# Patient Record
Sex: FEMALE | Race: WHITE | NOT HISPANIC OR LATINO | Employment: OTHER
[De-identification: names, ages, dates, MRNs, and addresses within clinical notes are randomized per-mention and may not be internally consistent; named-entity substitution may affect disease eponyms.]

---

## 2017-10-24 ENCOUNTER — RECORDS - HEALTHEAST (OUTPATIENT)
Dept: ADMINISTRATIVE | Facility: OTHER | Age: 82
End: 2017-10-24

## 2017-11-01 ENCOUNTER — RECORDS - HEALTHEAST (OUTPATIENT)
Dept: ADMINISTRATIVE | Facility: OTHER | Age: 82
End: 2017-11-01

## 2017-11-03 ENCOUNTER — AMBULATORY - HEALTHEAST (OUTPATIENT)
Dept: SURGERY | Facility: CLINIC | Age: 82
End: 2017-11-03

## 2017-11-03 ENCOUNTER — RECORDS - HEALTHEAST (OUTPATIENT)
Dept: ADMINISTRATIVE | Facility: OTHER | Age: 82
End: 2017-11-03

## 2017-11-03 DIAGNOSIS — C50.919 MALIGNANT NEOPLASM OF BREAST (FEMALE) (H): ICD-10-CM

## 2017-11-07 ENCOUNTER — OFFICE VISIT - HEALTHEAST (OUTPATIENT)
Dept: SURGERY | Facility: CLINIC | Age: 82
End: 2017-11-07

## 2017-11-07 DIAGNOSIS — C50.912 BILATERAL MALIGNANT NEOPLASM OF BREAST IN FEMALE, ESTROGEN RECEPTOR POSITIVE, UNSPECIFIED SITE OF BREAST (H): ICD-10-CM

## 2017-11-07 DIAGNOSIS — C50.911 BILATERAL MALIGNANT NEOPLASM OF BREAST IN FEMALE, ESTROGEN RECEPTOR POSITIVE, UNSPECIFIED SITE OF BREAST (H): ICD-10-CM

## 2017-11-07 DIAGNOSIS — Z17.0 BILATERAL MALIGNANT NEOPLASM OF BREAST IN FEMALE, ESTROGEN RECEPTOR POSITIVE, UNSPECIFIED SITE OF BREAST (H): ICD-10-CM

## 2017-11-07 RX ORDER — LISINOPRIL/HYDROCHLOROTHIAZIDE 10-12.5 MG
0.5-0.75 TABLET ORAL DAILY PRN
Status: SHIPPED | COMMUNITY
Start: 2017-09-20 | End: 2021-12-20

## 2017-11-07 ASSESSMENT — MIFFLIN-ST. JEOR: SCORE: 818.7

## 2017-11-22 ENCOUNTER — AMBULATORY - HEALTHEAST (OUTPATIENT)
Dept: SURGERY | Facility: CLINIC | Age: 82
End: 2017-11-22

## 2017-11-22 DIAGNOSIS — Z17.0 BILATERAL MALIGNANT NEOPLASM OF BREAST IN FEMALE, ESTROGEN RECEPTOR POSITIVE, UNSPECIFIED SITE OF BREAST (H): ICD-10-CM

## 2017-11-22 DIAGNOSIS — C50.912 BILATERAL MALIGNANT NEOPLASM OF BREAST IN FEMALE, ESTROGEN RECEPTOR POSITIVE, UNSPECIFIED SITE OF BREAST (H): ICD-10-CM

## 2017-11-22 DIAGNOSIS — C50.911 BILATERAL MALIGNANT NEOPLASM OF BREAST IN FEMALE, ESTROGEN RECEPTOR POSITIVE, UNSPECIFIED SITE OF BREAST (H): ICD-10-CM

## 2017-11-24 ENCOUNTER — COMMUNICATION - HEALTHEAST (OUTPATIENT)
Dept: SURGERY | Facility: CLINIC | Age: 82
End: 2017-11-24

## 2018-03-19 ENCOUNTER — RECORDS - HEALTHEAST (OUTPATIENT)
Dept: LAB | Facility: CLINIC | Age: 83
End: 2018-03-19

## 2018-03-19 LAB
ALBUMIN SERPL-MCNC: 3.7 G/DL (ref 3.5–5)
ALP SERPL-CCNC: 147 U/L (ref 45–120)
ALT SERPL W P-5'-P-CCNC: 20 U/L (ref 0–45)
ANION GAP SERPL CALCULATED.3IONS-SCNC: 9 MMOL/L (ref 5–18)
AST SERPL W P-5'-P-CCNC: 22 U/L (ref 0–40)
BILIRUB SERPL-MCNC: 0.9 MG/DL (ref 0–1)
BUN SERPL-MCNC: 45 MG/DL (ref 8–28)
CALCIUM SERPL-MCNC: 9.4 MG/DL (ref 8.5–10.5)
CHLORIDE BLD-SCNC: 105 MMOL/L (ref 98–107)
CO2 SERPL-SCNC: 28 MMOL/L (ref 22–31)
CREAT SERPL-MCNC: 1.64 MG/DL (ref 0.6–1.1)
GFR SERPL CREATININE-BSD FRML MDRD: 30 ML/MIN/1.73M2
GLUCOSE BLD-MCNC: 106 MG/DL (ref 70–125)
POTASSIUM BLD-SCNC: 4.8 MMOL/L (ref 3.5–5)
PROT SERPL-MCNC: 7.1 G/DL (ref 6–8)
SODIUM SERPL-SCNC: 142 MMOL/L (ref 136–145)
TSH SERPL DL<=0.005 MIU/L-ACNC: 0.3 UIU/ML (ref 0.3–5)

## 2018-03-30 ENCOUNTER — COMMUNICATION - HEALTHEAST (OUTPATIENT)
Dept: SCHEDULING | Facility: CLINIC | Age: 83
End: 2018-03-30

## 2018-06-29 ENCOUNTER — HOSPITAL ENCOUNTER (OUTPATIENT)
Dept: SURGERY | Facility: CLINIC | Age: 83
Discharge: HOME | End: 2018-06-29
Attending: SPECIALIST

## 2018-06-29 DIAGNOSIS — C50.811 MALIGNANT NEOPLASM OF OVERLAPPING SITES OF BOTH BREASTS IN FEMALE, ESTROGEN RECEPTOR POSITIVE (H): ICD-10-CM

## 2018-06-29 DIAGNOSIS — C50.911 BILATERAL MALIGNANT NEOPLASM OF BREAST IN FEMALE, ESTROGEN RECEPTOR POSITIVE, UNSPECIFIED SITE OF BREAST (H): ICD-10-CM

## 2018-06-29 DIAGNOSIS — Z17.0 MALIGNANT NEOPLASM OF OVERLAPPING SITES OF BOTH BREASTS IN FEMALE, ESTROGEN RECEPTOR POSITIVE (H): ICD-10-CM

## 2018-06-29 DIAGNOSIS — C50.912 BILATERAL MALIGNANT NEOPLASM OF BREAST IN FEMALE, ESTROGEN RECEPTOR POSITIVE, UNSPECIFIED SITE OF BREAST (H): ICD-10-CM

## 2018-06-29 DIAGNOSIS — C50.812 MALIGNANT NEOPLASM OF OVERLAPPING SITES OF BOTH BREASTS IN FEMALE, ESTROGEN RECEPTOR POSITIVE (H): ICD-10-CM

## 2018-06-29 DIAGNOSIS — Z17.0 BILATERAL MALIGNANT NEOPLASM OF BREAST IN FEMALE, ESTROGEN RECEPTOR POSITIVE, UNSPECIFIED SITE OF BREAST (H): ICD-10-CM

## 2018-06-29 ASSESSMENT — MIFFLIN-ST. JEOR: SCORE: 823.24

## 2018-09-04 ENCOUNTER — HOSPITAL ENCOUNTER (OUTPATIENT)
Dept: SURGERY | Facility: CLINIC | Age: 83
Discharge: HOME | End: 2018-09-04
Attending: SPECIALIST

## 2018-09-04 DIAGNOSIS — Z17.0 MALIGNANT NEOPLASM OF OVERLAPPING SITES OF BOTH BREASTS IN FEMALE, ESTROGEN RECEPTOR POSITIVE (H): ICD-10-CM

## 2018-09-04 DIAGNOSIS — C50.911 BILATERAL MALIGNANT NEOPLASM OF BREAST IN FEMALE, ESTROGEN RECEPTOR POSITIVE, UNSPECIFIED SITE OF BREAST (H): ICD-10-CM

## 2018-09-04 DIAGNOSIS — C50.912 BILATERAL MALIGNANT NEOPLASM OF BREAST IN FEMALE, ESTROGEN RECEPTOR POSITIVE, UNSPECIFIED SITE OF BREAST (H): ICD-10-CM

## 2018-09-04 DIAGNOSIS — C50.812 MALIGNANT NEOPLASM OF OVERLAPPING SITES OF BOTH BREASTS IN FEMALE, ESTROGEN RECEPTOR POSITIVE (H): ICD-10-CM

## 2018-09-04 DIAGNOSIS — Z17.0 BILATERAL MALIGNANT NEOPLASM OF BREAST IN FEMALE, ESTROGEN RECEPTOR POSITIVE, UNSPECIFIED SITE OF BREAST (H): ICD-10-CM

## 2018-09-04 DIAGNOSIS — C50.811 MALIGNANT NEOPLASM OF OVERLAPPING SITES OF BOTH BREASTS IN FEMALE, ESTROGEN RECEPTOR POSITIVE (H): ICD-10-CM

## 2018-09-19 ENCOUNTER — RECORDS - HEALTHEAST (OUTPATIENT)
Dept: LAB | Facility: CLINIC | Age: 83
End: 2018-09-19

## 2018-09-19 LAB
CHOLEST SERPL-MCNC: 165 MG/DL
FASTING STATUS PATIENT QL REPORTED: NORMAL
HDLC SERPL-MCNC: 65 MG/DL
LDLC SERPL CALC-MCNC: 81 MG/DL
T4 FREE SERPL-MCNC: 1.1 NG/DL (ref 0.7–1.8)
TRIGL SERPL-MCNC: 97 MG/DL
TSH SERPL DL<=0.005 MIU/L-ACNC: 0.28 UIU/ML (ref 0.3–5)

## 2019-03-05 ENCOUNTER — HOSPITAL ENCOUNTER (OUTPATIENT)
Dept: SURGERY | Facility: CLINIC | Age: 84
Discharge: HOME | End: 2019-03-05
Attending: SPECIALIST

## 2019-03-05 DIAGNOSIS — C50.812 MALIGNANT NEOPLASM OF OVERLAPPING SITES OF BOTH BREASTS IN FEMALE, ESTROGEN RECEPTOR POSITIVE (H): ICD-10-CM

## 2019-03-05 DIAGNOSIS — C50.811 MALIGNANT NEOPLASM OF OVERLAPPING SITES OF BOTH BREASTS IN FEMALE, ESTROGEN RECEPTOR POSITIVE (H): ICD-10-CM

## 2019-03-05 DIAGNOSIS — Z17.0 MALIGNANT NEOPLASM OF OVERLAPPING SITES OF BOTH BREASTS IN FEMALE, ESTROGEN RECEPTOR POSITIVE (H): ICD-10-CM

## 2019-03-05 ASSESSMENT — MIFFLIN-ST. JEOR: SCORE: 823.24

## 2019-03-20 ENCOUNTER — RECORDS - HEALTHEAST (OUTPATIENT)
Dept: LAB | Facility: CLINIC | Age: 84
End: 2019-03-20

## 2019-03-20 LAB
ALBUMIN SERPL-MCNC: 3.9 G/DL (ref 3.5–5)
ALP SERPL-CCNC: 96 U/L (ref 45–120)
ALT SERPL W P-5'-P-CCNC: 23 U/L (ref 0–45)
ANION GAP SERPL CALCULATED.3IONS-SCNC: 10 MMOL/L (ref 5–18)
AST SERPL W P-5'-P-CCNC: 19 U/L (ref 0–40)
BILIRUB SERPL-MCNC: 0.7 MG/DL (ref 0–1)
BUN SERPL-MCNC: 45 MG/DL (ref 8–28)
CALCIUM SERPL-MCNC: 9.3 MG/DL (ref 8.5–10.5)
CHLORIDE BLD-SCNC: 107 MMOL/L (ref 98–107)
CO2 SERPL-SCNC: 27 MMOL/L (ref 22–31)
CREAT SERPL-MCNC: 2.07 MG/DL (ref 0.6–1.1)
ERYTHROCYTE [SEDIMENTATION RATE] IN BLOOD BY WESTERGREN METHOD: 9 MM/HR (ref 0–20)
GFR SERPL CREATININE-BSD FRML MDRD: 23 ML/MIN/1.73M2
GLUCOSE BLD-MCNC: 130 MG/DL (ref 70–125)
POTASSIUM BLD-SCNC: 5 MMOL/L (ref 3.5–5)
PROT SERPL-MCNC: 7.2 G/DL (ref 6–8)
SODIUM SERPL-SCNC: 144 MMOL/L (ref 136–145)
T4 FREE SERPL-MCNC: 1.1 NG/DL (ref 0.7–1.8)
TSH SERPL DL<=0.005 MIU/L-ACNC: 0.13 UIU/ML (ref 0.3–5)

## 2019-04-05 ENCOUNTER — RECORDS - HEALTHEAST (OUTPATIENT)
Dept: LAB | Facility: CLINIC | Age: 84
End: 2019-04-05

## 2019-04-05 LAB — T3FREE SERPL-MCNC: 3.3 PG/ML (ref 1.9–3.9)

## 2019-08-23 ENCOUNTER — COMMUNICATION - HEALTHEAST (OUTPATIENT)
Dept: SURGERY | Facility: CLINIC | Age: 84
End: 2019-08-23

## 2019-08-23 DIAGNOSIS — C50.911 BILATERAL MALIGNANT NEOPLASM OF BREAST IN FEMALE, ESTROGEN RECEPTOR POSITIVE, UNSPECIFIED SITE OF BREAST (H): ICD-10-CM

## 2019-08-23 DIAGNOSIS — Z17.0 BILATERAL MALIGNANT NEOPLASM OF BREAST IN FEMALE, ESTROGEN RECEPTOR POSITIVE, UNSPECIFIED SITE OF BREAST (H): ICD-10-CM

## 2019-08-23 DIAGNOSIS — C50.912 BILATERAL MALIGNANT NEOPLASM OF BREAST IN FEMALE, ESTROGEN RECEPTOR POSITIVE, UNSPECIFIED SITE OF BREAST (H): ICD-10-CM

## 2019-09-10 ENCOUNTER — HOSPITAL ENCOUNTER (OUTPATIENT)
Dept: SURGERY | Facility: CLINIC | Age: 84
Discharge: HOME | End: 2019-09-10
Attending: SPECIALIST

## 2019-09-10 DIAGNOSIS — C50.911 BILATERAL MALIGNANT NEOPLASM OF BREAST IN FEMALE, ESTROGEN RECEPTOR POSITIVE, UNSPECIFIED SITE OF BREAST (H): ICD-10-CM

## 2019-09-10 DIAGNOSIS — C50.912 BILATERAL MALIGNANT NEOPLASM OF BREAST IN FEMALE, ESTROGEN RECEPTOR POSITIVE, UNSPECIFIED SITE OF BREAST (H): ICD-10-CM

## 2019-09-10 DIAGNOSIS — Z17.0 BILATERAL MALIGNANT NEOPLASM OF BREAST IN FEMALE, ESTROGEN RECEPTOR POSITIVE, UNSPECIFIED SITE OF BREAST (H): ICD-10-CM

## 2019-09-10 ASSESSMENT — MIFFLIN-ST. JEOR: SCORE: 784.87

## 2019-09-18 ENCOUNTER — RECORDS - HEALTHEAST (OUTPATIENT)
Dept: LAB | Facility: CLINIC | Age: 84
End: 2019-09-18

## 2019-09-18 LAB
ANION GAP SERPL CALCULATED.3IONS-SCNC: 9 MMOL/L (ref 5–18)
BUN SERPL-MCNC: 44 MG/DL (ref 8–28)
CALCIUM SERPL-MCNC: 9.7 MG/DL (ref 8.5–10.5)
CHLORIDE BLD-SCNC: 106 MMOL/L (ref 98–107)
CHOLEST SERPL-MCNC: 168 MG/DL
CO2 SERPL-SCNC: 29 MMOL/L (ref 22–31)
CREAT SERPL-MCNC: 1.76 MG/DL (ref 0.6–1.1)
FASTING STATUS PATIENT QL REPORTED: NORMAL
GFR SERPL CREATININE-BSD FRML MDRD: 27 ML/MIN/1.73M2
GLUCOSE BLD-MCNC: 128 MG/DL (ref 70–125)
HDLC SERPL-MCNC: 67 MG/DL
LDLC SERPL CALC-MCNC: 73 MG/DL
POTASSIUM BLD-SCNC: 5.1 MMOL/L (ref 3.5–5)
SODIUM SERPL-SCNC: 144 MMOL/L (ref 136–145)
TRIGL SERPL-MCNC: 138 MG/DL

## 2019-09-24 ENCOUNTER — RECORDS - HEALTHEAST (OUTPATIENT)
Dept: LAB | Facility: CLINIC | Age: 84
End: 2019-09-24

## 2019-09-25 LAB — HBA1C MFR BLD: 6.4 % (ref 4.2–6.1)

## 2020-09-03 ENCOUNTER — COMMUNICATION - HEALTHEAST (OUTPATIENT)
Dept: SURGERY | Facility: CLINIC | Age: 85
End: 2020-09-03

## 2020-09-03 DIAGNOSIS — C50.912 BILATERAL MALIGNANT NEOPLASM OF BREAST IN FEMALE, ESTROGEN RECEPTOR POSITIVE, UNSPECIFIED SITE OF BREAST (H): ICD-10-CM

## 2020-09-03 DIAGNOSIS — C50.911 BILATERAL MALIGNANT NEOPLASM OF BREAST IN FEMALE, ESTROGEN RECEPTOR POSITIVE, UNSPECIFIED SITE OF BREAST (H): ICD-10-CM

## 2020-09-03 DIAGNOSIS — Z17.0 BILATERAL MALIGNANT NEOPLASM OF BREAST IN FEMALE, ESTROGEN RECEPTOR POSITIVE, UNSPECIFIED SITE OF BREAST (H): ICD-10-CM

## 2020-09-14 ENCOUNTER — COMMUNICATION - HEALTHEAST (OUTPATIENT)
Dept: SURGERY | Facility: CLINIC | Age: 85
End: 2020-09-14

## 2020-09-25 ENCOUNTER — HOSPITAL ENCOUNTER (OUTPATIENT)
Dept: SURGERY | Facility: CLINIC | Age: 85
Discharge: HOME | End: 2020-09-25
Attending: SPECIALIST

## 2020-09-25 ENCOUNTER — SURGERY - HEALTHEAST (OUTPATIENT)
Dept: SURGERY | Facility: AMBULATORY SURGERY CENTER | Age: 85
End: 2020-09-25
Payer: MEDICARE

## 2020-09-25 DIAGNOSIS — Z17.0 BILATERAL MALIGNANT NEOPLASM OF BREAST IN FEMALE, ESTROGEN RECEPTOR POSITIVE, UNSPECIFIED SITE OF BREAST (H): ICD-10-CM

## 2020-09-25 DIAGNOSIS — C50.912 BILATERAL MALIGNANT NEOPLASM OF BREAST IN FEMALE, ESTROGEN RECEPTOR POSITIVE, UNSPECIFIED SITE OF BREAST (H): ICD-10-CM

## 2020-09-25 DIAGNOSIS — C50.911 BILATERAL MALIGNANT NEOPLASM OF BREAST IN FEMALE, ESTROGEN RECEPTOR POSITIVE, UNSPECIFIED SITE OF BREAST (H): ICD-10-CM

## 2020-10-21 ENCOUNTER — COMMUNICATION - HEALTHEAST (OUTPATIENT)
Dept: SURGERY | Facility: CLINIC | Age: 85
End: 2020-10-21

## 2021-05-20 ENCOUNTER — HOSPITAL ENCOUNTER (OUTPATIENT)
Dept: SURGERY | Facility: CLINIC | Age: 86
Discharge: HOME | End: 2021-05-20
Attending: SPECIALIST

## 2021-05-20 DIAGNOSIS — Z17.0 BILATERAL MALIGNANT NEOPLASM OF BREAST IN FEMALE, ESTROGEN RECEPTOR POSITIVE, UNSPECIFIED SITE OF BREAST (H): ICD-10-CM

## 2021-05-20 DIAGNOSIS — C50.911 BILATERAL MALIGNANT NEOPLASM OF BREAST IN FEMALE, ESTROGEN RECEPTOR POSITIVE, UNSPECIFIED SITE OF BREAST (H): ICD-10-CM

## 2021-05-20 DIAGNOSIS — C50.912 BILATERAL MALIGNANT NEOPLASM OF BREAST IN FEMALE, ESTROGEN RECEPTOR POSITIVE, UNSPECIFIED SITE OF BREAST (H): ICD-10-CM

## 2021-05-26 ENCOUNTER — COMMUNICATION - HEALTHEAST (OUTPATIENT)
Dept: SURGERY | Facility: CLINIC | Age: 86
End: 2021-05-26

## 2021-05-27 ENCOUNTER — AMBULATORY - HEALTHEAST (OUTPATIENT)
Dept: SURGERY | Facility: CLINIC | Age: 86
End: 2021-05-27

## 2021-05-27 DIAGNOSIS — Z11.59 ENCOUNTER FOR SCREENING FOR OTHER VIRAL DISEASES: ICD-10-CM

## 2021-05-31 VITALS — BODY MASS INDEX: 17.66 KG/M2 | HEIGHT: 62 IN | WEIGHT: 96 LBS

## 2021-06-01 VITALS — BODY MASS INDEX: 17.85 KG/M2 | WEIGHT: 97 LBS | HEIGHT: 62 IN

## 2021-06-01 NOTE — PROGRESS NOTES
Mayela comes in for continued follow-up of her bilateral breast cancers that we are treating nonoperatively with anastrozole.  She is very opposed to having any surgery done.  She was diagnosed 2 years ago.  She is overall feeling reasonably well.  She does state that she has had some problems with stability and uses a cane.    Physical exam:  Thin elderly woman.  She is alert and oriented.  Breast: Persistent palpable mass just underneath the nipple in the right breast.  It actually feels smaller than it did last time.  In the left breast there is a small lesion just lateral to the nipple.  There is also small lesion on the medial aspect of the breast that actually feels like benign breast tissue.  Axilla: No axillary adenopathy.    Impression: Bilateral breast cancer that we are following and treating nonoperatively with anastrozole.  I actually think both breasts feel better today.  My only concern is that she is actually lost some weight.  She states she knows this and is not concerned with it.  Once again she does not want any type of surgery but is willing to continue with the anastrozole.    Plan: Renewed her anastrozole.  Follow-up with me again in 6 months.

## 2021-06-01 NOTE — PROGRESS NOTES
"[facility] for a follow up appointment with Dr. Duran.  RN assessment and EMR update.  /64 (Patient Site: Left Arm, Patient Position: Sitting)   Pulse 76   Resp 16   Ht 5' 1\" (1.549 m)   Wt (!) 92 lb 0.6 oz (41.7 kg)   SpO2 94%   BMI 17.39 kg/m  .  She said she is tolerating her hormone medication just fine.  She has not noticed any changes in her breasts.  Patient met with Dr. Duarn, see dictation for details. She will plan to come see Dr. Duran in 6 mos, sooner as needed.  RN time 18 mins  "

## 2021-06-02 VITALS — BODY MASS INDEX: 17.74 KG/M2 | WEIGHT: 97 LBS

## 2021-06-02 VITALS — WEIGHT: 97 LBS | HEIGHT: 62 IN | BODY MASS INDEX: 17.85 KG/M2

## 2021-06-03 ENCOUNTER — RECORDS - HEALTHEAST (OUTPATIENT)
Dept: LAB | Facility: CLINIC | Age: 86
End: 2021-06-03

## 2021-06-03 VITALS — HEIGHT: 61 IN | BODY MASS INDEX: 17.38 KG/M2 | WEIGHT: 92.04 LBS

## 2021-06-03 LAB
ANION GAP SERPL CALCULATED.3IONS-SCNC: 8 MMOL/L (ref 5–18)
BUN SERPL-MCNC: 39 MG/DL (ref 8–28)
CALCIUM SERPL-MCNC: 9.1 MG/DL (ref 8.5–10.5)
CHLORIDE BLD-SCNC: 104 MMOL/L (ref 98–107)
CO2 SERPL-SCNC: 32 MMOL/L (ref 22–31)
CREAT SERPL-MCNC: 1.62 MG/DL (ref 0.6–1.1)
GFR SERPL CREATININE-BSD FRML MDRD: 30 ML/MIN/1.73M2
GLUCOSE BLD-MCNC: 107 MG/DL (ref 70–125)
POTASSIUM BLD-SCNC: 5 MMOL/L (ref 3.5–5)
SODIUM SERPL-SCNC: 144 MMOL/L (ref 136–145)

## 2021-06-04 ENCOUNTER — COMMUNICATION - HEALTHEAST (OUTPATIENT)
Dept: SURGERY | Facility: CLINIC | Age: 86
End: 2021-06-04

## 2021-06-04 DIAGNOSIS — C50.911 BILATERAL MALIGNANT NEOPLASM OF BREAST IN FEMALE, ESTROGEN RECEPTOR POSITIVE, UNSPECIFIED SITE OF BREAST (H): ICD-10-CM

## 2021-06-04 DIAGNOSIS — C50.912 BILATERAL MALIGNANT NEOPLASM OF BREAST IN FEMALE, ESTROGEN RECEPTOR POSITIVE, UNSPECIFIED SITE OF BREAST (H): ICD-10-CM

## 2021-06-04 DIAGNOSIS — Z17.0 BILATERAL MALIGNANT NEOPLASM OF BREAST IN FEMALE, ESTROGEN RECEPTOR POSITIVE, UNSPECIFIED SITE OF BREAST (H): ICD-10-CM

## 2021-06-07 RX ORDER — ANASTROZOLE 1 MG/1
TABLET ORAL
Qty: 90 TABLET | Refills: 2 | Status: SHIPPED | OUTPATIENT
Start: 2021-06-07 | End: 2022-04-11

## 2021-06-09 ENCOUNTER — RECORDS - HEALTHEAST (OUTPATIENT)
Dept: ADMINISTRATIVE | Facility: OTHER | Age: 86
End: 2021-06-09

## 2021-06-10 ENCOUNTER — RECORDS - HEALTHEAST (OUTPATIENT)
Dept: ADMINISTRATIVE | Facility: OTHER | Age: 86
End: 2021-06-10

## 2021-06-10 ASSESSMENT — MIFFLIN-ST. JEOR: SCORE: 775.15

## 2021-06-11 ENCOUNTER — RECORDS - HEALTHEAST (OUTPATIENT)
Dept: LAB | Facility: CLINIC | Age: 86
End: 2021-06-11

## 2021-06-11 ENCOUNTER — RECORDS - HEALTHEAST (OUTPATIENT)
Dept: ADMINISTRATIVE | Facility: OTHER | Age: 86
End: 2021-06-11

## 2021-06-11 LAB
SARS-COV-2 PCR COMMENT: NORMAL
SARS-COV-2 RNA SPEC QL NAA+PROBE: NEGATIVE
SARS-COV-2 VIRUS SPECIMEN SOURCE: NORMAL

## 2021-06-11 NOTE — PROGRESS NOTES
Mayela presents to [facility] for a follow up appointment with Dr. Duran.  RN assessment and EMR update. Patient has been taking Arimidex for her breast cancer since 2017 hoping to avoid surgery, but she feels the lump in her left breast is getting larger.  Patient met with Dr. Duran, see dictation for details of visit. She will think over whether or not she will agree to have surgery to remove the lump and call when she makes that decision.  Support provided, invited calls. RN time 12 mins

## 2021-06-11 NOTE — PROGRESS NOTES
This is a 87 y.o. woman who comes in for  continued follow-up of her bilateral breast cancers.  I have been seeing her for nearly 3 years with known breast cancers.  The patient has refused surgery.  She had an initial somewhat response to hormone therapy but states that over the last few months the mass on the left has grown considerably.      Please see the chart review for PMH, Meds, allergies, FH and SH.    ROS:  A 12 point comprehensive review of systems was negative except as noted.      Physical Exam:  There were no vitals taken for this visit.  General appearance: alert, appears stated age and cooperative, very thin.  Breasts: She has very small breasts.  There is a very large central mass in the left breast right behind the nipple measuring at least 2 cm in size and just medial to it is a smaller mass.  In the right breast there is a small mass right underneath the nipple causing some retraction of the nipple but this measures no more than a centimeter in size.  All of this is very mobile.  Nothing is fixed to the chest wall.  Lymph nodes: Cervical, supraclavicular, and axillary nodes normal.  Neurologic: Grossly normal    Data Review: There have been no recent mammograms.      Impression:   Bilateral breast cancer.  The mass in the left has now grown to the size where I definitely feel she needs surgery.  I have told her in the past that I think she needs surgery but now I really think she needs to consider it before it becomes a problem to the point where we cannot do a resection.  Explained to her that she is so thin and her breasts are so small, that this is something I could do as an outpatient under local anesthetic.  She was actually much more interested in this when I told her that.  In particular the fact that it is outpatient.  She does not want to do anything on the right but thinks that she would do something in the left. She then tried to pin me down as to how long she can wait to do it.  I  told her there is no way I would know how soon this could become a problem and that I would tend to recommend she do it sooner than later.  She is not quite ready to make that decision yet.  Would not do a sentinel lymph node biopsy on her.  It would not change anything we would do as first treatment and she again is so thin that if she had something in her axilla, I am sure I could feel it and there is nothing palpable.    Plan: She will call and let me know when she decides she wants surgery.  I told her I would not be calling and checking on her as she has been very clear about what she wants and does not want over the last several years.

## 2021-06-11 NOTE — TELEPHONE ENCOUNTER
Attempted to reach patient to schedule a follow up appointment for her to see Dr. Duran.  No answer, no voice mail.

## 2021-06-12 NOTE — TELEPHONE ENCOUNTER
Mayela's son, Mina, called and asked for Dr. Duran to call him to discuss his mother needing surgery.  Message sent to Dr. Duran.

## 2021-06-14 ENCOUNTER — RECORDS - HEALTHEAST (OUTPATIENT)
Dept: ADMINISTRATIVE | Facility: OTHER | Age: 86
End: 2021-06-14

## 2021-06-14 ENCOUNTER — SURGERY - HEALTHEAST (OUTPATIENT)
Dept: SURGERY | Facility: HOSPITAL | Age: 86
End: 2021-06-14
Payer: MEDICARE

## 2021-06-14 ENCOUNTER — ANESTHESIA - HEALTHEAST (OUTPATIENT)
Dept: SURGERY | Facility: HOSPITAL | Age: 86
End: 2021-06-14

## 2021-06-14 ASSESSMENT — MIFFLIN-ST. JEOR: SCORE: 767.5

## 2021-06-14 NOTE — PROGRESS NOTES
"This is a 84 y.o.  female who I'm asked to see by Mendel Murray MD for evaluation of a bilateral breast cancers.  The lesion on the right was picked up by the patient.  She then had mammograms which showed a mass where she felt 1 and then 2 lesions on the left.  The patient cannot feel a mass on the left.  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  They are both estrogen receptor positive, progesterone receptor positive and HER-2 negative.    She has no family history of breast cancer.      PAST MEDICAL HISTORY:  Past Medical History:   Diagnosis Date     Abnormal mini-mental status exam     scored 16 2012, ?dementia     Breast cancer      Chronic kidney disease      Essential tremor      HTN (hypertension)      Tobacco use      Past Surgical History:   Procedure Laterality Date     BREAST LUMPECTOMY Bilateral 1982    Cyst removed     ear prosthesis Left      l rola       left hip ORIF         Medications:    Current Outpatient Prescriptions:      artificial tears,hypromellose, (GENTEAL) 0.3 % Drop, Administer 1 drop to both eyes as needed., Disp: , Rfl:      busPIRone (BUSPAR) 5 MG tablet, Take 1 tablet by mouth daily as needed., Disp: , Rfl:      cholecalciferol, vitamin D3, 1,000 unit tablet, Take 1,000 Units by mouth daily., Disp: , Rfl:      lisinopril-hydrochlorothiazide (PRINZIDE,ZESTORETIC) 10-12.5 mg per tablet, Take 1 tablet by mouth daily., Disp: , Rfl:      multivit-iron-min-folic acid 3,500-18-0.4 unit-mg-mg Chew, Chew 0.5 tablets daily., Disp: , Rfl:     Allergies:  Allergies   Allergen Reactions     Latex Rash       Social History:   reports that she has been smoking.  She has never used smokeless tobacco. She reports that she does not drink alcohol.    ROS:  A 12 point comprehensive review of systems was negative except as noted.    Physical Exam  /72  Pulse 92  Resp 18  Ht 5' 2\" (1.575 m)  Wt (!) 96 lb (43.5 kg)  SpO2 " 100%  BMI 17.56 kg/m2  General:alert, appears stated age, cachectic and cooperative  Lungs:clear to auscultation bilaterally  CV:regular rate and rhythm, S1, S2 normal, no murmur, click, rub or gallop  Breasts: Very small breasts.  Mass just underneath the nipple of the right breast.  Although the mass is not very large it takes up the preponderance of the central portion of her breast tissue.  On the left I feel a mass laterally but do not feel the mass just below the nipple.  No skin changes.  Lymph Nodes:no axillary nodes palpated.  She has scarring in both axilla that look like potentially previous radiation.  Neuro:Grossly normal      Reviewed her mammograms and ultrasound and pathology.     Impression: Bilateral Breast Cancer.  She is clinically stage I on both sides.  Discussed the typical surgical options for treatment of breast cancer which generally are a lumpectomy (partial mastectomy) combined with radiation versus a mastectomy.  Explained that the survival benefit is the same for both.  The difference is in local recurrence risk.  The patient is a Poor candidate for a lumpectomy.  She has 2 lesions on the left that are not completely different quadrants and the lesion on the right is just underneath the nipple.  In addition she has very small breasts so I will basically have removed most of her breast tissue just by doing a lumpectomies.  There is also some remote history of radiation in the area so I do think she would be best served by mastectomies.  She was not particularly happy to hear this.  She kept asking why she could not have lumpectomies and despite me trying to explain to her I am not sure that I could really get her to understand it.  I also had a long talk with her son who is not here afterwards on the phone and explained to him.  He does seem to understand and he plans to talk to her.  I also talked to both of them about another option and that would be to simply do hormone therapy.  I  typically reserve this for patients who are not surgical candidates but there is good data out of [place] that this may be adequate.  He is going to talk to her about it and then they will let me know.  Discussed SLN biopsy.  The procedure and rationale were explained.  If she does do surgery then I would recommend this bilaterally.  I spent greater than an hour with the patient discussing all of this.  I then also spent time talking it over with her son.  Total time in coordinating her care was an hour and half.        Plan: She is going to discuss this with her son and then they will decide what to do and will get back to me.  She does have surgery this would be an overnight stay in the hospital.  The risks and benefits of surgery were explained.  Also talked about expected recovery time.

## 2021-06-15 ENCOUNTER — RECORDS - HEALTHEAST (OUTPATIENT)
Dept: ADMINISTRATIVE | Facility: OTHER | Age: 86
End: 2021-06-15

## 2021-06-15 ASSESSMENT — MIFFLIN-ST. JEOR: SCORE: 778.33

## 2021-06-16 PROBLEM — F17.210 CIGARETTE NICOTINE DEPENDENCE, UNCOMPLICATED: Status: ACTIVE | Noted: 2019-03-04

## 2021-06-16 PROBLEM — Z17.0 BILATERAL MALIGNANT NEOPLASM OF BREAST IN FEMALE, ESTROGEN RECEPTOR POSITIVE, UNSPECIFIED SITE OF BREAST (H): Status: ACTIVE | Noted: 2021-05-26

## 2021-06-16 PROBLEM — I12.9 HYPERTENSIVE CHRONIC KIDNEY DISEASE W STG 1-4/UNSP CHR KDNY: Status: ACTIVE | Noted: 2019-03-04

## 2021-06-16 PROBLEM — F41.1 GENERALIZED ANXIETY DISORDER: Status: ACTIVE | Noted: 2019-03-04

## 2021-06-16 PROBLEM — C50.919 INFILTRATING DUCTAL CARCINOMA OF BREAST (H): Status: ACTIVE | Noted: 2019-03-04

## 2021-06-16 PROBLEM — E04.9 GOITER: Status: ACTIVE | Noted: 2019-03-04

## 2021-06-16 PROBLEM — C50.912 BILATERAL MALIGNANT NEOPLASM OF BREAST IN FEMALE, ESTROGEN RECEPTOR POSITIVE, UNSPECIFIED SITE OF BREAST (H): Status: ACTIVE | Noted: 2021-05-26

## 2021-06-16 PROBLEM — C50.911 BILATERAL MALIGNANT NEOPLASM OF BREAST IN FEMALE, ESTROGEN RECEPTOR POSITIVE, UNSPECIFIED SITE OF BREAST (H): Status: ACTIVE | Noted: 2021-05-26

## 2021-06-17 NOTE — PROGRESS NOTES
This is a 88 y.o. woman who comes in for  continued follow-up of her bilateral breast cancers.  I have been seeing her for nearly 4 years with known breast cancers.  The patient has refused surgery.  She had an initial somewhat response to hormone therapy but states that over the last few months the mass on the left has grown considerably.  I saw her about 6 months ago and at that point she actually was indicating that she probably wanted to have surgery.  I even discussed this with her son.  Her mass on the left had grown considerably.  However she never returned phone calls about scheduling.  She states that she comes in again now because her son has been getting on her to make her come in to be evaluated again.  The last time I saw her again was 6 months ago.        Please see the chart review for PMH, Meds, allergies, FH and SH.  Past Medical History:   Diagnosis Date     Abnormal mini-mental status exam     scored 16 2012, ?dementia     Breast cancer (H)      Chronic kidney disease      Essential tremor      HTN (hypertension)      Tobacco use      Current Outpatient Medications   Medication Instructions     anastrozole (ARIMIDEX) 1 mg tablet TAKE 1 TABLET BY MOUTH DAILY     cholecalciferol (vitamin D3) 1,000 Units, Oral, DAILY     lisinopril-hydrochlorothiazide (PRINZIDE,ZESTORETIC) 10-12.5 mg per tablet 1 tablet, Oral, DAILY     multivit-iron-min-folic acid 3,500-18-0.4 unit-mg-mg Chew 0.5 tablets, Oral, DAILY        ROS:  A 12 point comprehensive review of systems was negative except as noted.        Physical Exam:  There were no vitals taken for this visit.    General appearance: alert, appears stated age and cooperative, very thin.  Breasts: She has very small breasts.  There is a very large central mass in the left breast right behind the nipple measuring at least 3-4 cm in size.  Worried that there is starting to be skin involvement over a fairly large area at the very central aspect of the breast.  Were  used to be 2 separate clearly palpable masses and now was just 1 relatively large mass.  There is definitely skin puckering medially. In the right breast there is a small mass right underneath the nipple causing some retraction of the nipple but this measures no more than a centimeter in size.  All of this is very mobile.  Nothing is fixed to the chest wall.  Lymph nodes: Cervical, supraclavicular, and axillary nodes normal.  Neurologic: Grossly normal     Data Review: There have been no recent mammograms.        Impression:   Bilateral breast cancer.  The mass in the left has now grown to the size where I definitely feel she needs surgery.  I told her this 6 months ago but then she never scheduled.  I told her that if we do not do something soon it will to the point where I will have nothing further to offer.   She is small and thin enough that I think we can do this under local MAC.  She seems most concerned about the anesthesia.  Given her age and some immobility issues I think she should be done at the hospital and plan to spend the night but I told her she could go home the next day.  She was actually much more interested in this when I told her that.    Previously she only wanted to do the left but I think I have her convinced at this point that if her going to go to the operating room we should do both sides so that we do not end up in the same boat again a year from now.  She is still very apprehensive but seems agreeable to do this.     Plan:  Bilateral mastectomies.  Do not plan to take out any lymph nodes because I do not want to increase her morbidity.  I will have my  call her but I told her we are not going to keep pounding her and will be up to her to make the decision on when to proceed.  We have made multiple phone calls in the past and then she just does not answer them and does not return calls.  It sounds like her son is more on top of trying to get her to get surgery at this  time.    Total time with the patient including reviewing charts, exam and discussion and then coordination of care afterwards was 60 minutes.

## 2021-06-17 NOTE — PROGRESS NOTES
Patient presents to [facility] [place] today for a follow up appointment with Dr. Duran regarding her bilateral breast cancers. She has been refusing surgery for a number of years now.   RN assessment and EMR update.  Patient met with Dr. Duran, see dictation for details of visit. She has agreed to bilateral mastectomies, no reconstruction.  Gave her a breast cancer packet, contents reviewed, drain teaching done.  Given a drain apron.  Joan to call her to schedule. Support provided, invited calls. RN time 20 mins

## 2021-06-18 ENCOUNTER — COMMUNICATION - HEALTHEAST (OUTPATIENT)
Dept: ADMINISTRATIVE | Facility: CLINIC | Age: 86
End: 2021-06-18

## 2021-06-19 NOTE — PROGRESS NOTES
"Mayela comes in for continued follow-up of her bilateral breast cancer that we had opted to treat nonoperatively and which is hormone therapy.  When I saw her 7 months ago I had recommended bilateral mastectomies.  She was very resistant and based on that we opted to just go with hormone therapy.  She is tolerating it well.  She states she does not examine her breasts so she does not know if there is been much change.    Past medical history is otherwise unchanged.    Physical exam:  /60 (Patient Site: Left Arm, Patient Position: Sitting, Cuff Size: Adult Regular)  Pulse 81  Ht 5' 2\" (1.575 m)  Wt (!) 97 lb (44 kg)  SpO2 94%  Breastfeeding? No  BMI 17.74 kg/m2  General: Thin elderly woman in no acute distress.  Breast: Persistent palpable mass just underneath the nipple of the right breast is basically involving the nipple.  The nipple is retracted.  In the left breast there is a clearly palpable mass in the medial aspect of the breast that measures about 1-1-1/2 cm in size and then another mass palpable laterally.  Axilla: No axillary adenopathy.    Impression: Bilateral breast cancer.  Unfortunately there has not been as much responses I would like to see to the hormone therapy.  At this point I would recommend she undergo bilateral mastectomies.  She again is very resistant.  She asked again about being able to do a lumpectomy.  I tried to explain to her once again that the size of the tumor on the right compared to the size of her breasts would just preclude doing any kind of a lumpectomy.  On the left she has 2 cancers and completely different quadrants of the breast and again she is very small breasts and I think trying to do a lumpectomy is going to leave a significant cosmetic deficit where doing a mastectomy and wearing a bra with a prosthesis I think will do better for her.  I offered to have her get a second opinion just to see if that would help her feel more comfortable with it.  She asked " about just checking in 3 months.  I told her that I doubted very much that there would be any difference in 3 months since there was not anything in 7 months.  I told her either she should go ahead with the surgery or not.  If she does not then we could just see her again in 6 months or so just to be sure that it is not advancing.  Although the tumors are not regressing the office or not getting larger so she could live for quite some time without these causing a problem.  There is of course no way to know that.  After much discussion she again refuses any type of surgery at this time.  She wants to come in in 3 months even though I think that is too soon and is not likely going to change anything that recommend.    Plan: Continue with anastrozole and follow-up again in 3 months.

## 2021-06-20 NOTE — PROGRESS NOTES
Chief Complaint   Patient presents with     Follow-up     patient states that she thinks it is going ok wondering if anything should be checked like hgb patient stattes she has been tired

## 2021-06-20 NOTE — PROGRESS NOTES
Mayela comes in for continued follow-up of her bilateral breast cancer that she opted to treat conservatively with hormone therapy versus surgery.  She is overall feeling well.  She is still opposed to doing mastectomies.  She has had no side effects from the medications.    No change in her past medical history.    Physical exam:  /60 (Patient Site: Left Arm, Patient Position: Sitting, Cuff Size: Adult Regular)  Pulse 81  Wt (!) 97 lb (44 kg)  SpO2 96%  Breastfeeding? No  BMI 17.74 kg/m2  General: Elderly, thin woman in no acute distress.  Very alert.  Breast: Persistent palpable mass involving the nipple in the right breast.  May be a little bit smaller.  Measures at least 2 cm in size.  In the left breast there to palpable masses one very medial and the other one lateral.  The medial one is the larger one of the 2.  Both feel stable from her previous exam which was just 3 months ago.  Axilla: No axillary adenopathy.    Impression: Bilateral breast cancer.  All of the lesions are fairly stable since we started hormone therapy.  The one on the right is may be a little bit smaller but not a significant change.  I discussed with her again that if we were to do surgery I would tend to recommend mastectomies.  She is very opposed to that.  Explained again that the tumor involves her nipple on her right so we would be removing the nipple and most of her breast tissue anyway.  On the left she has 2 masses so once we removed both of those there is going to be a significant cosmetic deficit where I do think mastectomies is getting give her a better cosmetic result.  She will be able to wear a mastectomy bra with prosthesis better than a typical bra will factor.  She is so small and thin and I am just worried that I will not even build to get much of a margin doing a lumpectomy.  The other option is just to continue with the hormone therapy.  She wanted to know how long it would take to see a complete response if  there was going to be.  I told her there is no way to know that.  All tumors are different.  There has not been a significant change in nearly a year or so there may never be a complete response.  If it at least keeps them at bay then that is reasonable to continue with just that also.  Again I would tend to recommend surgery but she has been opposed to that in the past.    Plan: For now we are going to continue hormone therapy.  She would like me to talk to her son and I will try to contact him within the next couple days.  Tentatively setting her up for a six-month follow-up but if she does decide to do surgery we would get that set up at her convenience.  I would be willing to do lumpectomies but again warned her that it is going to not be that much different than just going ahead with a mastectomy.

## 2021-06-22 ENCOUNTER — HOSPITAL ENCOUNTER (OUTPATIENT)
Dept: SURGERY | Facility: CLINIC | Age: 86
Discharge: HOME | End: 2021-06-22
Attending: SPECIALIST
Payer: MEDICARE

## 2021-06-22 DIAGNOSIS — Z48.89 POSTOPERATIVE VISIT: ICD-10-CM

## 2021-06-24 NOTE — PROGRESS NOTES
"Mayela comes in for continued follow-up of her bilateral breast cancers that she has opted to treat nonoperatively.  She is currently on anastrozole and is tolerating and she has no complaints.  She states she does not check the lump so she does not know if they have changed.    Past medical history is unchanged.  No significant changes in her medical history since I saw her last.    Physical exam:  /80 (Patient Site: Left Arm, Patient Position: Sitting, Cuff Size: Adult Regular)   Pulse 80   Ht 5' 2\" (1.575 m)   Wt (!) 97 lb (44 kg)   SpO2 96%   Breastfeeding? No   BMI 17.74 kg/m    General: Very thin elderly woman in no acute distress.  Breast: Clearly palpable mass involving the skin of the nipple on the right.  Seems to be a little less retracted than it was previously but still measures about a centimeter in size so no significant change.  On the left there are 2 palpable masses one at the 9:00 and one at the 3 o'clock position.  Both of them feel somewhat more firm than they did previously but not significantly enlarged.  Still less than a centimeter in size.  Axilla: No axillary adenopathy.    Impression: Bilateral breast cancer in an elderly patient who does not want surgery.  I did explain to her that these have not grown but have also not significantly decreased in size since her original diagnosis.  The hormone therapy may be just helping keep them at bay.  As I told her before I would still tend to recommend surgery, however, she does not want surgery at this time.  She asked a lot of questions about how would we know when it has spread.  I told her there is no way to really predict that.  There is no evidence of it at this time but I also do not have the ability to predict if and when that would ever happen.  I do not think that they are ever going to shrink away completely with the medication.  If that was going to do that that already would have had an effect.  On the other hand they also " do not seem to be growing significantly.  After much discussion she would like to just follow them for another 6 months.    Plan: Continue anastrozole.  Follow-up with me in 6 months.

## 2021-06-24 NOTE — PROGRESS NOTES
Chief Complaint   Patient presents with     Follow-up     6month follow up patient states she is doing ok bilateral breast cancer

## 2021-06-25 NOTE — TELEPHONE ENCOUNTER
Spoke with Carlos today regarding Mayela's surgery. Informed him that surgery has been scheduled. He would like details sent to him via e-mail at [email or web address]    We've received instruction to get you scheduled for Bilateral Mastectomies with Dr Dulce Duran. We have that arranged as follows:     Surgery Date: Monday June 14th   Location:  [place] [address]   Arrival Time: 7:10 AM (unless instructed otherwise by the pre-op nurse)    Prep Instructions:     1. Please schedule a pre-op physical with your primary care doctor. This may be virtual or face-to-face depending on your doctors preference. Call them right away to schedule this.    2. PCR-Rated COVID19 testing is required within 4 days of surgery. Please call [phone] to schedule your Covid Test. If your test is positive, your surgery will be canceled.     3. Nothing to eat or drink for 8 hours before surgery unless instructed differently by the pre-op nurse.    4. No blood thinners including aspirin for one week prior to surgery. Verify this is safe for you with your primary care doctor before stopping.     5. Visitor restrictions are in place due to the pandemic. One visitor is allowed for adult surgical patients. Please verify this with the pre-op nurse when they call before surgery because it is subject to change.    6. If you are going home the same day of surgery, you need an adult to drive you home and stay with you 24 hours after surgery.      7. When you arrive to the hospital, you will be screened for COVID19 symptoms. If you screen positive, your surgery will need to be postponed for your safety.    8. The community is experiencing a surge in COVID19 hospital admissions which is impacting bed availability at all hospitals. If you are being admitted overnight or longer following surgery, please be aware that your procedure could be cancelled as a result.     9. We always encourage you to notify your  insurance any time you have something scheduled including surgery. The number is usually right on the back of your insurance card. Please call [facility] Cost of Care at [phone] if you need pricing information.     Call our office if you have any questions! Thank you!     Joan REYNOLDS  Surgery Scheduler  [facility]  [facility]  Direct line: [phone]   Main Line: [phone]  Direct Fax: [phone]

## 2021-06-25 NOTE — TELEPHONE ENCOUNTER
Carlos would like Dr. Duran to give him a call he has some questions regarding his mother, Mayela's care.    Carlos informed no consent to communicate on file.    Thanks!

## 2021-06-26 NOTE — PROGRESS NOTES
Mayela is here for a post op visit. She is doing well, denies pain. 7 min in nursing time. WILFRED Jackson, OCN, CBCN

## 2021-06-26 NOTE — ANESTHESIA PREPROCEDURE EVALUATION
Anesthesia Evaluation      Patient summary reviewed   No history of anesthetic complications     Airway   Mallampati: II  Neck ROM: full   Pulmonary - normal exam                          Cardiovascular - normal exam  (+) hypertension, ,      Neuro/Psych      Endo/Other       GI/Hepatic/Renal    (+)   chronic renal disease,           Dental - normal exam                        Anesthesia Plan  Planned anesthetic: general LMA    ASA 2     Anesthetic plan and risks discussed with: patient  Anesthesia plan special considerations: antiemetics,   Post-op plan: routine recovery

## 2021-06-26 NOTE — ANESTHESIA POSTPROCEDURE EVALUATION
Patient: Mayela Espino  Procedure(s):  Bilateral mastectomies (Bilateral)  Anesthesia type: MAC    Patient location: PACU  Last vitals:   Vitals Value Taken Time   /70 06/14/21 1330   Temp 36.8  C (98.2  F) 06/14/21 1230   Pulse 60 06/14/21 1341   Resp 20 06/14/21 1300   SpO2 97 % 06/14/21 1341   Vitals shown include unvalidated device data.  Post vital signs: stable  Level of consciousness: awake and responds to simple questions  Post-anesthesia pain: pain controlled  Post-anesthesia nausea and vomiting: no  Pulmonary: unassisted, return to baseline  Cardiovascular: stable and blood pressure at baseline  Hydration: adequate  Anesthetic events: no    QCDR Measures:  ASA# 11 - Jeanette-op Cardiac Arrest: ASA11B - Patient did NOT experience unanticipated cardiac arrest  ASA# 12 - Jeanette-op Mortality Rate: ASA12B - Patient did NOT die  ASA# 13 - PACU Re-Intubation Rate: ASA13B - Patient did NOT require a new airway mgmt  ASA# 10 - Composite Anes Safety: ASA10A - No serious adverse event    Additional Notes:

## 2021-06-26 NOTE — PROGRESS NOTES
In for follow-up of her bilateral mastectomies.  She is feeling well.  She is having very minimal pain.      Physical exam:  Appears well.  Does not appear in any discomfort.  Breasts: Incisions are healing nicely with no signs of infection.  No swelling.    Pathology: The tumor on the left was more than 5 cm.  The margins are negative but very cloase. On the right it was 1.5 cm.  On the left the Her2 was indeterminate and has been sent for FISH.      Impression: Postop visit. Doing well.  Considering the problems she immediately had after surgery, hypoxia and urinary retention, she is actually doing very well now.  Her biggest complaint today is the fact that on her preoperative history and physical, cognitive impairment is listed as a diagnosis and she does not think she has any cognitive impairment.  However we received a phone call one evening from her son stating that she was very confused and he almost brought her into the hospital at that time.  She seems fairly with it again today.  Went over her pathology.  This was a very large tumor.  I would typically recommend radiation, but because of her chronic hypoxia already, I do not think chest wall radiation is advised.  Await the FISH results.  If those are positive then I will refer her to oncology to discuss it further.  Also plan to talk about her at tumor conference to see if there is any other thoughts.  The fact is, this mass grew very quickly while on an aromatase inhibitor.  I am not sure if they would recommend anything further.    Plan: Await the results of the FISH.  Tentatively planning on a 4-month follow-up with me.  We will make a decision on whether to refer her to oncology and radiation oncology after talking about her at tumor conference.

## 2021-06-26 NOTE — ANESTHESIA CARE TRANSFER NOTE
Last vitals:   Vitals:    06/14/21 0717   BP: 161/86   Pulse: 74   Resp: 18   Temp: 36.6  C (97.8  F)   SpO2: 96%     Patient's level of consciousness is drowsy  Spontaneous respirations: yes  Maintains airway independently: yes  Dentition unchanged: yes  Oropharynx: oropharynx clear of all foreign objects    QCDR Measures:  ASA# 20 - Surgical Safety Checklist: WHO surgical safety checklist completed prior to induction    PQRS# 430 - Adult PONV Prevention: 4558F - Pt received => 2 anti-emetic agents (different classes) preop & intraop  ASA# 8 - Peds PONV Prevention: NA - Not pediatric patient, not GA or 2 or more risk factors NOT present  PQRS# 424 - Jeanette-op Temp Management: 4559F - At least one body temp DOCUMENTED => 35.5C or 95.9F within required timeframe  PQRS# 426 - PACU Transfer Protocol: - Transfer of care checklist used  ASA# 14 - Acute Post-op Pain: ASA14B - Patient did NOT experience pain >= 7 out of 10

## 2021-06-26 NOTE — TELEPHONE ENCOUNTER
I returned a call to Mayela and her son to answer their questions about dressing care and showering.

## 2021-06-26 NOTE — TELEPHONE ENCOUNTER
Pt had bilateral mastectomy on 6/14 with Dr. Duran. Her spouse called with questions about showering. They don't want to get the cotton bandage wet unless it's ok to remove it. He did try calling the breast center but was told that there wasn't a nurse in today and was instructed to call Gen Surg. Please call back at [phone].

## 2021-07-03 NOTE — ADDENDUM NOTE
Addendum Note by Bertha Joya CMA at 3/5/2019  1:00 PM     Author: Bertha Joya CMA Service: -- Author Type: Certified Medical Assistant    Filed: 3/5/2019  4:10 PM Date of Service: 3/5/2019  1:00 PM Status: Signed    : Bertha Joya CMA (Certified Medical Assistant)    Encounter addended by: Bertha Joya CMA on: 3/5/2019  4:10 PM      Actions taken: Sign clinical note, Charge Capture section accepted

## 2021-07-03 NOTE — ADDENDUM NOTE
Addendum Note by Lombardi, Susan L, RN at 9/10/2019 12:47 PM     Author: Lombardi, Susan L, RN Service: -- Author Type: RN, Care Manager    Filed: 9/10/2019  3:17 PM Date of Service: 9/10/2019 12:47 PM Status: Signed    : Lombardi, Susan L, RN (RN, Care Manager)    Encounter addended by: Lombardi, Susan L, RN on: 9/10/2019  3:17 PM      Actions taken: Sign clinical note, Charge Capture section accepted

## 2021-07-04 NOTE — ADDENDUM NOTE
Addendum Note by Lombardi, Susan L, RN at 5/20/2021  9:40 AM     Author: Lombardi, Susan L, RN Service: -- Author Type: RN, Care Manager    Filed: 5/20/2021 12:19 PM Date of Service: 5/20/2021  9:40 AM Status: Signed    : Lombardi, Susan L, RN (RN, Care Manager)    Encounter addended by: Lombardi, Susan L, RN on: 5/20/2021 12:19 PM      Actions taken: Clinical Note Signed, Charge Capture section accepted

## 2021-07-06 VITALS
BODY MASS INDEX: 16.88 KG/M2 | HEIGHT: 61 IN | WEIGHT: 89.31 LBS | HEIGHT: 61 IN | BODY MASS INDEX: 17.33 KG/M2 | BODY MASS INDEX: 16.88 KG/M2 | BODY MASS INDEX: 17.39 KG/M2 | WEIGHT: 91.7 LBS | WEIGHT: 91 LBS | WEIGHT: 91 LBS | BODY MASS INDEX: 17.19 KG/M2 | BODY MASS INDEX: 17.19 KG/M2

## 2021-07-06 VITALS — WEIGHT: 88 LBS | BODY MASS INDEX: 16.63 KG/M2

## 2021-07-09 ENCOUNTER — HOSPITAL ENCOUNTER (INPATIENT)
Facility: CLINIC | Age: 86
LOS: 2 days | Discharge: HOME HEALTH | DRG: 065 | End: 2021-07-11
Attending: INTERNAL MEDICINE | Admitting: INTERNAL MEDICINE
Payer: MEDICARE

## 2021-07-09 DIAGNOSIS — I63.59 CEREBROVASCULAR ACCIDENT (CVA) DUE TO OCCLUSION OF OTHER CEREBRAL ARTERY (H): Primary | ICD-10-CM

## 2021-07-09 PROBLEM — I63.9 CVA (CEREBRAL VASCULAR ACCIDENT) (H): Status: ACTIVE | Noted: 2021-07-09

## 2021-07-09 LAB
CHOLEST SERPL-MCNC: 153 MG/DL
GLUCOSE BLDC GLUCOMTR-MCNC: 91 MG/DL (ref 70–99)
HBA1C MFR BLD: 6.2 % (ref 0–5.6)
HDLC SERPL-MCNC: 68 MG/DL
LDLC SERPL CALC-MCNC: 75 MG/DL
NONHDLC SERPL-MCNC: 85 MG/DL
TRIGL SERPL-MCNC: 48 MG/DL
TROPONIN I SERPL-MCNC: 0.03 UG/L (ref 0–0.04)

## 2021-07-09 PROCEDURE — 999N001017 HC STATISTIC GLUCOSE BY METER IP

## 2021-07-09 PROCEDURE — 80061 LIPID PANEL: CPT | Performed by: INTERNAL MEDICINE

## 2021-07-09 PROCEDURE — 84484 ASSAY OF TROPONIN QUANT: CPT | Performed by: INTERNAL MEDICINE

## 2021-07-09 PROCEDURE — 36415 COLL VENOUS BLD VENIPUNCTURE: CPT | Performed by: INTERNAL MEDICINE

## 2021-07-09 PROCEDURE — 120N000001 HC R&B MED SURG/OB

## 2021-07-09 PROCEDURE — 99223 1ST HOSP IP/OBS HIGH 75: CPT | Mod: AI | Performed by: PHYSICIAN ASSISTANT

## 2021-07-09 PROCEDURE — 83036 HEMOGLOBIN GLYCOSYLATED A1C: CPT | Performed by: INTERNAL MEDICINE

## 2021-07-09 RX ORDER — HYDRALAZINE HYDROCHLORIDE 20 MG/ML
10-20 INJECTION INTRAMUSCULAR; INTRAVENOUS
Status: DISCONTINUED | OUTPATIENT
Start: 2021-07-09 | End: 2021-07-11 | Stop reason: HOSPADM

## 2021-07-09 RX ORDER — CLOPIDOGREL BISULFATE 75 MG/1
75 TABLET ORAL DAILY
Status: DISCONTINUED | OUTPATIENT
Start: 2021-07-10 | End: 2021-07-11 | Stop reason: HOSPADM

## 2021-07-09 RX ORDER — POLYETHYLENE GLYCOL 3350 17 G/17G
17 POWDER, FOR SOLUTION ORAL DAILY PRN
Status: DISCONTINUED | OUTPATIENT
Start: 2021-07-09 | End: 2021-07-11 | Stop reason: HOSPADM

## 2021-07-09 RX ORDER — PROCHLORPERAZINE MALEATE 5 MG
5 TABLET ORAL EVERY 6 HOURS PRN
Status: DISCONTINUED | OUTPATIENT
Start: 2021-07-09 | End: 2021-07-11 | Stop reason: HOSPADM

## 2021-07-09 RX ORDER — SODIUM CHLORIDE 9 MG/ML
INJECTION, SOLUTION INTRAVENOUS CONTINUOUS PRN
Status: DISCONTINUED | OUTPATIENT
Start: 2021-07-09 | End: 2021-07-11 | Stop reason: HOSPADM

## 2021-07-09 RX ORDER — LIDOCAINE 40 MG/G
CREAM TOPICAL
Status: DISCONTINUED | OUTPATIENT
Start: 2021-07-09 | End: 2021-07-11 | Stop reason: HOSPADM

## 2021-07-09 RX ORDER — ASPIRIN 81 MG/1
81 TABLET ORAL DAILY
Status: DISCONTINUED | OUTPATIENT
Start: 2021-07-10 | End: 2021-07-11 | Stop reason: HOSPADM

## 2021-07-09 RX ORDER — LABETALOL HYDROCHLORIDE 5 MG/ML
10-20 INJECTION, SOLUTION INTRAVENOUS EVERY 10 MIN PRN
Status: DISCONTINUED | OUTPATIENT
Start: 2021-07-09 | End: 2021-07-11 | Stop reason: HOSPADM

## 2021-07-09 RX ORDER — AMOXICILLIN 250 MG
1-2 CAPSULE ORAL 2 TIMES DAILY PRN
Status: DISCONTINUED | OUTPATIENT
Start: 2021-07-09 | End: 2021-07-11 | Stop reason: HOSPADM

## 2021-07-09 RX ORDER — ONDANSETRON 4 MG/1
4 TABLET, ORALLY DISINTEGRATING ORAL EVERY 6 HOURS PRN
Status: DISCONTINUED | OUTPATIENT
Start: 2021-07-09 | End: 2021-07-11 | Stop reason: HOSPADM

## 2021-07-09 RX ORDER — ONDANSETRON 2 MG/ML
4 INJECTION INTRAMUSCULAR; INTRAVENOUS EVERY 6 HOURS PRN
Status: DISCONTINUED | OUTPATIENT
Start: 2021-07-09 | End: 2021-07-11 | Stop reason: HOSPADM

## 2021-07-09 RX ORDER — ASPIRIN 81 MG/1
81 TABLET, CHEWABLE ORAL DAILY
Status: DISCONTINUED | OUTPATIENT
Start: 2021-07-10 | End: 2021-07-11 | Stop reason: HOSPADM

## 2021-07-09 RX ORDER — PROCHLORPERAZINE 25 MG
12.5 SUPPOSITORY, RECTAL RECTAL EVERY 12 HOURS PRN
Status: DISCONTINUED | OUTPATIENT
Start: 2021-07-09 | End: 2021-07-11 | Stop reason: HOSPADM

## 2021-07-09 ASSESSMENT — ACTIVITIES OF DAILY LIVING (ADL): ADLS_ACUITY_SCORE: 15

## 2021-07-09 ASSESSMENT — MIFFLIN-ST. JEOR: SCORE: 766.55

## 2021-07-09 NOTE — H&P
[facility]    History and Physical  Hospitalist       Date of Admission:  7/9/2021    Assessment & Plan   Mayela Espino is a 88 year old female with pmhx prediabetes, ckd 3, recent bilat mastectomy who presents as direct adm from [facility] ED where she was found to have hyperacute / acute CVA involving left precentral gyrus.    Acute CVA.  R leg numbness presenting symptoms, since resolved. CT negative acute pathology. Brain MRI showed hyperacute / acute infarct at medial left precentral gyrus and multifocal chronic lacunar infarcts right thalamus, right internal capsule, left basal ganglia and adjacent corona radiata. EKG not visible as obtained at outside system, reportedly showed NSR.  - Tele, lipids, A1C, TTE without bubble study.  - Permissive HTN.  - Appreciate stroke neuro consult.  - ASA / Plavix started per ED sign out discussion with neurology and will cont.  - Therapy evals.    Nicotine dependence.  Current quarter pack per day smoker.  She is interested in quitting.  She declines nicotine replacement therapy at this time.    Hypertension.  Patient treated with lisinopril at outside emergency department.  - Hold PTA lisinopril-HCTZ to allow for permissive hypertension.    ER + breast cancer s/p bilateral mastectomy (6/14/2021).  - OP follow up.  - Continue PTA Arimidex.  - Consider prophylactic Lovenox if prolonged hospitalization.    Right thyroid mass, 7.8cm.  Incidental finding MRI / A imaging.  - Recommend OP thyroid u/s 1-2 weeks.  - Obtain thyroid studies.    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  Baseline unclear; appears around 1.6.    Prediabetes. A1C 6.4 in 2019.  Not on medications PTA.    Asymptomatic COVID-19. Pending.    DVT Prophylaxis: Pneumatic Compression Devices  Code Status: Full Code    This patient was discussed with Dr. Mejia of the Hospitalist Service who agrees with current plans as outlined above.    Disposition: Expected discharge in 1-2 days once stroke eval and neuro  recs comppleted. Patient hoping to discharge 7/10 in order to attend her grand-daughter's wedding via live stream.    JoAnna K. Barthell, PA-C    Primary Care Physician   [facility].    Chief Complaint   Acute CVA.    History is obtained from the patient and chart review.    History of Present Illness   Mayela Espino is a 88 year old female with pmhx prediabetes, ckd 3, recent bilat mastectomy who presents as direct adm from [facility] ED where she was found to have hyperacute / acute CVA involving left precentral gyrus.    Patient woke around 715 this morning and felt like her right leg was walking on a balloon.  No headaches, vision, speech changes, muscle weakness, paresthesias.  Her symptoms resolved while she was in the emergency department.  Brain MRI revealed acute CVA left precentral gyrus as above as well as multifocal chronic appearing lacunar infarctions.  Patient is not known to have prior stroke.    She has no known history of arrhythmia or cardiac disease.  She does smoke 1/4 pack of cigarettes per day.  Had recent bilateral mastectomy approximately 1 week ago and has been recovering well. Lives alone in [facility].    PAST MEDICAL HISTORY  ER positive breast cancer status post bilateral mastectomy 6/2021.  Pathology showing infiltrating ductal carcinoma.  CKD, stage IV.  Enlarged thyroid.  Generalized anxiety disorder.  Nicotine dependence.    PAST SURGICAL HISTORY  Past Surgical History:   Procedure Laterality Date     EYE SURGERY       JOINT REPLACEMENT       LUMPECTOMY BREAST Bilateral 1982    Cyst removed     OTHER SURGICAL HISTORY Left     ear prosthesis     OTHER SURGICAL HISTORY      l rola     OTHER SURGICAL HISTORY      left hip ORIF     ID MASTECTOMY, MODIFIED RADICAL Bilateral 6/14/2021    Procedure: Bilateral mastectomies;  Surgeon: Dulce Duran MD;  Location: [facility];  Service: General       HOME MEDICATIONS  Prior to Admission medications    Medication Sig  "Last Dose Taking? Auth Provider   anastrozole (ARIMIDEX) 1 mg tablet [ANASTROZOLE (ARIMIDEX) 1 MG TABLET] TAKE 1 TABLET BY MOUTH DAILY Past Week at Unknown time Yes Dulce Duran MD   cholecalciferol, vitamin D3, 1,000 unit tablet [CHOLECALCIFEROL, VITAMIN D3, 1,000 UNIT TABLET] Take 500 Units by mouth daily.  Past Week at Unknown time Yes Provider, Historical   HYDROcodone-acetaminophen 5-325 mg per tablet [HYDROCODONE-ACETAMINOPHEN 5-325 MG PER TABLET] Take 1 tablet by mouth every 4 (four) hours as needed for pain. Past Week at Unknown time Yes Dulce Duran MD   lisinopril-hydrochlorothiazide (PRINZIDE,ZESTORETIC) 10-12.5 mg per tablet [LISINOPRIL-HYDROCHLOROTHIAZIDE (PRINZIDE,ZESTORETIC) 10-12.5 MG PER TABLET] Take 0.5-0.75 tablets by mouth daily as needed (BP). For BP >/= 140/90 take 0.5 tablet (1/2);   For BP > 180/100 takes 0.75 tablet (3/4) 7/9/2021 at Unknown time Yes Provider, Historical   multivit-iron-min-folic acid 3,500-18-0.4 unit-mg-mg Chew [MULTIVIT-IRON-MIN-FOLIC ACID 3,500-18-0.4 UNIT-MG-MG CHEW] Chew 1 tablet daily.  Past Week at Unknown time Yes Provider, Historical       ALLERGIES  Allergies   Allergen Reactions     Latex Unknown     Added based on information entered during case entry, please review and add reactions, type, and severity as needed     Latex Rash       SOCIAL HISTORY  Current quarter pack per day smoker.  Denies alcohol and illicit drug use.  She lives alone in a condo.    FAMILY HISTORY  Father: Stroke.  Mother: Hypertension.    REVIEW OF SYSTEMS  A 10 point ROS was negative other than the symptoms noted above in the HPI.    Physical Exam   Nursing Notes Reviewed.  BP (!) 188/96 (BP Location: Left arm)   Pulse 76   Temp 97.8  F (36.6  C) (Axillary)   Resp 20   Ht 1.549 m (5' 1\")   Wt 39.9 kg (88 lb)   SpO2 98%   BMI 16.63 kg/m     General:  Appears stated age in no acute distress.  Skin:  Warm, dry. No rashes or lesions on exposed skin.  HEENT:  Normocephalic, " atraumatic; EOMs grossly intact.  Neck:  Supple.  Chest:  Breath sounds CTA and no increased work of breathing.  Cardiovascular:  RRR, no rub or murmur. No peripheral edema.  Abdomen:  Soft, non-tender, non-distended.  Musculoskeletal:  Moves all four extremities.  Neurological:  CN 2-12 intact. Normal FNF and heel down shin.    Data   Data reviewed today:  I personally reviewed no images or EKG's today.  No lab results found in last 7 days.    Imaging:  Recent Results (from the past 24 hour(s))   CT Head w/o Contrast    Narrative    EXAM: CT HEAD WO CONTRAST  LOCATION: [facility]  DATE/TIME: 7/9/2021 9:28 AM    INDICATION: Neuro deficit, acute, stroke suspected Stroke code. Right lower extremity numbness.  COMPARISON: None.  TECHNIQUE: Routine CT Head without IV contrast. Multiplanar reformats. Dose reduction techniques were used.    FINDINGS:  INTRACRANIAL CONTENTS: No intracranial hemorrhage. Mild diffuse cerebral parenchymal volume loss. No midline shift. The basilar cisterns are patent. Mild periventricular and scattered foci of deep white matter hypodensities involving both cerebral   hemispheres. Small chronic infarcts involving the anterior left basal ganglia and corona radiata. Small chronic infarct involving the anterior limb of the right internal capsule. Small focus of probable subacute or chronic infarct in the right thalamus.     No cerebellar tonsillar ectopia.    VISUALIZED ORBITS/SINUSES/MASTOIDS: Prior bilateral cataract surgery. Visualized portions of the orbits are otherwise unremarkable. Mild mucosal thickening of the ethmoid air cells. No middle ear or mastoid effusion.    BONES/SOFT TISSUES: No acute abnormality.      Impression    1.  No intracranial hemorrhage, mass lesions, hydrocephalus or CT evidence for an acute infarct. MRI is more sensitive for the evaluation of acute infarcts.  2.  Small focus of probable subacute or chronic infarct in the right thalamus.    3.  Small chronic infarcts involving the anterior left basal ganglia/corona radiata and the anterior limb of the right internal capsule.   4.  Mild diffuse cerebral parenchymal volume loss. Presumed chronic hypertensive/microvascular ischemic white matter changes.      Discussed the findings with Keren Li at 9:15 AM, 07/09/2021.   MR Brain COW Carotid w/o Contrast    Narrative    EXAM: MR BRAIN COW CAROTID WO CONTRAST  LOCATION: [facility]  DATE/TIME: 7/9/2021 10:43 AM    INDICATION: Right leg numbness, stroke suspected.  COMPARISON: Head CT from earlier and the morning  TECHNIQUE:   1) Routine multiplanar multisequence head MRI without intravenous contrast. The patient was unable to complete the entirety of the exam, and the axial T1 and axial/coronal T2 acquisitions were not obtained.  2) 3D time-of-flight head MRA without intravenous contrast.  3) Neck MRA without IV contrast. Stenosis measurements made according to NASCET criteria unless otherwise specified.      FINDINGS:  HEAD MRI:  INTRACRANIAL CONTENTS: 4 mm focus of diffusion restriction in the subcortical white matter of the medial left precentral gyrus (axial DWI series 4.1, image #18; coronal DWI series 6.1, image #11). This does not demonstrate definite associated FLAIR   signal hyperintensity, and is therefore consistent with hyperacute/acute infarction. Small chronic infarctions in the right thalamus, anterior limb of the right internal capsule, and anterior left basal ganglia and adjacent corona radiata. No mass,   acute   hemorrhage, or extra-axial fluid collections. Patchy nonspecific T2/FLAIR hyperintensities within the cerebral white matter most consistent with mild to moderate chronic microvascular ischemic change. Multiple small perivascular spaces noted in the   basal ganglia bilaterally. Moderate generalized cerebral atrophy. No hydrocephalus. Normal position of the cerebellar tonsils.     SELLA: No abnormality  accounting for technique.    OSSEOUS STRUCTURES/SOFT TISSUES: Normal marrow signal. The major intracranial vascular flow voids are maintained.     ORBITS: Prior bilateral cataract surgery. Visualized portions of the orbits are otherwise unremarkable.     SINUSES/MASTOIDS: No paranasal sinus mucosal disease. No middle ear or mastoid effusion.       HEAD MRA:   ANTERIOR CIRCULATION: No stenosis/occlusion, aneurysm, or high flow vascular malformation. Standard [place] anatomy.    POSTERIOR CIRCULATION: No stenosis/occlusion, aneurysm, or high flow vascular malformation. The right superior cerebellar artery arises from the P1 segment of the right posterior cerebral artery. Dominant left and smaller right vertebral artery   contribute to a normal basilar artery.       NECK MRA:   RIGHT CAROTID: No measurable stenosis or dissection.    LEFT CAROTID: No measurable stenosis or dissection.    VERTEBRAL ARTERIES: No focal stenosis or dissection. Dominant left and smaller right vertebral arteries.    AORTIC ARCH: Classic aortic arch anatomy with no significant stenosis at the origin of the great vessels.    NONVASCULAR STRUCTURES: Large right thyroid mass that measures up to 3.6 cm AP x 4.0 cm TV x 7.8 cm CC, with extension into the superior mediastinum.          Impression    HEAD MRI:   1.  Incomplete exam due to patient intolerance.  2.  4 mm focus of hyperacute/acute infarction within the subcortical white matter of the medial left precentral gyrus.  3.  Multifocal chronic lacunar infarctions in the right thalamus, anterior limb the right internal capsule, and anterior left basal ganglia and adjacent corona radiata.  4.  Moderate global brain parenchymal volume loss with additional presumed sequelae of mild to moderate chronic small vessel ischemic disease.    HEAD MRA:   1.  No high-grade stenosis, vascular occlusion, aneurysm, or high flow void AVM.  2.  Variant [place] anatomy as above.    NECK  MRA:  1.  Normal neck MRA.  2.  Large right thyroid mass with superior mediastinal extension measuring up to 3.6 x 4.0 x 7.8 cm. If not already performed recently elsewhere, recommend further characterization with dedicated thyroid ultrasound on a nonemergent basis in the next 1-2     weeks.      Results discussed with KELSEA LEDESMA on 7/9/2021 11:15 AM.

## 2021-07-10 ENCOUNTER — APPOINTMENT (OUTPATIENT)
Dept: CARDIOLOGY | Facility: CLINIC | Age: 86
DRG: 065 | End: 2021-07-10
Attending: PHYSICIAN ASSISTANT
Payer: MEDICARE

## 2021-07-10 ENCOUNTER — COMMUNICATION - HEALTHEAST (OUTPATIENT)
Dept: SCHEDULING | Facility: CLINIC | Age: 86
End: 2021-07-10

## 2021-07-10 ENCOUNTER — APPOINTMENT (OUTPATIENT)
Dept: PHYSICAL THERAPY | Facility: CLINIC | Age: 86
DRG: 065 | End: 2021-07-10
Attending: PHYSICIAN ASSISTANT
Payer: MEDICARE

## 2021-07-10 ENCOUNTER — APPOINTMENT (OUTPATIENT)
Dept: OCCUPATIONAL THERAPY | Facility: CLINIC | Age: 86
DRG: 065 | End: 2021-07-10
Attending: PHYSICIAN ASSISTANT
Payer: MEDICARE

## 2021-07-10 LAB
ANION GAP SERPL CALCULATED.3IONS-SCNC: 2 MMOL/L (ref 3–14)
BUN SERPL-MCNC: 28 MG/DL (ref 7–30)
CALCIUM SERPL-MCNC: 8.5 MG/DL (ref 8.5–10.1)
CHLORIDE SERPL-SCNC: 112 MMOL/L (ref 94–109)
CO2 SERPL-SCNC: 31 MMOL/L (ref 20–32)
CREAT SERPL-MCNC: 1.52 MG/DL (ref 0.52–1.04)
ERYTHROCYTE [DISTWIDTH] IN BLOOD BY AUTOMATED COUNT: 14.2 % (ref 10–15)
GFR SERPL CREATININE-BSD FRML MDRD: 30 ML/MIN/{1.73_M2}
GLUCOSE BLDC GLUCOMTR-MCNC: 79 MG/DL (ref 70–99)
GLUCOSE SERPL-MCNC: 120 MG/DL (ref 70–99)
HCT VFR BLD AUTO: 39 % (ref 35–47)
HGB BLD-MCNC: 12.2 G/DL (ref 11.7–15.7)
LABORATORY COMMENT REPORT: NORMAL
LVEF ECHO: NORMAL
MCH RBC QN AUTO: 30.1 PG (ref 26.5–33)
MCHC RBC AUTO-ENTMCNC: 31.3 G/DL (ref 31.5–36.5)
MCV RBC AUTO: 96 FL (ref 78–100)
PLATELET # BLD AUTO: 125 10E9/L (ref 150–450)
POTASSIUM SERPL-SCNC: 3.9 MMOL/L (ref 3.4–5.3)
RBC # BLD AUTO: 4.05 10E12/L (ref 3.8–5.2)
SARS-COV-2 RNA RESP QL NAA+PROBE: NEGATIVE
SODIUM SERPL-SCNC: 145 MMOL/L (ref 133–144)
SPECIMEN SOURCE: NORMAL
T4 FREE SERPL-MCNC: 1.19 NG/DL (ref 0.76–1.46)
TSH SERPL DL<=0.005 MIU/L-ACNC: 0.07 MU/L (ref 0.4–4)
WBC # BLD AUTO: 4.6 10E9/L (ref 4–11)

## 2021-07-10 PROCEDURE — 87635 SARS-COV-2 COVID-19 AMP PRB: CPT | Performed by: PHYSICIAN ASSISTANT

## 2021-07-10 PROCEDURE — 999N001017 HC STATISTIC GLUCOSE BY METER IP

## 2021-07-10 PROCEDURE — 97116 GAIT TRAINING THERAPY: CPT | Mod: GP

## 2021-07-10 PROCEDURE — 93306 TTE W/DOPPLER COMPLETE: CPT | Mod: 26 | Performed by: INTERNAL MEDICINE

## 2021-07-10 PROCEDURE — 99232 SBSQ HOSP IP/OBS MODERATE 35: CPT | Performed by: HOSPITALIST

## 2021-07-10 PROCEDURE — 97535 SELF CARE MNGMENT TRAINING: CPT | Mod: GO | Performed by: REHABILITATION PRACTITIONER

## 2021-07-10 PROCEDURE — 80048 BASIC METABOLIC PNL TOTAL CA: CPT | Performed by: PHYSICIAN ASSISTANT

## 2021-07-10 PROCEDURE — 85027 COMPLETE CBC AUTOMATED: CPT | Performed by: PHYSICIAN ASSISTANT

## 2021-07-10 PROCEDURE — 99223 1ST HOSP IP/OBS HIGH 75: CPT | Mod: GC | Performed by: PSYCHIATRY & NEUROLOGY

## 2021-07-10 PROCEDURE — 36415 COLL VENOUS BLD VENIPUNCTURE: CPT | Performed by: PHYSICIAN ASSISTANT

## 2021-07-10 PROCEDURE — 93306 TTE W/DOPPLER COMPLETE: CPT

## 2021-07-10 PROCEDURE — 99207 PR CDG-MDM COMPONENT: MEETS MODERATE - DOWN CODED: CPT | Performed by: HOSPITALIST

## 2021-07-10 PROCEDURE — 999N000226 HC STATISTIC SLP IP EVAL DEFER

## 2021-07-10 PROCEDURE — 250N000013 HC RX MED GY IP 250 OP 250 PS 637: Performed by: PHYSICIAN ASSISTANT

## 2021-07-10 PROCEDURE — 120N000001 HC R&B MED SURG/OB

## 2021-07-10 PROCEDURE — 97165 OT EVAL LOW COMPLEX 30 MIN: CPT | Mod: GO | Performed by: REHABILITATION PRACTITIONER

## 2021-07-10 PROCEDURE — 84443 ASSAY THYROID STIM HORMONE: CPT | Performed by: PHYSICIAN ASSISTANT

## 2021-07-10 PROCEDURE — 97161 PT EVAL LOW COMPLEX 20 MIN: CPT | Mod: GP

## 2021-07-10 PROCEDURE — 84439 ASSAY OF FREE THYROXINE: CPT | Performed by: PHYSICIAN ASSISTANT

## 2021-07-10 RX ORDER — ATORVASTATIN CALCIUM 40 MG/1
40 TABLET, FILM COATED ORAL EVERY EVENING
Status: DISCONTINUED | OUTPATIENT
Start: 2021-07-10 | End: 2021-07-11 | Stop reason: HOSPADM

## 2021-07-10 RX ADMIN — CLOPIDOGREL BISULFATE 75 MG: 75 TABLET ORAL at 11:17

## 2021-07-10 RX ADMIN — ASPIRIN 81 MG: 81 TABLET, DELAYED RELEASE ORAL at 11:17

## 2021-07-10 ASSESSMENT — ACTIVITIES OF DAILY LIVING (ADL)
ADLS_ACUITY_SCORE: 15
ADLS_ACUITY_SCORE: 15
ADLS_ACUITY_SCORE: 16
ADLS_ACUITY_SCORE: 15
ADLS_ACUITY_SCORE: 15
PREVIOUS_RESPONSIBILITIES: MEAL PREP;HOUSEKEEPING;LAUNDRY;SHOPPING;MEDICATION MANAGEMENT;FINANCES;DRIVING
ADLS_ACUITY_SCORE: 16

## 2021-07-10 ASSESSMENT — MIFFLIN-ST. JEOR: SCORE: 768.36

## 2021-07-10 NOTE — PLAN OF CARE
Care Plan Nursing Note    Patient Information  Name: Mayela Espino  Age: 88 year old    Patient Assessment   DATE & TIME: 07/09/2021, [id number]   Cognitive Concerns/ Orientation : A & O times four  BEHAVIOR & AGGRESSION TOOL COLOR: calm  ABNL VS/O2: BP in the higher range  MOBILITY: SBA  PAIN MANAGMENT: denied  DIET: regular  BOWEL/BLADDER: continent  ABNL LAB/BG: hgb A1C 6.2  DRAIN/DEVICES: PIV SL  TELEMETRY RHYTHM: NSR  SKIN: WDL  TESTS/PROCEDURES: echo in AM  D/C DATE: pending  OTHER IMPORTANT INFO: neuro assessment WDL. Continue to monitor.

## 2021-07-10 NOTE — PLAN OF CARE
SLP: Orders Received. Chart Reviewed. RN consulted. SLP observed patient taking medications with applesauce, which she reports is baseline for her. No significant difficulty noted in speech or swallowing. No skilled SLP evaluation indicated at this time, Defer evaluation, orders completed.

## 2021-07-10 NOTE — PROGRESS NOTES
[facility]    Hospitalist Progress Note      Assessment & Plan   Mayela Espino is a 88 year old female with pmhx prediabetes, ckd 3, recent bilat mastectomy who presents as direct adm from [facility] ED where she was found to have hyperacute / acute CVA involving left precentral gyrus.    Acute CVA.  R leg numbness presenting symptoms, since resolved. CT negative acute pathology. Brain MRI showed hyperacute / acute infarct at medial left precentral gyrus and multifocal chronic lacunar infarcts right thalamus, right internal capsule, left basal ganglia and adjacent corona radiata. EKG no acute changes.   - stroke neuro consult:  .  Continue ASA 81 mg, Plavix 75 mg daily for 21 days then aspirin monotherapy.  -Continue atorvastatin 40 mg daily  -A1c 6.2.  -BP goal less than 160/80.  -Stroke education.  - PT/OT;  ST cleared POs     Nicotine dependence.  Current quarter pack per day smoker.  She is interested in quitting.  She declines nicotine replacement therapy at this time.     Hypertension.  Patient treated with lisinopril at outside emergency department.  - Hold PTA lisinopril-HCTZ to allow for permissive hypertension.  BP goal per Neurology less than 160/80.     ER + breast cancer s/p bilateral mastectomy (6/14/2021).  - OP follow up.  - Continue PTA Arimidex.       Right thyroid mass, 7.8cm.  Incidental finding MRI / A imaging.  - Recommend OP thyroid u/s 1-2 weeks.  -TSH suppressed however free T4 WNL.  Follow-up with PCP on discharge with ultrasound as above.     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  Baseline unclear; appears around 1.6.     Prediabetes. A1C 6.4 in 2019.  Not on medications PTA.     Asymptomatic COVID-19. NEG.      DVT Prophylaxis: PCD  Code Status: Full Code  Expected discharge:  1-2 days pending clinical improvement, Therapy assessment regarding safe discharge.    Hola Prado MD  Text Page (7am - 6pm, M-F)    Interval History   No new or acute neurologic focality.  Right leg  weakness resolved.  ST evaluation okay for p.o.'s.     -Data reviewed today: I reviewed all new labs and imaging results over the last 24 hours. I personally reviewed EKG sinus; no acute changes.    Physical Exam   Temp: 98.4  F (36.9  C) Temp src: Oral BP: (!) 143/79 Pulse: 71   Resp: 16 SpO2: 93 % O2 Device: None (Room air)    Vitals:    07/09/21 1708 07/10/21 0600   Weight: 39.9 kg (88 lb) 40.1 kg (88 lb 6.4 oz)     Vital Signs with Ranges  Temp:  [97.5  F (36.4  C)-98.4  F (36.9  C)] 98.4  F (36.9  C)  Pulse:  [64-82] 71  Resp:  [16-20] 16  BP: (122-188)/(78-96) 143/79  SpO2:  [92 %-98 %] 93 %  I/O last 3 completed shifts:  In: 210 [P.O.:210]  Out: -     General/Constitutional:   NAD, alert, calm, cooperative    HEENT/Head Exam:  atraumatic  Eyes:  PERRL, no conjunctivits  Mouth/Oral Pharynx:  Buccal mucosa WNL  Chest/Respiratory:  Air exchange bilateral lung fields; no rales or wheeze. Respiration nonlabored.  Cardiovascular:  no murmur appreciated.  LE edema none  Gastrointestinal/Abdomen:  soft, nontender, no rebound, guarding or other peritoneal signs.  Musculoskeletal:  extremities warm, dry, noncyanotic; no acitve synovitis.  Neurologic:  gross CN and motor testing  intact,nonfocal.  Sensation grossly tested intact.  Psychiatric:  Mental status:  Alert, oriented, affect calm  Skin:  No rash noted on gross exam    I spent >35 minutes on the unit/floor in management of care today reviewing labs, medications, interdisciplinary notes; and completing documentation of encounter and orders with over 50% of my time was spent Counseling the Patient regarding stroke, thyroid mass with follow-up and Coordinating Care and plan with Nursing and Specialists, Neurology/stroke regarding stroke management and surveillance.       Medications     - MEDICATION INSTRUCTIONS -       - MEDICATION INSTRUCTIONS -       sodium chloride         aspirin  81 mg Oral Daily    Or     aspirin  81 mg Oral or NG Tube Daily     atorvastatin   40 mg Oral QPM     clopidogrel  75 mg Oral or NG Tube Daily     sodium chloride (PF)  3 mL Intracatheter Q8H       Data   Recent Labs   Lab 07/10/21  0922 21  2234   WBC 4.6  --    HGB 12.2  --    MCV 96  --    *  --    *  --    POTASSIUM 3.9  --    CHLORIDE 112*  --    CO2 31  --    BUN 28  --    CR 1.52*  --    ANIONGAP 2*  --    ITA 8.5  --    *  --    TROPI  --  0.030       Recent Results (from the past 24 hour(s))   Echocardiogram Complete - For age > 60 yrs   Result Value    LVEF  50-55%    [facility]    [id number]  [id number]  [id number]  [id number]^BARTHELL^JESICA^DONALDO SUTTON     [facility]  Echocardiography Laboratory  [address] [place], [place] [id number]     Name: RIANNA RUIZ  MRN: [id number]  : 1932  Study Date: 07/10/2021 11:31 AM  Age: 88 yrs  Gender: Female  Patient Location: [facility]  Reason For Study: Cerebrovascular Incident  Ordering Physician: BARTHELL, JOANNA KERSTEN ULMEN  Referring Physician: KELSEA LEDESMA  Performed By: Barb Iniguez     BSA: 1.3 m2  Height: 61 in  Weight: 88 lb  HR: 72  BP: 188/96 mmHg  ______________________________________________________________________________  Procedure  Complete Portable Echo Adult.  ______________________________________________________________________________  Interpretation Summary     1. The left ventricle is normal in size. The visual ejection fraction is 50-  55%.  2. The right ventricle is normal in structure, function and size.  3. There is mild to moderate (1-2+) mitral regurgitation.  4. There is mild to moderate (1-2+) aortic regurgitation.     No previous echo for comparison.     ______________________________________________________________________________  Left Ventricle  The left ventricle is normal in size. There is normal left ventricular wall  thickness. The visual ejection fraction is 50-55%. Left ventricular diastolic  function is indeterminate. Normal left ventricular wall  motion.     Right Ventricle  The right ventricle is normal in structure, function and size.     Atria  Normal left atrial size. Right atrial size is normal. There is no atrial shunt  seen. There is no color Doppler evidence of a PFO.     Mitral Valve  There is mild to moderate (1-2+) mitral regurgitation.     Tricuspid Valve  There is mild (1+) tricuspid regurgitation. The right ventricular systolic  pressure is approximated at 38.2 mmHg plus the right atrial pressure.     Aortic Valve  There is mild to moderate (1-2+) aortic regurgitation.     Pulmonic Valve  The pulmonic valve is normal in structure and function.     Vessels  Normal ascending, transverse (arch), and descending aorta. The inferior vena  cava was normal in size with preserved respiratory variability.     Pericardium  There is no pericardial effusion.     Rhythm  Sinus rhythm was noted.  ______________________________________________________________________________  MMode/2D Measurements & Calculations  IVSd: 1.0 cm     LVIDd: 4.6 cm  LVIDs: 3.4 cm  LVPWd: 0.87 cm  FS: 25.6 %  LV mass(C)d: 147.3 grams  LV mass(C)dI: 110.5 grams/m2  Ao root diam: 3.5 cm  LA dimension: 3.6 cm  asc Aorta Diam: 3.6 cm  LA/Ao: 1.0  LVOT diam: 2.1 cm  LVOT area: 3.5 cm2  LA Volume (BP): 39.5 ml  LA Volume Index (BP): 29.7 ml/m2  RWT: 0.38     Doppler Measurements & Calculations  MV E max román: 50.3 cm/sec  MV A max román: 96.7 cm/sec  MV E/A: 0.52  MV dec slope: 203.9 cm/sec2  AI P1/2t: 455.0 msec  PA acc time: 0.08 sec  TR max román: 295.5 cm/sec  TR max P.2 mmHg  E/E' av.1  Lateral E/e': 12.2  Medial E/e': 15.9     ______________________________________________________________________________  Report approved by: Sierra Hillman 07/10/2021 01:43 PM

## 2021-07-10 NOTE — PROGRESS NOTES
"SP[facility] Progress Note  [facility]    Referral Source: Hospital  Request    PT indicates that she had a \"small\" stroke that she describes as a \"1 out of 14\" with 1 being the least serious. PT disclosed that she's thinking about her granddaughter who will be  today in [place]. PT stated that she requested a  visit specifically for the purpose of prayer which was offered.    Provided emotional and spiritual support through active listening and prayer. PT indicates that she welcomes visits from  for more discussion as today's visit was shortened by the need for other cares.    Follow-up with PT while she remains in hospital.      Mendel Joshi  Chaplain Resident   "

## 2021-07-10 NOTE — PLAN OF CARE
Strokes & status post bilateral mastectomy.  Neuros/CMS - alert & oriented x 4; moving all extremities-slow & deliberate with finger to nose; denyng any numbness or tingling; generalized weakness. VSS, room air. Tele - SR. Regular diet tolerated/fair appetite/pills whole with applesauce one by one. Up with 1 assist/gait belt & walker to bathroom. Up in chair for first time this afternoon. Continent of bowel and bladder. Denies pain. Pt scoring green on the Aggression Stop Light Tool. ECho completed/awaiting results.  Chest incisions/open to air/no drainage.

## 2021-07-10 NOTE — CONSULTS
"[facility]    Stroke Consult Note    Reason for Consult:  Stroke    Chief Complaint: RLE weakness     HPI history gathered from patient and chart  Mayela Espino is a 88 year old female with a past medical history of prediabetes, CKD 3, recent bilateral mastectomy who presented as a direct admission from [facility] ED where she was found to have an acute stroke of the left precentral gyrus.    Patient presented to the emergency department complaining of right lower extremity numbness which she describes as \"standing on a balloon\".  She notes that she feels like it was only her right lower extremity and only the portion from her knee down to her foot.  She states that while she was in the outside emergency room this resolved.  While in the emergency room she proceeded with an MRI and MRA due to concerns for her kidney function.  MRI was revealing for a 4 mm focus of diffusion restriction in the medial left precentral gyrus with no associated FLAIR hyperintensity.  MRA without significant stenosis or occlusion or aneurysm in neck or cerebral arteries.  The outside neurology team there who feel that the stroke call started patient on aspirin and Plavix.    Stroke Summary and Impression    Acute ischemic stroke of L MCA vs JEFFERY due to cardioembolism     Assessment   CT Head  no intracranial hemorrhage   MRI MRI was revealing for a 4 mm focus of diffusion restriction in the medial left precentral gyrus with no associated FLAIR hyperintensity.   Head Vessel Imaging MRA without significant stenosis or occlusion or aneurysm in neck or cerebral arteries.     Neck Vessel Imaging MRA without significant stenosis or occlusion or aneurysm in neck or cerebral arteries.     Cardiac TTE:   1. The left ventricle is normal in size. The visual ejection fraction is 50-  55%.  2. The right ventricle is normal in structure, function and size.  3. There is mild to moderate (1-2+) mitral regurgitation.  4. " "There is mild to moderate (1-2+) aortic regurgitation.   EKG pending   Blood Pressure Goal: < 160/80   Antiplt/Anticoag    LDL Lab Results   Component Value Date/Time    LDL 75 07/09/2021 10:34 PM      A1C Lab Results   Component Value Date/Time    A1C 6.2 (H) 07/09/2021 10:34 PM      Troponin Lab Results   Component Value Date/Time    TROPI 0.030 07/09/2021 10:34 PM              PLAN    Neuro-Disposition > Neurochecks q4 hours   > Ok to discharge from Neuro-Vascular perspective if cleared from PT OT   Further Imaging  none currently   Blood Pressure Goal: < 160/80   Antiplt/Anticoag > Started Asa 81mg qday  > Plavix 75mg qday for 21 days then aspirin monotherapy   LDL   > Goal < 70  > Atrovastatin 40mg qday   A1C Defer to primary team, goal A1c < 7   Tobacco Recommend tobacco cessation if applicable   Rehab Physical Therapy   Occupational Therapy    Speech Therapy    Secondary Stroke Prevention - Alcohol consumption: men< or= 2 drinks per day, nonpregnant women< or= 1 drink per day  - Physical activity: at least 30 minutes of moderate intensity exercise 1-3 times per week  - Diet: low fat, low sodium, Mediterranean diet  - Goal BMI 18.5-25    Cardiac  patient to be discharged on heart monitor for 30 days             Patient Follow-up    - in 12 weeks with [facility] or other local neurologist of patient's choice    Thank you for this consult. No further stroke evaluation is recommended, so we will sign off. Please contact us with any additional questions.    The Stroke Staff is Dr. Donovan.    Jordan Viveros MD  Vascular Neurology Fellow  To page me or covering stroke neurology team member, click here: AMCOM   Choose \"On Call\" tab at top, then search dropdown box for \"Neurology Adult\", select location, press Enter, then look for stroke/neuro ICU/telestroke.    _____________________________________________________    Risk Factors Present on Admission      # Thrombocytopenia: Plts = 125 10e9/L " (Ref range: 150 - 450 10e9/L) on admission, will monitor for bleeding       Past Medical History   No past medical history on file.  Past Surgical History   Past Surgical History:   Procedure Laterality Date     EYE SURGERY       JOINT REPLACEMENT       LUMPECTOMY BREAST Bilateral 1982    Cyst removed     OTHER SURGICAL HISTORY Left     ear prosthesis     OTHER SURGICAL HISTORY      l rola     OTHER SURGICAL HISTORY      left hip ORIF     IA MASTECTOMY, MODIFIED RADICAL Bilateral 6/14/2021    Procedure: Bilateral mastectomies;  Surgeon: Dulce Duran MD;  Location: [facility] [place], [place];  Service: General     Medications   Home Meds  Prior to Admission medications    Medication Sig Start Date End Date Taking? Authorizing Provider   anastrozole (ARIMIDEX) 1 mg tablet [ANASTROZOLE (ARIMIDEX) 1 MG TABLET] TAKE 1 TABLET BY MOUTH DAILY 6/7/21  Yes Dulce Duran MD   cholecalciferol, vitamin D3, 1,000 unit tablet [CHOLECALCIFEROL, VITAMIN D3, 1,000 UNIT TABLET] Take 500 Units by mouth daily.  3/16/16  Yes Provider, Historical   HYDROcodone-acetaminophen 5-325 mg per tablet [HYDROCODONE-ACETAMINOPHEN 5-325 MG PER TABLET] Take 1 tablet by mouth every 4 (four) hours as needed for pain. 6/14/21  Yes Dulce Duran MD   lisinopril-hydrochlorothiazide (PRINZIDE,ZESTORETIC) 10-12.5 mg per tablet [LISINOPRIL-HYDROCHLOROTHIAZIDE (PRINZIDE,ZESTORETIC) 10-12.5 MG PER TABLET] Take 0.5-0.75 tablets by mouth daily as needed (BP). For BP >/= 140/90 take 0.5 tablet (1/2);   For BP > 180/100 takes 0.75 tablet (3/4) 9/20/17  Yes Provider, Historical   multivit-iron-min-folic acid 3,500-18-0.4 unit-mg-mg Chew [MULTIVIT-IRON-MIN-FOLIC ACID 3,500-18-0.4 UNIT-MG-MG CHEW] Chew 1 tablet daily.  3/16/16  Yes Provider, Historical       Scheduled Meds    aspirin  81 mg Oral Daily    Or     aspirin  81 mg Oral or NG Tube Daily     atorvastatin  40 mg Oral QPM     clopidogrel  75 mg Oral or NG Tube Daily     sodium chloride (PF)  3 mL Intracatheter  Q8H       Infusion Meds    - MEDICATION INSTRUCTIONS -       - MEDICATION INSTRUCTIONS -       sodium chloride         PRN Meds  hydrALAZINE, labetalol, lidocaine 4%, lidocaine (buffered or not buffered), - MEDICATION INSTRUCTIONS -, - MEDICATION INSTRUCTIONS -, ondansetron **OR** ondansetron, polyethylene glycol, prochlorperazine **OR** prochlorperazine **OR** prochlorperazine, senna-docusate, sodium chloride (PF), sodium chloride    Allergies   Allergies   Allergen Reactions     Latex Unknown     Added based on information entered during case entry, please review and add reactions, type, and severity as needed     Latex Rash     Family History   No family history on file.  Social History   Social History     Tobacco Use     Smoking status: Not on file   Substance Use Topics     Alcohol use: Not on file     Drug use: Not on file       Review of Systems   The 10 point Review of Systems is negative other than noted in the HPI or here.        PHYSICAL EXAMINATION   Temp:  [97.5  F (36.4  C)-98.4  F (36.9  C)] 98.4  F (36.9  C)  Pulse:  [64-82] 71  Resp:  [16-20] 16  BP: (122-188)/(78-96) 143/79  SpO2:  [92 %-98 %] 93 %    General Exam  General:  patient lying in bed without any acute distress    HEENT:  normocephalic/atraumatic  Cardio:  RRR  Pulmonary:  no respiratory distress  Abdomen:  non-distended  Extremities:  no edema  Skin:  intact     Neuro Exam  Mental Status:  alert, oriented x 3, follows commands, speech clear and fluent, naming and repetition normal  Cranial Nerves:  visual fields intact, PERRL, EOMI with normal smooth pursuit, facial sensation intact and symmetric, facial movements symmetric, hearing not formally tested but intact to conversation, no dysarthria, tongue protrusion midline  Motor:  normal muscle tone and bulk, no abnormal movements, able to move all limbs spontaneously, strength 5/5 throughout upper and lower extremities  Sensory:  light touch sensation intact and symmetric throughout  upper and lower extremities  Coordination:  normal finger to nose  Station/Gait:  deferred    Dysphagia Screen  Per Nursing      Imaging  I personally reviewed all imaging; relevant findings per HPI.    Labs Data   CBC  Recent Labs   Lab 07/10/21  0922   WBC 4.6   RBC 4.05   HGB 12.2   HCT 39.0   *     Basic Metabolic Panel   Recent Labs   Lab 07/10/21  0922   *   POTASSIUM 3.9   CHLORIDE 112*   CO2 31   BUN 28   CR 1.52*   *   ITA 8.5     Liver Panel  No results for input(s): PROTTOTAL, ALBUMIN, BILITOTAL, ALKPHOS, AST, ALT, BILIDIRECT in the last 168 hours.  INR  No lab results found.   Lipid Profile    Recent Labs   Lab Test 07/09/21  2234   CHOL 153   HDL 68   LDL 75   TRIG 48     A1C    Recent Labs   Lab Test 07/09/21  2234   A1C 6.2*     Troponin I    Recent Labs   Lab 07/09/21 2234   TROPI 0.030

## 2021-07-10 NOTE — PHARMACY-CONSULT NOTE
Pharmacy Consult to evaluate for medication related stroke core measures    Mayela Espino, 88 year old female admitted for acute CVA on 7/9/2021.    Thrombolytic was not given because of Time from onset contraindications    VTE Prophylaxis SCDs /PCDs placed on 7/9/21, as appropriate prior to end of hospital day 2.    Antithrombotic: aspirin and clopidogrel started on 7/9/21, as appropriate by end of hospital day 2. Continue antithrombotic therapy on discharge to meet quality measures, unless contraindicated.    Anticoagulation if history of A-fib/flutter: Patient does not have history of A-fib/flutter - anticoagulation not required for medication related stroke core measures.     LDL Cholesterol Calculated   Date Value Ref Range Status   07/09/2021 75 <100 mg/dL Final     Comment:     Desirable:       <100 mg/dl       Patient currently receiving Lipitor (atorvastatin) continue statin on discharge to meet quality measures, unless contraindicated.    Recommendations: None at this time    Thank you for the consult.    Rupal Esparza, PharmD 7/10/2021 2:50 PM

## 2021-07-10 NOTE — PLAN OF CARE
Care Plan Nursing Note    Patient Information  Name: Mayela Espino  Age: 88 year old  Reason for admission: right leg numbness    Patient Assessment   DATE & TIME: 07/10/2021, 2565-3660  Cognitive Concerns/ Orientation : A & O times four  BEHAVIOR & AGGRESSION TOOL COLOR: calm  ABNL VS/O2: VSS, room air  MOBILITY: SBA  PAIN MANAGMENT: Denied  DIET: Regular  BOWEL/BLADDER: continent  ABNL LAB/BG: Na+145, GFR=30  DRAIN/DEVICES: PIV SL  TELEMETRY RHYTHM: NSR  SKIN: WDL, incision CDI  TESTS/PROCEDURES:   D/C DATE: Pending  OTHER IMPORTANT INFO: Neuro assessment stable. Continue to monitor.

## 2021-07-10 NOTE — CONSULTS
Stroke Education Note    The following information has been reviewed with the patient:    1. Warning signs of stroke    2. Calling 911 if having warning signs of stroke    3. All modifiable risk factors: hypertension, CAD, atrial fib, diabetes, hypercholesterolemia, smoking, substance abuse, diet, physical inactivity, obesity, sleep apnea.    4. Patient's risk factors for stroke which include: prediabetes, smoking, HTN, unhealthy diet    5. Follow-up plan for after discharge    6. Discharge medications which include: ASA, plavix, lisinopril    In addition, the above information was given to the patient in writing as a part of the [facility] Stroke Class Handout.    Learner's response to risk factors / lifestyle modification education: Reasons to change Need to change Committment to change     Jessie Ornelas RN

## 2021-07-10 NOTE — PLAN OF CARE
Pt here with  acute CVA, right leg numbness-resolved. A&O. Neuros intact. VSS on RA. Tele regular diet. Takes pills whole. Up with 1A/GB/W. Denies pain. Pt scoring green on the Aggression Stop Light Tool. Plan PT/OT/ST/SW & neurology consult & echo today. Discharge pending improvement.

## 2021-07-10 NOTE — PROGRESS NOTES
07/10/21 1400   Quick Adds   Type of Visit Initial Occupational Therapy Evaluation   Living Environment   People in home alone   Current Living Arrangements condominium   Home Accessibility no concerns   Transportation Anticipated car, drives self   Living Environment Comments Pt lives alone in 1 level condo, has walker and cane, uses both as needed. Pt has grab bars in walk in shower.   Self-Care   Usual Activity Tolerance good   Current Activity Tolerance good   Regular Exercise No   Equipment Currently Used at Home cane, straight;walker, rolling;grab bar, tub/shower   Activity/Exercise/Self-Care Comment Pt is active at baseline, does cooking, laundry, light house cleaning, driving, shopping   Disability/Function   Hearing Difficulty or Deaf yes   Patient's preferred means of communication English speaker with hearing loss, no speech problems.   Describe hearing loss hearing loss on left side   Use of hearing assistive devices none   Were auxiliary aids offered? no   The following aids were provided; patient declined offer of auxiliary aids   Wear Glasses or Blind no   Concentrating, Remembering or Making Decisions Difficulty no   Difficulty Communicating no   Difficulty Eating/Swallowing no   Walking or Climbing Stairs Difficulty no   Dressing/Bathing Difficulty no   Toileting issues no   Doing Errands Independently Difficulty (such as shopping) no   Fall history within last six months no   General Information   Onset of Illness/Injury or Date of Surgery 07/09/21   Referring Physician Barthell, Joanna Kersten Ulmen, PA-C   Patient/Family Therapy Goal Statement (OT) Pt would like to go home   Existing Precautions/Restrictions fall   Cognitive Status Examination   Orientation Status orientation to person, place and time   Cognitive Status Comments No cognitive concerns noted   Visual Perception   Impact of Vision Impairment on Function (Vision) Pt reports no visual changes, able to negotiate hallway and maneuver  around objects with no difficulty   Sensory   Sensory Comments Pt reports no numbness/tingling   Pain Assessment   Patient Currently in Pain No   Range of Motion Comprehensive   Comment, General Range of Motion BUE is WFL   Strength Comprehensive (MMT)   Comment, General Manual Muscle Testing (MMT) Assessment BUE is WFL, R>L, pt is R handed   Bed Mobility   Comment (Bed Mobility) Pt is Mod I with bed mobility (use of bedrails today)   Transfers   Transfer Comments Pt transfers with SBA   Balance   Balance Comments No overt LOB   Activities of Daily Living   BADL Assessment/Intervention bathing;upper body dressing;lower body dressing;grooming;feeding;toileting   Bathing Assessment/Intervention   Comment (Bathing) Pt has walk in shower with grab bars and chair, is I at baseline   Upper Body Dressing Assessment/Training   [place] Level (Upper Body Dressing) independent   Lower Body Dressing Assessment/Training   [place] Level (Lower Body Dressing) independent   Grooming Assessment/Training   [place] Level (Grooming) set up;supervision;verbal cues   Comment (Grooming) fatigue limits time standing   Eating/Self Feeding   [place] Level (Feeding) independent;set up   Toileting   [place] Level (Toileting) contact guard assist   Instrumental Activities of Daily Living (IADL)   Previous Responsibilities meal prep;housekeeping;laundry;shopping;medication management;finances;driving  (Gets A with heavy cleaning)   Clinical Impression   Criteria for Skilled Therapeutic Interventions Met (OT) yes   OT Diagnosis Decreased I with ADL tasks   OT Problem List-Impairments impacting ADL problems related to;activity tolerance impaired   Assessment of Occupational Performance 1-3 Performance Deficits   Identified Performance Deficits Pt is functioning below her baseline in standing ADLs and toilet transfer   Planned Therapy Interventions (OT) ADL retraining;progressive activity/exercise   Clinical Decision Making  Complexity (OT) low complexity   Therapy Frequency (OT) Daily   Predicted Duration of Therapy 2 days   Risk & Benefits of therapy have been explained evaluation/treatment results reviewed;care plan/treatment goals reviewed;risks/benefits reviewed;participants voiced agreement with care plan;participants included;patient   Comment-Clinical Impression Skilled OT intervention is warranted to address presenting deficits, anticipate 2 tx sessions   OT Discharge Planning    OT Discharge Recommendation (DC Rec) home   OT Rationale for DC Rec Pt is functioning below her baseline, limited by fatigue. Anticipate pt rhona continue to progress to baseline level of I following 2 tx sessions of acute OT intervention, anticipate no further intervention will be warranted.   Total Evaluation Time (Minutes)   Total Evaluation Time (Minutes) 10

## 2021-07-10 NOTE — PROGRESS NOTES
07/10/21 1450   Quick Adds   Type of Visit Initial PT Evaluation   Living Environment   People in home alone   Current Living Arrangements condominium   Home Accessibility no concerns   Transportation Anticipated car, drives self   Self-Care   Usual Activity Tolerance good   Current Activity Tolerance good   Regular Exercise No   Equipment Currently Used at Home cane, straight;walker, rolling;grab bar, tub/shower   Activity/Exercise/Self-Care Comment Patient uses the walker at night and first thing in the morning. She uses a cane when she leaves the house. She drives, does her own cooking and cleaning.    Disability/Function   Fall history within last six months no   General Information   Onset of Illness/Injury or Date of Surgery 07/09/21   Referring Physician Barthell, Joanna Kersten Ulmen, PA-C   Patient/Family Therapy Goals Statement (PT) to go home   Pertinent History of Current Problem (include personal factors and/or comorbidities that impact the POC) Patient is 89 YO F admitted with R LE numbness that has now resolved, found on imaging to have acute L paracentral gyrus CVA and multiple chronic lacunar infarcts. PMH: recent B mastectomy 6/14/21, nicotine dependence, CKD and HTN   Existing Precautions/Restrictions fall   Weight-Bearing Status - LLE full weight-bearing   Weight-Bearing Status - RLE full weight-bearing   General Observations Patient sitting up in bed, agreeable to PT. Activity orders: up with assist   Cognition   Orientation Status (Cognition) oriented x 4   Affect/Mental Status (Cognition) WNL   Follows Commands (Cognition) WNL   Pain Assessment   Patient Currently in Pain No   Integumentary/Edema   Integumentary/Edema no deficits were identifed   Posture    Posture Protracted shoulders;Forward head position   Posture Comments R leg length discrepancy, chronic with shoe lift in place   Range of Motion (ROM)   ROM Quick Adds ROM WFL   ROM Comment B LE AROM WFL   Strength   Manual Muscle  Testing Quick Adds Able to perform R SLR;Able to perform L SLR;Strength WFL   Strength Comments No focal areas of weakness; Patient indicating generalized weakness sensation due to lying in bed for the past 24 hours   Bed Mobility   Bed Mobility supine-sit   Supine-Sit [place] (Bed Mobility) independent   Transfers   Transfers sit-stand transfer   Sit-Stand Transfer   Sit-Stand [place] (Transfers) supervision   Assistive Device (Sit-Stand Transfers) walker, front-wheeled   Gait/Stairs (Locomotion)   [place] Level (Gait) contact guard   Assistive Device (Gait) walker, front-wheeled   Pattern (Gait) step-through   Deviations/Abnormal Patterns (Gait) mariah decreased   Comment (Gait/Stairs) Patient ambulated 30' with FWW and CGA, mild unsteadiness initially but no losses of balance.    Balance   Balance Comments Good sitting balance, able to done shoes and socks without difficulty; Mod I standing with FWW; Mild unstediness with gait, requiring UE support of walker   Sensory Examination   Sensory Perception patient reports no sensory changes   Sensory Perception Comments B LE in tact to light touch    Coordination   Coordination no deficits were identified   Clinical Impression   Criteria for Skilled Therapeutic Intervention yes, treatment indicated   PT Diagnosis (PT) gait instability   Influenced by the following impairments impaired balance, generalized weakness   Functional limitations due to impairments increased difficulty ambulating   Clinical Presentation Stable/Uncomplicated   Clinical Presentation Rationale medical status, resolved symptoms, clinical judgement   Clinical Decision Making (Complexity) low complexity   Therapy Frequency (PT) Daily   Predicted Duration of Therapy Intervention (days/wks) 3 days   Planned Therapy Interventions (PT) balance training;gait training;patient/family education;neuromuscular re-education;transfer training   Risk & Benefits of therapy have been explained  evaluation/treatment results reviewed;care plan/treatment goals reviewed;risks/benefits reviewed;current/potential barriers reviewed;participants voiced agreement with care plan;participants included;patient   Clinical Impression Comments Patient initially mildly unsteady but with donning her shoes with shoe lift in R shoe and increased activity, patient progressed quickly during session   PT Discharge Planning    PT Discharge Recommendation (DC Rec) home with outpatient physical therapy   PT Rationale for DC Rec Patient slightly below baseline but ambulating safely with FWW. She ambulates without device at home typically. She would benefit from OP PT for balance and gait training to progress to baseline level of mobility.    Total Evaluation Time   Total Evaluation Time (Minutes) 9

## 2021-07-11 ENCOUNTER — APPOINTMENT (OUTPATIENT)
Dept: OCCUPATIONAL THERAPY | Facility: CLINIC | Age: 86
DRG: 065 | End: 2021-07-11
Attending: INTERNAL MEDICINE
Payer: MEDICARE

## 2021-07-11 ENCOUNTER — APPOINTMENT (OUTPATIENT)
Dept: PHYSICAL THERAPY | Facility: CLINIC | Age: 86
DRG: 065 | End: 2021-07-11
Attending: INTERNAL MEDICINE
Payer: MEDICARE

## 2021-07-11 ENCOUNTER — APPOINTMENT (OUTPATIENT)
Dept: CARDIOLOGY | Facility: CLINIC | Age: 86
DRG: 065 | End: 2021-07-11
Attending: INTERNAL MEDICINE
Payer: MEDICARE

## 2021-07-11 VITALS
HEART RATE: 71 BPM | BODY MASS INDEX: 16.69 KG/M2 | RESPIRATION RATE: 16 BRPM | DIASTOLIC BLOOD PRESSURE: 91 MMHG | OXYGEN SATURATION: 93 % | SYSTOLIC BLOOD PRESSURE: 139 MMHG | WEIGHT: 88.4 LBS | TEMPERATURE: 97.5 F | HEIGHT: 61 IN

## 2021-07-11 PROCEDURE — 99239 HOSP IP/OBS DSCHRG MGMT >30: CPT | Performed by: HOSPITALIST

## 2021-07-11 PROCEDURE — 93272 ECG/REVIEW INTERPRET ONLY: CPT | Performed by: INTERNAL MEDICINE

## 2021-07-11 PROCEDURE — 97535 SELF CARE MNGMENT TRAINING: CPT | Mod: GO | Performed by: OCCUPATIONAL THERAPIST

## 2021-07-11 PROCEDURE — 97530 THERAPEUTIC ACTIVITIES: CPT | Mod: GP

## 2021-07-11 PROCEDURE — 97116 GAIT TRAINING THERAPY: CPT | Mod: GP

## 2021-07-11 PROCEDURE — 250N000013 HC RX MED GY IP 250 OP 250 PS 637: Performed by: PHYSICIAN ASSISTANT

## 2021-07-11 PROCEDURE — 93270 REMOTE 30 DAY ECG REV/REPORT: CPT

## 2021-07-11 RX ORDER — CLOPIDOGREL BISULFATE 75 MG/1
75 TABLET ORAL DAILY
Status: CANCELLED | OUTPATIENT
Start: 2021-07-12

## 2021-07-11 RX ORDER — ATORVASTATIN CALCIUM 40 MG/1
40 TABLET, FILM COATED ORAL EVERY EVENING
Qty: 30 TABLET | Refills: 0 | Status: SHIPPED | OUTPATIENT
Start: 2021-07-11 | End: 2022-02-14

## 2021-07-11 RX ORDER — CLOPIDOGREL BISULFATE 75 MG/1
75 TABLET ORAL DAILY
Qty: 19 TABLET | Refills: 0 | Status: SHIPPED | OUTPATIENT
Start: 2021-07-12 | End: 2021-12-20

## 2021-07-11 RX ADMIN — ASPIRIN 81 MG: 81 TABLET, DELAYED RELEASE ORAL at 08:50

## 2021-07-11 RX ADMIN — CLOPIDOGREL BISULFATE 75 MG: 75 TABLET ORAL at 08:50

## 2021-07-11 ASSESSMENT — ACTIVITIES OF DAILY LIVING (ADL)
ADLS_ACUITY_SCORE: 15

## 2021-07-11 NOTE — PLAN OF CARE
Stroke/status post mastectomies 1 month.  Neuros/CMS - alert & oriented x 4; following directions; letting her needs known; denies numbness/tingling; no dizziness or shortness of breath.  VSS, room air. Tele NSR. Regular diet/good appetite/pills whole with applesauce.  Up with 1 assist/gait belt & walker. Denies pain. Pt scoring green on the Aggression Stop Light Tool. Discharging home today with homecare, [employer] cab will be set up for safe discharge.  RX filled here at [facility] and provided.  Patient discharged approximately 1330. Discharged later due to finishing lunch / processing discharge orders/ AVS reviewed/prescriptions provided/heart monitor hooked up by EKG tech/patient declined medi van-assisted patient to set up [employer] cab ride home-patient assisted to door with nursing assistant. Patient agrees with discharge plan, no unmet needs.

## 2021-07-11 NOTE — PLAN OF CARE
Pt here with L CVA. A&Ox4. Neuros intact, except generalized weakness, denies numbness/tingling. VSS. Tele NSR. Regular diet, thin liquids. Takes pills whole with applesauce. Up with A/GB/W. Denies pain. Pt scoring green on the Aggression Stop Light Tool. Slept well. Discharge plan to go home with heart monitor possibly today.

## 2021-07-11 NOTE — DISCHARGE SUMMARY
[facility]    Discharge Summary  Hospitalist    Date of Admission:  7/9/2021  Date of Discharge:  7/11/2021  Discharging Provider: Hola Prado MD  Date of Service (when I saw the patient): 07/11/21      History of Present Illness   Mayela Espino is a 88 year old female with pmhx prediabetes, ckd 3, recent bilat mastectomy who presents as direct adm from [facility] ED where she was found to have hyperacute / acute CVA involving left precentral gyrus.    Hospital Course   Mayela Espino was admitted on 7/9/2021.  The following problems were addressed during her hospitalization:    Acute CVA.  R leg numbness presenting symptoms, since resolved. CT negative acute pathology. Brain MRI showed hyperacute / acute infarct at medial left precentral gyrus and multifocal chronic lacunar infarcts right thalamus, right internal capsule, left basal ganglia and adjacent corona radiata. EKG no acute changes.   - stroke neuro consult:  .  Continue ASA 81 mg, Plavix 75 mg daily for 21 days then aspirin monotherapy.  -Atorvastatin 40 mg daily  -A1c 6.2.  -BP goal less than 160/80.  -Stroke education.  - PT/OT;  ST cleared POs  -Discharged today with recommendations for Home Health PT, will arrange follow-up with new PCP within 1 week for hospital follow-up including BP and to manage new aspirin/Plavix, see above and neurology follow-up with neurology arranged on referral from PCP at 1 month with 30-day Cardiac monitor arranged per Neurology.     Nicotine dependence.  Current quarter pack per day smoker.  Enforce need to quit.  She is interested in quitting.  She declines nicotine replacement therapy at this time.     Hypertension.  Patient treated with lisinopril at outside emergency department.  - Hold PTA lisinopril-HCTZ to allow for permissive hypertension.  BP goal per Neurology less than 160/80.  Follow-up with PCP.     ER + breast cancer s/p bilateral mastectomy (6/14/2021).  - OP follow  up.  - Continue PTA Arimidex.        Right thyroid mass, 7.8cm.  Incidental finding MRI / A imaging.  - Recommend OP thyroid u/s 1-2 weeks.  -TSH suppressed however free T4 WNL.  Follow-up with PCP on discharge with ultrasound as above.     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  Baseline unclear; appears around 1.6.     Prediabetes. A1C 6.4 in 2019.  Not on medications PTA.     Asymptomatic COVID-19. NEG.    Code Status      Full Code       Primary Care Physician   Physician No Ref-Primary    Physical Exam   Temp: 97.5  F (36.4  C) Temp src: Oral BP: (!) 139/91 Pulse: 71   Resp: 16 SpO2: 93 % O2 Device: None (Room air)    Vitals:    07/09/21 1708 07/10/21 0600   Weight: 39.9 kg (88 lb) 40.1 kg (88 lb 6.4 oz)     Vital Signs with Ranges  Temp:  [97.5  F (36.4  C)-98.4  F (36.9  C)] 97.5  F (36.4  C)  Pulse:  [71-77] 71  Resp:  [16] 16  BP: (131-171)/() 139/91  SpO2:  [93 %-96 %] 93 %  I/O last 3 completed shifts:  In: 350 [P.O.:350]  Out: -     General/Constitutional:   NAD, alert, calm, cooperative    HEENT/Head Exam:  atraumatic  Eyes:  PERRL, no conjunctivits  Mouth/Oral Pharynx:  Buccal mucosa WNL  Chest/Respiratory:  Air exchange bilateral lung fields; no rales or wheeze. Respiration nonlabored.  Cardiovascular:  no murmur appreciated.  LE edema none  Gastrointestinal/Abdomen:  soft, nontender, no rebound, guarding or other peritoneal signs.  Musculoskeletal:  extremities warm, dry, noncyanotic; no acitve synovitis.  Neurologic:  gross CN and motor testing  intact,nonfocal.  Sensation grossly tested intact.  Psychiatric:  Mental status:  Alert, oriented, affect calm  Skin:  No rash noted on gross exam    Discharge Disposition   Discharged to home  Condition at discharge: Fair    Consultations This Hospital Stay   PATIENT LEARNING CENTER IP CONSULT  SWALLOW EVAL SPEECH PATH AT BEDSIDE IP CONSULT  SPEECH LANGUAGE PATH ADULT IP CONSULT  PHARMACY IP CONSULT  PHARMACY IP CONSULT  PHARMACY IP CONSULT  PHYSICAL THERAPY ADULT IP  CONSULT  OCCUPATIONAL THERAPY ADULT IP CONSULT  CARE MANAGEMENT / SOCIAL WORK IP CONSULT  NEUROLOGY IP CONSULT  SMOKING CESSATION PROGRAM IP CONSULT    Time Spent on this Encounter   IHola MD, personally saw the patient today and spent greater than 30 minutes discharging this patient discussing discharge plans with Patient and Nursing, arranging follow-up with new PCP as well as completing face-to-face documentation for Home Health Care; coordinating with [facility] regarding medications and follow-up; completing discharge orders, medications and follow-up.    Discharge Orders      Home Care PT Referral for Hospital Discharge      Reason for your hospital stay    Presented with stroke     Follow-up and recommended labs and tests     Follow up with primary care provider, No primary care provider on file., within 7 days for hospital follow- up.  The following labs/tests are recommended: BMP;  please follow-up on recent stroke including any side effects to new medications including plavix [21 days] and ongoing ASA.  Please follow-up blood pressure  .    Follow up with Neurologist recommended by your Primary Provider at 4 wks.    Home Health PT will call to arrange appointment within your home.     Activity    Your activity upon discharge: as advised by PT/OT.  NO driving until follow-up appointment with your Primary Provider     MD face to face encounter    Documentation of Face to Face and Certification for Home Health Services    I certify that patient: Mayela Espino is under my care and that I, or a nurse practitioner or physician's assistant working with me, had a face-to-face encounter that meets the physician face-to-face encounter requirements with this patient on: 7/11/2021.    This encounter with the patient was in whole, or in part, for the following medical condition, which is the primary reason for home health care: stroke.    I certify that, based on my findings, the following  services are medically necessary home health services: Physical Therapy.    My clinical findings support the need for the above services because: Physical Therapy Services are needed to assess and treat the following functional impairments: post stroke and safety.    Further, I certify that my clinical findings support that this patient is homebound (i.e. absences from home require considerable and taxing effort and are for medical reasons or [facility] services or infrequently or of short duration when for other reasons) because: post stroke.    Based on the above findings. I certify that this patient is confined to the home and needs intermittent skilled nursing care, physical therapy and/or speech therapy.  The patient is under my care, and I have initiated the establishment of the plan of care.  This patient will be followed by a physician who will periodically review the plan of care.  Physician/Provider to provide follow up care: Marce primary care provider on file.    Attending hospital physician (the Medicare certified [place] provider): Hola Prado MD  Physician Signature: See electronic signature associated with these discharge orders.  Date: 7/11/2021     Follow-up and recommended labs and tests     In addition to previous follow-up   Also schedule follow-up with Primary Provider at 5 wks to review cardiac event montior     Diet    Follow this diet upon discharge: Orders Placed This Encounter      Regular Diet Adult     Discharge Medications   Current Discharge Medication List      START taking these medications    Details   aspirin (ASA) 81 MG EC tablet Take 1 tablet (81 mg) by mouth daily Future refills by PCP Dr. Zheng primary care provider on file. with phone number None.  Qty: 30 tablet, Refills: 0    Associated Diagnoses: Cerebrovascular accident (CVA) due to occlusion of other cerebral artery (H)      atorvastatin (LIPITOR) 40 MG tablet Take 1 tablet (40 mg) by mouth every evening Future refills by  PCP Dr. Zheng primary care provider on file. with phone number None.  Qty: 30 tablet, Refills: 0    Associated Diagnoses: Cerebrovascular accident (CVA) due to occlusion of other cerebral artery (H)      clopidogrel (PLAVIX) 75 MG tablet Take 1 tablet (75 mg) by mouth daily  Qty: 19 tablet, Refills: 0    Associated Diagnoses: Cerebrovascular accident (CVA) due to occlusion of other cerebral artery (H)         CONTINUE these medications which have NOT CHANGED    Details   anastrozole (ARIMIDEX) 1 mg tablet [ANASTROZOLE (ARIMIDEX) 1 MG TABLET] TAKE 1 TABLET BY MOUTH DAILY  Qty: 90 tablet, Refills: 2    Associated Diagnoses: Bilateral malignant neoplasm of breast in female, estrogen receptor positive, unspecified site of breast (H)      cholecalciferol, vitamin D3, 1,000 unit tablet [CHOLECALCIFEROL, VITAMIN D3, 1,000 UNIT TABLET] Take 500 Units by mouth daily.       HYDROcodone-acetaminophen 5-325 mg per tablet [HYDROCODONE-ACETAMINOPHEN 5-325 MG PER TABLET] Take 1 tablet by mouth every 4 (four) hours as needed for pain.  Qty: 18 tablet, Refills: 0    Associated Diagnoses: Bilateral malignant neoplasm of breast in female, estrogen receptor positive, unspecified site of breast (H)      multivit-iron-min-folic acid 3,500-18-0.4 unit-mg-mg Chew [MULTIVIT-IRON-MIN-FOLIC ACID 3,500-18-0.4 UNIT-MG-MG CHEW] Chew 1 tablet daily.          STOP taking these medications       lisinopril-hydrochlorothiazide (PRINZIDE,ZESTORETIC) 10-12.5 mg per tablet Comments:   Reason for Stopping:             Allergies   Allergies   Allergen Reactions     Latex Unknown     Added based on information entered during case entry, please review and add reactions, type, and severity as needed     Latex Rash     Data   Most Recent 3 CBC's:Recent Labs   Lab Test 07/10/21  0922   WBC 4.6   HGB 12.2   MCV 96   *      Most Recent 3 BMP's:  Recent Labs   Lab Test 07/10/21  0922 09/18/19  1101 03/20/19  1140   * 144 144   POTASSIUM 3.9 5.1* 5.0    CHLORIDE 112* 106 107   CO2 31 29 27   BUN 28 44* 45*   CR 1.52* 1.76* 2.07*   ANIONGAP 2* 9 10   ITA 8.5 9.7 9.3   * 128* 130*     Most Recent 2 LFT's:  Recent Labs   Lab Test 03/20/19  1140 03/19/18  1054   AST 19 22   ALT 23 20   ALKPHOS 96 147*   BILITOTAL 0.7 0.9   Most Recent Cholesterol Panel:  Recent Labs   Lab Test 07/09/21  2234   CHOL 153   LDL 75   HDL 68   TRIG 48     Most Recent 6 Bacteria Isolates From Any Culture (See EPIC Reports for Culture Details):No lab results found.  Most Recent TSH, T4 and A1c Labs:  Recent Labs   Lab Test 07/10/21  0922 07/09/21  2234   TSH 0.07*  --    T4 1.19  --    A1C  --  6.2*

## 2021-07-11 NOTE — PLAN OF CARE
Physical Therapy Discharge Summary    Reason for therapy discharge:    Discharged to home with home therapy.    Progress towards therapy goal(s). See goals on Care Plan in [facility] electronic health record for goal details.  Goals partially met.  Barriers to achieving goals:   discharge from facility.    Therapy recommendation(s):    Continued therapy is recommended.  Rationale/Recommendations:  outpatient PT recommended for balance and gait training but patient does not currently have transportation so she will be getting Home PT.

## 2021-07-11 NOTE — PROGRESS NOTES
Care Management Follow Up    Care Management Initial Consult    General Information  Assessment completed with: pt and bedside RN    Primary Care Provider verified and updated as needed:     Readmission within the last 30 days:           Advance Care Planning:  None in chart.     Communication Assessment  Patient's communication style: spoken language (English or Bilingual)    Hearing Difficulty or Deaf: yes   Wear Glasses or Blind: no    Cognitive  Cognitive/Neuro/Behavioral: WDL  Level of Consciousness: alert    Orientation: oriented x 4  Mood/Behavior: calm, cooperative  Best Language: 0 - No aphasia  Speech: logical, spontaneous, clear    Living Environment:   People in home:   self    Current living Arrangements: condominium      Able to return to prior arrangements:  yes     Family/Social Support:  Care provided by:  Self. Pt lives alone              Description of Support System:    Pt lives alone .    Current Resources:   Patient receiving home care services:  Pt states that she is ok with home care through [facility].  Community Resources:    Equipment currently used at home: cane, straight, walker, rolling, grab bar, tub/shower  Supplies currently used at home:      Employment/Financial:  Employment Status:          Financial Concerns:   Pt states she has Medicare and AARP but nothing listed in chart for insurance.  SW called to update registration and no answer.      Lifestyle & Psychosocial Needs:  Social Determinants of Health     Tobacco Use: High Risk     Smoking Tobacco Use: Current Every Day Smoker     Smokeless Tobacco Use: Never Used   Alcohol Use:      Frequency of Alcohol Consumption:      Average Number of Drinks:      Frequency of Binge Drinking:    Financial Resource Strain:      Difficulty of Paying Living Expenses:    Food Insecurity:      Worried About Running Out of Food in the Last Year:      Ran Out of Food in the Last Year:    Transportation Needs:      Lack of Transportation (Medical):       Lack of Transportation (Non-Medical):    Physical Activity:      Days of Exercise per Week:      Minutes of Exercise per Session:    Stress:      Feeling of Stress :    Social Connections:      Frequency of Communication with Friends and Family:      Frequency of Social Gatherings with Friends and Family:      Attends Jew Services:      Active Member of Clubs or Organizations:      Attends Club or Organization Meetings:      Marital Status:    Intimate Partner Violence:      Fear of Current or Ex-Partner:      Emotionally Abused:      Physically Abused:      Sexually Abused:    Depression:      PHQ-2 Score:    Housing Stability:      Unable to Pay for Housing in the Last Year:      Number of Places Lived in the Last Year:      Unstable Housing in the Last Year:        Functional Status:  Prior to admission patient needed assistance: Pt was independent with all ADL's. Pt states that while she is home bound now; she intends to start driving again once she is stronger.     Mental Health Status:         Chemical Dependency Status:          Alert and oriented    Values/Beliefs:  Spiritual, Cultural Beliefs, Jew Practices, Values that affect care:       Discharge Date: 07/11/2021       Discharge Disposition: home     Discharge Services:  Home care through [facility]. CTRN made referral to [facility] ( [phone] fax [phone]).   (face sheet discharge orders and H&P sent via fax)    Discharge Transportation: car, drives self but currently unable to drive.  Pt requesting a w/c ride home. She has no steps into the house. She has her keys and is agreeable to the cost of transpotation.    10:37: Pt is now not agreeable to [facility] transportation. She utilized a [employer] company that she is going to call for a ride. Pt cautioned about he possible need for stand by assistance getting into her home.  She states that she is not worried and has used transport in the past and they are helpful.    Private pay costs  discussed: transportation costs  Pt agreeable to [facility] w/c charges and then changed her mind and ordered her own cab service.  Patient in Agreement with the Plan:  yes      Additional Information:  FYI:   Pt's face sheet does not list her PHP but she was seen by Agus Benjamin six months ago at [facility] in [facility]. [phone].  She has not made a change of provider since then.    Nabila Erickson, [facility]  [phone]

## 2021-07-12 ENCOUNTER — PATIENT OUTREACH (OUTPATIENT)
Dept: CARE COORDINATION | Facility: CLINIC | Age: 86
End: 2021-07-12

## 2021-07-12 DIAGNOSIS — Z71.89 OTHER SPECIFIED COUNSELING: ICD-10-CM

## 2021-07-12 NOTE — PROGRESS NOTES
Occupational Therapy Discharge Summary    Reason for therapy discharge:    Discharged to home with home therapy.    Progress towards therapy goal(s). See goals on Care Plan in [facility] electronic health record for goal details.  Goals partially met.  Barriers to achieving goals:   discharge from facility.    Therapy recommendation(s):    No further therapy is recommended.

## 2021-07-12 NOTE — PROGRESS NOTES
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 (Red Flag symptoms escalate to triage hotline per guidelines): Improved  Do you feel your condition is stable enough to be safe at home until your provider visit?: Yes  Does the patient have their discharge instructions? : Yes  Does the patient have questions regarding their discharge instructions? : No  Were you started on any new medications or were there changes to any of your previous medications? : No  Does the patient have all of their medications?: Yes  Do you have questions regarding any of your medications? : No             PLAN                                                      Outpatient Plan:      No future appointments.      For any urgent concerns, please contact our 24 hour nurse triage line: [phone] ([phone])         Heather Alvarado

## 2021-07-19 ENCOUNTER — LAB REQUISITION (OUTPATIENT)
Dept: LAB | Facility: CLINIC | Age: 86
End: 2021-07-19
Payer: MEDICARE

## 2021-07-19 DIAGNOSIS — I63.81 OTHER CEREBRAL INFARCTION DUE TO OCCLUSION OR STENOSIS OF SMALL ARTERY (H): ICD-10-CM

## 2021-07-19 LAB
ANION GAP SERPL CALCULATED.3IONS-SCNC: 10 MMOL/L (ref 5–18)
BUN SERPL-MCNC: 40 MG/DL (ref 8–28)
CALCIUM SERPL-MCNC: 8.7 MG/DL (ref 8.5–10.5)
CHLORIDE BLD-SCNC: 107 MMOL/L (ref 98–107)
CHOLEST SERPL-MCNC: 142 MG/DL
CO2 SERPL-SCNC: 27 MMOL/L (ref 22–31)
CREAT SERPL-MCNC: 1.93 MG/DL (ref 0.6–1.1)
GFR SERPL CREATININE-BSD FRML MDRD: 23 ML/MIN/1.73M2
GLUCOSE BLD-MCNC: 181 MG/DL (ref 70–125)
HDLC SERPL-MCNC: 64 MG/DL
LDLC SERPL CALC-MCNC: 55 MG/DL
POTASSIUM BLD-SCNC: 5.1 MMOL/L (ref 3.5–5)
SODIUM SERPL-SCNC: 144 MMOL/L (ref 136–145)
TRIGL SERPL-MCNC: 114 MG/DL

## 2021-07-19 PROCEDURE — 80048 BASIC METABOLIC PNL TOTAL CA: CPT | Performed by: FAMILY MEDICINE

## 2021-07-19 PROCEDURE — 80061 LIPID PANEL: CPT | Performed by: FAMILY MEDICINE

## 2021-11-18 ENCOUNTER — LAB REQUISITION (OUTPATIENT)
Dept: LAB | Facility: CLINIC | Age: 86
End: 2021-11-18
Payer: MEDICARE

## 2021-11-18 DIAGNOSIS — R09.02 HYPOXEMIA: ICD-10-CM

## 2021-11-18 LAB
ANION GAP SERPL CALCULATED.3IONS-SCNC: 9 MMOL/L (ref 5–18)
BNP SERPL-MCNC: 2301 PG/ML (ref 0–167)
BUN SERPL-MCNC: 36 MG/DL (ref 8–28)
CALCIUM SERPL-MCNC: 9.2 MG/DL (ref 8.5–10.5)
CHLORIDE BLD-SCNC: 110 MMOL/L (ref 98–107)
CO2 SERPL-SCNC: 29 MMOL/L (ref 22–31)
CREAT SERPL-MCNC: 1.78 MG/DL (ref 0.6–1.1)
GFR SERPL CREATININE-BSD FRML MDRD: 25 ML/MIN/1.73M2
GLUCOSE BLD-MCNC: 113 MG/DL (ref 70–125)
POTASSIUM BLD-SCNC: 4.9 MMOL/L (ref 3.5–5)
SODIUM SERPL-SCNC: 148 MMOL/L (ref 136–145)

## 2021-11-18 PROCEDURE — 80048 BASIC METABOLIC PNL TOTAL CA: CPT | Mod: ORL | Performed by: STUDENT IN AN ORGANIZED HEALTH CARE EDUCATION/TRAINING PROGRAM

## 2021-11-18 PROCEDURE — 83880 ASSAY OF NATRIURETIC PEPTIDE: CPT | Mod: ORL | Performed by: STUDENT IN AN ORGANIZED HEALTH CARE EDUCATION/TRAINING PROGRAM

## 2021-11-24 ENCOUNTER — LAB REQUISITION (OUTPATIENT)
Dept: LAB | Facility: CLINIC | Age: 86
End: 2021-11-24
Payer: MEDICARE

## 2021-11-24 DIAGNOSIS — I50.40 UNSPECIFIED COMBINED SYSTOLIC (CONGESTIVE) AND DIASTOLIC (CONGESTIVE) HEART FAILURE (H): ICD-10-CM

## 2021-11-24 LAB
ALBUMIN SERPL-MCNC: 3.4 G/DL (ref 3.5–5)
ALP SERPL-CCNC: 96 U/L (ref 45–120)
ALT SERPL W P-5'-P-CCNC: 53 U/L (ref 0–45)
ANION GAP SERPL CALCULATED.3IONS-SCNC: 8 MMOL/L (ref 5–18)
AST SERPL W P-5'-P-CCNC: 24 U/L (ref 0–40)
BILIRUB SERPL-MCNC: 0.7 MG/DL (ref 0–1)
BUN SERPL-MCNC: 42 MG/DL (ref 8–28)
CALCIUM SERPL-MCNC: 8.6 MG/DL (ref 8.5–10.5)
CHLORIDE BLD-SCNC: 110 MMOL/L (ref 98–107)
CO2 SERPL-SCNC: 28 MMOL/L (ref 22–31)
CREAT SERPL-MCNC: 1.84 MG/DL (ref 0.6–1.1)
GFR SERPL CREATININE-BSD FRML MDRD: 24 ML/MIN/1.73M2
GLUCOSE BLD-MCNC: 110 MG/DL (ref 70–125)
POTASSIUM BLD-SCNC: 5.2 MMOL/L (ref 3.5–5)
PROT SERPL-MCNC: 6.5 G/DL (ref 6–8)
SODIUM SERPL-SCNC: 146 MMOL/L (ref 136–145)

## 2021-11-24 PROCEDURE — 80053 COMPREHEN METABOLIC PANEL: CPT | Mod: ORL | Performed by: STUDENT IN AN ORGANIZED HEALTH CARE EDUCATION/TRAINING PROGRAM

## 2021-11-26 ENCOUNTER — LAB REQUISITION (OUTPATIENT)
Dept: LAB | Facility: CLINIC | Age: 86
End: 2021-11-26
Payer: MEDICARE

## 2021-11-26 DIAGNOSIS — I50.40 UNSPECIFIED COMBINED SYSTOLIC (CONGESTIVE) AND DIASTOLIC (CONGESTIVE) HEART FAILURE (H): ICD-10-CM

## 2021-11-26 LAB
ANION GAP SERPL CALCULATED.3IONS-SCNC: 10 MMOL/L (ref 5–18)
BUN SERPL-MCNC: 34 MG/DL (ref 8–28)
CALCIUM SERPL-MCNC: 9 MG/DL (ref 8.5–10.5)
CHLORIDE BLD-SCNC: 107 MMOL/L (ref 98–107)
CO2 SERPL-SCNC: 30 MMOL/L (ref 22–31)
CREAT SERPL-MCNC: 1.9 MG/DL (ref 0.6–1.1)
GFR SERPL CREATININE-BSD FRML MDRD: 23 ML/MIN/1.73M2
GLUCOSE BLD-MCNC: 113 MG/DL (ref 70–125)
POTASSIUM BLD-SCNC: 5 MMOL/L (ref 3.5–5)
SODIUM SERPL-SCNC: 147 MMOL/L (ref 136–145)

## 2021-11-26 PROCEDURE — 80048 BASIC METABOLIC PNL TOTAL CA: CPT | Mod: ORL | Performed by: STUDENT IN AN ORGANIZED HEALTH CARE EDUCATION/TRAINING PROGRAM

## 2021-11-29 ENCOUNTER — OFFICE VISIT (OUTPATIENT)
Dept: CARDIOLOGY | Facility: CLINIC | Age: 86
End: 2021-11-29
Payer: MEDICARE

## 2021-11-29 VITALS
BODY MASS INDEX: 18.21 KG/M2 | SYSTOLIC BLOOD PRESSURE: 128 MMHG | DIASTOLIC BLOOD PRESSURE: 64 MMHG | HEART RATE: 62 BPM | WEIGHT: 96.4 LBS | RESPIRATION RATE: 16 BRPM

## 2021-11-29 DIAGNOSIS — I50.23 ACUTE ON CHRONIC SYSTOLIC HEART FAILURE (H): Primary | ICD-10-CM

## 2021-11-29 DIAGNOSIS — I10 ESSENTIAL HYPERTENSION: ICD-10-CM

## 2021-11-29 DIAGNOSIS — I50.23 ACUTE ON CHRONIC SYSTOLIC HEART FAILURE (H): ICD-10-CM

## 2021-11-29 DIAGNOSIS — I42.9 SECONDARY CARDIOMYOPATHY (H): ICD-10-CM

## 2021-11-29 PROCEDURE — 99204 OFFICE O/P NEW MOD 45 MIN: CPT | Performed by: INTERNAL MEDICINE

## 2021-11-29 RX ORDER — CARVEDILOL 3.12 MG/1
3.12 TABLET ORAL 2 TIMES DAILY WITH MEALS
Qty: 180 TABLET | Refills: 1 | Status: SHIPPED | OUTPATIENT
Start: 2021-11-29 | End: 2021-12-20

## 2021-11-29 RX ORDER — CARVEDILOL 3.12 MG/1
3.12 TABLET ORAL 2 TIMES DAILY WITH MEALS
Qty: 60 TABLET | Refills: 11 | Status: SHIPPED | OUTPATIENT
Start: 2021-11-29 | End: 2021-11-29

## 2021-11-29 NOTE — PROGRESS NOTES
Thank you, Marco Glynn, for asking the [facility] Heart Care team to see Ms. Mayela Espino to evaluate New Patient (new heart failure)      Assessment/Recommendations   Assessment:    1. Acute systolic congestive heart failure - new diagnosis - no anginal symptoms reported. Still mildly decompensated. Discussed ischemic evaluation with invasive coronary angiography versus stress testing versus wait-and-see.  We will attempt medical management prior to ischemic evaluation due to patient preference.  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  Due to patient's age and frailty we are deferring on invasive ischemic evaluation at this time.  3. Hypertension -blood pressure controlled    Plan:  1. Patient to start lisinopril as prescribed by Dr. Brenner  2. A few days  from the start of lisinopril she should start carvedilol 3.125 mg twice daily.  3. Follow-up in 2 weeks in the heart failure nurse practitioner clinic for medication titration and lab work to monitor renal function.  Would also discuss general heart failure education at this appointment.  4. Echocardiogram in 4 to 6 weeks to reassess LV function  5. Follow-up with me in 6 weeks, after the echo is done.         History of Present Illness   Ms. Mayela Espino is a 88 year old female with a significant past history of hypertension and hyperlipidemia who presents for evaluation of a new diagnosis of heart failure.    Ms. Espino was feeling well until approximately 3 to 4 weeks ago when she became short of breath.  This was associated with weight gain and swelling with orthopnea.  She was initially treated for COPD exacerbation and noted modest improvement in her symptoms.  An echocardiogram was performed that revealed severely reduced LV function with an EF of 25 to 30% and she was started on Lasix.  She was recommended to start lisinopril but that has not started yet.  Since starting the Lasix she has had a subjective  improvement in her shortness of breath with some reduction in the lower extremity swelling however she continues to have persistent edema in her lower legs her orthopnea has significantly improved.  She denies anginal pain or other chest discomfort anytime in the last several weeks to months.  She has no known history of coronary disease.      Other than noted above, Ms. Espino denies any chest pain/pressure/tightness, shortness of breath at rest or with exertion, light headedness/dizziness, pre-syncope, syncope, lower extremity swelling, palpitations, paroxysmal nocturnal dyspnea (PND), or orthopnea.     Cardiac Problems and Cardiac Diagnostics     Most Recent Cardiac testing:  ECG dated 11/18/21 (personaly reviewed and interpreted): sinus rhythm with left atrial enlargement, LVH, and nonspecific ST abnormality.    ECHO (report reviewed):   TTE at \[facility]\"" 11/19/21    Left-ventricular is normal size with an EF of 25 to 30%.    Right ventricle with normal size and systolic function    Moderate left atrial margin.    Mild to moderate aortic insufficiency    Moderate mitral vegetation    TTE 7/9/21  Interpretation Summary     1. The left ventricle is normal in size. The visual ejection fraction is 50-  55%.  2. The right ventricle is normal in structure, function and size.  3. There is mild to moderate (1-2+) mitral regurgitation.  4. There is mild to moderate (1-2+) aortic regurgitation.     No previous echo for comparison.         Medications  Allergies   Current Outpatient Medications   Medication Sig Dispense Refill     anastrozole (ARIMIDEX) 1 mg tablet [ANASTROZOLE (ARIMIDEX) 1 MG TABLET] TAKE 1 TABLET BY MOUTH DAILY 90 tablet 2     aspirin (ASA) 81 MG EC tablet Take 1 tablet (81 mg) by mouth daily Future refills by PCP Dr. Zheng primary care provider on file. with phone number None. 30 tablet 0     atorvastatin (LIPITOR) 40 MG tablet Take 1 tablet (40 mg) by mouth every evening Future refills by PCP Dr. Zheng primary  care provider on file. with phone number None. 30 tablet 0     carvedilol (COREG) 3.125 MG tablet Take 1 tablet (3.125 mg) by mouth 2 times daily (with meals) 60 tablet 11     cholecalciferol, vitamin D3, 1,000 unit tablet [CHOLECALCIFEROL, VITAMIN D3, 1,000 UNIT TABLET] Take 500 Units by mouth daily.        clopidogrel (PLAVIX) 75 MG tablet Take 1 tablet (75 mg) by mouth daily 19 tablet 0     multivit-iron-min-folic acid 3,500-18-0.4 unit-mg-mg Chew [MULTIVIT-IRON-MIN-FOLIC ACID 3,500-18-0.4 UNIT-MG-MG CHEW] Chew 1 tablet daily.        HYDROcodone-acetaminophen 5-325 mg per tablet [HYDROCODONE-ACETAMINOPHEN 5-325 MG PER TABLET] Take 1 tablet by mouth every 4 (four) hours as needed for pain. (Patient not taking: Reported on 11/29/2021) 18 tablet 0      Allergies   Allergen Reactions     Latex Unknown     Added based on information entered during case entry, please review and add reactions, type, and severity as needed     Latex Rash        Physical Examination Review of Systems   Vitals: /64 (BP Location: Left arm, Patient Position: Sitting, Cuff Size: Adult Regular)   Pulse 62   Resp 16   Wt 43.7 kg (96 lb 6.4 oz)   BMI 18.21 kg/m    BMI= Body mass index is 18.21 kg/m .  Wt Readings from Last 3 Encounters:   11/29/21 43.7 kg (96 lb 6.4 oz)   07/10/21 40.1 kg (88 lb 6.4 oz)   06/15/21 41.6 kg (91 lb 11.2 oz)       General Appearance:   Pleasant, frail, elderly woman in no acute distress   ENT/Mouth: membranes moist, no apparent gingival bleeding.      EYES:  no scleral icterus, normal conjunctivae   Neck: no carotid bruits. supple   Respiratory:   lungs are clear to auscultation, no rales or wheezing, equal chest wall expansion    Cardiovascular:   Regular rhythm, normal rate. Normal first and second heart sounds with no murmurs, rubs, or gallops; the carotid, radial and posterior tibial pulses are intact, Jugular venous pressure normal, 1+ BLE edema below knees    Abdomen/GI:  Soft, non-tender    Extremities: no cyanosis or clubbing   Skin: no xanthelasma, warm.    Heme/lymph/ Immunology No apparent bleeding noted.   Neurologic: Alert and oriented. Ambulates slowly with a cane. no tremors   Psychiatric: Pleasant, calm, appropriate affect.         Please refer above for cardiac ROS details.       Past History   Past Medical History:   Past Medical History:   Diagnosis Date     Abnormal mini-mental status exam     scored 16 2012, ?dementia     Breast cancer (H)      Chronic kidney disease      Essential tremor      HTN (hypertension)      Tobacco use         Past Surgical History:   Past Surgical History:   Procedure Laterality Date     EYE SURGERY       JOINT REPLACEMENT       LUMPECTOMY BREAST Bilateral 1982    Cyst removed     OTHER SURGICAL HISTORY Left     ear prosthesis     OTHER SURGICAL HISTORY      l rola     OTHER SURGICAL HISTORY      left hip ORIF     [place] MASTECTOMY, MODIFIED RADICAL Bilateral 6/14/2021    Procedure: Bilateral mastectomies;  Surgeon: Dulce Duran MD;  Location: [facility] [place];  Service: General        Family History:   Family History   Problem Relation Age of Onset     Hypertension Mother      Cerebrovascular Disease Father 70.00     No Known Problems Son        Social History:   Social History     Socioeconomic History     Marital status:      Spouse name: Not on file     Number of children: Not on file     Years of education: Not on file     Highest education level: Not on file   Occupational History     Not on file   Tobacco Use     Smoking status: Current Every Day Smoker     Packs/day: 0.25     Smokeless tobacco: Never Used   Substance and Sexual Activity     Alcohol use: No     Drug use: Never     Sexual activity: Not on file   Other Topics Concern     Not on file   Social History Narrative    Has one son who lives in [place]     Social Determinants of Health     Financial Resource Strain: Not on file   Food Insecurity: Not on file   Transportation Needs: Not  on file   Physical Activity: Not on file   Stress: Not on file   Social Connections: Not on file   Intimate Partner Violence: Not on file   Housing Stability: Not on file            Lab Results    Chemistry/lipid CBC Cardiac Enzymes/BNP/TSH/INR   Lab Results   Component Value Date    CHOL 142 07/19/2021    HDL 64 07/19/2021    TRIG 114 07/19/2021    BUN 34 (H) 11/26/2021     (H) 11/26/2021    CO2 30 11/26/2021    Lab Results   Component Value Date    WBC 4.6 07/10/2021    HGB 12.2 07/10/2021    HCT 39.0 07/10/2021    MCV 96 07/10/2021     (L) 07/10/2021    Lab Results   Component Value Date    TROPONINI 0.03 07/09/2021    BNP 2,301 (H) 11/18/2021    TSH 0.07 (L) 07/10/2021    INR 1.13 (H) 07/09/2021

## 2021-11-29 NOTE — LETTER
11/29/2021    SRINIVASAN FARIA MD  [facility] [address]  [facility] [address]    RE: Mayela Castañedaon       Dear Colleague,    I had the pleasure of seeing Mayela Espino in the [facility].      Thank you, Srinivasan Glynn, for asking the [facility] Heart Care team to see Ms. Mayela Espino to evaluate New Patient (new heart failure)      Assessment/Recommendations   Assessment:    1. Acute systolic congestive heart failure - new diagnosis - no anginal symptoms reported. Still mildly decompensated. Discussed ischemic evaluation with invasive coronary angiography versus stress testing versus wait-and-see.  We will attempt medical management prior to ischemic evaluation due to patient preference.  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  Due to patient's age and frailty we are deferring on invasive ischemic evaluation at this time.  3. Hypertension -blood pressure controlled    Plan:  1. Patient to start lisinopril as prescribed by Dr. Brenner  2. A few days  from the start of lisinopril she should start carvedilol 3.125 mg twice daily.  3. Follow-up in 2 weeks in the heart failure nurse practitioner clinic for medication titration and lab work to monitor renal function.  Would also discuss general heart failure education at this appointment.  4. Echocardiogram in 4 to 6 weeks to reassess LV function  5. Follow-up with me in 6 weeks, after the echo is done.         History of Present Illness   Ms. Mayela Espino is a 88 year old female with a significant past history of hypertension and hyperlipidemia who presents for evaluation of a new diagnosis of heart failure.    Ms. Espino was feeling well until approximately 3 to 4 weeks ago when she became short of breath.  This was associated with weight gain and swelling with orthopnea.  She was initially treated for COPD exacerbation and noted modest improvement in  her symptoms.  An echocardiogram was performed that revealed severely reduced LV function with an EF of 25 to 30% and she was started on Lasix.  She was recommended to start lisinopril but that has not started yet.  Since starting the Lasix she has had a subjective improvement in her shortness of breath with some reduction in the lower extremity swelling however she continues to have persistent edema in her lower legs her orthopnea has significantly improved.  She denies anginal pain or other chest discomfort anytime in the last several weeks to months.  She has no known history of coronary disease.      Other than noted above, Ms. Espino denies any chest pain/pressure/tightness, shortness of breath at rest or with exertion, light headedness/dizziness, pre-syncope, syncope, lower extremity swelling, palpitations, paroxysmal nocturnal dyspnea (PND), or orthopnea.     Cardiac Problems and Cardiac Diagnostics     Most Recent Cardiac testing:  ECG dated 11/18/21 (personaly reviewed and interpreted): sinus rhythm with left atrial enlargement, LVH, and nonspecific ST abnormality.    ECHO (report reviewed):   [facility] 11/19/21    Left-ventricular is normal size with an EF of 25 to 30%.    Right ventricle with normal size and systolic function    Moderate left atrial margin.    Mild to moderate aortic insufficiency    Moderate mitral vegetation    TTE 7/9/21  Interpretation Summary     1. The left ventricle is normal in size. The visual ejection fraction is 50-  55%.  2. The right ventricle is normal in structure, function and size.  3. There is mild to moderate (1-2+) mitral regurgitation.  4. There is mild to moderate (1-2+) aortic regurgitation.     No previous echo for comparison.         Medications  Allergies   Current Outpatient Medications   Medication Sig Dispense Refill     anastrozole (ARIMIDEX) 1 mg tablet [ANASTROZOLE (ARIMIDEX) 1 MG TABLET] TAKE 1 TABLET BY MOUTH DAILY 90 tablet 2     aspirin (ASA) 81  MG EC tablet Take 1 tablet (81 mg) by mouth daily Future refills by PCP Dr. Zheng primary care provider on file. with phone number None. 30 tablet 0     atorvastatin (LIPITOR) 40 MG tablet Take 1 tablet (40 mg) by mouth every evening Future refills by PCP Dr. Zheng primary care provider on file. with phone number None. 30 tablet 0     carvedilol (COREG) 3.125 MG tablet Take 1 tablet (3.125 mg) by mouth 2 times daily (with meals) 60 tablet 11     cholecalciferol, vitamin D3, 1,000 unit tablet [CHOLECALCIFEROL, VITAMIN D3, 1,000 UNIT TABLET] Take 500 Units by mouth daily.        clopidogrel (PLAVIX) 75 MG tablet Take 1 tablet (75 mg) by mouth daily 19 tablet 0     multivit-iron-min-folic acid 3,500-18-0.4 unit-mg-mg Chew [MULTIVIT-IRON-MIN-FOLIC ACID 3,500-18-0.4 UNIT-MG-MG CHEW] Chew 1 tablet daily.        HYDROcodone-acetaminophen 5-325 mg per tablet [HYDROCODONE-ACETAMINOPHEN 5-325 MG PER TABLET] Take 1 tablet by mouth every 4 (four) hours as needed for pain. (Patient not taking: Reported on 11/29/2021) 18 tablet 0      Allergies   Allergen Reactions     Latex Unknown     Added based on information entered during case entry, please review and add reactions, type, and severity as needed     Latex Rash        Physical Examination Review of Systems   Vitals: /64 (BP Location: Left arm, Patient Position: Sitting, Cuff Size: Adult Regular)   Pulse 62   Resp 16   Wt 43.7 kg (96 lb 6.4 oz)   BMI 18.21 kg/m    BMI= Body mass index is 18.21 kg/m .  Wt Readings from Last 3 Encounters:   11/29/21 43.7 kg (96 lb 6.4 oz)   07/10/21 40.1 kg (88 lb 6.4 oz)   06/15/21 41.6 kg (91 lb 11.2 oz)       General Appearance:   Pleasant, frail, elderly woman in no acute distress   ENT/Mouth: membranes moist, no apparent gingival bleeding.      EYES:  no scleral icterus, normal conjunctivae   Neck: no carotid bruits. supple   Respiratory:   lungs are clear to auscultation, no rales or wheezing, equal chest wall expansion     Cardiovascular:   Regular rhythm, normal rate. Normal first and second heart sounds with no murmurs, rubs, or gallops; the carotid, radial and posterior tibial pulses are intact, Jugular venous pressure normal, 1+ BLE edema below knees    Abdomen/GI:  Soft, non-tender   Extremities: no cyanosis or clubbing   Skin: no xanthelasma, warm.    Heme/lymph/ Immunology No apparent bleeding noted.   Neurologic: Alert and oriented. Ambulates slowly with a cane. no tremors   Psychiatric: Pleasant, calm, appropriate affect.         Please refer above for cardiac ROS details.       Past History   Past Medical History:   Past Medical History:   Diagnosis Date     Abnormal mini-mental status exam     scored 16 2012, ?dementia     Breast cancer (H)      Chronic kidney disease      Essential tremor      HTN (hypertension)      Tobacco use         Past Surgical History:   Past Surgical History:   Procedure Laterality Date     EYE SURGERY       JOINT REPLACEMENT       LUMPECTOMY BREAST Bilateral 1982    Cyst removed     OTHER SURGICAL HISTORY Left     ear prosthesis     OTHER SURGICAL HISTORY      l rola     OTHER SURGICAL HISTORY      left hip ORIF     [place] MASTECTOMY, MODIFIED RADICAL Bilateral 6/14/2021    Procedure: Bilateral mastectomies;  Surgeon: Dulce Duran MD;  Location: [facility] [place];  Service: General        Family History:   Family History   Problem Relation Age of Onset     Hypertension Mother      Cerebrovascular Disease Father 70.00     No Known Problems Son        Social History:   Social History     Socioeconomic History     Marital status:      Spouse name: Not on file     Number of children: Not on file     Years of education: Not on file     Highest education level: Not on file   Occupational History     Not on file   Tobacco Use     Smoking status: Current Every Day Smoker     Packs/day: 0.25     Smokeless tobacco: Never Used   Substance and Sexual Activity     Alcohol use: No     Drug use: Never      Sexual activity: Not on file   Other Topics Concern     Not on file   Social History Narrative    Has one son who lives in [place]     Social Determinants of Health     Financial Resource Strain: Not on file   Food Insecurity: Not on file   Transportation Needs: Not on file   Physical Activity: Not on file   Stress: Not on file   Social Connections: Not on file   Intimate Partner Violence: Not on file   Housing Stability: Not on file            Lab Results    Chemistry/lipid CBC Cardiac Enzymes/BNP/TSH/INR   Lab Results   Component Value Date    CHOL 142 07/19/2021    HDL 64 07/19/2021    TRIG 114 07/19/2021    BUN 34 (H) 11/26/2021     (H) 11/26/2021    CO2 30 11/26/2021    Lab Results   Component Value Date    WBC 4.6 07/10/2021    HGB 12.2 07/10/2021    HCT 39.0 07/10/2021    MCV 96 07/10/2021     (L) 07/10/2021    Lab Results   Component Value Date    TROPONINI 0.03 07/09/2021    BNP 2,301 (H) 11/18/2021    TSH 0.07 (L) 07/10/2021    INR 1.13 (H) 07/09/2021              cc:   Johnathan Brenner MD  [facility]  [address] [address]

## 2021-11-29 NOTE — PATIENT INSTRUCTIONS
It was a pleasure to meet with you today.      Below is a summary of your visit.   1. Start taking the lisinopril that was prescribed by Dr. Brenner.  2. Also start taking carvedilol 3.25 mg twice daily for your heart, but don't start taking this the same day as the lisinopril.   3. Continue your other medications without changes.  4. Schedule a followup appointment in our heart failure clinic in a couple weeks.  5. Schedule an echocardiogram to look at the function of your heart in about 4-6 weeks  6. Follow up with me again after your echocardiogram    We will call you to inform you of your test or procedure results within 3 business days of the test being performed.  If you do not hear from our office with the test results within 1 week please do not hesitate to call asking for these results.     Please do not hesitate to call the [facility] clinic with any questions or concerns at [phone]. You can also reach my nurse, Nancy, during normal business hours at [phone].    Sincerely,

## 2021-12-03 ENCOUNTER — LAB REQUISITION (OUTPATIENT)
Dept: LAB | Facility: CLINIC | Age: 86
End: 2021-12-03
Payer: MEDICARE

## 2021-12-03 DIAGNOSIS — I50.40 UNSPECIFIED COMBINED SYSTOLIC (CONGESTIVE) AND DIASTOLIC (CONGESTIVE) HEART FAILURE (H): ICD-10-CM

## 2021-12-03 LAB
ANION GAP SERPL CALCULATED.3IONS-SCNC: 8 MMOL/L (ref 5–18)
BUN SERPL-MCNC: 37 MG/DL (ref 8–28)
CALCIUM SERPL-MCNC: 8.8 MG/DL (ref 8.5–10.5)
CHLORIDE BLD-SCNC: 107 MMOL/L (ref 98–107)
CO2 SERPL-SCNC: 31 MMOL/L (ref 22–31)
CREAT SERPL-MCNC: 1.96 MG/DL (ref 0.6–1.1)
GFR SERPL CREATININE-BSD FRML MDRD: 22 ML/MIN/1.73M2
GLUCOSE BLD-MCNC: 153 MG/DL (ref 70–125)
POTASSIUM BLD-SCNC: 5.5 MMOL/L (ref 3.5–5)
SODIUM SERPL-SCNC: 146 MMOL/L (ref 136–145)

## 2021-12-03 PROCEDURE — 80048 BASIC METABOLIC PNL TOTAL CA: CPT | Mod: ORL | Performed by: STUDENT IN AN ORGANIZED HEALTH CARE EDUCATION/TRAINING PROGRAM

## 2021-12-17 ENCOUNTER — LAB REQUISITION (OUTPATIENT)
Dept: LAB | Facility: CLINIC | Age: 86
End: 2021-12-17
Payer: MEDICARE

## 2021-12-17 DIAGNOSIS — N18.4 CHRONIC KIDNEY DISEASE, STAGE 4 (SEVERE) (H): ICD-10-CM

## 2021-12-17 LAB
ANION GAP SERPL CALCULATED.3IONS-SCNC: 9 MMOL/L (ref 5–18)
BUN SERPL-MCNC: 37 MG/DL (ref 8–28)
CALCIUM SERPL-MCNC: 8.7 MG/DL (ref 8.5–10.5)
CHLORIDE BLD-SCNC: 107 MMOL/L (ref 98–107)
CO2 SERPL-SCNC: 30 MMOL/L (ref 22–31)
CREAT SERPL-MCNC: 1.86 MG/DL (ref 0.6–1.1)
GFR SERPL CREATININE-BSD FRML MDRD: 24 ML/MIN/1.73M2
GLUCOSE BLD-MCNC: 102 MG/DL (ref 70–125)
POTASSIUM BLD-SCNC: 4.8 MMOL/L (ref 3.5–5)
SODIUM SERPL-SCNC: 146 MMOL/L (ref 136–145)

## 2021-12-17 PROCEDURE — 80048 BASIC METABOLIC PNL TOTAL CA: CPT | Mod: ORL | Performed by: STUDENT IN AN ORGANIZED HEALTH CARE EDUCATION/TRAINING PROGRAM

## 2021-12-20 ENCOUNTER — OFFICE VISIT (OUTPATIENT)
Dept: CARDIOLOGY | Facility: CLINIC | Age: 86
End: 2021-12-20
Payer: MEDICARE

## 2021-12-20 ENCOUNTER — APPOINTMENT (OUTPATIENT)
Dept: CARDIOLOGY | Facility: CLINIC | Age: 86
End: 2021-12-20
Payer: MEDICARE

## 2021-12-20 ENCOUNTER — TELEPHONE (OUTPATIENT)
Dept: CARDIOLOGY | Facility: CLINIC | Age: 86
End: 2021-12-20

## 2021-12-20 VITALS
RESPIRATION RATE: 16 BRPM | DIASTOLIC BLOOD PRESSURE: 68 MMHG | SYSTOLIC BLOOD PRESSURE: 124 MMHG | WEIGHT: 94.6 LBS | HEART RATE: 68 BPM | BODY MASS INDEX: 17.87 KG/M2

## 2021-12-20 DIAGNOSIS — I50.23 ACUTE ON CHRONIC SYSTOLIC HEART FAILURE (H): ICD-10-CM

## 2021-12-20 DIAGNOSIS — I12.9 HYPERTENSIVE CHRONIC KIDNEY DISEASE W STG 1-4/UNSP CHR KDNY: ICD-10-CM

## 2021-12-20 DIAGNOSIS — I42.9 SECONDARY CARDIOMYOPATHY (H): ICD-10-CM

## 2021-12-20 DIAGNOSIS — N18.31 STAGE 3A CHRONIC KIDNEY DISEASE (H): ICD-10-CM

## 2021-12-20 DIAGNOSIS — I50.20 HEART FAILURE WITH REDUCED EJECTION FRACTION, NYHA CLASS I (H): Primary | ICD-10-CM

## 2021-12-20 DIAGNOSIS — I50.9 CHF (CONGESTIVE HEART FAILURE) (H): Primary | ICD-10-CM

## 2021-12-20 DIAGNOSIS — I63.59 CEREBROVASCULAR ACCIDENT (CVA) DUE TO OCCLUSION OF OTHER CEREBRAL ARTERY (H): ICD-10-CM

## 2021-12-20 PROCEDURE — 99214 OFFICE O/P EST MOD 30 MIN: CPT | Performed by: NURSE PRACTITIONER

## 2021-12-20 RX ORDER — LISINOPRIL 2.5 MG/1
2.5 TABLET ORAL DAILY
COMMUNITY
End: 2023-01-01

## 2021-12-20 RX ORDER — ATORVASTATIN CALCIUM 10 MG/1
10 TABLET, FILM COATED ORAL DAILY
COMMUNITY
End: 2023-01-01

## 2021-12-20 RX ORDER — CARVEDILOL 3.12 MG/1
1.56 TABLET ORAL 2 TIMES DAILY WITH MEALS
Qty: 180 TABLET | Refills: 1 | COMMUNITY
Start: 2021-12-20 | End: 2022-02-14

## 2021-12-20 RX ORDER — FUROSEMIDE 20 MG
20 TABLET ORAL DAILY
COMMUNITY
End: 2022-02-14

## 2021-12-20 NOTE — PATIENT INSTRUCTIONS
Mayela Espino,    It was a pleasure to see you today at [facility].     My recommendations after this visit include:  - Please follow up with Dr Vasquez after echocardiogram   - Please follow up with Gaby Quintana in 8 weeks  - Please schedule echocardiogram in 3-4 weeks  - I will have my nurse Anat call you and update your medication list    Gaby Quintana CNP      What is the [facility]?     The [facility] Failure [facility] is a heart failure specialty clinic within Heart Care.  You will work with your cardiologist, nurse practitioner, and nurses to carefully adjust medications and learn how to live with heart failure.  The Heart Failure Program will help you:      Better understand your chronic heart condition    Feel better and avoid hospital stays    Monitoring for Symptoms      Call the Heart Failure Phone Line ([phone]) if you have any of these symptoms:     Increased shortness of breath/shortness of breath at rest    Waking up at night with difficulty breathing    Unable to lie down for sleep due to symptoms or needing to sit upright for sleep    Weight gain of 2 pounds a day for 2 days in a row OR 5 pounds in 1 week    Increased swelling in your ankles or legs    Dizziness or lightheadedness    Medications       Take your medications as prescribed    Bring all your medications in their original bottles to every appointment    Avoid non-steroidal anti-inflammatory medications (Advil, Aleve, Ibuprofen, Naprosyn, Naproxen, Celebrex)    Do not stop taking your medications or begin taking over-the-counter or herbal medications without first talking to your doctor or nurse practitioner    Diet and Lifestyle       Limit sodium/salt to 2000 mg daily   o Read food labels for sodium content  o Do not add salt when cooking or add salt at the table    Weigh yourself every day and record in your daily weight log   o Call if you gain 2 pounds a day  for 2 days in a row OR 5 pounds in 1 week  o Bring daily weight log to every appointment    Stay active, pace yourself, listen to your body, and rest when tired    Elevate your legs if they are swollen. Ask about using compression/support stockings    Stop smoking    Lose weight if you are overweight    Avoid drinking alcohol or limit amount    Stay updated on your immunizations including flu and pneumonia vaccines

## 2021-12-20 NOTE — TELEPHONE ENCOUNTER
Called pt; she states that she told me the wrong dose of Lasix. She takes Lasix 20 mg daily. Updated in Epic.     Pt will start taking Coreg 1.5625 BID starting tonight. Reached out to scheduling to have her Echo scheduled in 3-4 weeks and Follow-up appt with Gaby in 4 weeks. Cancelled appt on 2-14-21.     Routing to Gaby as JENAE.     Anat Montgomery RN

## 2021-12-20 NOTE — TELEPHONE ENCOUNTER
----- Message from JOHNNY Payton CNP sent at 12/20/2021 11:50 AM CST -----  Regarding: med list/[facility]  Anat,    I just saw this patient this morning.  Will you please call her and update her medication list as she states she is taking lisinopril and Lasix but this is not currently on her list.  Please update me with medication doses after you speak with her.  Also I would like to sign her up for [facility] and this was discussed with her today.  She is very interested.  She lives alone in a condominium.  Thank you.    Gaby

## 2021-12-20 NOTE — TELEPHONE ENCOUNTER
Can she try to cut carvedilol 3.125 mg in half and take 1.5625 mg BID and see how she tolerates that? Thank you.     Also she did not schedule her echo in 3-4 weeks. Please have her r/s her appt with me that's on  2/14 to 4 weeks from now. Thank you

## 2021-12-20 NOTE — TELEPHONE ENCOUNTER
Called patient; reconciled medications and updated Epic.     Pt taking Lasix 10 mg daily and Lisinopril 2.5 mg daily.     Filled out [facility] Application with pt over the phone and faxed. Provided pt with [facility] phone number to Follow-up with meal delivery.     Routing to Gaby to review.     Anat Montgomery RN

## 2021-12-20 NOTE — PROGRESS NOTES
Assessment/Recommendations   Assessment:    1.  Heart failure with reduced ejection fraction, LVEF 25 to 30%, NYHA class II: Compensated.  She continues to have fatigue and mild dyspnea on exertion.  She states her lower extremity edema has improved.  She lives alone and tries to follow a low-sodium diet.  We discussed monitoring symptoms, following a low-sodium diet and monitoring weights.  She does not like to monitor her weights as this gives her anxiety.  We discussed if she notices any rapid increase that this could indicate water retention.  I will enroll her in [facility] meal service today to help her with low-salt diet.  Dr. Vasquez had started her on carvedilol and she took a few doses and had lightheadedness and felt she was going to faint.  She stopped taking this.  2.  Hypertension: Controlled.  Blood pressure 124/68  3.  Chronic kidney disease stage III: She had labs December 17 with a creatinine of 1.86, potassium 4.8 and sodium 146    Plan:  1.  Continue current medications.  My nurse and I will give her a call to update her medication list as this is not updated  2.  Stressed importance of low-sodium diet.  Will enroll her in [facility] meal service  3.  Schedule echocardiogram in 3 to 4 weeks    Mayela Espino will follow up with Dr Vasquez after echocardiogram and in the heart failure clinic in 6-8 week.     History of Present Illness/Subjective    Ms. Mayela Espino is a 89 year old female seen at [facility] heart failure clinic today for continued follow-up.  She follows up for heart failure with reduced ejection fraction.  She had an echocardiogram through [facility] which showed an ejection fraction of 25 to 30% with moderate mitral regurgitation and mild to moderate aortic regurgitation.  She has a past medical history significant for CVA, hypertension, tobacco use, chronic kidney disease stage III, and anxiety.    During the last clinic visit, Dr. Vasquez started her on carvedilol.  She  states she took 1 dose and had lightheadedness and felt she was going to faint.  She has stopped taking carvedilol.  She continues Lasix and lisinopril but is unaware of doses as this is not in our medication list.  She has mild fatigue and dyspnea on exertion.  Her lower extremity edema has improved.  She denies lightheadedness, shortness of breath, orthopnea, PND, palpitations, chest pain and abdominal fullness/bloating.      She does not weigh herself on a daily basis.  She does live alone and tries to follow a low-sodium diet.    ECHOCARDIOGRAM: Angela 11/19/2021     Physical Examination Review of Systems   /68 (BP Location: Left arm, Patient Position: Sitting, Cuff Size: Adult Small)   Pulse 68   Resp 16   Wt 42.9 kg (94 lb 9.6 oz)   BMI 17.87 kg/m    Body mass index is 17.87 kg/m .  Wt Readings from Last 3 Encounters:   12/20/21 42.9 kg (94 lb 9.6 oz)   11/29/21 43.7 kg (96 lb 6.4 oz)   07/10/21 40.1 kg (88 lb 6.4 oz)       General Appearance:   no acute distress   ENT/Mouth: membranes moist, no oral lesions or bleeding gums.      EYES:  no scleral icterus, normal conjunctivae   Neck: no carotid bruits or thyromegaly   Chest/Lungs:   lungs are clear to auscultation, no rales or wheezing, equal chest wall expansion    Cardiovascular:   Regular. Normal first and second heart sounds with no murmurs, rubs, or gallops; Jugular venous pressure normal, trace right lower extremity edema   Abdomen:  no organomegaly, masses, bruits, or tenderness; bowel sounds are present   Extremities: no cyanosis or clubbing   Skin: no xanthelasma, warm.    Neurologic: normal  bilateral, no tremors     Psychiatric: alert and oriented x3    Enc Vitals  BP: 124/68  Pulse: 68  Resp: 16  Weight: 42.9 kg (94 lb 9.6 oz)                                         Medical History  Surgical History Family History Social History   Past Medical History:   Diagnosis Date     Abnormal mini-mental status exam     scored 16 2012,  ?dementia     Breast cancer (H)      Chronic kidney disease      Essential tremor      HTN (hypertension)      Tobacco use     Past Surgical History:   Procedure Laterality Date     EYE SURGERY       JOINT REPLACEMENT       LUMPECTOMY BREAST Bilateral 1982    Cyst removed     OTHER SURGICAL HISTORY Left     ear prosthesis     OTHER SURGICAL HISTORY      l rola     OTHER SURGICAL HISTORY      left hip ORIF     MI MASTECTOMY, MODIFIED RADICAL Bilateral 6/14/2021    Procedure: Bilateral mastectomies;  Surgeon: Dulce Duran MD;  [place] [facility]  Service: General    Family History   Problem Relation Age of Onset     Hypertension Mother      Cerebrovascular Disease Father 70.00     No Known Problems Son     Social History     Socioeconomic History     Marital status:      Spouse name: Not on file     Number of children: Not on file     Years of education: Not on file     Highest education level: Not on file   Occupational History     Not on file   Tobacco Use     Smoking status: Current Every Day Smoker     Packs/day: 0.25     Smokeless tobacco: Never Used   Substance and Sexual Activity     Alcohol use: No     Drug use: Never     Sexual activity: Not on file   Other Topics Concern     Not on file   Social History Narrative    Has one son who lives in [place]     Social Determinants of Health     Financial Resource Strain: Not on file   Food Insecurity: Not on file   Transportation Needs: Not on file   Physical Activity: Not on file   Stress: Not on file   Social Connections: Not on file   Intimate Partner Violence: Not on file   Housing Stability: Not on file          Medications  Allergies   Current Outpatient Medications   Medication Sig Dispense Refill     anastrozole (ARIMIDEX) 1 mg tablet [ANASTROZOLE (ARIMIDEX) 1 MG TABLET] TAKE 1 TABLET BY MOUTH DAILY 90 tablet 2     aspirin (ASA) 81 MG EC tablet Take 1 tablet (81 mg) by mouth daily Future refills by PCP Dr. Zheng primary care provider on  file. with phone number None. 30 tablet 0     atorvastatin (LIPITOR) 40 MG tablet Take 1 tablet (40 mg) by mouth every evening Future refills by PCP Dr. Zheng primary care provider on file. with phone number None. 30 tablet 0     cholecalciferol, vitamin D3, 1,000 unit tablet [CHOLECALCIFEROL, VITAMIN D3, 1,000 UNIT TABLET] Take 500 Units by mouth daily.        clopidogrel (PLAVIX) 75 MG tablet Take 1 tablet (75 mg) by mouth daily 19 tablet 0     multivit-iron-min-folic acid 3,500-18-0.4 unit-mg-mg Chew [MULTIVIT-IRON-MIN-FOLIC ACID 3,500-18-0.4 UNIT-MG-MG CHEW] Chew 1 tablet daily.        carvedilol (COREG) 3.125 MG tablet Take 1 tablet (3.125 mg) by mouth 2 times daily (with meals) (Patient not taking: Reported on 12/20/2021) 180 tablet 1     HYDROcodone-acetaminophen 5-325 mg per tablet [HYDROCODONE-ACETAMINOPHEN 5-325 MG PER TABLET] Take 1 tablet by mouth every 4 (four) hours as needed for pain. (Patient not taking: Reported on 12/20/2021) 18 tablet 0    Allergies   Allergen Reactions     Latex Unknown     Added based on information entered during case entry, please review and add reactions, type, and severity as needed     Latex Rash         Lab Results    Chemistry/lipid CBC Cardiac Enzymes/BNP/TSH/INR   Lab Results   Component Value Date    CHOL 142 07/19/2021    HDL 64 07/19/2021    TRIG 114 07/19/2021    BUN 37 (H) 12/17/2021     (H) 12/17/2021    CO2 30 12/17/2021    Lab Results   Component Value Date    WBC 4.6 07/10/2021    HGB 12.2 07/10/2021    HCT 39.0 07/10/2021    MCV 96 07/10/2021     (L) 07/10/2021    Lab Results   Component Value Date    TROPONINI 0.03 07/09/2021    BNP 2,301 (H) 11/18/2021    TSH 0.07 (L) 07/10/2021    INR 1.13 (H) 07/09/2021             This note has been dictated using voice recognition software. Any grammatical, typographical, or context distortions are unintentional and inherent to the software    30 minutes spent on the date of encounter doing chart review, review  of outside records, review of test results, interpretation with above tests, patient visit and documentation.

## 2021-12-20 NOTE — LETTER
12/20/2021    SRINIVASAN FARIA MD  [facility] [address]  [address] [facility] [address]    RE: Mayela Espino       Dear Colleague,     I had the pleasure of seeing Mayela Espino in the [facility].        Assessment/Recommendations   Assessment:    1.  Heart failure with reduced ejection fraction, LVEF 25 to 30%, NYHA class II: Compensated.  She continues to have fatigue and mild dyspnea on exertion.  She states her lower extremity edema has improved.  She lives alone and tries to follow a low-sodium diet.  We discussed monitoring symptoms, following a low-sodium diet and monitoring weights.  She does not like to monitor her weights as this gives her anxiety.  We discussed if she notices any rapid increase that this could indicate water retention.  I will enroll her in [facility] meal service today to help her with low-salt diet.  Dr. Vasquez had started her on carvedilol and she took a few doses and had lightheadedness and felt she was going to faint.  She stopped taking this.  2.  Hypertension: Controlled.  Blood pressure 124/68  3.  Chronic kidney disease stage III: She had labs December 17 with a creatinine of 1.86, potassium 4.8 and sodium 146    Plan:  1.  Continue current medications.  My nurse and I will give her a call to update her medication list as this is not updated  2.  Stressed importance of low-sodium diet.  Will enroll her in [facility] meal service  3.  Schedule echocardiogram in 3 to 4 weeks    Mayela Espino will follow up with Dr Vasquez after echocardiogram and in the heart failure clinic in 6-8 week.     History of Present Illness/Subjective    Ms. Mayela Espino is a 89 year old female seen at [facility] heart failure clinic today for continued follow-up.  She follows up for heart failure with reduced ejection fraction.  She had an echocardiogram through [facility] which showed an ejection fraction of 25 to 30% with moderate mitral regurgitation and mild to  moderate aortic regurgitation.  She has a past medical history significant for CVA, hypertension, tobacco use, chronic kidney disease stage III, and anxiety.    During the last clinic visit, Dr. Vasquez started her on carvedilol.  She states she took 1 dose and had lightheadedness and felt she was going to faint.  She has stopped taking carvedilol.  She continues Lasix and lisinopril but is unaware of doses as this is not in our medication list.  She has mild fatigue and dyspnea on exertion.  Her lower extremity edema has improved.  She denies lightheadedness, shortness of breath, orthopnea, PND, palpitations, chest pain and abdominal fullness/bloating.      She does not weigh herself on a daily basis.  She does live alone and tries to follow a low-sodium diet.    ECHOCARDIOGRAM: Angela 11/19/2021     Physical Examination Review of Systems   /68 (BP Location: Left arm, Patient Position: Sitting, Cuff Size: Adult Small)   Pulse 68   Resp 16   Wt 42.9 kg (94 lb 9.6 oz)   BMI 17.87 kg/m    Body mass index is 17.87 kg/m .  Wt Readings from Last 3 Encounters:   12/20/21 42.9 kg (94 lb 9.6 oz)   11/29/21 43.7 kg (96 lb 6.4 oz)   07/10/21 40.1 kg (88 lb 6.4 oz)       General Appearance:   no acute distress   ENT/Mouth: membranes moist, no oral lesions or bleeding gums.      EYES:  no scleral icterus, normal conjunctivae   Neck: no carotid bruits or thyromegaly   Chest/Lungs:   lungs are clear to auscultation, no rales or wheezing, equal chest wall expansion    Cardiovascular:   Regular. Normal first and second heart sounds with no murmurs, rubs, or gallops; Jugular venous pressure normal, trace right lower extremity edema   Abdomen:  no organomegaly, masses, bruits, or tenderness; bowel sounds are present   Extremities: no cyanosis or clubbing   Skin: no xanthelasma, warm.    Neurologic: normal  bilateral, no tremors     Psychiatric: alert and oriented x3    Enc Vitals  BP: 124/68  Pulse: 68  Resp: 16  Weight:  42.9 kg (94 lb 9.6 oz)                                         Medical History  Surgical History Family History Social History   Past Medical History:   Diagnosis Date     Abnormal mini-mental status exam     scored 16 2012, ?dementia     Breast cancer (H)      Chronic kidney disease      Essential tremor      HTN (hypertension)      Tobacco use     Past Surgical History:   Procedure Laterality Date     EYE SURGERY       JOINT REPLACEMENT       LUMPECTOMY BREAST Bilateral 1982    Cyst removed     OTHER SURGICAL HISTORY Left     ear prosthesis     OTHER SURGICAL HISTORY      l rola     OTHER SURGICAL HISTORY      left hip ORIF     NM MASTECTOMY, MODIFIED RADICAL Bilateral 6/14/2021    Procedure: Bilateral mastectomies;  Surgeon: Dulce Duran MD;  Location: [facility];  Service: General    Family History   Problem Relation Age of Onset     Hypertension Mother      Cerebrovascular Disease Father 70.00     No Known Problems Son     Social History     Socioeconomic History     Marital status:      Spouse name: Not on file     Number of children: Not on file     Years of education: Not on file     Highest education level: Not on file   Occupational History     Not on file   Tobacco Use     Smoking status: Current Every Day Smoker     Packs/day: 0.25     Smokeless tobacco: Never Used   Substance and Sexual Activity     Alcohol use: No     Drug use: Never     Sexual activity: Not on file   Other Topics Concern     Not on file   Social History Narrative    Has one son who lives in [place]     Social Determinants of Health     Financial Resource Strain: Not on file   Food Insecurity: Not on file   Transportation Needs: Not on file   Physical Activity: Not on file   Stress: Not on file   Social Connections: Not on file   Intimate Partner Violence: Not on file   Housing Stability: Not on file          Medications  Allergies   Current Outpatient Medications   Medication Sig Dispense Refill     anastrozole  (ARIMIDEX) 1 mg tablet [ANASTROZOLE (ARIMIDEX) 1 MG TABLET] TAKE 1 TABLET BY MOUTH DAILY 90 tablet 2     aspirin (ASA) 81 MG EC tablet Take 1 tablet (81 mg) by mouth daily Future refills by PCP Dr. Zheng primary care provider on file. with phone number None. 30 tablet 0     atorvastatin (LIPITOR) 40 MG tablet Take 1 tablet (40 mg) by mouth every evening Future refills by PCP Dr. Zheng primary care provider on file. with phone number None. 30 tablet 0     cholecalciferol, vitamin D3, 1,000 unit tablet [CHOLECALCIFEROL, VITAMIN D3, 1,000 UNIT TABLET] Take 500 Units by mouth daily.        clopidogrel (PLAVIX) 75 MG tablet Take 1 tablet (75 mg) by mouth daily 19 tablet 0     multivit-iron-min-folic acid 3,500-18-0.4 unit-mg-mg Chew [MULTIVIT-IRON-MIN-FOLIC ACID 3,500-18-0.4 UNIT-MG-MG CHEW] Chew 1 tablet daily.        carvedilol (COREG) 3.125 MG tablet Take 1 tablet (3.125 mg) by mouth 2 times daily (with meals) (Patient not taking: Reported on 12/20/2021) 180 tablet 1     HYDROcodone-acetaminophen 5-325 mg per tablet [HYDROCODONE-ACETAMINOPHEN 5-325 MG PER TABLET] Take 1 tablet by mouth every 4 (four) hours as needed for pain. (Patient not taking: Reported on 12/20/2021) 18 tablet 0    Allergies   Allergen Reactions     Latex Unknown     Added based on information entered during case entry, please review and add reactions, type, and severity as needed     Latex Rash         Lab Results    Chemistry/lipid CBC Cardiac Enzymes/BNP/TSH/INR   Lab Results   Component Value Date    CHOL 142 07/19/2021    HDL 64 07/19/2021    TRIG 114 07/19/2021    BUN 37 (H) 12/17/2021     (H) 12/17/2021    CO2 30 12/17/2021    Lab Results   Component Value Date    WBC 4.6 07/10/2021    HGB 12.2 07/10/2021    HCT 39.0 07/10/2021    MCV 96 07/10/2021     (L) 07/10/2021    Lab Results   Component Value Date    TROPONINI 0.03 07/09/2021    BNP 2,301 (H) 11/18/2021    TSH 0.07 (L) 07/10/2021    INR 1.13 (H) 07/09/2021             This note  has been dictated using voice recognition software. Any grammatical, typographical, or context distortions are unintentional and inherent to the software    30 minutes spent on the date of encounter doing chart review, review of outside records, review of test results, interpretation with above tests, patient visit and documentation.                Thank you for allowing me to participate in the care of your patient.      Sincerely,     JOHNNY Payton [facility]  cc:   Ray Vasquez MD  [address] [place] [address]

## 2022-01-01 ENCOUNTER — TRANSFERRED RECORDS (OUTPATIENT)
Dept: HEALTH INFORMATION MANAGEMENT | Facility: CLINIC | Age: 87
End: 2022-01-01

## 2022-01-01 LAB — EJECTION FRACTION: NORMAL %

## 2022-01-12 VITALS — WEIGHT: 91.7 LBS | BODY MASS INDEX: 17.31 KG/M2 | HEIGHT: 61 IN

## 2022-02-14 ENCOUNTER — OFFICE VISIT (OUTPATIENT)
Dept: CARDIOLOGY | Facility: CLINIC | Age: 87
End: 2022-02-14
Payer: MEDICARE

## 2022-02-14 VITALS
OXYGEN SATURATION: 94 % | HEART RATE: 78 BPM | WEIGHT: 91 LBS | RESPIRATION RATE: 16 BRPM | SYSTOLIC BLOOD PRESSURE: 124 MMHG | BODY MASS INDEX: 17.19 KG/M2 | DIASTOLIC BLOOD PRESSURE: 70 MMHG

## 2022-02-14 DIAGNOSIS — N18.31 STAGE 3A CHRONIC KIDNEY DISEASE (H): Primary | ICD-10-CM

## 2022-02-14 DIAGNOSIS — I50.20 HEART FAILURE WITH REDUCED EJECTION FRACTION (H): ICD-10-CM

## 2022-02-14 DIAGNOSIS — I12.9 HYPERTENSIVE CHRONIC KIDNEY DISEASE W STG 1-4/UNSP CHR KDNY: ICD-10-CM

## 2022-02-14 PROCEDURE — 99214 OFFICE O/P EST MOD 30 MIN: CPT | Performed by: NURSE PRACTITIONER

## 2022-02-14 RX ORDER — FUROSEMIDE 20 MG
20 TABLET ORAL DAILY PRN
Status: ON HOLD
Start: 2022-02-14 | End: 2023-01-01

## 2022-02-14 RX ORDER — LORAZEPAM 1 MG/1
TABLET ORAL PRN
Status: ON HOLD | COMMUNITY
Start: 2021-12-04 | End: 2023-01-01

## 2022-02-14 NOTE — PROGRESS NOTES
Assessment/Recommendations   Assessment:    1.  Heart failure with reduced ejection fraction LVEF 30 to 35%, NYHA class II: Compensated.  She states her dyspnea on exertion has improved.  Her edema has resolved.  She complains of continued fatigue today.  She stopped her carvedilol on her own.  We discussed the results of her echocardiogram she had a few weeks ago.  We briefly discussed possible ICD placement and provided her with information with a pamphlet.  She will think about it and discuss with Dr. Vasquez at follow-up  2.  Hypertension: Controlled.  Blood pressure 124/70  3.  Chronic kidney disease stage III: She had labs mid December with potassium 4.8, BUN 37 and creatinine 1.86 which is stable    Plan:  1.   Heart failure medications:  - Beta blocker therapy with carvedilol 1.5625 mg twice a day stopped on her own  - ACEI therapy with lisinopril 2.5 mg daily  - Diuretic therapy with Lasix 20 mg daily change to as needed for swelling  2.  Continue monitoring weights and continue low-sodium diet  3.  Encourage regular activity    Mayela Espino will follow up with Dr. Vasquez February 24 and in the heart failure clinic in late March.     History of Present Illness/Subjective    Ms. Mayela Espino is a 89 year old female seen at [facility] heart failure clinic today for continued follow-up.   She follows up for heart failure with reduced ejection fraction.  She had an echocardiogram through [facility]  on January 21, 2022 which showed an ejection fraction of 30 to 35% which is slightly improved.  It also showed mild to moderate aortic regurgitation. She has a past medical history significant for CVA, hypertension, tobacco use, chronic kidney disease stage III, and anxiety.    Today, she complains of fatigue.  She continues to have mild dyspnea on exertion.  Her edema has resolved. she denies lightheadedness, shortness of breath, orthopnea, PND, palpitations, chest pain, abdominal fullness/bloating  and lower extremity edema.      She is monitoring home weights which are stable.  She lives alone.  She is following a low sodium diet.     [facility]  LVEF 30-35%  Mild to moderate aortic regurgitation     Physical Examination Review of Systems   /70 (BP Location: Right arm, Patient Position: Sitting, Cuff Size: Adult Small)   Pulse 78   Resp 16   Wt 41.3 kg (91 lb)   SpO2 94%   BMI 17.19 kg/m    Body mass index is 17.19 kg/m .  Wt Readings from Last 3 Encounters:   02/14/22 41.3 kg (91 lb)   12/20/21 42.9 kg (94 lb 9.6 oz)   11/29/21 43.7 kg (96 lb 6.4 oz)       General Appearance:   no acute distress   ENT/Mouth: membranes moist, no oral lesions or bleeding gums.      EYES:  no scleral icterus, normal conjunctivae   Neck: no carotid bruits or thyromegaly   Chest/Lungs:   lungs are clear to auscultation, no rales or wheezing, equal chest wall expansion    Cardiovascular:   Regular. Normal first and second heart sounds with no murmurs, rubs, or gallops; Jugular venous pressure normal, no edema bilaterally    Abdomen:  no organomegaly, masses, bruits, or tenderness; bowel sounds are present   Extremities: no cyanosis or clubbing   Skin: no xanthelasma, warm.    Neurologic: normal  bilateral, no tremors     Psychiatric: alert and oriented x3                                              Medical History  Surgical History Family History Social History   Past Medical History:   Diagnosis Date     Abnormal mini-mental status exam     scored 16 2012, ?dementia     Breast cancer (H)      Chronic kidney disease      Essential tremor      HTN (hypertension)      Tobacco use     Past Surgical History:   Procedure Laterality Date     EYE SURGERY       JOINT REPLACEMENT       LUMPECTOMY BREAST Bilateral 1982    Cyst removed     OTHER SURGICAL HISTORY Left     ear prosthesis     OTHER SURGICAL HISTORY      l rola     OTHER SURGICAL HISTORY      left hip ORIF     NC  MASTECTOMY, MODIFIED RADICAL Bilateral 6/14/2021    Procedure: Bilateral mastectomies;  Surgeon: Dulce Duran MD;  Location: [facility] OR;  Service: General    Family History   Problem Relation Age of Onset     Hypertension Mother      Cerebrovascular Disease Father 70.00     No Known Problems Son     Social History     Socioeconomic History     Marital status:      Spouse name: Not on file     Number of children: Not on file     Years of education: Not on file     Highest education level: Not on file   Occupational History     Not on file   Tobacco Use     Smoking status: Current Every Day Smoker     Packs/day: 0.25     Smokeless tobacco: Never Used   Substance and Sexual Activity     Alcohol use: No     Drug use: Never     Sexual activity: Not on file   Other Topics Concern     Not on file   Social History Narrative    Has one son who lives in [place]     Social Determinants of Health     Financial Resource Strain: Not on file   Food Insecurity: Not on file   Transportation Needs: Not on file   Physical Activity: Not on file   Stress: Not on file   Social Connections: Not on file   Intimate Partner Violence: Not on file   Housing Stability: Not on file          Medications  Allergies   Current Outpatient Medications   Medication Sig Dispense Refill     anastrozole (ARIMIDEX) 1 mg tablet [ANASTROZOLE (ARIMIDEX) 1 MG TABLET] TAKE 1 TABLET BY MOUTH DAILY 90 tablet 2     aspirin (ASA) 81 MG EC tablet Take 1 tablet (81 mg) by mouth daily Future refills by PCP Dr. Zheng primary care provider on file. with phone number None. 30 tablet 0     atorvastatin (LIPITOR) 10 MG tablet Take 10 mg by mouth daily       cholecalciferol, vitamin D3, 1,000 unit tablet [CHOLECALCIFEROL, VITAMIN D3, 1,000 UNIT TABLET] Take 500 Units by mouth daily.        furosemide (LASIX) 20 MG tablet Take 1 tablet (20 mg) by mouth daily as needed (for swelling)       lisinopril (ZESTRIL) 2.5 MG tablet Take 2.5 mg by mouth daily        LORazepam (ATIVAN) 1 MG tablet Take by mouth as needed       multivit-iron-min-folic acid 3,500-18-0.4 unit-mg-mg Chew [MULTIVIT-IRON-MIN-FOLIC ACID 3,500-18-0.4 UNIT-MG-MG CHEW] Chew 1 tablet daily.       Allergies   Allergen Reactions     Latex Unknown     Added based on information entered during case entry, please review and add reactions, type, and severity as needed     Latex Rash         Lab Results    Chemistry/lipid CBC Cardiac Enzymes/BNP/TSH/INR   Lab Results   Component Value Date    CHOL 142 07/19/2021    HDL 64 07/19/2021    TRIG 114 07/19/2021    BUN 37 (H) 12/17/2021     (H) 12/17/2021    CO2 30 12/17/2021    Lab Results   Component Value Date    WBC 4.6 07/10/2021    HGB 12.2 07/10/2021    HCT 39.0 07/10/2021    MCV 96 07/10/2021     (L) 07/10/2021    Lab Results   Component Value Date    TROPONINI 0.03 07/09/2021    BNP 2,301 (H) 11/18/2021    TSH 0.07 (L) 07/10/2021    INR 1.13 (H) 07/09/2021             This note has been dictated using voice recognition software. Any grammatical, typographical, or context distortions are unintentional and inherent to the software    30 minutes spent on the date of encounter doing chart review, review of outside records, review of test results, interpretation with above tests, patient visit and documentation.

## 2022-02-14 NOTE — LETTER
2/14/2022    SRINIVASAN FARIA MD  [facility] [address]  [address] [facility] [address]    RE: Mayela Espino       Dear Colleague,     I had the pleasure of seeing Mayela Espino in the [facility].        Assessment/Recommendations   Assessment:    1.  Heart failure with reduced ejection fraction LVEF 30 to 35%, NYHA class II: Compensated.  She states her dyspnea on exertion has improved.  Her edema has resolved.  She complains of continued fatigue today.  She stopped her carvedilol on her own.  We discussed the results of her echocardiogram she had a few weeks ago.  We briefly discussed possible ICD placement and provided her with information with a pamphlet.  She will think about it and discuss with Dr. Vasquez at follow-up  2.  Hypertension: Controlled.  Blood pressure 124/70  3.  Chronic kidney disease stage III: She had labs mid December with potassium 4.8, BUN 37 and creatinine 1.86 which is stable    Plan:  1.   Heart failure medications:  - Beta blocker therapy with carvedilol 1.5625 mg twice a day stopped on her own  - ACEI therapy with lisinopril 2.5 mg daily  - Diuretic therapy with Lasix 20 mg daily change to as needed for swelling  2.  Continue monitoring weights and continue low-sodium diet  3.  Encourage regular activity    Mayela Espino will follow up with Dr. Vasquez February 24 and in the heart failure clinic in late March.     History of Present Illness/Subjective    Ms. Mayela Espino is a 89 year old female seen at [facility] heart failure clinic today for continued follow-up.   She follows up for heart failure with reduced ejection fraction.  She had an echocardiogram through [facility]  on January 21, 2022 which showed an ejection fraction of 30 to 35% which is slightly improved.  It also showed mild to moderate aortic regurgitation. She has a past medical history significant for CVA, hypertension, tobacco use, chronic kidney disease stage III, and  anxiety.    Today, she complains of fatigue.  She continues to have mild dyspnea on exertion.  Her edema has resolved. she denies lightheadedness, shortness of breath, orthopnea, PND, palpitations, chest pain, abdominal fullness/bloating and lower extremity edema.      She is monitoring home weights which are stable.  She lives alone.  She is following a low sodium diet.     [facility]  LVEF 30-35%  Mild to moderate aortic regurgitation     Physical Examination Review of Systems   /70 (BP Location: Right arm, Patient Position: Sitting, Cuff Size: Adult Small)   Pulse 78   Resp 16   Wt 41.3 kg (91 lb)   SpO2 94%   BMI 17.19 kg/m    Body mass index is 17.19 kg/m .  Wt Readings from Last 3 Encounters:   02/14/22 41.3 kg (91 lb)   12/20/21 42.9 kg (94 lb 9.6 oz)   11/29/21 43.7 kg (96 lb 6.4 oz)       General Appearance:   no acute distress   ENT/Mouth: membranes moist, no oral lesions or bleeding gums.      EYES:  no scleral icterus, normal conjunctivae   Neck: no carotid bruits or thyromegaly   Chest/Lungs:   lungs are clear to auscultation, no rales or wheezing, equal chest wall expansion    Cardiovascular:   Regular. Normal first and second heart sounds with no murmurs, rubs, or gallops; Jugular venous pressure normal, no edema bilaterally    Abdomen:  no organomegaly, masses, bruits, or tenderness; bowel sounds are present   Extremities: no cyanosis or clubbing   Skin: no xanthelasma, warm.    Neurologic: normal  bilateral, no tremors     Psychiatric: alert and oriented x3                                              Medical History  Surgical History Family History Social History   Past Medical History:   Diagnosis Date     Abnormal mini-mental status exam     scored 16 2012, ?dementia     Breast cancer (H)      Chronic kidney disease      Essential tremor      HTN (hypertension)      Tobacco use     Past Surgical History:   Procedure Laterality Date      EYE SURGERY       JOINT REPLACEMENT       LUMPECTOMY BREAST Bilateral 1982    Cyst removed     OTHER SURGICAL HISTORY Left     ear prosthesis     OTHER SURGICAL HISTORY      l rola     OTHER SURGICAL HISTORY      left hip ORIF     [place] MASTECTOMY, MODIFIED RADICAL Bilateral 6/14/2021    Procedure: Bilateral mastectomies;  Surgeon: Dulce Duran MD;  [place] [facility] [place];  Service: General    Family History   Problem Relation Age of Onset     Hypertension Mother      Cerebrovascular Disease Father 70.00     No Known Problems Son     Social History     Socioeconomic History     Marital status:      Spouse name: Not on file     Number of children: Not on file     Years of education: Not on file     Highest education level: Not on file   Occupational History     Not on file   Tobacco Use     Smoking status: Current Every Day Smoker     Packs/day: 0.25     Smokeless tobacco: Never Used   Substance and Sexual Activity     Alcohol use: No     Drug use: Never     Sexual activity: Not on file   Other Topics Concern     Not on file   Social History Narrative    Has one son who lives in [place]     Social Determinants of Health     Financial Resource Strain: Not on file   Food Insecurity: Not on file   Transportation Needs: Not on file   Physical Activity: Not on file   Stress: Not on file   Social Connections: Not on file   Intimate Partner Violence: Not on file   Housing Stability: Not on file          Medications  Allergies   Current Outpatient Medications   Medication Sig Dispense Refill     anastrozole (ARIMIDEX) 1 mg tablet [ANASTROZOLE (ARIMIDEX) 1 MG TABLET] TAKE 1 TABLET BY MOUTH DAILY 90 tablet 2     aspirin (ASA) 81 MG EC tablet Take 1 tablet (81 mg) by mouth daily Future refills by PCP Dr. Zheng primary care provider on file. with phone number None. 30 tablet 0     atorvastatin (LIPITOR) 10 MG tablet Take 10 mg by mouth daily       cholecalciferol, vitamin D3, 1,000 unit tablet [CHOLECALCIFEROL,  VITAMIN D3, 1,000 UNIT TABLET] Take 500 Units by mouth daily.        furosemide (LASIX) 20 MG tablet Take 1 tablet (20 mg) by mouth daily as needed (for swelling)       lisinopril (ZESTRIL) 2.5 MG tablet Take 2.5 mg by mouth daily       LORazepam (ATIVAN) 1 MG tablet Take by mouth as needed       multivit-iron-min-folic acid 3,500-18-0.4 unit-mg-mg Chew [MULTIVIT-IRON-MIN-FOLIC ACID 3,500-18-0.4 UNIT-MG-MG CHEW] Chew 1 tablet daily.       Allergies   Allergen Reactions     Latex Unknown     Added based on information entered during case entry, please review and add reactions, type, and severity as needed     Latex Rash         Lab Results    Chemistry/lipid CBC Cardiac Enzymes/BNP/TSH/INR   Lab Results   Component Value Date    CHOL 142 07/19/2021    HDL 64 07/19/2021    TRIG 114 07/19/2021    BUN 37 (H) 12/17/2021     (H) 12/17/2021    CO2 30 12/17/2021    Lab Results   Component Value Date    WBC 4.6 07/10/2021    HGB 12.2 07/10/2021    HCT 39.0 07/10/2021    MCV 96 07/10/2021     (L) 07/10/2021    Lab Results   Component Value Date    TROPONINI 0.03 07/09/2021    BNP 2,301 (H) 11/18/2021    TSH 0.07 (L) 07/10/2021    INR 1.13 (H) 07/09/2021          This note has been dictated using voice recognition software. Any grammatical, typographical, or context distortions are unintentional and inherent to the software    30 minutes spent on the date of encounter doing chart review, review of outside records, review of test results, interpretation with above tests, patient visit and documentation.

## 2022-02-14 NOTE — PATIENT INSTRUCTIONS
Mayela Espino,    It was a pleasure to see you today at [facility].     My recommendations after this visit include:  - Please follow up with Dr Vasquez February 24   - Please follow up with Gaby Quintana in late March  - Take your lasix as needed for any swelling in your legs    Gaby Quintana, CNP

## 2022-02-24 ENCOUNTER — OFFICE VISIT (OUTPATIENT)
Dept: CARDIOLOGY | Facility: CLINIC | Age: 87
End: 2022-02-24
Payer: MEDICARE

## 2022-02-24 VITALS
HEART RATE: 84 BPM | WEIGHT: 93.7 LBS | HEIGHT: 61 IN | SYSTOLIC BLOOD PRESSURE: 110 MMHG | BODY MASS INDEX: 17.69 KG/M2 | DIASTOLIC BLOOD PRESSURE: 58 MMHG | RESPIRATION RATE: 16 BRPM

## 2022-02-24 DIAGNOSIS — I50.22 CHRONIC SYSTOLIC HEART FAILURE (H): Primary | ICD-10-CM

## 2022-02-24 DIAGNOSIS — R54 FRAILTY: ICD-10-CM

## 2022-02-24 DIAGNOSIS — M54.42 CHRONIC LEFT-SIDED LOW BACK PAIN WITH LEFT-SIDED SCIATICA: ICD-10-CM

## 2022-02-24 DIAGNOSIS — G89.29 CHRONIC LEFT-SIDED LOW BACK PAIN WITH LEFT-SIDED SCIATICA: ICD-10-CM

## 2022-02-24 DIAGNOSIS — I42.9 SECONDARY CARDIOMYOPATHY (H): ICD-10-CM

## 2022-02-24 PROCEDURE — 99214 OFFICE O/P EST MOD 30 MIN: CPT | Performed by: INTERNAL MEDICINE

## 2022-02-24 NOTE — PROGRESS NOTES
Thank you, Marco Glynn, for asking the [facility] Care team to see Ms. Mayela Espino to Follow Up (heart failure)      Assessment/Recommendations   Assessment:    1. chronic systolic congestive heart failure - currently compensated. Did not tolerate addition of b-blocker. On low-dose lisinopril. LVEF 30-35%  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  Due to patient's age and frailty we are deferring on invasive ischemic evaluation at this time.  3. Hypertension - blood pressure controlled    Plan:  1. Continue medications without changes.  2. Monitor for recurrent symptoms of fluid retention.  3. Follow up with me in about 6 months or sooner if needed.         History of Present Illness   Ms. Mayela Espino is a 89 year old year old female with a significant past history of hypertension and hyperlipidemia who presents for follow-up of heart failure.    Ms. Espnio was diagnosed with heart failure in the fall of 2021. She had an LVEF of 25-30%. We attempted to use carvedilol but she became light headed and stopped this medication. She currently is treated with low-dose lisinopril and as needed furosemide. She has thus far declined ischemic evaluation due to CKD stage III and age. She currently reports some mild fatigue, but otherwise has no significant LE edema or dyspnea on exertion.       Other than noted above, Ms. Espino denies any chest pain/pressure/tightness, shortness of breath at rest or with exertion, light headedness/dizziness, pre-syncope, syncope, lower extremity swelling, palpitations, paroxysmal nocturnal dyspnea (PND), or orthopnea.     Cardiac Problems and Cardiac Diagnostics     Most Recent Cardiac testing:  ECG dated 11/18/21 (personaly reviewed and interpreted): sinus rhythm with left atrial enlargement, LVH, and nonspecific ST abnormality.    ECHO (report reviewed):   TTE at [facility] 1/21/22   LVEF 30-35%  RV normal size/function  Mild aortic sclerosis  "without stenosis    TTE at [facility] 11/19/21    Left-ventricular is normal size with an EF of 25 to 30%.    Right ventricle with normal size and systolic function    Moderate left atrial enlargement    Mild to moderate aortic insufficiency    Moderate mitral vegetation    TTE 7/9/21  Interpretation Summary  1. The left ventricle is normal in size. The visual ejection fraction is 50-  55%.  2. The right ventricle is normal in structure, function and size.  3. There is mild to moderate (1-2+) mitral regurgitation.  4. There is mild to moderate (1-2+) aortic regurgitation.     No previous echo for comparison.         Medications  Allergies   Current Outpatient Medications   Medication Sig Dispense Refill     anastrozole (ARIMIDEX) 1 mg tablet [ANASTROZOLE (ARIMIDEX) 1 MG TABLET] TAKE 1 TABLET BY MOUTH DAILY 90 tablet 2     aspirin (ASA) 81 MG EC tablet Take 1 tablet (81 mg) by mouth daily Future refills by PCP Dr. Zheng primary care provider on file. with phone number None. 30 tablet 0     atorvastatin (LIPITOR) 10 MG tablet Take 10 mg by mouth daily       cholecalciferol, vitamin D3, 1,000 unit tablet [CHOLECALCIFEROL, VITAMIN D3, 1,000 UNIT TABLET] Take 500 Units by mouth daily.        furosemide (LASIX) 20 MG tablet Take 1 tablet (20 mg) by mouth daily as needed (for swelling)       lisinopril (ZESTRIL) 2.5 MG tablet Take 2.5 mg by mouth daily       LORazepam (ATIVAN) 1 MG tablet Take by mouth as needed       multivit-iron-min-folic acid 3,500-18-0.4 unit-mg-mg Chew [MULTIVIT-IRON-MIN-FOLIC ACID 3,500-18-0.4 UNIT-MG-MG CHEW] Chew 1 tablet daily.         Allergies   Allergen Reactions     Latex Unknown     Added based on information entered during case entry, please review and add reactions, type, and severity as needed     Latex Rash        Physical Examination Review of Systems   Vitals: /58 (BP Location: Left arm, Patient Position: Sitting, Cuff Size: Adult Small)   Pulse 84   Resp 16   Ht 1.549 m (5' 1\")   " Wt 42.5 kg (93 lb 11.2 oz)   BMI 17.70 kg/m    BMI= Body mass index is 17.7 kg/m .  Wt Readings from Last 3 Encounters:   02/24/22 42.5 kg (93 lb 11.2 oz)   02/14/22 41.3 kg (91 lb)   12/20/21 42.9 kg (94 lb 9.6 oz)       General Appearance:   Pleasant, frail, elderly woman in no acute distress   ENT/Mouth: membranes moist, no apparent gingival bleeding.      EYES:  no scleral icterus, normal conjunctivae   Neck: no carotid bruits. supple   Respiratory:   lungs are clear to auscultation, no rales or wheezing, equal chest wall expansion    Cardiovascular:   Regular rhythm, normal rate. Normal first and second heart sounds with no murmurs, rubs, or gallops; Jugular venous pressure normal, no edema   Abdomen/GI:  Soft, non-tender   Extremities: no cyanosis or clubbing   Skin: no xanthelasma, warm.    Heme/lymph/ Immunology No apparent bleeding noted.   Neurologic: Alert and oriented. Ambulates slowly with a cane. no tremors   Psychiatric: Pleasant, calm, appropriate affect.         Please refer above for cardiac ROS details.       Past History   Past Medical History:   Past Medical History:   Diagnosis Date     Abnormal mini-mental status exam     scored 16 2012, ?dementia     Breast cancer (H)      Chronic kidney disease      Essential tremor      HTN (hypertension)      Tobacco use         Past Surgical History:   Past Surgical History:   Procedure Laterality Date     EYE SURGERY       JOINT REPLACEMENT       LUMPECTOMY BREAST Bilateral 1982    Cyst removed     OTHER SURGICAL HISTORY Left     ear prosthesis     OTHER SURGICAL HISTORY      l rola     OTHER SURGICAL HISTORY      left hip ORIF     IN MASTECTOMY, MODIFIED RADICAL Bilateral 6/14/2021    Procedure: Bilateral mastectomies;  Surgeon: Dulce Duran MD;  Location: [facility] [place];  Service: General        Family History:   Family History   Problem Relation Age of Onset     Hypertension Mother      Cerebrovascular Disease Father 70.00     No Known  Problems Son        Social History:   Social History     Socioeconomic History     Marital status:      Spouse name: Not on file     Number of children: Not on file     Years of education: Not on file     Highest education level: Not on file   Occupational History     Not on file   Tobacco Use     Smoking status: Current Every Day Smoker     Packs/day: 0.25     Smokeless tobacco: Never Used   Substance and Sexual Activity     Alcohol use: No     Drug use: Never     Sexual activity: Not on file   Other Topics Concern     Not on file   Social History Narrative    Has one son who lives in [place]     Social Determinants of Health     Financial Resource Strain: Not on file   Food Insecurity: Not on file   Transportation Needs: Not on file   Physical Activity: Not on file   Stress: Not on file   Social Connections: Not on file   Intimate Partner Violence: Not on file   Housing Stability: Not on file            Lab Results    Chemistry/lipid CBC Cardiac Enzymes/BNP/TSH/INR   Lab Results   Component Value Date    CHOL 142 07/19/2021    HDL 64 07/19/2021    TRIG 114 07/19/2021    BUN 37 (H) 12/17/2021     (H) 12/17/2021    CO2 30 12/17/2021    Lab Results   Component Value Date    WBC 4.6 07/10/2021    HGB 12.2 07/10/2021    HCT 39.0 07/10/2021    MCV 96 07/10/2021     (L) 07/10/2021    Lab Results   Component Value Date    TROPONINI 0.03 07/09/2021    BNP 2,301 (H) 11/18/2021    TSH 0.07 (L) 07/10/2021    INR 1.13 (H) 07/09/2021

## 2022-02-24 NOTE — PATIENT INSTRUCTIONS
It was a pleasure to meet with you today.      Below is a summary of your visit.   1. Continue your medications without changes.  2. If you think the lisinopril is causing your fatigue, then you can try stopping it for about a week. If your fatigue does not improve then resume the lisinopril.  3. I have placed a referral to physical therapy for your low back/buttock pain.  4. Follow up with me in about 6 months.     Please do not hesitate to call the [facility] clinic with any questions or concerns at [phone]. You can also reach my nurse, Nancy, during normal business hours at [phone].    Sincerely,

## 2022-02-24 NOTE — LETTER
2/24/2022    SRINIVASAN FARIA MD  [facility] [address]  [address] [facility] [address]    RE: Mayela Espino       Dear Colleague,     I had the pleasure of seeing Mayela Espino in the [facility].      Thank you, Srinivasan Glynn, for asking the [facility] team to see Ms. Mayela Espino to Follow Up (heart failure)      Assessment/Recommendations   Assessment:    1. chronic systolic congestive heart failure - currently compensated. Did not tolerate addition of b-blocker. On low-dose lisinopril. LVEF 30-35%  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  Due to patient's age and frailty we are deferring on invasive ischemic evaluation at this time.  3. Hypertension - blood pressure controlled    Plan:  1. Continue medications without changes.  2. Monitor for recurrent symptoms of fluid retention.  3. Follow up with me in about 6 months or sooner if needed.         History of Present Illness   Ms. Mayela Espino is a 89 year old year old female with a significant past history of hypertension and hyperlipidemia who presents for follow-up of heart failure.    Ms. Espino was diagnosed with heart failure in the fall of 2021. She had an LVEF of 25-30%. We attempted to use carvedilol but she became light headed and stopped this medication. She currently is treated with low-dose lisinopril and as needed furosemide. She has thus far declined ischemic evaluation due to CKD stage III and age. She currently reports some mild fatigue, but otherwise has no significant LE edema or dyspnea on exertion.       Other than noted above, Ms. Espino denies any chest pain/pressure/tightness, shortness of breath at rest or with exertion, light headedness/dizziness, pre-syncope, syncope, lower extremity swelling, palpitations, paroxysmal nocturnal dyspnea (PND), or orthopnea.     Cardiac Problems and Cardiac Diagnostics     Most Recent Cardiac testing:  ECG dated 11/18/21  (personaly reviewed and interpreted): sinus rhythm with left atrial enlargement, LVH, and nonspecific ST abnormality.    ECHO (report reviewed):   TTE at [facility] 1/21/22   LVEF 30-35%  RV normal size/function  Mild aortic sclerosis without stenosis    TTE at [facility] 11/19/21    Left-ventricular is normal size with an EF of 25 to 30%.    Right ventricle with normal size and systolic function    Moderate left atrial enlargement    Mild to moderate aortic insufficiency    Moderate mitral vegetation    TTE 7/9/21  Interpretation Summary  1. The left ventricle is normal in size. The visual ejection fraction is 50-  55%.  2. The right ventricle is normal in structure, function and size.  3. There is mild to moderate (1-2+) mitral regurgitation.  4. There is mild to moderate (1-2+) aortic regurgitation.     No previous echo for comparison.         Medications  Allergies   Current Outpatient Medications   Medication Sig Dispense Refill     anastrozole (ARIMIDEX) 1 mg tablet [ANASTROZOLE (ARIMIDEX) 1 MG TABLET] TAKE 1 TABLET BY MOUTH DAILY 90 tablet 2     aspirin (ASA) 81 MG EC tablet Take 1 tablet (81 mg) by mouth daily Future refills by PCP Dr. Zheng primary care provider on file. with phone number None. 30 tablet 0     atorvastatin (LIPITOR) 10 MG tablet Take 10 mg by mouth daily       cholecalciferol, vitamin D3, 1,000 unit tablet [CHOLECALCIFEROL, VITAMIN D3, 1,000 UNIT TABLET] Take 500 Units by mouth daily.        furosemide (LASIX) 20 MG tablet Take 1 tablet (20 mg) by mouth daily as needed (for swelling)       lisinopril (ZESTRIL) 2.5 MG tablet Take 2.5 mg by mouth daily       LORazepam (ATIVAN) 1 MG tablet Take by mouth as needed       multivit-iron-min-folic acid 3,500-18-0.4 unit-mg-mg Chew [MULTIVIT-IRON-MIN-FOLIC ACID 3,500-18-0.4 UNIT-MG-MG CHEW] Chew 1 tablet daily.         Allergies   Allergen Reactions     Latex Unknown     Added based on information entered during case entry, please review and add reactions,  "type, and severity as needed     Latex Rash        Physical Examination Review of Systems   Vitals: /58 (BP Location: Left arm, Patient Position: Sitting, Cuff Size: Adult Small)   Pulse 84   Resp 16   Ht 1.549 m (5' 1\")   Wt 42.5 kg (93 lb 11.2 oz)   BMI 17.70 kg/m    BMI= Body mass index is 17.7 kg/m .  Wt Readings from Last 3 Encounters:   02/24/22 42.5 kg (93 lb 11.2 oz)   02/14/22 41.3 kg (91 lb)   12/20/21 42.9 kg (94 lb 9.6 oz)       General Appearance:   Pleasant, frail, elderly woman in no acute distress   ENT/Mouth: membranes moist, no apparent gingival bleeding.      EYES:  no scleral icterus, normal conjunctivae   Neck: no carotid bruits. supple   Respiratory:   lungs are clear to auscultation, no rales or wheezing, equal chest wall expansion    Cardiovascular:   Regular rhythm, normal rate. Normal first and second heart sounds with no murmurs, rubs, or gallops; Jugular venous pressure normal, no edema   Abdomen/GI:  Soft, non-tender   Extremities: no cyanosis or clubbing   Skin: no xanthelasma, warm.    Heme/lymph/ Immunology No apparent bleeding noted.   Neurologic: Alert and oriented. Ambulates slowly with a cane. no tremors   Psychiatric: Pleasant, calm, appropriate affect.         Please refer above for cardiac ROS details.       Past History   Past Medical History:   Past Medical History:   Diagnosis Date     Abnormal mini-mental status exam     scored 16 2012, ?dementia     Breast cancer (H)      Chronic kidney disease      Essential tremor      HTN (hypertension)      Tobacco use         Past Surgical History:   Past Surgical History:   Procedure Laterality Date     EYE SURGERY       JOINT REPLACEMENT       LUMPECTOMY BREAST Bilateral 1982    Cyst removed     OTHER SURGICAL HISTORY Left     ear prosthesis     OTHER SURGICAL HISTORY      l rola     OTHER SURGICAL HISTORY      left hip ORIF     MT MASTECTOMY, MODIFIED RADICAL Bilateral 6/14/2021    Procedure: Bilateral mastectomies;  " Surgeon: Dulce Duran MD;  Location: [facility] OR;  Service: General        Family History:   Family History   Problem Relation Age of Onset     Hypertension Mother      Cerebrovascular Disease Father 70.00     No Known Problems Son        Social History:   Social History     Socioeconomic History     Marital status:      Spouse name: Not on file     Number of children: Not on file     Years of education: Not on file     Highest education level: Not on file   Occupational History     Not on file   Tobacco Use     Smoking status: Current Every Day Smoker     Packs/day: 0.25     Smokeless tobacco: Never Used   Substance and Sexual Activity     Alcohol use: No     Drug use: Never     Sexual activity: Not on file   Other Topics Concern     Not on file   Social History Narrative    Has one son who lives in [place]     Social Determinants of Health     Financial Resource Strain: Not on file   Food Insecurity: Not on file   Transportation Needs: Not on file   Physical Activity: Not on file   Stress: Not on file   Social Connections: Not on file   Intimate Partner Violence: Not on file   Housing Stability: Not on file            Lab Results    Chemistry/lipid CBC Cardiac Enzymes/BNP/TSH/INR   Lab Results   Component Value Date    CHOL 142 07/19/2021    HDL 64 07/19/2021    TRIG 114 07/19/2021    BUN 37 (H) 12/17/2021     (H) 12/17/2021    CO2 30 12/17/2021    Lab Results   Component Value Date    WBC 4.6 07/10/2021    HGB 12.2 07/10/2021    HCT 39.0 07/10/2021    MCV 96 07/10/2021     (L) 07/10/2021    Lab Results   Component Value Date    TROPONINI 0.03 07/09/2021    BNP 2,301 (H) 11/18/2021    TSH 0.07 (L) 07/10/2021    INR 1.13 (H) 07/09/2021

## 2022-03-04 ENCOUNTER — APPOINTMENT (OUTPATIENT)
Dept: MRI IMAGING | Facility: CLINIC | Age: 87
End: 2022-03-04
Attending: EMERGENCY MEDICINE
Payer: MEDICARE

## 2022-03-04 ENCOUNTER — HOSPITAL ENCOUNTER (EMERGENCY)
Facility: CLINIC | Age: 87
Discharge: HOME | End: 2022-03-04
Attending: EMERGENCY MEDICINE | Admitting: EMERGENCY MEDICINE
Payer: MEDICARE

## 2022-03-04 VITALS
SYSTOLIC BLOOD PRESSURE: 120 MMHG | TEMPERATURE: 98 F | RESPIRATION RATE: 16 BRPM | HEIGHT: 61 IN | BODY MASS INDEX: 17.56 KG/M2 | HEART RATE: 76 BPM | OXYGEN SATURATION: 93 % | WEIGHT: 93 LBS | DIASTOLIC BLOOD PRESSURE: 80 MMHG

## 2022-03-04 DIAGNOSIS — M48.062 SPINAL STENOSIS OF LUMBAR REGION WITH NEUROGENIC CLAUDICATION: ICD-10-CM

## 2022-03-04 DIAGNOSIS — G45.9 TIA (TRANSIENT ISCHEMIC ATTACK): Primary | ICD-10-CM

## 2022-03-04 LAB
ALBUMIN SERPL-MCNC: 3.3 G/DL (ref 3.4–5)
ALP SERPL-CCNC: 106 U/L (ref 40–150)
ALT SERPL W P-5'-P-CCNC: 23 U/L (ref 0–50)
ANION GAP SERPL CALCULATED.3IONS-SCNC: 3 MMOL/L (ref 3–14)
APTT PPP: 29 SECONDS (ref 22–38)
AST SERPL W P-5'-P-CCNC: 26 U/L (ref 0–45)
ATRIAL RATE - MUSE: 73 BPM
BASOPHILS # BLD AUTO: 0 10E3/UL (ref 0–0.2)
BASOPHILS NFR BLD AUTO: 1 %
BILIRUB SERPL-MCNC: 0.8 MG/DL (ref 0.2–1.3)
BUN SERPL-MCNC: 42 MG/DL (ref 7–30)
CALCIUM SERPL-MCNC: 8.7 MG/DL (ref 8.5–10.1)
CHLORIDE BLD-SCNC: 109 MMOL/L (ref 94–109)
CHOLEST SERPL-MCNC: 133 MG/DL
CO2 SERPL-SCNC: 28 MMOL/L (ref 20–32)
CREAT SERPL-MCNC: 1.74 MG/DL (ref 0.52–1.04)
DIASTOLIC BLOOD PRESSURE - MUSE: NORMAL MMHG
EOSINOPHIL # BLD AUTO: 0.1 10E3/UL (ref 0–0.7)
EOSINOPHIL NFR BLD AUTO: 2 %
ERYTHROCYTE [DISTWIDTH] IN BLOOD BY AUTOMATED COUNT: 15 % (ref 10–15)
GFR SERPL CREATININE-BSD FRML MDRD: 28 ML/MIN/1.73M2
GLUCOSE BLD-MCNC: 95 MG/DL (ref 70–99)
HBA1C MFR BLD: 6.4 % (ref 0–5.6)
HCT VFR BLD AUTO: 40.2 % (ref 35–47)
HDLC SERPL-MCNC: 70 MG/DL
HGB BLD-MCNC: 12.2 G/DL (ref 11.7–15.7)
IMM GRANULOCYTES # BLD: 0 10E3/UL
IMM GRANULOCYTES NFR BLD: 0 %
INR PPP: 1.03 (ref 0.85–1.15)
INTERPRETATION ECG - MUSE: NORMAL
LDLC SERPL CALC-MCNC: 50 MG/DL
LYMPHOCYTES # BLD AUTO: 1.3 10E3/UL (ref 0.8–5.3)
LYMPHOCYTES NFR BLD AUTO: 27 %
MCH RBC QN AUTO: 31.2 PG (ref 26.5–33)
MCHC RBC AUTO-ENTMCNC: 30.3 G/DL (ref 31.5–36.5)
MCV RBC AUTO: 103 FL (ref 78–100)
MONOCYTES # BLD AUTO: 0.3 10E3/UL (ref 0–1.3)
MONOCYTES NFR BLD AUTO: 7 %
NEUTROPHILS # BLD AUTO: 3 10E3/UL (ref 1.6–8.3)
NEUTROPHILS NFR BLD AUTO: 63 %
NONHDLC SERPL-MCNC: 63 MG/DL
NRBC # BLD AUTO: 0 10E3/UL
NRBC BLD AUTO-RTO: 0 /100
P AXIS - MUSE: 66 DEGREES
PLATELET # BLD AUTO: 123 10E3/UL (ref 150–450)
POTASSIUM BLD-SCNC: 4.5 MMOL/L (ref 3.4–5.3)
PR INTERVAL - MUSE: 138 MS
PROT SERPL-MCNC: 7.2 G/DL (ref 6.8–8.8)
QRS DURATION - MUSE: 76 MS
QT - MUSE: 390 MS
QTC - MUSE: 429 MS
R AXIS - MUSE: -24 DEGREES
RBC # BLD AUTO: 3.91 10E6/UL (ref 3.8–5.2)
SODIUM SERPL-SCNC: 140 MMOL/L (ref 133–144)
SYSTOLIC BLOOD PRESSURE - MUSE: NORMAL MMHG
T AXIS - MUSE: 76 DEGREES
TRIGL SERPL-MCNC: 66 MG/DL
VENTRICULAR RATE- MUSE: 73 BPM
WBC # BLD AUTO: 4.8 10E3/UL (ref 4–11)

## 2022-03-04 PROCEDURE — 83036 HEMOGLOBIN GLYCOSYLATED A1C: CPT | Performed by: STUDENT IN AN ORGANIZED HEALTH CARE EDUCATION/TRAINING PROGRAM

## 2022-03-04 PROCEDURE — 80053 COMPREHEN METABOLIC PANEL: CPT | Performed by: EMERGENCY MEDICINE

## 2022-03-04 PROCEDURE — 85610 PROTHROMBIN TIME: CPT | Performed by: EMERGENCY MEDICINE

## 2022-03-04 PROCEDURE — 93005 ELECTROCARDIOGRAM TRACING: CPT

## 2022-03-04 PROCEDURE — 70547 MR ANGIOGRAPHY NECK W/O DYE: CPT | Mod: MG

## 2022-03-04 PROCEDURE — 82040 ASSAY OF SERUM ALBUMIN: CPT | Performed by: EMERGENCY MEDICINE

## 2022-03-04 PROCEDURE — 80061 LIPID PANEL: CPT | Performed by: STUDENT IN AN ORGANIZED HEALTH CARE EDUCATION/TRAINING PROGRAM

## 2022-03-04 PROCEDURE — G1004 CDSM NDSC: HCPCS

## 2022-03-04 PROCEDURE — 85730 THROMBOPLASTIN TIME PARTIAL: CPT | Performed by: EMERGENCY MEDICINE

## 2022-03-04 PROCEDURE — 70544 MR ANGIOGRAPHY HEAD W/O DYE: CPT | Mod: MG,XS

## 2022-03-04 PROCEDURE — 85025 COMPLETE CBC W/AUTO DIFF WBC: CPT | Performed by: EMERGENCY MEDICINE

## 2022-03-04 PROCEDURE — 99285 EMERGENCY DEPT VISIT HI MDM: CPT | Mod: 25

## 2022-03-04 PROCEDURE — 36415 COLL VENOUS BLD VENIPUNCTURE: CPT | Performed by: EMERGENCY MEDICINE

## 2022-03-04 RX ORDER — CLOPIDOGREL BISULFATE 75 MG/1
75 TABLET ORAL DAILY
Qty: 21 TABLET | Refills: 0 | Status: SHIPPED | OUTPATIENT
Start: 2022-03-05 | End: 2022-03-07

## 2022-03-04 RX ORDER — ASPIRIN 81 MG/1
81 TABLET ORAL DAILY
Qty: 30 TABLET | Refills: 0 | Status: SHIPPED | OUTPATIENT
Start: 2022-03-05 | End: 2022-04-04

## 2022-03-04 ASSESSMENT — ENCOUNTER SYMPTOMS
NUMBNESS: 0
FEVER: 0
NECK PAIN: 0
BACK PAIN: 1
SHORTNESS OF BREATH: 0
ARTHRALGIAS: 1
HEADACHES: 0

## 2022-03-04 NOTE — ED TRIAGE NOTES
"Pt sts yesterday around 4pm she noticed her right foot felt \"clumsy\" symptoms continue today. Pt is able to ambulate. Similar thing happened 2 days ago  "

## 2022-03-04 NOTE — CONSULTS
"    [facility]    Stroke Telephone Note    I was called by Ayo Glynn on 03/04/22 regarding patient Mayela Espino. The patient is a 89 year old female  has a past medical history of Abnormal mini-mental status exam, Breast cancer (H), Chronic kidney disease, Essential tremor, HTN (hypertension), and Tobacco use.   Patient presented in July 2021 with a stroke found on MRI --at that time she complained of similar foot sensation. July 2021: complaining of right lower extremity numbness which she describes as \"standing on a balloon\".  She notes that she feels like it was only her right lower extremity and only the portion from her knee down to her foot. MRI was revealing for a 4 mm focus of diffusion restriction in the medial left precentral gyrus with no associated FLAIR hyperintensity.    She presents 03/04/22 with a heavy sensation in her foot which started 1600 3/3/22 and resolved.     Imaging Findings   Results for orders placed or performed during the hospital encounter of 03/04/22   Lumbar spine MRI w/o contrast    Impression    IMPRESSION:    1. Severe spinal canal stenosis at L4-L5 with essentially complete  effacement of the thecal sac.  2. Other degenerative change and stenoses as detailed.  3. Chronic appearing compression deformities of all visualized  thoracic and lumbar vertebral bodies.    MELANIE AVALOS MD         SYSTEM ID:  [id number]   MRA Brain ([place] [facility]) wo Contrast    Impression    IMPRESSION:   1. No evidence of large vessel occlusion or high-grade stenosis.  2. Presumed infundibulum at the expected origin of a left posterior  communicating artery.    MELANIE AVALOS MD         SYSTEM ID:  [id number]   MR Brain w/o Contrast    Impression    IMPRESSION:    1. No evidence of acute ischemia or hemorrhage.  2. Chronic changes as detailed.    MELANIE AVALOS MD         SYSTEM ID:  [id number]   MRA Angiogram Neck w/o Contrast    Impression    IMPRESSION: No " "evidence of large vessel occlusion or high-grade  stenosis.    MELANIE AVALOS MD         SYSTEM ID:  [id number]          Impression  Possible TIA vs Spinal stenosis    Recommendations   - TIA clinic pathway  - Neurochecks and Vital Signs every 4 hours   - Permissive HTN; goal SBP: Normotension  - Daily aspirin 325 mg for secondary stroke prevention  - Plavix 75mg for 21 days (she was on Plavix previously)   - May need plavix response labs  - Statin: Atorvastatin 80mg  - Telemetry, EKG  - Bedside Glucose Monitoring  - A1c, Lipid Panel, Troponin x 3  - PT/OT/SLP  - Stroke Education  - Euthermia, Euglycemia  - Echocardiogram, TTE  - Heart monitor on discharge       My recommendations are based on the information provided over the phone by Mayela Espino's in-person providers. They are not intended to replace the clinical judgment of her in-person providers. I was not requested to personally see or examine the patient at this time.    The Stroke Staff is Dr. Hermes Viveros MD  Vascular Neurology Fellow  To page me or covering stroke neurology team member, click here: [facility]   Choose \"On Call\" tab at top, then search dropdown box for \"Neurology Adult\", select location, press Enter, then look for stroke/neuro ICU/telestroke.         "

## 2022-03-04 NOTE — ED NOTES
Bed: ED05  Expected date:   Expected time:   Means of arrival:   Comments:  [facility] - 89 F foot pain eta 0868

## 2022-03-04 NOTE — ED PROVIDER NOTES
"  History   Chief Complaint:  Neurologic Problem       The history is provided by the patient.      Mayela Espino is an 89 year old female with history of hypertension and stroke who presents with a neurologic problem. She first noticed a \"heavty\" sensation in her right ankle and foot since approximately 1600 yesterday. She called the police department today, and they recommended that she go to the ED, and they called an ambulance for her. She is able to ambulate without difficulty. She normal uses a cane when outside of the house. She had similar symptoms a couples days ago, but it resolved. She is currently undergoing physical therapy for back pain with left-sided sciatica. The back pain started about a month ago.  She currently takes a baby aspirin and Plavix. She denies tingling/numbnesss, visual disturbance, headache, neck pain, urinary/bowel incontinence, fever, or shortness of breath.     Review of Systems   Constitutional: Negative for fever.   Eyes: Negative for visual disturbance.   Respiratory: Negative for shortness of breath.    Musculoskeletal: Positive for arthralgias (L Ankle) and back pain. Negative for neck pain.   Neurological: Negative for numbness and headaches.   All other systems reviewed and are negative.    Allergies:  Latex    Medications:  Arimidex   Lipitor   Lasix   Ativan     Past Medical History:     Breast cancer (H)   Essential tremor   HTN (hypertension)   Anxiety  Chronic kidney disease, stage 3 (H)  Gait disturbance  Tremor  Essential tremor  Cigarette nicotine dependence, uncomplicated  Generalized anxiety disorder  Goiter  Hypertensive chronic kidney disease w stg 1-4/unsp chr kdny  Infiltrating ductal carcinoma of breast (H)  Osteoarthritis of knee  CVA (cerebral vascular accident) (H)  Heart failure with reduced ejection fraction (H)    Past Surgical History:    Breast lumpectomy   L ear prosthesis   L hip ORIF   Radical modified mastectomy   Unspecified arthroplasty " "    Family History:    Mother - HTN  Father - stroke     Social History:  Patient presents to the ED alone via EMS  Hx of tobacco use (current smoker)    Physical Exam     Patient Vitals for the past 24 hrs:   BP Temp Temp src Pulse Resp SpO2 Height Weight   03/04/22 1245 -- -- -- -- -- 93 % -- --   03/04/22 0930 (!) 143/74 -- -- 64 -- 91 % -- --   03/04/22 0915 (!) 146/81 -- -- 68 -- 94 % -- --   03/04/22 0900 (!) 148/79 -- -- 75 -- 95 % -- --   03/04/22 0851 (!) 181/80 98  F (36.7  C) Oral 74 16 96 % 1.549 m (5' 1\") 42.2 kg (93 lb)   03/04/22 0845 (!) 151/80 -- -- -- -- -- -- --       Physical Exam  Nursing note and vitals reviewed.  Constitutional:  Oriented to person, place, and time. Cooperative.   HENT:   Nose:    Nose normal.   Mouth/Throat:   Mucous membranes are normal.   Eyes:    Conjunctivae normal and EOM are normal.      Pupils are equal, round, and reactive to light.   Neck:    Trachea normal.   Cardiovascular:  Normal rate, regular rhythm, normal heart sounds and normal pulses. No murmur heard.  Pulmonary/Chest:  Effort normal and breath sounds normal.   Abdominal:   Soft. Normal appearance and bowel sounds are normal.      There is no tenderness.      There is no rebound and no CVA tenderness.   Musculoskeletal:  Some tenderness to palpation to the left sciatic notch region.  Extremities atraumatic x 4.   Lymphadenopathy:  No cervical adenopathy.   Neurological:   Alert and oriented to person, place, and time. Normal strength in all 4 extremities.      No cranial nerve deficit or sensory deficit. GCS eye subscore is 4. GCS verbal subscore is 5. GCS motor subscore is 6.  Able to ambulate with minimal difficulty. 5/5 strength in all muscle groups of the lower extremities.  Sensation intact to light touch distally. DTRs equal and symmetric in the lower extremities. Straight leg raises negative bilaterally.  Skin:    Skin is intact. No rash noted.   Psychiatric:   Normal mood and affect.    Emergency " Department Course   ECG:  ECG taken at 1300, ECG read at 1304  Normal sinus rhythm with sinus arrhythmia  Possible Left atrial enlargement   Left ventricular hypertrophy   Abnormal ECG   Rate 73 bpm. GA interval 138 ms. QRS duration 76 ms. QT/QTc 390/429 ms. P-R-T axes 66 -24 76.     Imaging:  Lumbar spine MRI w/o contrast  Preliminary Result  IMPRESSION:    1. Severe spinal canal stenosis at L4-L5 with essentially complete  effacement of the thecal sac.  2. Other degenerative change and stenoses as detailed.  3. Chronic appearing compression deformities of all visualized  thoracic and lumbar vertebral bodies.    MRA Angiogram Neck w/o Contrast  Final Result  IMPRESSION: No evidence of large vessel occlusion or high-grade  stenosis.  MELANIE AVALOS MD     MR Brain w/o Contrast  Final Result  IMPRESSION:  1. No evidence of acute ischemia or hemorrhage.  2. Chronic changes as detailed.  MELANIE AVALOS MD     MRA Brain ([place] [facility]) wo Contrast  Final Result  IMPRESSION:   1. No evidence of large vessel occlusion or high-grade stenosis.  2. Presumed infundibulum at the expected origin of a left posterior  communicating artery.  MELANIE AVALOS MD     Report per radiology    Laboratory:  Labs Ordered and Resulted from Time of ED Arrival to Time of ED Departure   COMPREHENSIVE METABOLIC PANEL - Abnormal       Result Value    Sodium 140      Potassium 4.5      Chloride 109      Carbon Dioxide (CO2) 28      Anion Gap 3      Urea Nitrogen 42 (*)     Creatinine 1.74 (*)     Calcium 8.7      Glucose 95      Alkaline Phosphatase 106      AST 26      ALT 23      Protein Total 7.2      Albumin 3.3 (*)     Bilirubin Total 0.8      GFR Estimate 28 (*)    CBC WITH PLATELETS AND DIFFERENTIAL - Abnormal    WBC Count 4.8      RBC Count 3.91      Hemoglobin 12.2      Hematocrit 40.2       (*)     MCH 31.2      MCHC 30.3 (*)     RDW 15.0      Platelet Count 123 (*)     % Neutrophils 63      % Lymphocytes 27      %  Monocytes 7      % Eosinophils 2      % Basophils 1      % Immature Granulocytes 0      NRBCs per 100 WBC 0      Absolute Neutrophils 3.0      Absolute Lymphocytes 1.3      Absolute Monocytes 0.3      Absolute Eosinophils 0.1      Absolute Basophils 0.0      Absolute Immature Granulocytes 0.0      Absolute NRBCs 0.0     INR - Normal    INR 1.03     PARTIAL THROMBOPLASTIN TIME - Normal    aPTT 29     HEMOGLOBIN A1C   LIPID REFLEX TO DIRECT LDL PANEL      Emergency Department Course:    Reviewed:  I reviewed nursing notes, vitals and past medical history    Assessments:  0900 I obtained history and examined the patient as noted above.   1305 I rechecked the patient and explained findings.   1335 I rechecked the patient and discussed plan for discharge home.    Consults:  1302 I spoke with Neurosurgery.   1325 I spoke with Dr. Cloud from Stroke Neurology.     Disposition:  The patient was discharged to home.     Impression & Plan   Medical Decision Making:  This is an 89-year-old female who came in for further evaluation of right foot coordination issues.  She does not appear to have any focal weakness or changes to sensation.  She does have a history of a similar event in July 2021, when she was found to have an acute stroke.  Therefore was concerned that she might have another stroke, and I also considered the possibility of this being secondary to a herniated disc or spinal stenosis in her lumbar spine with nerve impingement.  I proceeded with the above MRI imaging in addition to the blood work and EKG.  I spoke with Dr. Cloud, the fellow with stroke neurology, who was in agreement with proceeding with the above imaging studies.  She does not appear to have an acute stroke or any problems with her vessels, but she does have severe spinal stenosis.  Therefore I spoke with Saira, the PA with neurosurgery, who discussed it with her staff, Dr. Salmon.  They felt that she could safely go home with outpatient follow-up  in their clinic, and I am providing the patient the clinic contact information.  I further discussed the case with Dr. Cloud again, as her symptoms seem to have resolved, making a TIA more likely as well.  He did not feel it was necessary to change her anticoagulation, but she does need to be seen in the TIA clinic, which Dr. Cloud is initiating for the patient.  I recommended close outpatient follow-up with her primary physician as well and certainly returning with any concerns or worsening symptoms as well as any recurrent symptoms.    Diagnosis:    ICD-10-CM    1. TIA (transient ischemic attack)  G45.9 Cardiac Event Monitor Adult Pediatric     TIA Follow-Up     Echocardiogram Complete - For age > 60 years     aspirin 81 MG EC tablet     clopidogrel (PLAVIX) 75 MG tablet   2. Spinal stenosis of lumbar region with neurogenic claudication  M48.062        Discharge Medications:  New Prescriptions    ASPIRIN 81 MG EC TABLET    Take 1 tablet (81 mg) by mouth daily DO NOT CRUSH    CLOPIDOGREL (PLAVIX) 75 MG TABLET    Take 1 tablet (75 mg) by mouth daily for 21 days       Scribe Disclosure:  I, Ray Dean, am serving as a scribe at 9:00 AM on 3/4/2022 to document services personally performed by Ayo Glynn MD based on my observations and the provider's statements to me.        Ayo Glynn MD  03/04/22 [id number]

## 2022-03-07 ENCOUNTER — TELEPHONE (OUTPATIENT)
Dept: NEUROLOGY | Facility: CLINIC | Age: 87
End: 2022-03-07
Payer: MEDICARE

## 2022-03-07 ENCOUNTER — VIRTUAL VISIT (OUTPATIENT)
Dept: NEUROLOGY | Facility: CLINIC | Age: 87
End: 2022-03-07
Payer: MEDICARE

## 2022-03-07 DIAGNOSIS — G45.9 TIA (TRANSIENT ISCHEMIC ATTACK): Primary | ICD-10-CM

## 2022-03-07 DIAGNOSIS — I63.10 CEREBROVASCULAR ACCIDENT (CVA) DUE TO EMBOLISM OF PRECEREBRAL ARTERY (H): ICD-10-CM

## 2022-03-07 DIAGNOSIS — G54.1 LUMBOSACRAL PLEXOPATHY: ICD-10-CM

## 2022-03-07 DIAGNOSIS — Z65.8 INSUFFICIENT SOCIAL SUPPORT: ICD-10-CM

## 2022-03-07 PROCEDURE — 99443 PR PHYSICIAN TELEPHONE EVALUATION 21-30 MIN: CPT | Mod: 95

## 2022-03-07 NOTE — PROGRESS NOTES
Mayela is a 89 year old who is being evaluated via a billable telephone visit.      What phone number would you like to be contacted at? [phone]  How would you like to obtain your AVS? Mail a copy  Phone call duration: 52 minutes    _____________________________________________________________    [facility]    Virtual Clinic - [phone]    _____________________________________________________________      Chief Complaint: Patient presents with:  Transient Ischemic Attack      History of Present Illness: Mayela Espino is a 89 year old female presenting for follow-up for possible TIA     She was hospitalized at [facility] on 3/4/22 . Prior to the hospital stay, she had a past medical history of  breast cancer, CKD, essential tremor, HTN, smoking, and prior stroke in 7/2021 for which she did not follow up with neurology.    She presented to the hospital because her R foot and ankle started to feel clumsy (there are some discrepancies in the medical record regarding L vs R; today she tells me R).  She was walking around her house, doing nothing out of the ordinary.  She is not sure if it came on suddenly or gradually throughout the day.  No symptoms in hand, arm, face.   It lasted only until the ER eval was over, so several hours probably.  No pain in the foot or ankle on the R.    However, she does have a substantial pain in the other leg.---- but she has had pain in the L thigh.   The pain in the L thigh started 2 or 3 weeks ago. It has happened before. She did physical therapy for it before and it went away. She had just seen her PCP for that before.  She also tells me she's currently doing PT for it.    TIA Evaluation summarized:  MRI/Head CT: MRI brain showed no stroke  Intracranial Vascular Imaging: MRA head no stenosis or occlusion  Cervical Carotid and Vertebral Artery Vascular Imaging: MRA neck no stenosis or occlusion  Echocardiogram: not  done  EKG/Telemetry: SR with sinus arrhythmia  LDL: 50  A1c: 6.4  Troponin: not checked   Other testing: Not Applicable    She was started on aspirin and Plavix for recurrent stroke prevention. Since being discharged, she reports she didn't know that she was given a new prescription for Plavix and has just been on aspirin 81mg daily. She continues to have thigh pain, but no more episodes of clumsiness.    Impression:   Problem List Items Addressed This Visit        Nervous and Auditory    Cerebrovascular accident (CVA) due to embolism of precerebral artery (H)    Lumbosacral plexopathy       Other    Insufficient social support      Other Visit Diagnoses     TIA (transient ischemic attack)    -  Primary         1. RLE clumsiness  89 year old woman who presented to the ED several days ago with some RLE symptoms in the setting of a flare of LLE pain, the latter almost certainly due to her severe spinal stenosis. Her RLE episode is a little more difficult to diagnose since she lacks details on how the symptoms came on, progressed, etc. Even in the event this was a TIA, she is not very interested in additional testing for workup for stroke prevention.  - Continue just aspirin: she she is now 72+ hours out from the event and did not get Plavix from her pharmacy, and the benefit time period of dual antiplatelets after TIA (even if this was a more convincing TIA) is over now.  - LDL already in goal range of 40-70 on lipitor 10mg    2. Insufficient social support  I added her son to our phone virtual visit today, and offered various suggestions such as getting her a [employer] account that he could also access to see her medical info, but she declined. She declined additional testing for now as well and wants to just discuss with her PCP.  I'm concerned about her lack of social support in [place]. Her discharge paperwork from the ED included many new tests and one new medication (Plavix), but she was unaware of this medication  "until my call. Her son suggested someone coming to her home to set up her medications, but she declined. I offered the help of our stroke nurse care coordinator, but she declined and said this kind of thing is available through her PCP's office.    3. Severe spinal stenosis  - Continue physical therapy  - She reports she would consider further eval if PT insufficient.    4. History of stroke in 7/2021- L precentral gyrus, embolic stroke of undetermined source  - The patient completed some of the workup for this, but reports she could not tolerate the 30 day monitor for more than a few days: by the report, it looks to me about 6 days. Ideally, she would do another 30 days because of this new potential TIA combined with the history of stroke. She wants to discuss this with her PCP.    Follow Up:  Unless she wants to complete more stroke workup, I think she can continue to just follow with her PCP and consider general neurology for her spinal stenosis.    Stroke Education provided.  She will call us with any questions.  For any acute neurologic deficits she was advised to  go directly to the hospital rather than call the clinic.    Keisha Avelar PA-C  Neurology  03/07/2022 1:47 PM  To page me or covering stroke neurology team member, click here: AMCOM  Choose \"On Call\" tab at top, then search dropdown box for \"Neurology Adult\" & press Enter, look for Neuro ICU/Stroke    ___________________________________________________________________    Current Medications  Current Outpatient Medications   Medication     anastrozole (ARIMIDEX) 1 mg tablet     aspirin 81 MG EC tablet     atorvastatin (LIPITOR) 10 MG tablet     cholecalciferol, vitamin D3, 1,000 unit tablet     clopidogrel (PLAVIX) 75 MG tablet     furosemide (LASIX) 20 MG tablet     lisinopril (ZESTRIL) 2.5 MG tablet     LORazepam (ATIVAN) 1 MG tablet     multivit-iron-min-folic acid 3,500-18-0.4 unit-mg-mg Chew     aspirin (ASA) 81 MG EC tablet     No current " facility-administered medications for this visit.       Past Medical History  Past Medical History:   Diagnosis Date     Abnormal mini-mental status exam     scored 16 , ?dementia     Breast cancer (H)      Chronic kidney disease      Essential tremor      HTN (hypertension)      Tobacco use        Social History  Social History     Tobacco Use     Smoking status: Current Every Day Smoker     Packs/day: 0.25     Smokeless tobacco: Never Used   Substance Use Topics     Alcohol use: No     Drug use: Never       Physical Exam    Vitals - Patient Reported 3/7/2022   Weight (Patient Reported) 93 lb               Neurologic Exam:  Phone-only visit. Speech was very slow but otherwise normal    Neuroimaging: as per HPI. I personally reviewed those images    Labs:    Coagulation studies:  Recent Labs   Lab Test 22  0925 21  0911   INR 1.03 1.13*        Lipid panel:  Recent Labs   Lab Test 22  0925 21  1610   CHOL 133 142   HDL 70 64   LDL 50 55   TRIG 66 114       HbA1C:  Recent Labs   Lab Test 2225 21  2234 19  1101   A1C 6.4* 6.2* 6.4*       Troponin:  Recent Labs   Lab Test 21  2234   TROPI 0.030         Billin minutes spent on the date of the encounter doing chart review, review of outside records, review of test results, interpretation of tests, patient visit, documentation and discussion with family

## 2022-03-07 NOTE — TELEPHONE ENCOUNTER
RNCC outreach deferred as pt has REJI appt this afternoon. I will be available as needed if indicated by REJI.    Destiny Galvez BS, RN, SCRN  RN Stroke Neurology Care Coordinator  [facility] Neuroscience Service Line

## 2022-03-07 NOTE — TELEPHONE ENCOUNTER
ED TIA Pathway patient. Chart reviewed    Neurology phone note while in the hospital: YES    Head CT or Brain MRI completed:YES    Head and Neck Vessel Imaging completed (MRA Head/Neck or CTA Head/Neck):YES    Patient scheduled for Stroke REJI Virtual appointment 3/7/22 at 2:00 PM      Routing to [facility] to Assist with scheduling URGENT Echocardiogram and Cardiac Event Monitor     BRODIE BENNETT CMA

## 2022-03-07 NOTE — TELEPHONE ENCOUNTER
Spoke to patient, originally declining both cardiac testing and Stroke REJI visit, stating she has had 2 echos in the past and doesn't like seeing too many doctors, states she would prefer to stay on medications and follow up with PCP. I did state that I thought a phone visit with the stroke team today would be a really good idea along with following up with PCP. Patient ultimately agreed to phone visit today with Stroke REJI team and declined assistance with scheduling cardiac testing. FYI to stroke RNCC and Stroke REJI team.     BRODIE BENNETT, CMA

## 2022-03-07 NOTE — LETTER
3/7/2022        RE: Mayela Espino  [address] [address]        Mayela is a 89 year old who is being evaluated via a billable telephone visit.      What phone number would you like to be contacted at? [phone]  How would you like to obtain your AVS? Mail a copy  Phone call duration: 52 minutes    _____________________________________________________________    [facility]    Virtual Clinic - [phone]    _____________________________________________________________      Chief Complaint: Patient presents with:  Transient Ischemic Attack      History of Present Illness: Mayela Espino is a 89 year old female presenting for follow-up for possible TIA     She was hospitalized at [facility] on 3/4/22 . Prior to the hospital stay, she had a past medical history of  breast cancer, CKD, essential tremor, HTN, smoking, and prior stroke in 7/2021 for which she did not follow up with neurology.    She presented to the hospital because her R foot and ankle started to feel clumsy (there are some discrepancies in the medical record regarding L vs R; today she tells me R).  She was walking around her house, doing nothing out of the ordinary.  She is not sure if it came on suddenly or gradually throughout the day.  No symptoms in hand, arm, face.   It lasted only until the ER eval was over, so several hours probably.  No pain in the foot or ankle on the R.    However, she does have a substantial pain in the other leg.---- but she has had pain in the L thigh.   The pain in the L thigh started 2 or 3 weeks ago. It has happened before. She did physical therapy for it before and it went away. She had just seen her PCP for that before.  She also tells me she's currently doing PT for it.    TIA Evaluation summarized:  MRI/Head CT: MRI brain showed no stroke  Intracranial Vascular Imaging: MRA head no stenosis or occlusion  Cervical Carotid and Vertebral  Artery Vascular Imaging: MRA neck no stenosis or occlusion  Echocardiogram: not done  EKG/Telemetry: SR with sinus arrhythmia  LDL: 50  A1c: 6.4  Troponin: not checked   Other testing: Not Applicable    She was started on aspirin and Plavix for recurrent stroke prevention. Since being discharged, she reports she didn't know that she was given a new prescription for Plavix and has just been on aspirin 81mg daily. She continues to have thigh pain, but no more episodes of clumsiness.    Impression:   Problem List Items Addressed This Visit        Nervous and Auditory    Cerebrovascular accident (CVA) due to embolism of precerebral artery (H)    Lumbosacral plexopathy       Other    Insufficient social support      Other Visit Diagnoses     TIA (transient ischemic attack)    -  Primary         1. RLE clumsiness  89 year old woman who presented to the ED several days ago with some RLE symptoms in the setting of a flare of LLE pain, the latter almost certainly due to her severe spinal stenosis. Her RLE episode is a little more difficult to diagnose since she lacks details on how the symptoms came on, progressed, etc. Even in the event this was a TIA, she is not very interested in additional testing for workup for stroke prevention.  - Continue just aspirin: she she is now 72+ hours out from the event and did not get Plavix from her pharmacy, and the benefit time period of dual antiplatelets after TIA (even if this was a more convincing TIA) is over now.  - LDL already in goal range of 40-70 on lipitor 10mg    2. Insufficient social support  I added her son to our phone virtual visit today, and offered various suggestions such as getting her a [employer] account that he could also access to see her medical info, but she declined. She declined additional testing for now as well and wants to just discuss with her PCP.  I'm concerned about her lack of social support in [place]. Her discharge paperwork from the ED included many new  "tests and one new medication (Plavix), but she was unaware of this medication until my call. Her son suggested someone coming to her home to set up her medications, but she declined. I offered the help of our stroke nurse care coordinator, but she declined and said this kind of thing is available through her PCP's office.    3. Severe spinal stenosis  - Continue physical therapy  - She reports she would consider further eval if PT insufficient.    4. History of stroke in 7/2021- L precentral gyrus, embolic stroke of undetermined source  - The patient completed some of the workup for this, but reports she could not tolerate the 30 day monitor for more than a few days: by the report, it looks to me about 6 days. Ideally, she would do another 30 days because of this new potential TIA combined with the history of stroke. She wants to discuss this with her PCP.    Follow Up:  Unless she wants to complete more stroke workup, I think she can continue to just follow with her PCP and consider general neurology for her spinal stenosis.    Stroke Education provided.  She will call us with any questions.  For any acute neurologic deficits she was advised to  go directly to the hospital rather than call the clinic.    Keisha Avelar PA-C  Neurology  03/07/2022 1:47 PM  To page me or covering stroke neurology team member, click here: AMCOM  Choose \"On Call\" tab at top, then search dropdown box for \"Neurology Adult\" & press Enter, look for Neuro ICU/Stroke    ___________________________________________________________________    Current Medications  Current Outpatient Medications   Medication     anastrozole (ARIMIDEX) 1 mg tablet     aspirin 81 MG EC tablet     atorvastatin (LIPITOR) 10 MG tablet     cholecalciferol, vitamin D3, 1,000 unit tablet     clopidogrel (PLAVIX) 75 MG tablet     furosemide (LASIX) 20 MG tablet     lisinopril (ZESTRIL) 2.5 MG tablet     LORazepam (ATIVAN) 1 MG tablet     multivit-iron-min-folic acid " 3,500-18-0.4 unit-mg-mg Chew     aspirin (ASA) 81 MG EC tablet     No current facility-administered medications for this visit.       Past Medical History  Past Medical History:   Diagnosis Date     Abnormal mini-mental status exam     scored 16 , ?dementia     Breast cancer (H)      Chronic kidney disease      Essential tremor      HTN (hypertension)      Tobacco use        Social History  Social History     Tobacco Use     Smoking status: Current Every Day Smoker     Packs/day: 0.25     Smokeless tobacco: Never Used   Substance Use Topics     Alcohol use: No     Drug use: Never       Physical Exam    Vitals - Patient Reported 3/7/2022   Weight (Patient Reported) 93 lb               Neurologic Exam:  Phone-only visit. Speech was very slow but otherwise normal    Neuroimaging: as per HPI. I personally reviewed those images    Labs:    Coagulation studies:  Recent Labs   Lab Test 22  0925 21  0911   INR 1.03 1.13*        Lipid panel:  Recent Labs   Lab Test 2225 21  1610   CHOL 133 142   HDL 70 64   LDL 50 55   TRIG 66 114       HbA1C:  Recent Labs   Lab Test 22  2234 19  1101   A1C 6.4* 6.2* 6.4*       Troponin:  Recent Labs   Lab Test 21  2234   TROPI 0.030         Billin minutes spent on the date of the encounter doing chart review, review of outside records, review of test results, interpretation of tests, patient visit, documentation and discussion with family           Sincerely,         NEUROLOGY STROKE

## 2022-03-29 ENCOUNTER — TELEPHONE (OUTPATIENT)
Dept: CARDIOLOGY | Facility: CLINIC | Age: 87
End: 2022-03-29
Payer: MEDICARE

## 2022-03-29 NOTE — TELEPHONE ENCOUNTER
Incoming fax receipt of paperwork in relation to PT order placed by KANDACE 2/24/22. Will bring to provider for review as requested. JEAN CARLOS,Rn

## 2022-03-29 NOTE — TELEPHONE ENCOUNTER
Outgoing fax of completed paperwork, from KANDACE. Paperwork sent to [facility] to have scanned into chart/record. JEAN CARLOSRN     PC to [facility], and confirmed receipt of paperwork. JEAN CARLOS,Rn

## 2022-04-04 ENCOUNTER — OFFICE VISIT (OUTPATIENT)
Dept: CARDIOLOGY | Facility: CLINIC | Age: 87
End: 2022-04-04
Payer: MEDICARE

## 2022-04-04 VITALS
RESPIRATION RATE: 20 BRPM | BODY MASS INDEX: 17.76 KG/M2 | WEIGHT: 94 LBS | DIASTOLIC BLOOD PRESSURE: 70 MMHG | SYSTOLIC BLOOD PRESSURE: 122 MMHG | HEART RATE: 76 BPM

## 2022-04-04 DIAGNOSIS — I50.20 HEART FAILURE WITH REDUCED EJECTION FRACTION (H): Primary | ICD-10-CM

## 2022-04-04 DIAGNOSIS — N18.31 STAGE 3A CHRONIC KIDNEY DISEASE (H): ICD-10-CM

## 2022-04-04 DIAGNOSIS — I12.9 HYPERTENSIVE CHRONIC KIDNEY DISEASE W STG 1-4/UNSP CHR KDNY: ICD-10-CM

## 2022-04-04 PROCEDURE — 99214 OFFICE O/P EST MOD 30 MIN: CPT | Performed by: NURSE PRACTITIONER

## 2022-04-04 NOTE — PATIENT INSTRUCTIONS
Mayela Espino,    It was a pleasure to see you today at [facility].     My recommendations after this visit include:  - Please follow up with Dr Vasquez in August   - Please follow up with Gaby Quintana in December  - Continue current medications    Gaby Quintana, CNP

## 2022-04-04 NOTE — PROGRESS NOTES
Assessment/Recommendations   Assessment:    1.  Heart failure with reduced ejection fraction, LVEF 30 to 35%, NYHA class II: Compensated.  She has mild fatigue and dyspnea on exertion.  Her weight has been stable.  She tries to follow a low-sodium diet.  2.  Hypertension: Controlled.  Blood pressure 122/70  3.  Chronic kidney disease stage III: She had labs early March with sodium 140, potassium 4.5, BUN 42 and creatinine 1.74 which is stable    Plan:  1.   Continue current medications  2.  Continue monitoring weights and low-salt diet  3.  Encourage regular activity    Mayela Espino will follow up with Dr. Vasquez in August and in the heart failure clinic in December or sooner if needed.     History of Present Illness/Subjective    Ms. Mayela Espino is a 89 year old female seen at [facility] heart failure clinic today for continued follow-up.  She follows up for heart failure with reduced ejection fraction.  She had an echocardiogram through [facility]  on January 21, 2022 which showed an ejection fraction of 30 to 35% which is slightly improved.  It also showed mild to moderate aortic regurgitation. She has a past medical history significant for CVA, hypertension, tobacco use, chronic kidney disease stage III, and anxiety.    Today, she denies any acute heart failure symptoms.  She has mild fatigue and dyspnea on exertion.  She denies lightheadedness, orthopnea, PND, palpitations, chest pain, abdominal fullness/bloating and lower extremity edema.      She is monitoring home weights which are stable.  She is following a low sodium diet.  She lives alone.     Physical Examination Review of Systems   /70 (BP Location: Left arm, Patient Position: Sitting, Cuff Size: Adult Small)   Pulse 76   Resp 20   Wt 42.6 kg (94 lb)   BMI 17.76 kg/m    Body mass index is 17.76 kg/m .  Wt Readings from Last 3 Encounters:   04/04/22 42.6 kg (94 lb)   03/04/22 42.2 kg (93 lb)   02/24/22 42.5 kg (93 lb 11.2 oz)        General Appearance:   no acute distress, frail elderly woman   ENT/Mouth: Wearing mask   EYES:  no scleral icterus, normal conjunctivae   Neck: no thyromegaly   Chest/Lungs:   lungs are clear to auscultation, no rales or wheezing, equal chest wall expansion    Cardiovascular:   Regular. Normal first and second heart sounds with no murmurs, rubs, or gallops; Jugular venous pressure normal, no edema bilaterally    Abdomen:   bowel sounds are present   Extremities: no cyanosis or clubbing   Skin: no xanthelasma, warm.    Neurologic: normal  bilateral, tremors     Psychiatric: alert and oriented x3                                              Medical History  Surgical History Family History Social History   Past Medical History:   Diagnosis Date     Abnormal mini-mental status exam     scored 16 2012, ?dementia     Breast cancer (H)      Chronic kidney disease      Congestive heart failure (H)      Essential tremor      HTN (hypertension)      Tobacco use     Past Surgical History:   Procedure Laterality Date     EYE SURGERY       JOINT REPLACEMENT       LUMPECTOMY BREAST Bilateral 1982    Cyst removed     OTHER SURGICAL HISTORY Left     ear prosthesis     OTHER SURGICAL HISTORY      l rola     OTHER SURGICAL HISTORY      left hip ORIF     PA MASTECTOMY, MODIFIED RADICAL Bilateral 6/14/2021    Procedure: Bilateral mastectomies;  Surgeon: Dulce Duran MD;  Location: [facility] [place] [facility];  Service: General    Family History   Problem Relation Age of Onset     Hypertension Mother      Cerebrovascular Disease Father 70.00     No Known Problems Son     Social History     Socioeconomic History     Marital status:      Spouse name: Not on file     Number of children: Not on file     Years of education: Not on file     Highest education level: Not on file   Occupational History     Not on file   Tobacco Use     Smoking status: Current Every Day Smoker     Packs/day: 0.25     Smokeless tobacco: Never Used    Substance and Sexual Activity     Alcohol use: No     Drug use: Never     Sexual activity: Not on file   Other Topics Concern     Parent/sibling w/ CABG, MI or angioplasty before 65F 55M? Not Asked   Social History Narrative    Has one son who lives in [place]     Social Determinants of Health     Financial Resource Strain: Not on file   Food Insecurity: Not on file   Transportation Needs: Not on file   Physical Activity: Not on file   Stress: Not on file   Social Connections: Not on file   Intimate Partner Violence: Not on file   Housing Stability: Not on file          Medications  Allergies   Current Outpatient Medications   Medication Sig Dispense Refill     anastrozole (ARIMIDEX) 1 mg tablet [ANASTROZOLE (ARIMIDEX) 1 MG TABLET] TAKE 1 TABLET BY MOUTH DAILY 90 tablet 2     aspirin 81 MG EC tablet Take 1 tablet (81 mg) by mouth daily DO NOT CRUSH 30 tablet 0     atorvastatin (LIPITOR) 10 MG tablet Take 10 mg by mouth daily       cholecalciferol, vitamin D3, 1,000 unit tablet [CHOLECALCIFEROL, VITAMIN D3, 1,000 UNIT TABLET] Take 500 Units by mouth daily.        furosemide (LASIX) 20 MG tablet Take 1 tablet (20 mg) by mouth daily as needed (for swelling)       lisinopril (ZESTRIL) 2.5 MG tablet Take 2.5 mg by mouth daily       LORazepam (ATIVAN) 1 MG tablet Take by mouth as needed       multivit-iron-min-folic acid 3,500-18-0.4 unit-mg-mg Chew [MULTIVIT-IRON-MIN-FOLIC ACID 3,500-18-0.4 UNIT-MG-MG CHEW] Chew 1 tablet daily.       Allergies   Allergen Reactions     Latex Unknown     Added based on information entered during case entry, please review and add reactions, type, and severity as needed     Latex Rash         Lab Results    Chemistry/lipid CBC Cardiac Enzymes/BNP/TSH/INR   Lab Results   Component Value Date    CHOL 133 03/04/2022    HDL 70 03/04/2022    TRIG 66 03/04/2022    BUN 42 (H) 03/04/2022     03/04/2022    CO2 28 03/04/2022    Lab Results   Component Value Date    WBC 4.8 03/04/2022    HGB  12.2 03/04/2022    HCT 40.2 03/04/2022     (H) 03/04/2022     (L) 03/04/2022    Lab Results   Component Value Date    TROPONINI 0.03 07/09/2021    BNP 2,301 (H) 11/18/2021    TSH 0.07 (L) 07/10/2021    INR 1.03 03/04/2022             This note has been dictated using voice recognition software. Any grammatical, typographical, or context distortions are unintentional and inherent to the software    30 minutes spent on the date of encounter doing chart review, review of outside records, review of test results, patient visit and documentation.

## 2022-04-04 NOTE — LETTER
4/4/2022    SRINIVASAN FARIA MD  [facility] [address]  [address] [facility] [address]    RE: Mayela Espino       Dear Colleague,     I had the pleasure of seeing Mayela Espino in the [facility].        Assessment/Recommendations   Assessment:    1.  Heart failure with reduced ejection fraction, LVEF 30 to 35%, NYHA class II: Compensated.  She has mild fatigue and dyspnea on exertion.  Her weight has been stable.  She tries to follow a low-sodium diet.  2.  Hypertension: Controlled.  Blood pressure 122/70  3.  Chronic kidney disease stage III: She had labs early March with sodium 140, potassium 4.5, BUN 42 and creatinine 1.74 which is stable    Plan:  1.   Continue current medications  2.  Continue monitoring weights and low-salt diet  3.  Encourage regular activity    Mayela Espino will follow up with Dr. Vasquez in August and in the heart failure clinic in December or sooner if needed.     History of Present Illness/Subjective    Ms. Mayela Espino is a 89 year old female seen at [facility] heart failure clinic today for continued follow-up.  She follows up for heart failure with reduced ejection fraction.  She had an echocardiogram through [facility]  on January 21, 2022 which showed an ejection fraction of 30 to 35% which is slightly improved.  It also showed mild to moderate aortic regurgitation. She has a past medical history significant for CVA, hypertension, tobacco use, chronic kidney disease stage III, and anxiety.    Today, she denies any acute heart failure symptoms.  She has mild fatigue and dyspnea on exertion.  She denies lightheadedness, orthopnea, PND, palpitations, chest pain, abdominal fullness/bloating and lower extremity edema.      She is monitoring home weights which are stable.  She is following a low sodium diet.  She lives alone.     Physical Examination Review of Systems   /70 (BP Location: Left arm, Patient Position: Sitting, Cuff Size: Adult  Small)   Pulse 76   Resp 20   Wt 42.6 kg (94 lb)   BMI 17.76 kg/m    Body mass index is 17.76 kg/m .  Wt Readings from Last 3 Encounters:   04/04/22 42.6 kg (94 lb)   03/04/22 42.2 kg (93 lb)   02/24/22 42.5 kg (93 lb 11.2 oz)       General Appearance:   no acute distress, frail elderly woman   ENT/Mouth: Wearing mask   EYES:  no scleral icterus, normal conjunctivae   Neck: no thyromegaly   Chest/Lungs:   lungs are clear to auscultation, no rales or wheezing, equal chest wall expansion    Cardiovascular:   Regular. Normal first and second heart sounds with no murmurs, rubs, or gallops; Jugular venous pressure normal, no edema bilaterally    Abdomen:   bowel sounds are present   Extremities: no cyanosis or clubbing   Skin: no xanthelasma, warm.    Neurologic: normal  bilateral, tremors     Psychiatric: alert and oriented x3                                              Medical History  Surgical History Family History Social History   Past Medical History:   Diagnosis Date     Abnormal mini-mental status exam     scored 16 2012, ?dementia     Breast cancer (H)      Chronic kidney disease      Congestive heart failure (H)      Essential tremor      HTN (hypertension)      Tobacco use     Past Surgical History:   Procedure Laterality Date     EYE SURGERY       JOINT REPLACEMENT       LUMPECTOMY BREAST Bilateral 1982    Cyst removed     OTHER SURGICAL HISTORY Left     ear prosthesis     OTHER SURGICAL HISTORY      l rola     OTHER SURGICAL HISTORY      left hip ORIF     [place] MASTECTOMY, MODIFIED RADICAL Bilateral 6/14/2021    Procedure: Bilateral mastectomies;  Surgeon: Dulce Duran MD;  Location: [facility];  Service: General    Family History   Problem Relation Age of Onset     Hypertension Mother      Cerebrovascular Disease Father 70.00     No Known Problems Son     Social History     Socioeconomic History     Marital status:      Spouse name: Not on file     Number of children: Not on file      Years of education: Not on file     Highest education level: Not on file   Occupational History     Not on file   Tobacco Use     Smoking status: Current Every Day Smoker     Packs/day: 0.25     Smokeless tobacco: Never Used   Substance and Sexual Activity     Alcohol use: No     Drug use: Never     Sexual activity: Not on file   Other Topics Concern     Parent/sibling w/ CABG, MI or angioplasty before 65F 55M? Not Asked   Social History Narrative    Has one son who lives in [place]     Social Determinants of Health     Financial Resource Strain: Not on file   Food Insecurity: Not on file   Transportation Needs: Not on file   Physical Activity: Not on file   Stress: Not on file   Social Connections: Not on file   Intimate Partner Violence: Not on file   Housing Stability: Not on file          Medications  Allergies   Current Outpatient Medications   Medication Sig Dispense Refill     anastrozole (ARIMIDEX) 1 mg tablet [ANASTROZOLE (ARIMIDEX) 1 MG TABLET] TAKE 1 TABLET BY MOUTH DAILY 90 tablet 2     aspirin 81 MG EC tablet Take 1 tablet (81 mg) by mouth daily DO NOT CRUSH 30 tablet 0     atorvastatin (LIPITOR) 10 MG tablet Take 10 mg by mouth daily       cholecalciferol, vitamin D3, 1,000 unit tablet [CHOLECALCIFEROL, VITAMIN D3, 1,000 UNIT TABLET] Take 500 Units by mouth daily.        furosemide (LASIX) 20 MG tablet Take 1 tablet (20 mg) by mouth daily as needed (for swelling)       lisinopril (ZESTRIL) 2.5 MG tablet Take 2.5 mg by mouth daily       LORazepam (ATIVAN) 1 MG tablet Take by mouth as needed       multivit-iron-min-folic acid 3,500-18-0.4 unit-mg-mg Chew [MULTIVIT-IRON-MIN-FOLIC ACID 3,500-18-0.4 UNIT-MG-MG CHEW] Chew 1 tablet daily.       Allergies   Allergen Reactions     Latex Unknown     Added based on information entered during case entry, please review and add reactions, type, and severity as needed     Latex Rash         Lab Results    Chemistry/lipid CBC Cardiac Enzymes/BNP/TSH/INR   Lab Results    Component Value Date    CHOL 133 03/04/2022    HDL 70 03/04/2022    TRIG 66 03/04/2022    BUN 42 (H) 03/04/2022     03/04/2022    CO2 28 03/04/2022    Lab Results   Component Value Date    WBC 4.8 03/04/2022    HGB 12.2 03/04/2022    HCT 40.2 03/04/2022     (H) 03/04/2022     (L) 03/04/2022    Lab Results   Component Value Date    TROPONINI 0.03 07/09/2021    BNP 2,301 (H) 11/18/2021    TSH 0.07 (L) 07/10/2021    INR 1.03 03/04/2022             This note has been dictated using voice recognition software. Any grammatical, typographical, or context distortions are unintentional and inherent to the software    30 minutes spent on the date of encounter doing chart review, review of outside records, review of test results, patient visit and documentation.                  Thank you for allowing me to participate in the care of your patient.      Sincerely,     JOHNNY Payton CNP     [facility]  cc:   JOHNNY Payton CNP  [address]  [address] [place] [address]

## 2022-04-11 DIAGNOSIS — C50.911 BILATERAL MALIGNANT NEOPLASM OF BREAST IN FEMALE, ESTROGEN RECEPTOR POSITIVE, UNSPECIFIED SITE OF BREAST (H): ICD-10-CM

## 2022-04-11 DIAGNOSIS — Z17.0 BILATERAL MALIGNANT NEOPLASM OF BREAST IN FEMALE, ESTROGEN RECEPTOR POSITIVE, UNSPECIFIED SITE OF BREAST (H): ICD-10-CM

## 2022-04-11 DIAGNOSIS — C50.912 BILATERAL MALIGNANT NEOPLASM OF BREAST IN FEMALE, ESTROGEN RECEPTOR POSITIVE, UNSPECIFIED SITE OF BREAST (H): ICD-10-CM

## 2022-04-11 RX ORDER — ANASTROZOLE 1 MG/1
TABLET ORAL
Qty: 90 TABLET | Refills: 0 | Status: SHIPPED | OUTPATIENT
Start: 2022-04-11 | End: 2022-05-19

## 2022-04-11 NOTE — TELEPHONE ENCOUNTER
Patient's son, Mina, called. Said patient is needing a refill on her Arimidex from Dr. Duran.  After reviewing chart, noticed patient was to follow up with Dr. Duran last fall for a 4 mo follow up appointment.  Told Mina she is overdue for a follow up with Dr. Duran.  Told him I will send in a refill to her [facility] but they need to make an appointment with Dr. Duran.  Mina said he will call her to let her know this.  Gave him the number to schedule the appointment with Dr. Duran.

## 2022-05-19 ENCOUNTER — OFFICE VISIT (OUTPATIENT)
Dept: SURGERY | Facility: CLINIC | Age: 87
End: 2022-05-19
Attending: FAMILY MEDICINE
Payer: MEDICARE

## 2022-05-19 DIAGNOSIS — Z17.0 BILATERAL MALIGNANT NEOPLASM OF BREAST IN FEMALE, ESTROGEN RECEPTOR POSITIVE, UNSPECIFIED SITE OF BREAST (H): ICD-10-CM

## 2022-05-19 DIAGNOSIS — C50.911 BILATERAL MALIGNANT NEOPLASM OF BREAST IN FEMALE, ESTROGEN RECEPTOR POSITIVE, UNSPECIFIED SITE OF BREAST (H): ICD-10-CM

## 2022-05-19 DIAGNOSIS — Z85.3 PERSONAL HISTORY OF BREAST CANCER: Primary | ICD-10-CM

## 2022-05-19 DIAGNOSIS — C50.912 BILATERAL MALIGNANT NEOPLASM OF BREAST IN FEMALE, ESTROGEN RECEPTOR POSITIVE, UNSPECIFIED SITE OF BREAST (H): ICD-10-CM

## 2022-05-19 PROCEDURE — 99213 OFFICE O/P EST LOW 20 MIN: CPT | Performed by: SPECIALIST

## 2022-05-19 RX ORDER — ANASTROZOLE 1 MG/1
TABLET ORAL
Qty: 90 TABLET | Refills: 3 | Status: ON HOLD | OUTPATIENT
Start: 2022-05-19 | End: 2023-01-01

## 2022-05-19 NOTE — NURSING NOTE
Mayela presents to [facility] today for a follow up appointment with Dr. Duran.  She states she is doing well.  Taking her anastrozole and said her heart has been giving her troubles but she is following with her primary md regarding that.  She is taking anastrozole and that is going well for her.  RN assessment and EMR update.  Patient met with Dr. Duran, see dictation for details.  She will plan to coming back in one year.  Support provided, invited calls.  RN time 12 mins

## 2022-05-19 NOTE — PROGRESS NOTES
This is a 89 year old woman who comes in for  continued follow-up of her bilateral breast cancer.  She is now 11 months  status post bilateral  lumpectomies  without radiation.  She is feeling well.  She has no new complaints today.  She was seen recently for congestive heart failure but she states she is doing well now.      Please see the chart review for PMH, Meds, allergies, FH and SH.    ROS:  Pertinent items are noted in HPI.      Physical Exam:  General appearance: alert, appears stated age and cooperative, very thin.  Breasts: There are no palpable masses.  The scar(s) has(ve) healed up well.  Lymph nodes: Cervical, supraclavicular, and axillary nodes normal.  Neurologic: Grossly normal        Impression: Personal History of breast cancer. NOD.  Is overall doing very well.  The patient has refused to see an oncologist in the past.  Both of these cancers we have been following for years as she had refused surgery for many years before she relented when the left side was getting quite large.  She is agreeable to staying on the aromatase inhibitor for now.    Plan: Follow up with me in 1 year.  I did warn her that then we will need to get her set up with an oncologist for continued follow-up.

## 2022-06-01 ENCOUNTER — HOSPITAL ENCOUNTER (EMERGENCY)
Facility: CLINIC | Age: 87
Discharge: ANOTHER HOSPITAL | End: 2022-06-01
Attending: EMERGENCY MEDICINE | Admitting: EMERGENCY MEDICINE
Payer: MEDICARE

## 2022-06-01 ENCOUNTER — APPOINTMENT (OUTPATIENT)
Dept: RADIOLOGY | Facility: CLINIC | Age: 87
End: 2022-06-01
Attending: EMERGENCY MEDICINE
Payer: MEDICARE

## 2022-06-01 VITALS
RESPIRATION RATE: 20 BRPM | WEIGHT: 94 LBS | DIASTOLIC BLOOD PRESSURE: 71 MMHG | OXYGEN SATURATION: 94 % | HEART RATE: 76 BPM | SYSTOLIC BLOOD PRESSURE: 141 MMHG | TEMPERATURE: 97.8 F | BODY MASS INDEX: 17.76 KG/M2

## 2022-06-01 DIAGNOSIS — I50.9 ACUTE ON CHRONIC CONGESTIVE HEART FAILURE, UNSPECIFIED HEART FAILURE TYPE (H): ICD-10-CM

## 2022-06-01 LAB
ANION GAP SERPL CALCULATED.3IONS-SCNC: 8 MMOL/L (ref 5–18)
ATRIAL RATE - MUSE: 72 BPM
BASOPHILS # BLD AUTO: 0 10E3/UL (ref 0–0.2)
BASOPHILS NFR BLD AUTO: 0 %
BNP SERPL-MCNC: 3728 PG/ML (ref 0–167)
BUN SERPL-MCNC: 42 MG/DL (ref 8–28)
CALCIUM SERPL-MCNC: 8.5 MG/DL (ref 8.5–10.5)
CHLORIDE BLD-SCNC: 112 MMOL/L (ref 98–107)
CO2 SERPL-SCNC: 27 MMOL/L (ref 22–31)
CREAT SERPL-MCNC: 1.74 MG/DL (ref 0.6–1.1)
DIASTOLIC BLOOD PRESSURE - MUSE: 78 MMHG
EOSINOPHIL # BLD AUTO: 0.1 10E3/UL (ref 0–0.7)
EOSINOPHIL NFR BLD AUTO: 1 %
ERYTHROCYTE [DISTWIDTH] IN BLOOD BY AUTOMATED COUNT: 14.6 % (ref 10–15)
GFR SERPL CREATININE-BSD FRML MDRD: 28 ML/MIN/1.73M2
GLUCOSE BLD-MCNC: 122 MG/DL (ref 70–125)
HCT VFR BLD AUTO: 36.2 % (ref 35–47)
HGB BLD-MCNC: 11.2 G/DL (ref 11.7–15.7)
IMM GRANULOCYTES # BLD: 0 10E3/UL
IMM GRANULOCYTES NFR BLD: 0 %
INTERPRETATION ECG - MUSE: NORMAL
LYMPHOCYTES # BLD AUTO: 0.7 10E3/UL (ref 0.8–5.3)
LYMPHOCYTES NFR BLD AUTO: 16 %
MCH RBC QN AUTO: 31.3 PG (ref 26.5–33)
MCHC RBC AUTO-ENTMCNC: 30.9 G/DL (ref 31.5–36.5)
MCV RBC AUTO: 101 FL (ref 78–100)
MONOCYTES # BLD AUTO: 0.4 10E3/UL (ref 0–1.3)
MONOCYTES NFR BLD AUTO: 9 %
NEUTROPHILS # BLD AUTO: 3.3 10E3/UL (ref 1.6–8.3)
NEUTROPHILS NFR BLD AUTO: 74 %
NRBC # BLD AUTO: 0 10E3/UL
NRBC BLD AUTO-RTO: 0 /100
P AXIS - MUSE: 32 DEGREES
PLATELET # BLD AUTO: 101 10E3/UL (ref 150–450)
POTASSIUM BLD-SCNC: 4.9 MMOL/L (ref 3.5–5)
PR INTERVAL - MUSE: 98 MS
QRS DURATION - MUSE: 78 MS
QT - MUSE: 414 MS
QTC - MUSE: 453 MS
R AXIS - MUSE: -6 DEGREES
RBC # BLD AUTO: 3.58 10E6/UL (ref 3.8–5.2)
SARS-COV-2 RNA RESP QL NAA+PROBE: NEGATIVE
SODIUM SERPL-SCNC: 147 MMOL/L (ref 136–145)
SYSTOLIC BLOOD PRESSURE - MUSE: 134 MMHG
T AXIS - MUSE: 115 DEGREES
TROPONIN I SERPL-MCNC: 0.06 NG/ML (ref 0–0.29)
VENTRICULAR RATE- MUSE: 72 BPM
WBC # BLD AUTO: 4.5 10E3/UL (ref 4–11)

## 2022-06-01 PROCEDURE — 250N000011 HC RX IP 250 OP 636: Performed by: EMERGENCY MEDICINE

## 2022-06-01 PROCEDURE — 85025 COMPLETE CBC W/AUTO DIFF WBC: CPT | Performed by: EMERGENCY MEDICINE

## 2022-06-01 PROCEDURE — 93005 ELECTROCARDIOGRAM TRACING: CPT | Performed by: EMERGENCY MEDICINE

## 2022-06-01 PROCEDURE — 84484 ASSAY OF TROPONIN QUANT: CPT | Performed by: EMERGENCY MEDICINE

## 2022-06-01 PROCEDURE — 36415 COLL VENOUS BLD VENIPUNCTURE: CPT | Performed by: EMERGENCY MEDICINE

## 2022-06-01 PROCEDURE — U0003 INFECTIOUS AGENT DETECTION BY NUCLEIC ACID (DNA OR RNA); SEVERE ACUTE RESPIRATORY SYNDROME CORONAVIRUS 2 (SARS-COV-2) (CORONAVIRUS DISEASE [COVID-19]), AMPLIFIED PROBE TECHNIQUE, MAKING USE OF HIGH THROUGHPUT TECHNOLOGIES AS DESCRIBED BY CMS-2020-01-R: HCPCS | Performed by: EMERGENCY MEDICINE

## 2022-06-01 PROCEDURE — 80048 BASIC METABOLIC PNL TOTAL CA: CPT | Performed by: EMERGENCY MEDICINE

## 2022-06-01 PROCEDURE — 250N000009 HC RX 250: Performed by: EMERGENCY MEDICINE

## 2022-06-01 PROCEDURE — 94640 AIRWAY INHALATION TREATMENT: CPT

## 2022-06-01 PROCEDURE — 83880 ASSAY OF NATRIURETIC PEPTIDE: CPT | Performed by: EMERGENCY MEDICINE

## 2022-06-01 PROCEDURE — 96375 TX/PRO/DX INJ NEW DRUG ADDON: CPT

## 2022-06-01 PROCEDURE — 96374 THER/PROPH/DIAG INJ IV PUSH: CPT

## 2022-06-01 PROCEDURE — 71045 X-RAY EXAM CHEST 1 VIEW: CPT

## 2022-06-01 PROCEDURE — 99285 EMERGENCY DEPT VISIT HI MDM: CPT | Mod: CS,25

## 2022-06-01 PROCEDURE — C9803 HOPD COVID-19 SPEC COLLECT: HCPCS

## 2022-06-01 PROCEDURE — 999N000157 HC STATISTIC RCP TIME EA 10 MIN

## 2022-06-01 RX ORDER — IPRATROPIUM BROMIDE AND ALBUTEROL SULFATE 2.5; .5 MG/3ML; MG/3ML
3 SOLUTION RESPIRATORY (INHALATION) ONCE
Status: COMPLETED | OUTPATIENT
Start: 2022-06-01 | End: 2022-06-01

## 2022-06-01 RX ORDER — FUROSEMIDE 10 MG/ML
20 INJECTION INTRAMUSCULAR; INTRAVENOUS ONCE
Status: COMPLETED | OUTPATIENT
Start: 2022-06-01 | End: 2022-06-01

## 2022-06-01 RX ORDER — IPRATROPIUM BROMIDE AND ALBUTEROL SULFATE 2.5; .5 MG/3ML; MG/3ML
3 SOLUTION RESPIRATORY (INHALATION) ONCE
Status: DISCONTINUED | OUTPATIENT
Start: 2022-06-01 | End: 2022-06-01 | Stop reason: HOSPADM

## 2022-06-01 RX ORDER — METHYLPREDNISOLONE SODIUM SUCCINATE 125 MG/2ML
125 INJECTION, POWDER, LYOPHILIZED, FOR SOLUTION INTRAMUSCULAR; INTRAVENOUS ONCE
Status: COMPLETED | OUTPATIENT
Start: 2022-06-01 | End: 2022-06-01

## 2022-06-01 RX ADMIN — IPRATROPIUM BROMIDE AND ALBUTEROL SULFATE 3 ML: 2.5; .5 SOLUTION RESPIRATORY (INHALATION) at 10:58

## 2022-06-01 RX ADMIN — METHYLPREDNISOLONE SODIUM SUCCINATE 125 MG: 125 INJECTION, POWDER, FOR SOLUTION INTRAMUSCULAR; INTRAVENOUS at 10:43

## 2022-06-01 RX ADMIN — FUROSEMIDE 20 MG: 10 INJECTION, SOLUTION INTRAMUSCULAR; INTRAVENOUS at 12:16

## 2022-06-01 NOTE — PROGRESS NOTES
O2 2 lpm/NC, SpO2 97 . BS crackles LLL RML RLL, Duoneb tx given, tolerated well, BS after unchanged, no wheezes, persistent bilat crackles, last BNP 11/22 2300. Pt states that she takes diuretics at home but did not take it today. RT available as needed.     /77   Pulse 79   Resp (!) 44   Wt 42.6 kg (94 lb)   SpO2 97%   BMI 17.76 kg/m

## 2022-06-01 NOTE — ED NOTES
Pt sats at 90% on 2L NC when at rest.  When speaking, sats increase to mid-90s.  Pt did not need to urinate yet.  Diet order placed.

## 2022-06-01 NOTE — ED PROVIDER NOTES
EMERGENCY DEPARTMENT ENCOUNTER      NAME: Mayela Espino  AGE: 89 year old female  YOB: 1932  MRN: [id number]  EVALUATION DATE & TIME: 6/1/2022 10:17 AM    PCP: Marco Bonner    ED PROVIDER: Marco Dooley D.O.      Chief Complaint   Patient presents with     Shortness of Breath       FINAL IMPRESSION:  1. Acute on chronic congestive heart failure, unspecified heart failure type (H)        ED COURSE & MEDICAL DECISION MAKING:    10:15 AM I met with the patient to gather history and to perform my initial exam. I discussed the plan for care while in the Emergency Department.  11:49 AM Spoke with patient about transfer to [facility] as there are no beds available in system.   11:57 AM Spoke with the hospitalist at [facility], Dr. Gallardo.         Pertinent Labs & Imaging studies reviewed. (See chart for details)  89 year old female presents to the Emergency Department for evaluation of shortness of breath.  She was noted to be hypoxic by EMS prior to coming to the emergency department, which did improve on 4 L of oxygen without additional therapies by EMS.  Patient has a known history of COPD and CHF.  She did have some mild wheezing on exam, however her BNP is significantly elevated above her normal baseline, and even more so than her last admission.  With this I am concerned that it is a CHF exacerbation resulting in her symptoms today, she had minimal improvement with the DuoNeb's and steroids here.  Patient was given Lasix.  I discussed the patient with the hospitalist at [facility], due to lack of bed space within the [place] system, and we will transfer there for further management.  She is not show any signs of ischemia or arrhythmia on her EKG, first troponin is negative.  There is no clinical concern for infectious process or PE at this time.  Patient was comfortable with transfer.    At the conclusion of the encounter I discussed the results of all of the tests and the disposition.  The questions were answered. The patient or family acknowledged understanding and was agreeable with the care plan.      HPI    Patient information was obtained from: patient and EMS     Use of : N/A      Mayela Espino is a 89 year old female who presents for shortness of breath.     Per EMS, today, patient was feeling short of breath and used her portable pulse oximeter which told patient she was sating at 70%. She called EMS at this time. With EMS, her saturation was 89% on room air. She was put on 4L O2 and saturation went up to %. After a while, patient was decreased to 2L O2 and saturation was %. Patient does not have O2 at home. She endorsed to EMS that she has an albuterol inhaler. She is not vaccinated against COVID-19. Of note, patient recently had family members with young kids come over for the past 5 days and has been stressed and anxious because of this. Patient smokes.     Per patient, she reports that she has felt intermittent shortness of breath which worsened today. States that she was recently given medication for possibly a combination of asthma and COPD although she is not sure. Reports she has not taken these yet. Patient has a pertinent medical history of hypertension, CHF, CKD, CVA, Heart failure with reduced ejection fraction, breast cancer s/p double mastectomy, and s/p hip replacement. Endorses smoking but reports she is trying to decrease the amount. Denies alcohol consumption. Patient denies any other complaints at this time.     REVIEW OF SYSTEMS  Constitutional:  Denies fever, chills, weight loss or weakness  Eyes:  No pain, discharge, redness  HENT:  Denies sore throat, ear pain, congestion  Respiratory: Positive for SOB. No wheeze or cough  Cardiovascular:  No CP, palpitations  GI:  Denies abdominal pain, nausea, vomiting, diarrhea  : Denies dysuria, hematuria  Musculoskeletal:  Denies any new muscle/joint pain, swelling or loss of function.  Skin:  Denies  rash, pallor  Neurologic:  Denies headache, focal weakness or sensory changes  Lymph: Denies swollen nodes    All other systems negative unless noted in HPI.    PAST MEDICAL HISTORY:  Past Medical History:   Diagnosis Date     Abnormal mini-mental status exam     scored 16 2012, ?dementia     Breast cancer (H)      Chronic kidney disease      Congestive heart failure (H)      Essential tremor      HTN (hypertension)      Tobacco use        PAST SURGICAL HISTORY:  Past Surgical History:   Procedure Laterality Date     EYE SURGERY       JOINT REPLACEMENT       LUMPECTOMY BREAST Bilateral 1982    Cyst removed     OTHER SURGICAL HISTORY Left     ear prosthesis     OTHER SURGICAL HISTORY      l rola     OTHER SURGICAL HISTORY      left hip ORIF     [place] MASTECTOMY, MODIFIED RADICAL Bilateral 6/14/2021    Procedure: Bilateral mastectomies;  Surgeon: Dulce Duran MD;  Location: [facility];  Service: General         CURRENT MEDICATIONS:    Current Facility-Administered Medications   Medication     ipratropium - albuterol 0.5 mg/2.5 mg/3 mL (DUONEB) neb solution 3 mL     Current Outpatient Medications   Medication     anastrozole (ARIMIDEX) 1 MG tablet     atorvastatin (LIPITOR) 10 MG tablet     cholecalciferol, vitamin D3, 1,000 unit tablet     furosemide (LASIX) 20 MG tablet     lisinopril (ZESTRIL) 2.5 MG tablet     LORazepam (ATIVAN) 1 MG tablet     multivit-iron-min-folic acid 3,500-18-0.4 unit-mg-mg Chew         ALLERGIES:  Allergies   Allergen Reactions     Latex Unknown     Added based on information entered during case entry, please review and add reactions, type, and severity as needed     Latex Rash       FAMILY HISTORY:  Family History   Problem Relation Age of Onset     Hypertension Mother      Cerebrovascular Disease Father 70.00     No Known Problems Son        SOCIAL HISTORY:  Social History     Socioeconomic History     Marital status:    Tobacco Use     Smoking status: Current Every Day  Smoker     Packs/day: 0.25     Smokeless tobacco: Never Used   Substance and Sexual Activity     Alcohol use: No     Drug use: Never   Social History Narrative    Has one son who lives in [place]       VITALS:  Patient Vitals for the past 24 hrs:   BP Temp Temp src Pulse Resp SpO2 Weight   06/01/22 1220 -- 97.8  F (36.6  C) Oral -- -- -- --   06/01/22 1200 134/79 -- -- 76 22 94 % --   06/01/22 1130 129/71 -- -- 66 22 95 % --   06/01/22 1058 132/77 -- -- 79 (!) 44 97 % --   06/01/22 1030 134/78 -- -- 78 -- 90 % --   06/01/22 1023 (!) 133/92 -- -- 79 16 90 % 42.6 kg (94 lb)       PHYSICAL EXAM   VITAL SIGNS: /79   Pulse 76   Temp 97.8  F (36.6  C) (Oral)   Resp 22   Wt 42.6 kg (94 lb)   SpO2 94%   BMI 17.76 kg/m    General Appearance: Well-appearing, well-nourished, no acute distress   Head:  Normocephalic, without obvious abnormality, atraumatic  Eyes:  PERRL, conjunctiva/corneas clear, EOM's intact,  ENT:  Lips, mucosa, and tongue normal, membranes are moist without pallor  Neck:  Normal ROM, symmetrical, trachea midline    Cardio:  Regular rate and rhythm, no murmur, rub or gallop, 2+ pulses symmetric in all extremities  Pulm:  Diffuse wheezes and moderate increased work of breathing  Abdomen:  Soft, non-tender, no rebound or guarding.  Musculoskeletal: Full ROM, no edema, no cyanosis, good ROM of major joints  Integument:  Warm, Dry, No erythema, No rash.    Neurologic:  Alert & oriented.  No focal deficits appreciated.  Ambulatory.  Psychiatric:  Affect normal, Judgment normal, Mood normal.      LABS  Results for orders placed or performed during the hospital encounter of 06/01/22 (from the past 24 hour(s))   CBC with platelets + differential    Narrative    The following orders were created for panel order CBC with platelets + differential.  Procedure                               Abnormality         Status                     ---------                               -----------         ------                      CBC with platelets and d...[[id number]]  Abnormal            Final result                 Please view results for these tests on the individual orders.   Basic metabolic panel   Result Value Ref Range    Sodium 147 (H) 136 - 145 mmol/L    Potassium 4.9 3.5 - 5.0 mmol/L    Chloride 112 (H) 98 - 107 mmol/L    Carbon Dioxide (CO2) 27 22 - 31 mmol/L    Anion Gap 8 5 - 18 mmol/L    Urea Nitrogen 42 (H) 8 - 28 mg/dL    Creatinine 1.74 (H) 0.60 - 1.10 mg/dL    Calcium 8.5 8.5 - 10.5 mg/dL    Glucose 122 70 - 125 mg/dL    GFR Estimate 28 (L) >60 mL/min/1.73m2   Troponin I (now)   Result Value Ref Range    Troponin I 0.06 0.00 - 0.29 ng/mL   B-Type Natriuretic Peptide ([facility] Only)   Result Value Ref Range    BNP 3,728 (H) 0 - 167 pg/mL   CBC with platelets and differential   Result Value Ref Range    WBC Count 4.5 4.0 - 11.0 10e3/uL    RBC Count 3.58 (L) 3.80 - 5.20 10e6/uL    Hemoglobin 11.2 (L) 11.7 - 15.7 g/dL    Hematocrit 36.2 35.0 - 47.0 %     (H) 78 - 100 fL    MCH 31.3 26.5 - 33.0 pg    MCHC 30.9 (L) 31.5 - 36.5 g/dL    RDW 14.6 10.0 - 15.0 %    Platelet Count 101 (L) 150 - 450 10e3/uL    % Neutrophils 74 %    % Lymphocytes 16 %    % Monocytes 9 %    % Eosinophils 1 %    % Basophils 0 %    % Immature Granulocytes 0 %    NRBCs per 100 WBC 0 <1 /100    Absolute Neutrophils 3.3 1.6 - 8.3 10e3/uL    Absolute Lymphocytes 0.7 (L) 0.8 - 5.3 10e3/uL    Absolute Monocytes 0.4 0.0 - 1.3 10e3/uL    Absolute Eosinophils 0.1 0.0 - 0.7 10e3/uL    Absolute Basophils 0.0 0.0 - 0.2 10e3/uL    Absolute Immature Granulocytes 0.0 <=0.4 10e3/uL    Absolute NRBCs 0.0 10e3/uL   XR Chest Port 1 View    Narrative    EXAM: XR CHEST PORT 1 VIEW  LOCATION: [facility]  DATE/TIME: 6/1/2022 10:52 AM    INDICATION: Dyspnea.  COMPARISON: 2 view chest 11/18/2021 CT C-spine 03/30/2018      Impression    IMPRESSION: Stable cardiomegaly with normal pulmonary vascularity. Stable right paratracheal soft tissue  density corresponding to vascular structures and prominent right thyroid lobe. Tortuous ectatic descending thoracic aorta redemonstrated. Lungs are   hyperinflated with left midlung and bibasilar subsegmental atelectasis versus scar. No focal consolidation nor pleural effusion.         RADIOLOGY  XR Chest Port 1 View   Final Result   IMPRESSION: Stable cardiomegaly with normal pulmonary vascularity. Stable right paratracheal soft tissue density corresponding to vascular structures and prominent right thyroid lobe. Tortuous ectatic descending thoracic aorta redemonstrated. Lungs are    hyperinflated with left midlung and bibasilar subsegmental atelectasis versus scar. No focal consolidation nor pleural effusion.         EKG:    Rate: 72 bpm  Rhythm: Normal Sinus Rhythm  Axis: Normal  Interval: Normal  Conduction: Normal  QRS: Normal  ST: Normal  T-wave: Inverted lateral leads  QT: Not prolonged  Comparison EKG: T wave inversions are new compared to 4 March 2022  Impression:  No acute ischemic change   I have independently reviewed and interpreted today's EKG, pending Cardiologist read    MEDICATIONS GIVEN IN THE EMERGENCY:  Medications   ipratropium - albuterol 0.5 mg/2.5 mg/3 mL (DUONEB) neb solution 3 mL (3 mLs Nebulization Not Given 6/1/22 1059)   methylPREDNISolone sodium succinate (solu-MEDROL) injection 125 mg (125 mg Intravenous Given 6/1/22 1043)   ipratropium - albuterol 0.5 mg/2.5 mg/3 mL (DUONEB) neb solution 3 mL (3 mLs Nebulization Given 6/1/22 1058)   furosemide (LASIX) injection 20 mg (20 mg Intravenous Given 6/1/22 1216)       NEW PRESCRIPTIONS STARTED AT TODAY'S ER VISIT  New Prescriptions    No medications on file        I, Daphnie Hutchins, am serving as a scribe to document services personally performed by Marco Dooley DO, based on my observations and the provider's statements to me.  I, Marco Dooley DO, attest that Daphnie Hutchins is acting in a scribe capacity, has observed my performance of the services  and has documented them in accordance with my direction.    Marco Dooley D.O.  Emergency Medicine  [facility] EMERGENCY ROOM  [address] [address] [phone]  Dept: [phone]     Marco Dooley,   06/01/22 1252

## 2022-06-01 NOTE — ED TRIAGE NOTES
Sob increasing with home O2 sats at 79%. 3 weeks increasing     Triage Assessment     Row Name 06/01/22 1024       Triage Assessment (Adult)    Airway WDL WDL    Additional Documentation Breath Sounds (Group)       Respiratory WDL    Respiratory WDL X;rhythm/pattern    Rhythm/Pattern, Respiratory tachypneic       Breath Sounds    Breath Sounds All Fields    All Lung Fields Breath Sounds wheezes, high-pitched       Skin Circulation/Temperature WDL    Skin Circulation/Temperature WDL WDL       Cardiac WDL    Cardiac WDL WDL       Cognitive/Neuro/Behavioral WDL    Cognitive/Neuro/Behavioral WDL WDL

## 2022-06-16 ENCOUNTER — LAB REQUISITION (OUTPATIENT)
Dept: LAB | Facility: CLINIC | Age: 87
End: 2022-06-16
Payer: MEDICARE

## 2022-06-16 DIAGNOSIS — I13.0 HYPERTENSIVE HEART AND CHRONIC KIDNEY DISEASE WITH HEART FAILURE AND STAGE 1 THROUGH STAGE 4 CHRONIC KIDNEY DISEASE, OR UNSPECIFIED CHRONIC KIDNEY DISEASE (H): ICD-10-CM

## 2022-06-16 LAB
ANION GAP SERPL CALCULATED.3IONS-SCNC: 10 MMOL/L (ref 5–18)
BUN SERPL-MCNC: 41 MG/DL (ref 8–28)
CALCIUM SERPL-MCNC: 9.3 MG/DL (ref 8.5–10.5)
CHLORIDE BLD-SCNC: 104 MMOL/L (ref 98–107)
CO2 SERPL-SCNC: 31 MMOL/L (ref 22–31)
CREAT SERPL-MCNC: 1.94 MG/DL (ref 0.6–1.1)
GFR SERPL CREATININE-BSD FRML MDRD: 24 ML/MIN/1.73M2
GLUCOSE BLD-MCNC: 129 MG/DL (ref 70–125)
POTASSIUM BLD-SCNC: 4.8 MMOL/L (ref 3.5–5)
SODIUM SERPL-SCNC: 145 MMOL/L (ref 136–145)

## 2022-06-16 PROCEDURE — 80048 BASIC METABOLIC PNL TOTAL CA: CPT | Mod: ORL | Performed by: STUDENT IN AN ORGANIZED HEALTH CARE EDUCATION/TRAINING PROGRAM

## 2023-01-01 ENCOUNTER — DISCHARGE SUMMARY NURSING HOME (OUTPATIENT)
Dept: GERIATRICS | Facility: CLINIC | Age: 88
End: 2023-01-01
Payer: MEDICARE

## 2023-01-01 ENCOUNTER — APPOINTMENT (OUTPATIENT)
Dept: CARDIOLOGY | Facility: CLINIC | Age: 88
End: 2023-01-01
Payer: MEDICARE

## 2023-01-01 ENCOUNTER — LAB REQUISITION (OUTPATIENT)
Dept: LAB | Facility: CLINIC | Age: 88
End: 2023-01-01
Payer: MEDICARE

## 2023-01-01 ENCOUNTER — TELEPHONE (OUTPATIENT)
Dept: CARDIOLOGY | Facility: CLINIC | Age: 88
End: 2023-01-01
Payer: MEDICARE

## 2023-01-01 ENCOUNTER — HOSPITAL ENCOUNTER (OUTPATIENT)
Dept: ULTRASOUND IMAGING | Facility: HOSPITAL | Age: 88
Discharge: HOME | End: 2023-06-21
Attending: INTERNAL MEDICINE | Admitting: INTERNAL MEDICINE
Payer: MEDICARE

## 2023-01-01 ENCOUNTER — TRANSFERRED RECORDS (OUTPATIENT)
Dept: HEALTH INFORMATION MANAGEMENT | Facility: CLINIC | Age: 88
End: 2023-01-01

## 2023-01-01 ENCOUNTER — TRANSITIONAL CARE UNIT VISIT (OUTPATIENT)
Dept: GERIATRICS | Facility: CLINIC | Age: 88
End: 2023-01-01
Payer: MEDICARE

## 2023-01-01 ENCOUNTER — MEDICAL CORRESPONDENCE (OUTPATIENT)
Dept: HEALTH INFORMATION MANAGEMENT | Facility: CLINIC | Age: 88
End: 2023-01-01

## 2023-01-01 ENCOUNTER — HOSPITAL ENCOUNTER (INPATIENT)
Facility: CLINIC | Age: 88
LOS: 7 days | Discharge: NURSING, REHAB OR LONG-TERM CARE | DRG: 291 | End: 2023-05-25
Attending: EMERGENCY MEDICINE | Admitting: FAMILY MEDICINE
Payer: MEDICARE

## 2023-01-01 ENCOUNTER — PATIENT OUTREACH (OUTPATIENT)
Dept: CARE COORDINATION | Facility: CLINIC | Age: 88
End: 2023-01-01

## 2023-01-01 ENCOUNTER — ONCOLOGY VISIT (OUTPATIENT)
Dept: ONCOLOGY | Facility: CLINIC | Age: 88
End: 2023-01-01
Attending: INTERNAL MEDICINE
Payer: MEDICARE

## 2023-01-01 ENCOUNTER — APPOINTMENT (OUTPATIENT)
Dept: OCCUPATIONAL THERAPY | Facility: CLINIC | Age: 88
DRG: 291 | End: 2023-01-01
Payer: MEDICARE

## 2023-01-01 ENCOUNTER — TELEPHONE (OUTPATIENT)
Dept: GERIATRICS | Facility: CLINIC | Age: 88
End: 2023-01-01
Payer: MEDICARE

## 2023-01-01 ENCOUNTER — PRE VISIT (OUTPATIENT)
Dept: ONCOLOGY | Facility: CLINIC | Age: 88
End: 2023-01-01
Payer: MEDICARE

## 2023-01-01 ENCOUNTER — APPOINTMENT (OUTPATIENT)
Dept: OCCUPATIONAL THERAPY | Facility: CLINIC | Age: 88
DRG: 291 | End: 2023-01-01
Attending: FAMILY MEDICINE
Payer: MEDICARE

## 2023-01-01 ENCOUNTER — APPOINTMENT (OUTPATIENT)
Dept: ULTRASOUND IMAGING | Facility: CLINIC | Age: 88
DRG: 291 | End: 2023-01-01
Attending: FAMILY MEDICINE
Payer: MEDICARE

## 2023-01-01 ENCOUNTER — APPOINTMENT (OUTPATIENT)
Dept: MRI IMAGING | Facility: CLINIC | Age: 88
DRG: 291 | End: 2023-01-01
Attending: FAMILY MEDICINE
Payer: MEDICARE

## 2023-01-01 ENCOUNTER — APPOINTMENT (OUTPATIENT)
Dept: ULTRASOUND IMAGING | Facility: CLINIC | Age: 88
DRG: 291 | End: 2023-01-01
Attending: EMERGENCY MEDICINE
Payer: MEDICARE

## 2023-01-01 ENCOUNTER — APPOINTMENT (OUTPATIENT)
Dept: PHYSICAL THERAPY | Facility: CLINIC | Age: 88
DRG: 291 | End: 2023-01-01
Attending: FAMILY MEDICINE
Payer: MEDICARE

## 2023-01-01 ENCOUNTER — TELEPHONE (OUTPATIENT)
Dept: INTERVENTIONAL RADIOLOGY/VASCULAR | Facility: HOSPITAL | Age: 88
End: 2023-01-01
Payer: MEDICARE

## 2023-01-01 ENCOUNTER — APPOINTMENT (OUTPATIENT)
Dept: CARDIOLOGY | Facility: CLINIC | Age: 88
DRG: 291 | End: 2023-01-01
Attending: FAMILY MEDICINE
Payer: MEDICARE

## 2023-01-01 ENCOUNTER — APPOINTMENT (OUTPATIENT)
Dept: CT IMAGING | Facility: CLINIC | Age: 88
DRG: 291 | End: 2023-01-01
Attending: EMERGENCY MEDICINE
Payer: MEDICARE

## 2023-01-01 ENCOUNTER — OFFICE VISIT (OUTPATIENT)
Dept: CARDIOLOGY | Facility: CLINIC | Age: 88
End: 2023-01-01
Payer: MEDICARE

## 2023-01-01 VITALS
OXYGEN SATURATION: 93 % | TEMPERATURE: 97.6 F | SYSTOLIC BLOOD PRESSURE: 136 MMHG | HEART RATE: 92 BPM | DIASTOLIC BLOOD PRESSURE: 72 MMHG | RESPIRATION RATE: 18 BRPM

## 2023-01-01 VITALS
HEIGHT: 61 IN | SYSTOLIC BLOOD PRESSURE: 119 MMHG | HEIGHT: 61 IN | SYSTOLIC BLOOD PRESSURE: 138 MMHG | DIASTOLIC BLOOD PRESSURE: 75 MMHG | DIASTOLIC BLOOD PRESSURE: 59 MMHG | HEART RATE: 77 BPM | OXYGEN SATURATION: 98 % | TEMPERATURE: 97.1 F | HEART RATE: 97 BPM | BODY MASS INDEX: 17.94 KG/M2 | WEIGHT: 95 LBS | BODY MASS INDEX: 17.94 KG/M2 | WEIGHT: 95 LBS | RESPIRATION RATE: 20 BRPM | RESPIRATION RATE: 20 BRPM | OXYGEN SATURATION: 92 % | TEMPERATURE: 96 F

## 2023-01-01 VITALS
TEMPERATURE: 96.4 F | RESPIRATION RATE: 20 BRPM | OXYGEN SATURATION: 94 % | BODY MASS INDEX: 18.09 KG/M2 | WEIGHT: 95.8 LBS | HEART RATE: 94 BPM | HEIGHT: 61 IN | DIASTOLIC BLOOD PRESSURE: 62 MMHG | SYSTOLIC BLOOD PRESSURE: 129 MMHG

## 2023-01-01 VITALS
OXYGEN SATURATION: 94 % | DIASTOLIC BLOOD PRESSURE: 53 MMHG | WEIGHT: 94 LBS | HEART RATE: 98 BPM | SYSTOLIC BLOOD PRESSURE: 123 MMHG | BODY MASS INDEX: 17.76 KG/M2

## 2023-01-01 VITALS
HEIGHT: 61 IN | RESPIRATION RATE: 16 BRPM | OXYGEN SATURATION: 93 % | BODY MASS INDEX: 17.94 KG/M2 | HEART RATE: 93 BPM | WEIGHT: 95 LBS | SYSTOLIC BLOOD PRESSURE: 136 MMHG | DIASTOLIC BLOOD PRESSURE: 77 MMHG

## 2023-01-01 VITALS
SYSTOLIC BLOOD PRESSURE: 131 MMHG | WEIGHT: 92.8 LBS | BODY MASS INDEX: 17.52 KG/M2 | HEIGHT: 61 IN | RESPIRATION RATE: 19 BRPM | TEMPERATURE: 97.4 F | HEART RATE: 90 BPM | OXYGEN SATURATION: 95 % | DIASTOLIC BLOOD PRESSURE: 96 MMHG

## 2023-01-01 VITALS
RESPIRATION RATE: 18 BRPM | TEMPERATURE: 97.7 F | OXYGEN SATURATION: 95 % | WEIGHT: 93.2 LBS | DIASTOLIC BLOOD PRESSURE: 53 MMHG | SYSTOLIC BLOOD PRESSURE: 118 MMHG | HEART RATE: 93 BPM | HEIGHT: 61 IN | BODY MASS INDEX: 17.59 KG/M2

## 2023-01-01 VITALS
RESPIRATION RATE: 18 BRPM | BODY MASS INDEX: 17.94 KG/M2 | TEMPERATURE: 97 F | OXYGEN SATURATION: 95 % | DIASTOLIC BLOOD PRESSURE: 83 MMHG | WEIGHT: 95 LBS | SYSTOLIC BLOOD PRESSURE: 131 MMHG | HEIGHT: 61 IN | HEART RATE: 88 BPM

## 2023-01-01 VITALS
BODY MASS INDEX: 17.56 KG/M2 | SYSTOLIC BLOOD PRESSURE: 121 MMHG | RESPIRATION RATE: 20 BRPM | OXYGEN SATURATION: 97 % | DIASTOLIC BLOOD PRESSURE: 69 MMHG | HEART RATE: 84 BPM | WEIGHT: 93 LBS | TEMPERATURE: 95.7 F | HEIGHT: 61 IN

## 2023-01-01 VITALS
WEIGHT: 91 LBS | DIASTOLIC BLOOD PRESSURE: 65 MMHG | RESPIRATION RATE: 16 BRPM | BODY MASS INDEX: 17.18 KG/M2 | SYSTOLIC BLOOD PRESSURE: 140 MMHG | OXYGEN SATURATION: 96 % | HEIGHT: 61 IN | TEMPERATURE: 97.7 F | HEART RATE: 72 BPM

## 2023-01-01 VITALS
TEMPERATURE: 97.4 F | OXYGEN SATURATION: 97 % | HEIGHT: 61 IN | SYSTOLIC BLOOD PRESSURE: 128 MMHG | WEIGHT: 95 LBS | HEART RATE: 70 BPM | DIASTOLIC BLOOD PRESSURE: 73 MMHG | BODY MASS INDEX: 17.94 KG/M2 | RESPIRATION RATE: 17 BRPM

## 2023-01-01 DIAGNOSIS — I50.9 ACUTE DECOMPENSATED HEART FAILURE (H): Primary | ICD-10-CM

## 2023-01-01 DIAGNOSIS — C50.919 CARCINOMA OF BREAST METASTATIC TO BONE, UNSPECIFIED LATERALITY (H): ICD-10-CM

## 2023-01-01 DIAGNOSIS — I50.23 ACUTE ON CHRONIC SYSTOLIC (CONGESTIVE) HEART FAILURE (H): ICD-10-CM

## 2023-01-01 DIAGNOSIS — C50.919 INFILTRATING DUCTAL CARCINOMA OF BREAST, UNSPECIFIED LATERALITY (H): ICD-10-CM

## 2023-01-01 DIAGNOSIS — Z85.3 HISTORY OF BREAST CANCER: ICD-10-CM

## 2023-01-01 DIAGNOSIS — D69.6 THROMBOCYTOPENIA (H): ICD-10-CM

## 2023-01-01 DIAGNOSIS — S22.000G COMPRESSION FRACTURE OF THORACIC VERTEBRA WITH DELAYED HEALING, UNSPECIFIED THORACIC VERTEBRAL LEVEL, SUBSEQUENT ENCOUNTER: ICD-10-CM

## 2023-01-01 DIAGNOSIS — C79.51 CARCINOMA OF BREAST METASTATIC TO BONE, UNSPECIFIED LATERALITY (H): ICD-10-CM

## 2023-01-01 DIAGNOSIS — E46 PROTEIN-CALORIE MALNUTRITION, UNSPECIFIED SEVERITY (H): ICD-10-CM

## 2023-01-01 DIAGNOSIS — S22.080D CLOSED COMPRESSION FRACTURE OF THORACOLUMBAR VERTEBRA WITH ROUTINE HEALING, SUBSEQUENT ENCOUNTER: ICD-10-CM

## 2023-01-01 DIAGNOSIS — I50.20 HEART FAILURE WITH REDUCED EJECTION FRACTION (H): ICD-10-CM

## 2023-01-01 DIAGNOSIS — N18.4 CKD (CHRONIC KIDNEY DISEASE) STAGE 4, GFR 15-29 ML/MIN (H): ICD-10-CM

## 2023-01-01 DIAGNOSIS — E87.5 HYPERKALEMIA: ICD-10-CM

## 2023-01-01 DIAGNOSIS — R53.83 OTHER FATIGUE: ICD-10-CM

## 2023-01-01 DIAGNOSIS — J44.1 CHRONIC OBSTRUCTIVE PULMONARY DISEASE WITH ACUTE EXACERBATION (H): ICD-10-CM

## 2023-01-01 DIAGNOSIS — J96.01 ACUTE RESPIRATORY FAILURE WITH HYPOXIA (H): Primary | ICD-10-CM

## 2023-01-01 DIAGNOSIS — I50.20 HEART FAILURE WITH REDUCED EJECTION FRACTION (H): Primary | ICD-10-CM

## 2023-01-01 DIAGNOSIS — Z17.0 BILATERAL MALIGNANT NEOPLASM OF BREAST IN FEMALE, ESTROGEN RECEPTOR POSITIVE, UNSPECIFIED SITE OF BREAST (H): ICD-10-CM

## 2023-01-01 DIAGNOSIS — R19.5 LOOSE STOOLS: ICD-10-CM

## 2023-01-01 DIAGNOSIS — C50.919 CARCINOMA OF BREAST METASTATIC TO LIVER, UNSPECIFIED LATERALITY (H): ICD-10-CM

## 2023-01-01 DIAGNOSIS — C50.919 CARCINOMA OF BREAST METASTATIC TO BONE, UNSPECIFIED LATERALITY (H): Primary | ICD-10-CM

## 2023-01-01 DIAGNOSIS — K59.00 CONSTIPATION, UNSPECIFIED CONSTIPATION TYPE: ICD-10-CM

## 2023-01-01 DIAGNOSIS — J18.9 PNEUMONIA OF LEFT LOWER LOBE DUE TO INFECTIOUS ORGANISM: Primary | ICD-10-CM

## 2023-01-01 DIAGNOSIS — Z71.89 ADVANCED CARE PLANNING/COUNSELING DISCUSSION: ICD-10-CM

## 2023-01-01 DIAGNOSIS — N18.4 CHRONIC KIDNEY DISEASE, STAGE 4 (SEVERE) (H): ICD-10-CM

## 2023-01-01 DIAGNOSIS — E44.1 MILD PROTEIN-CALORIE MALNUTRITION (H): ICD-10-CM

## 2023-01-01 DIAGNOSIS — R05.1 ACUTE COUGH: ICD-10-CM

## 2023-01-01 DIAGNOSIS — I12.9 HYPERTENSIVE CHRONIC KIDNEY DISEASE WITH STAGE 1 THROUGH STAGE 4 CHRONIC KIDNEY DISEASE, OR UNSPECIFIED CHRONIC KIDNEY DISEASE: ICD-10-CM

## 2023-01-01 DIAGNOSIS — I50.21 ACUTE SYSTOLIC CONGESTIVE HEART FAILURE (H): ICD-10-CM

## 2023-01-01 DIAGNOSIS — I50.22 CHRONIC SYSTOLIC CONGESTIVE HEART FAILURE (H): ICD-10-CM

## 2023-01-01 DIAGNOSIS — F41.1 GENERALIZED ANXIETY DISORDER: ICD-10-CM

## 2023-01-01 DIAGNOSIS — J18.9 PNEUMONIA OF LEFT LOWER LOBE DUE TO INFECTIOUS ORGANISM: ICD-10-CM

## 2023-01-01 DIAGNOSIS — G25.0 ESSENTIAL TREMOR: ICD-10-CM

## 2023-01-01 DIAGNOSIS — C50.911 BILATERAL MALIGNANT NEOPLASM OF BREAST IN FEMALE, ESTROGEN RECEPTOR POSITIVE, UNSPECIFIED SITE OF BREAST (H): ICD-10-CM

## 2023-01-01 DIAGNOSIS — C78.7 CARCINOMA OF BREAST METASTATIC TO LIVER, UNSPECIFIED LATERALITY (H): ICD-10-CM

## 2023-01-01 DIAGNOSIS — R53.81 PHYSICAL DECONDITIONING: ICD-10-CM

## 2023-01-01 DIAGNOSIS — F41.9 ANXIETY: ICD-10-CM

## 2023-01-01 DIAGNOSIS — S32.010D CLOSED COMPRESSION FRACTURE OF THORACOLUMBAR VERTEBRA WITH ROUTINE HEALING, SUBSEQUENT ENCOUNTER: ICD-10-CM

## 2023-01-01 DIAGNOSIS — Z13.228 ENCOUNTER FOR SCREENING FOR OTHER METABOLIC DISORDERS: ICD-10-CM

## 2023-01-01 DIAGNOSIS — I50.20 UNSPECIFIED SYSTOLIC (CONGESTIVE) HEART FAILURE (H): ICD-10-CM

## 2023-01-01 DIAGNOSIS — N18.9 CHRONIC KIDNEY DISEASE, UNSPECIFIED: ICD-10-CM

## 2023-01-01 DIAGNOSIS — R11.0 NAUSEA: ICD-10-CM

## 2023-01-01 DIAGNOSIS — D72.9 DISORDER OF WHITE BLOOD CELLS, UNSPECIFIED: ICD-10-CM

## 2023-01-01 DIAGNOSIS — K76.9 LESION OF LIVER: Primary | ICD-10-CM

## 2023-01-01 DIAGNOSIS — R91.8 PULMONARY NODULES: ICD-10-CM

## 2023-01-01 DIAGNOSIS — J96.21 ACUTE AND CHRONIC RESPIRATORY FAILURE WITH HYPOXIA (H): ICD-10-CM

## 2023-01-01 DIAGNOSIS — I50.22 CHRONIC SYSTOLIC HEART FAILURE (H): ICD-10-CM

## 2023-01-01 DIAGNOSIS — Z86.73 HISTORY OF CVA (CEREBROVASCULAR ACCIDENT): ICD-10-CM

## 2023-01-01 DIAGNOSIS — C79.51 CARCINOMA OF BREAST METASTATIC TO BONE, UNSPECIFIED LATERALITY (H): Primary | ICD-10-CM

## 2023-01-01 DIAGNOSIS — G54.1 LUMBOSACRAL PLEXOPATHY: Primary | ICD-10-CM

## 2023-01-01 DIAGNOSIS — E87.6 HYPOKALEMIA: ICD-10-CM

## 2023-01-01 DIAGNOSIS — J44.9 CHRONIC OBSTRUCTIVE PULMONARY DISEASE, UNSPECIFIED COPD TYPE (H): ICD-10-CM

## 2023-01-01 DIAGNOSIS — E07.9 THYROID MASS: ICD-10-CM

## 2023-01-01 DIAGNOSIS — Z72.0 TOBACCO ABUSE: ICD-10-CM

## 2023-01-01 DIAGNOSIS — M62.81 GENERALIZED MUSCLE WEAKNESS: Primary | ICD-10-CM

## 2023-01-01 DIAGNOSIS — I50.21 ACUTE SYSTOLIC CONGESTIVE HEART FAILURE (H): Primary | ICD-10-CM

## 2023-01-01 DIAGNOSIS — N18.4 CHRONIC KIDNEY DISEASE, STAGE IV (SEVERE) (H): ICD-10-CM

## 2023-01-01 DIAGNOSIS — K76.9 LESION OF LIVER: ICD-10-CM

## 2023-01-01 DIAGNOSIS — M62.81 GENERALIZED MUSCLE WEAKNESS: ICD-10-CM

## 2023-01-01 DIAGNOSIS — C50.912 BILATERAL MALIGNANT NEOPLASM OF BREAST IN FEMALE, ESTROGEN RECEPTOR POSITIVE, UNSPECIFIED SITE OF BREAST (H): ICD-10-CM

## 2023-01-01 DIAGNOSIS — I42.9 SECONDARY CARDIOMYOPATHY (H): ICD-10-CM

## 2023-01-01 DIAGNOSIS — R13.10 DYSPHAGIA, UNSPECIFIED TYPE: Primary | ICD-10-CM

## 2023-01-01 DIAGNOSIS — I50.9 ACUTE DECOMPENSATED HEART FAILURE (H): ICD-10-CM

## 2023-01-01 LAB
ALBUMIN SERPL BCG-MCNC: 3.6 G/DL (ref 3.5–5.2)
ALBUMIN SERPL-MCNC: 3.1 G/DL (ref 3.5–5)
ALBUMIN SERPL-MCNC: 3.3 G/DL (ref 3.5–5)
ALBUMIN UR-MCNC: NEGATIVE MG/DL
ALP SERPL-CCNC: 107 U/L (ref 45–120)
ALP SERPL-CCNC: 173 U/L (ref 35–104)
ALP SERPL-CCNC: 99 U/L (ref 45–120)
ALT SERPL W P-5'-P-CCNC: 20 U/L (ref 0–45)
ALT SERPL W P-5'-P-CCNC: 22 U/L (ref 0–45)
ALT SERPL W P-5'-P-CCNC: 41 U/L (ref 10–35)
ANION GAP SERPL CALCULATED.3IONS-SCNC: 10 MMOL/L (ref 5–18)
ANION GAP SERPL CALCULATED.3IONS-SCNC: 10 MMOL/L (ref 5–18)
ANION GAP SERPL CALCULATED.3IONS-SCNC: 10 MMOL/L (ref 7–15)
ANION GAP SERPL CALCULATED.3IONS-SCNC: 11 MMOL/L (ref 5–18)
ANION GAP SERPL CALCULATED.3IONS-SCNC: 11 MMOL/L (ref 5–18)
ANION GAP SERPL CALCULATED.3IONS-SCNC: 11 MMOL/L (ref 7–15)
ANION GAP SERPL CALCULATED.3IONS-SCNC: 12 MMOL/L (ref 5–18)
ANION GAP SERPL CALCULATED.3IONS-SCNC: 12 MMOL/L (ref 7–15)
ANION GAP SERPL CALCULATED.3IONS-SCNC: 12 MMOL/L (ref 7–15)
ANION GAP SERPL CALCULATED.3IONS-SCNC: 13 MMOL/L (ref 7–15)
ANION GAP SERPL CALCULATED.3IONS-SCNC: 14 MMOL/L (ref 7–15)
ANION GAP SERPL CALCULATED.3IONS-SCNC: 14 MMOL/L (ref 7–15)
ANION GAP SERPL CALCULATED.3IONS-SCNC: 7 MMOL/L (ref 5–18)
ANION GAP SERPL CALCULATED.3IONS-SCNC: 7 MMOL/L (ref 5–18)
ANION GAP SERPL CALCULATED.3IONS-SCNC: 9 MMOL/L (ref 5–18)
APPEARANCE UR: CLEAR
APTT PPP: 28 SECONDS (ref 22–38)
AST SERPL W P-5'-P-CCNC: 27 U/L (ref 0–40)
AST SERPL W P-5'-P-CCNC: 32 U/L (ref 0–40)
AST SERPL W P-5'-P-CCNC: 48 U/L (ref 10–35)
ATRIAL RATE - MUSE: 73 BPM
BASE EXCESS BLDV CALC-SCNC: 10.7 MMOL/L
BASE EXCESS BLDV CALC-SCNC: 12.7 MMOL/L
BASOPHILS # BLD AUTO: 0 10E3/UL (ref 0–0.2)
BASOPHILS NFR BLD AUTO: 0 %
BASOPHILS NFR BLD AUTO: 1 %
BILIRUB DIRECT SERPL-MCNC: 0.2 MG/DL
BILIRUB SERPL-MCNC: 0.5 MG/DL (ref 0–1)
BILIRUB SERPL-MCNC: 0.6 MG/DL
BILIRUB SERPL-MCNC: 0.9 MG/DL (ref 0–1)
BILIRUB UR QL STRIP: NEGATIVE
BNP SERPL-MCNC: 4024 PG/ML (ref 0–167)
BUN SERPL-MCNC: 37.7 MG/DL (ref 8–23)
BUN SERPL-MCNC: 41.8 MG/DL (ref 8–23)
BUN SERPL-MCNC: 46 MG/DL (ref 8–28)
BUN SERPL-MCNC: 46.2 MG/DL (ref 8–23)
BUN SERPL-MCNC: 47.9 MG/DL (ref 8–23)
BUN SERPL-MCNC: 48.5 MG/DL (ref 8–23)
BUN SERPL-MCNC: 55.3 MG/DL (ref 8–23)
BUN SERPL-MCNC: 57.1 MG/DL (ref 8–23)
BUN SERPL-MCNC: 60 MG/DL (ref 8–28)
BUN SERPL-MCNC: 61.3 MG/DL (ref 8–23)
BUN SERPL-MCNC: 72.2 MG/DL (ref 8–23)
BUN SERPL-MCNC: 73 MG/DL (ref 8–28)
BUN SERPL-MCNC: 77 MG/DL (ref 8–28)
BUN SERPL-MCNC: 78.9 MG/DL (ref 8–23)
BUN SERPL-MCNC: 81 MG/DL (ref 8–28)
BUN SERPL-MCNC: 83 MG/DL (ref 8–28)
BUN SERPL-MCNC: 83 MG/DL (ref 8–28)
BUN SERPL-MCNC: 84 MG/DL (ref 8–28)
BUN SERPL-MCNC: 88.2 MG/DL (ref 8–23)
BUN SERPL-MCNC: 98.7 MG/DL (ref 8–23)
CALCIUM SERPL-MCNC: 8.3 MG/DL (ref 8.5–10.5)
CALCIUM SERPL-MCNC: 8.4 MG/DL (ref 8.5–10.5)
CALCIUM SERPL-MCNC: 8.5 MG/DL (ref 8.5–10.5)
CALCIUM SERPL-MCNC: 8.6 MG/DL (ref 8.5–10.5)
CALCIUM SERPL-MCNC: 8.8 MG/DL (ref 8.5–10.5)
CALCIUM SERPL-MCNC: 8.9 MG/DL (ref 8.2–9.6)
CALCIUM SERPL-MCNC: 8.9 MG/DL (ref 8.2–9.6)
CALCIUM SERPL-MCNC: 9 MG/DL (ref 8.2–9.6)
CALCIUM SERPL-MCNC: 9 MG/DL (ref 8.2–9.6)
CALCIUM SERPL-MCNC: 9 MG/DL (ref 8.5–10.5)
CALCIUM SERPL-MCNC: 9 MG/DL (ref 8.5–10.5)
CALCIUM SERPL-MCNC: 9.1 MG/DL (ref 8.2–9.6)
CALCIUM SERPL-MCNC: 9.1 MG/DL (ref 8.2–9.6)
CALCIUM SERPL-MCNC: 9.2 MG/DL (ref 8.5–10.5)
CALCIUM SERPL-MCNC: 9.3 MG/DL (ref 8.2–9.6)
CALCIUM SERPL-MCNC: 9.3 MG/DL (ref 8.2–9.6)
CALCIUM SERPL-MCNC: 9.5 MG/DL (ref 8.2–9.6)
CALCIUM SERPL-MCNC: 9.7 MG/DL (ref 8.2–9.6)
CHLORIDE BLD-SCNC: 101 MMOL/L (ref 98–107)
CHLORIDE BLD-SCNC: 96 MMOL/L (ref 98–107)
CHLORIDE BLD-SCNC: 96 MMOL/L (ref 98–107)
CHLORIDE BLD-SCNC: 97 MMOL/L (ref 98–107)
CHLORIDE BLD-SCNC: 98 MMOL/L (ref 98–107)
CHLORIDE BLD-SCNC: 98 MMOL/L (ref 98–107)
CHLORIDE BLD-SCNC: 99 MMOL/L (ref 98–107)
CHLORIDE BLD-SCNC: 99 MMOL/L (ref 98–107)
CHLORIDE SERPL-SCNC: 101 MMOL/L (ref 98–107)
CHLORIDE SERPL-SCNC: 102 MMOL/L (ref 98–107)
CHLORIDE SERPL-SCNC: 102 MMOL/L (ref 98–107)
CHLORIDE SERPL-SCNC: 104 MMOL/L (ref 98–107)
CHLORIDE SERPL-SCNC: 105 MMOL/L (ref 98–107)
CHLORIDE SERPL-SCNC: 107 MMOL/L (ref 98–107)
CHLORIDE SERPL-SCNC: 107 MMOL/L (ref 98–107)
CHLORIDE SERPL-SCNC: 98 MMOL/L (ref 98–107)
CHLORIDE SERPL-SCNC: 99 MMOL/L (ref 98–107)
CO2 SERPL-SCNC: 30 MMOL/L (ref 22–31)
CO2 SERPL-SCNC: 31 MMOL/L (ref 22–31)
CO2 SERPL-SCNC: 31 MMOL/L (ref 22–31)
CO2 SERPL-SCNC: 33 MMOL/L (ref 22–31)
CO2 SERPL-SCNC: 35 MMOL/L (ref 22–31)
CO2 SERPL-SCNC: 35 MMOL/L (ref 22–31)
CO2 SERPL-SCNC: 36 MMOL/L (ref 22–31)
CO2 SERPL-SCNC: 38 MMOL/L (ref 22–31)
COLOR UR AUTO: ABNORMAL
CREAT SERPL-MCNC: 2.08 MG/DL (ref 0.51–0.95)
CREAT SERPL-MCNC: 2.09 MG/DL (ref 0.51–0.95)
CREAT SERPL-MCNC: 2.13 MG/DL (ref 0.51–0.95)
CREAT SERPL-MCNC: 2.18 MG/DL (ref 0.6–1.1)
CREAT SERPL-MCNC: 2.21 MG/DL (ref 0.51–0.95)
CREAT SERPL-MCNC: 2.25 MG/DL (ref 0.51–0.95)
CREAT SERPL-MCNC: 2.28 MG/DL (ref 0.51–0.95)
CREAT SERPL-MCNC: 2.31 MG/DL (ref 0.51–0.95)
CREAT SERPL-MCNC: 2.33 MG/DL (ref 0.51–0.95)
CREAT SERPL-MCNC: 2.34 MG/DL (ref 0.51–0.95)
CREAT SERPL-MCNC: 2.39 MG/DL (ref 0.51–0.95)
CREAT SERPL-MCNC: 2.44 MG/DL (ref 0.51–0.95)
CREAT SERPL-MCNC: 2.47 MG/DL (ref 0.6–1.1)
CREAT SERPL-MCNC: 2.6 MG/DL (ref 0.6–1.1)
CREAT SERPL-MCNC: 2.68 MG/DL (ref 0.6–1.1)
CREAT SERPL-MCNC: 2.69 MG/DL (ref 0.6–1.1)
CREAT SERPL-MCNC: 2.8 MG/DL (ref 0.51–0.95)
CREAT SERPL-MCNC: 2.91 MG/DL (ref 0.6–1.1)
CREAT SERPL-MCNC: 3.06 MG/DL (ref 0.6–1.1)
CREAT SERPL-MCNC: 3.09 MG/DL (ref 0.6–1.1)
CYSTATIN C (ROCHE): 4.1 MG/L (ref 0.6–1)
DEPRECATED HCO3 PLAS-SCNC: 20 MMOL/L (ref 22–29)
DEPRECATED HCO3 PLAS-SCNC: 22 MMOL/L (ref 22–29)
DEPRECATED HCO3 PLAS-SCNC: 22 MMOL/L (ref 22–29)
DEPRECATED HCO3 PLAS-SCNC: 23 MMOL/L (ref 22–29)
DEPRECATED HCO3 PLAS-SCNC: 23 MMOL/L (ref 22–29)
DEPRECATED HCO3 PLAS-SCNC: 24 MMOL/L (ref 22–29)
DEPRECATED HCO3 PLAS-SCNC: 27 MMOL/L (ref 22–29)
DEPRECATED HCO3 PLAS-SCNC: 30 MMOL/L (ref 22–29)
DEPRECATED HCO3 PLAS-SCNC: 32 MMOL/L (ref 22–29)
DEPRECATED HCO3 PLAS-SCNC: 32 MMOL/L (ref 22–29)
DEPRECATED HCO3 PLAS-SCNC: 33 MMOL/L (ref 22–29)
DEPRECATED HCO3 PLAS-SCNC: 36 MMOL/L (ref 22–29)
DIASTOLIC BLOOD PRESSURE - MUSE: 61 MMHG
EOSINOPHIL # BLD AUTO: 0 10E3/UL (ref 0–0.7)
EOSINOPHIL # BLD AUTO: 0.1 10E3/UL (ref 0–0.7)
EOSINOPHIL NFR BLD AUTO: 0 %
EOSINOPHIL NFR BLD AUTO: 1 %
ERYTHROCYTE [DISTWIDTH] IN BLOOD BY AUTOMATED COUNT: 15.3 % (ref 10–15)
ERYTHROCYTE [DISTWIDTH] IN BLOOD BY AUTOMATED COUNT: 15.4 % (ref 10–15)
ERYTHROCYTE [DISTWIDTH] IN BLOOD BY AUTOMATED COUNT: 15.6 % (ref 10–15)
ERYTHROCYTE [DISTWIDTH] IN BLOOD BY AUTOMATED COUNT: 16.2 % (ref 10–15)
ERYTHROCYTE [DISTWIDTH] IN BLOOD BY AUTOMATED COUNT: 16.2 % (ref 10–15)
ERYTHROCYTE [DISTWIDTH] IN BLOOD BY AUTOMATED COUNT: 16.3 % (ref 10–15)
GFR SERPL CREATININE-BSD FRML MDRD: 10 ML/MIN/1.73M2
GFR SERPL CREATININE-BSD FRML MDRD: 14 ML/MIN/1.73M2
GFR SERPL CREATININE-BSD FRML MDRD: 14 ML/MIN/1.73M2
GFR SERPL CREATININE-BSD FRML MDRD: 15 ML/MIN/1.73M2
GFR SERPL CREATININE-BSD FRML MDRD: 15 ML/MIN/1.73M2
GFR SERPL CREATININE-BSD FRML MDRD: 16 ML/MIN/1.73M2
GFR SERPL CREATININE-BSD FRML MDRD: 16 ML/MIN/1.73M2
GFR SERPL CREATININE-BSD FRML MDRD: 17 ML/MIN/1.73M2
GFR SERPL CREATININE-BSD FRML MDRD: 18 ML/MIN/1.73M2
GFR SERPL CREATININE-BSD FRML MDRD: 18 ML/MIN/1.73M2
GFR SERPL CREATININE-BSD FRML MDRD: 19 ML/MIN/1.73M2
GFR SERPL CREATININE-BSD FRML MDRD: 20 ML/MIN/1.73M2
GFR SERPL CREATININE-BSD FRML MDRD: 20 ML/MIN/1.73M2
GFR SERPL CREATININE-BSD FRML MDRD: 21 ML/MIN/1.73M2
GFR SERPL CREATININE-BSD FRML MDRD: 22 ML/MIN/1.73M2
GFR SERPL CREATININE-BSD FRML MDRD: 22 ML/MIN/1.73M2
GLUCOSE BLD-MCNC: 101 MG/DL (ref 70–125)
GLUCOSE BLD-MCNC: 103 MG/DL (ref 70–125)
GLUCOSE BLD-MCNC: 136 MG/DL (ref 70–125)
GLUCOSE BLD-MCNC: 146 MG/DL (ref 70–125)
GLUCOSE BLD-MCNC: 151 MG/DL (ref 70–125)
GLUCOSE BLD-MCNC: 237 MG/DL (ref 70–125)
GLUCOSE BLD-MCNC: 97 MG/DL (ref 70–125)
GLUCOSE BLD-MCNC: 97 MG/DL (ref 70–125)
GLUCOSE SERPL-MCNC: 102 MG/DL (ref 70–99)
GLUCOSE SERPL-MCNC: 108 MG/DL (ref 70–99)
GLUCOSE SERPL-MCNC: 112 MG/DL (ref 70–99)
GLUCOSE SERPL-MCNC: 114 MG/DL (ref 70–99)
GLUCOSE SERPL-MCNC: 119 MG/DL (ref 70–99)
GLUCOSE SERPL-MCNC: 126 MG/DL (ref 70–99)
GLUCOSE SERPL-MCNC: 140 MG/DL (ref 70–99)
GLUCOSE SERPL-MCNC: 144 MG/DL (ref 70–99)
GLUCOSE SERPL-MCNC: 182 MG/DL (ref 70–99)
GLUCOSE SERPL-MCNC: 84 MG/DL (ref 70–99)
GLUCOSE SERPL-MCNC: 92 MG/DL (ref 70–99)
GLUCOSE SERPL-MCNC: 97 MG/DL (ref 70–99)
GLUCOSE UR STRIP-MCNC: NEGATIVE MG/DL
HCO3 BLDV-SCNC: 36 MMOL/L (ref 24–30)
HCO3 BLDV-SCNC: 38 MMOL/L (ref 24–30)
HCT VFR BLD AUTO: 36.1 % (ref 35–47)
HCT VFR BLD AUTO: 36.1 % (ref 35–47)
HCT VFR BLD AUTO: 37.2 % (ref 35–47)
HCT VFR BLD AUTO: 37.9 % (ref 35–47)
HCT VFR BLD AUTO: 40.1 % (ref 35–47)
HCT VFR BLD AUTO: 43 % (ref 35–47)
HGB BLD-MCNC: 10.6 G/DL (ref 11.7–15.7)
HGB BLD-MCNC: 10.8 G/DL (ref 11.7–15.7)
HGB BLD-MCNC: 10.9 G/DL (ref 11.7–15.7)
HGB BLD-MCNC: 10.9 G/DL (ref 11.7–15.7)
HGB BLD-MCNC: 11.3 G/DL (ref 11.7–15.7)
HGB BLD-MCNC: 12.4 G/DL (ref 11.7–15.7)
HGB BLD-MCNC: 13.1 G/DL (ref 11.7–15.7)
HGB UR QL STRIP: NEGATIVE
IMM GRANULOCYTES # BLD: 0 10E3/UL
IMM GRANULOCYTES NFR BLD: 0 %
IMM GRANULOCYTES NFR BLD: 1 %
INR PPP: 1.08 (ref 0.85–1.15)
INTERPRETATION ECG - MUSE: NORMAL
KETONES UR STRIP-MCNC: NEGATIVE MG/DL
LACTATE SERPL-SCNC: 1.1 MMOL/L (ref 0.7–2)
LEUKOCYTE ESTERASE UR QL STRIP: NEGATIVE
LIPASE SERPL-CCNC: <9 U/L (ref 0–52)
LVEF ECHO: NORMAL
LYMPHOCYTES # BLD AUTO: 0.5 10E3/UL (ref 0.8–5.3)
LYMPHOCYTES # BLD AUTO: 0.5 10E3/UL (ref 0.8–5.3)
LYMPHOCYTES # BLD AUTO: 0.6 10E3/UL (ref 0.8–5.3)
LYMPHOCYTES # BLD AUTO: 0.8 10E3/UL (ref 0.8–5.3)
LYMPHOCYTES NFR BLD AUTO: 10 %
LYMPHOCYTES NFR BLD AUTO: 12 %
LYMPHOCYTES NFR BLD AUTO: 20 %
LYMPHOCYTES NFR BLD AUTO: 7 %
MAGNESIUM SERPL-MCNC: 2 MG/DL (ref 1.8–2.6)
MAGNESIUM SERPL-MCNC: 2.1 MG/DL (ref 1.8–2.6)
MAGNESIUM SERPL-MCNC: 2.2 MG/DL (ref 1.8–2.6)
MCH RBC QN AUTO: 30.9 PG (ref 26.5–33)
MCH RBC QN AUTO: 31.1 PG (ref 26.5–33)
MCH RBC QN AUTO: 31.1 PG (ref 26.5–33)
MCH RBC QN AUTO: 31.4 PG (ref 26.5–33)
MCH RBC QN AUTO: 31.4 PG (ref 26.5–33)
MCH RBC QN AUTO: 31.5 PG (ref 26.5–33)
MCHC RBC AUTO-ENTMCNC: 28.8 G/DL (ref 31.5–36.5)
MCHC RBC AUTO-ENTMCNC: 29 G/DL (ref 31.5–36.5)
MCHC RBC AUTO-ENTMCNC: 29.4 G/DL (ref 31.5–36.5)
MCHC RBC AUTO-ENTMCNC: 30.5 G/DL (ref 31.5–36.5)
MCHC RBC AUTO-ENTMCNC: 30.9 G/DL (ref 31.5–36.5)
MCHC RBC AUTO-ENTMCNC: 31.3 G/DL (ref 31.5–36.5)
MCV RBC AUTO: 100 FL (ref 78–100)
MCV RBC AUTO: 102 FL (ref 78–100)
MCV RBC AUTO: 103 FL (ref 78–100)
MCV RBC AUTO: 106 FL (ref 78–100)
MCV RBC AUTO: 107 FL (ref 78–100)
MCV RBC AUTO: 108 FL (ref 78–100)
MONOCYTES # BLD AUTO: 0.1 10E3/UL (ref 0–1.3)
MONOCYTES # BLD AUTO: 0.3 10E3/UL (ref 0–1.3)
MONOCYTES # BLD AUTO: 0.4 10E3/UL (ref 0–1.3)
MONOCYTES # BLD AUTO: 0.4 10E3/UL (ref 0–1.3)
MONOCYTES NFR BLD AUTO: 3 %
MONOCYTES NFR BLD AUTO: 6 %
MONOCYTES NFR BLD AUTO: 7 %
MONOCYTES NFR BLD AUTO: 8 %
MUCOUS THREADS #/AREA URNS LPF: PRESENT /LPF
NEUTROPHILS # BLD AUTO: 2.8 10E3/UL (ref 1.6–8.3)
NEUTROPHILS # BLD AUTO: 4.5 10E3/UL (ref 1.6–8.3)
NEUTROPHILS # BLD AUTO: 4.5 10E3/UL (ref 1.6–8.3)
NEUTROPHILS # BLD AUTO: 6.3 10E3/UL (ref 1.6–8.3)
NEUTROPHILS NFR BLD AUTO: 70 %
NEUTROPHILS NFR BLD AUTO: 82 %
NEUTROPHILS NFR BLD AUTO: 84 %
NEUTROPHILS NFR BLD AUTO: 86 %
NITRATE UR QL: NEGATIVE
NRBC # BLD AUTO: 0 10E3/UL
NRBC BLD AUTO-RTO: 0 /100
NT-PROBNP SERPL-MCNC: ABNORMAL PG/ML (ref 0–1800)
OXYHGB MFR BLDV: 23.1 % (ref 70–75)
OXYHGB MFR BLDV: 81.2 % (ref 70–75)
P AXIS - MUSE: 68 DEGREES
PATH REPORT.COMMENTS IMP SPEC: ABNORMAL
PATH REPORT.COMMENTS IMP SPEC: YES
PATH REPORT.FINAL DX SPEC: ABNORMAL
PATH REPORT.GROSS SPEC: ABNORMAL
PATH REPORT.MICROSCOPIC SPEC OTHER STN: ABNORMAL
PATH REPORT.MICROSCOPIC SPEC OTHER STN: ABNORMAL
PATH REPORT.RELEVANT HX SPEC: ABNORMAL
PCO2 BLDV: 65 MM HG (ref 35–50)
PCO2 BLDV: 66 MM HG (ref 35–50)
PH BLDV: 7.36 [PH] (ref 7.35–7.45)
PH BLDV: 7.37 [PH] (ref 7.35–7.45)
PH UR STRIP: 6.5 [PH] (ref 5–7)
PHOTO IMAGE: ABNORMAL
PLATELET # BLD AUTO: 110 10E3/UL (ref 150–450)
PLATELET # BLD AUTO: 114 10E3/UL (ref 150–450)
PLATELET # BLD AUTO: 117 10E3/UL (ref 150–450)
PLATELET # BLD AUTO: 117 10E3/UL (ref 150–450)
PLATELET # BLD AUTO: 120 10E3/UL (ref 150–450)
PLATELET # BLD AUTO: 121 10E3/UL (ref 150–450)
PLATELET # BLD AUTO: 124 10E3/UL (ref 150–450)
PLATELET # BLD AUTO: 124 10E3/UL (ref 150–450)
PO2 BLDV: 19 MM HG (ref 25–47)
PO2 BLDV: 49 MM HG (ref 25–47)
POTASSIUM BLD-SCNC: 4.5 MMOL/L (ref 3.5–5)
POTASSIUM BLD-SCNC: 4.5 MMOL/L (ref 3.5–5)
POTASSIUM BLD-SCNC: 4.6 MMOL/L (ref 3.5–5)
POTASSIUM BLD-SCNC: 4.7 MMOL/L (ref 3.5–5)
POTASSIUM BLD-SCNC: 4.7 MMOL/L (ref 3.5–5)
POTASSIUM BLD-SCNC: 5.3 MMOL/L (ref 3.5–5)
POTASSIUM BLD-SCNC: 5.4 MMOL/L (ref 3.5–5)
POTASSIUM BLD-SCNC: 5.7 MMOL/L (ref 3.5–5)
POTASSIUM SERPL-SCNC: 4.6 MMOL/L (ref 3.4–5.3)
POTASSIUM SERPL-SCNC: 4.9 MMOL/L (ref 3.4–5.3)
POTASSIUM SERPL-SCNC: 5.1 MMOL/L (ref 3.4–5.3)
POTASSIUM SERPL-SCNC: 5.2 MMOL/L (ref 3.4–5.3)
POTASSIUM SERPL-SCNC: 5.2 MMOL/L (ref 3.4–5.3)
POTASSIUM SERPL-SCNC: 5.4 MMOL/L (ref 3.4–5.3)
POTASSIUM SERPL-SCNC: 5.4 MMOL/L (ref 3.4–5.3)
POTASSIUM SERPL-SCNC: 5.5 MMOL/L (ref 3.4–5.3)
POTASSIUM SERPL-SCNC: 5.5 MMOL/L (ref 3.4–5.3)
POTASSIUM SERPL-SCNC: 5.6 MMOL/L (ref 3.4–5.3)
POTASSIUM SERPL-SCNC: 6.5 MMOL/L (ref 3.4–5.3)
PR INTERVAL - MUSE: 130 MS
PROT SERPL-MCNC: 6.8 G/DL (ref 6.4–8.3)
PROT SERPL-MCNC: 7.2 G/DL (ref 6–8)
PROT SERPL-MCNC: 7.2 G/DL (ref 6–8)
QRS DURATION - MUSE: 86 MS
QT - MUSE: 424 MS
QTC - MUSE: 467 MS
R AXIS - MUSE: 4 DEGREES
RBC # BLD AUTO: 3.41 10E6/UL (ref 3.8–5.2)
RBC # BLD AUTO: 3.49 10E6/UL (ref 3.8–5.2)
RBC # BLD AUTO: 3.51 10E6/UL (ref 3.8–5.2)
RBC # BLD AUTO: 3.6 10E6/UL (ref 3.8–5.2)
RBC # BLD AUTO: 3.95 10E6/UL (ref 3.8–5.2)
RBC # BLD AUTO: 4.16 10E6/UL (ref 3.8–5.2)
RBC URINE: <1 /HPF
SAO2 % BLDV: 23.5 % (ref 70–75)
SAO2 % BLDV: 83 % (ref 70–75)
SARS-COV-2 RNA RESP QL NAA+PROBE: NEGATIVE
SODIUM SERPL-SCNC: 138 MMOL/L (ref 136–145)
SODIUM SERPL-SCNC: 139 MMOL/L (ref 136–145)
SODIUM SERPL-SCNC: 140 MMOL/L (ref 136–145)
SODIUM SERPL-SCNC: 140 MMOL/L (ref 136–145)
SODIUM SERPL-SCNC: 141 MMOL/L (ref 136–145)
SODIUM SERPL-SCNC: 142 MMOL/L (ref 136–145)
SODIUM SERPL-SCNC: 143 MMOL/L (ref 136–145)
SODIUM SERPL-SCNC: 143 MMOL/L (ref 136–145)
SODIUM SERPL-SCNC: 144 MMOL/L (ref 136–145)
SODIUM SERPL-SCNC: 144 MMOL/L (ref 136–145)
SODIUM SERPL-SCNC: 147 MMOL/L (ref 136–145)
SODIUM SERPL-SCNC: 148 MMOL/L (ref 136–145)
SP GR UR STRIP: 1.01 (ref 1–1.03)
SQUAMOUS EPITHELIAL: <1 /HPF
SYSTOLIC BLOOD PRESSURE - MUSE: 104 MMHG
T AXIS - MUSE: 100 DEGREES
TROPONIN I SERPL-MCNC: 0.08 NG/ML (ref 0–0.29)
UROBILINOGEN UR STRIP-MCNC: <2 MG/DL
VENTRICULAR RATE- MUSE: 73 BPM
WBC # BLD AUTO: 4 10E3/UL (ref 4–11)
WBC # BLD AUTO: 5 10E3/UL (ref 4–11)
WBC # BLD AUTO: 5.3 10E3/UL (ref 4–11)
WBC # BLD AUTO: 5.5 10E3/UL (ref 4–11)
WBC # BLD AUTO: 6.2 10E3/UL (ref 4–11)
WBC # BLD AUTO: 7.3 10E3/UL (ref 4–11)
WBC URINE: <1 /HPF

## 2023-01-01 PROCEDURE — P9604 ONE-WAY ALLOW PRORATED TRIP: HCPCS | Performed by: NURSE PRACTITIONER

## 2023-01-01 PROCEDURE — 80048 BASIC METABOLIC PNL TOTAL CA: CPT | Mod: ORL | Performed by: NURSE PRACTITIONER

## 2023-01-01 PROCEDURE — 99306 1ST NF CARE HIGH MDM 50: CPT | Performed by: INTERNAL MEDICINE

## 2023-01-01 PROCEDURE — 85025 COMPLETE CBC W/AUTO DIFF WBC: CPT | Performed by: FAMILY MEDICINE

## 2023-01-01 PROCEDURE — 99310 SBSQ NF CARE HIGH MDM 45: CPT | Performed by: NURSE PRACTITIONER

## 2023-01-01 PROCEDURE — 84132 ASSAY OF SERUM POTASSIUM: CPT

## 2023-01-01 PROCEDURE — 97535 SELF CARE MNGMENT TRAINING: CPT | Mod: GO

## 2023-01-01 PROCEDURE — 250N000011 HC RX IP 250 OP 636: Performed by: FAMILY MEDICINE

## 2023-01-01 PROCEDURE — 999N000157 HC STATISTIC RCP TIME EA 10 MIN

## 2023-01-01 PROCEDURE — 99222 1ST HOSP IP/OBS MODERATE 55: CPT | Performed by: NURSE PRACTITIONER

## 2023-01-01 PROCEDURE — 88342 IMHCHEM/IMCYTCHM 1ST ANTB: CPT | Mod: 26 | Performed by: PATHOLOGY

## 2023-01-01 PROCEDURE — 36415 COLL VENOUS BLD VENIPUNCTURE: CPT | Mod: ORL | Performed by: INTERNAL MEDICINE

## 2023-01-01 PROCEDURE — 82805 BLOOD GASES W/O2 SATURATION: CPT | Performed by: FAMILY MEDICINE

## 2023-01-01 PROCEDURE — P9604 ONE-WAY ALLOW PRORATED TRIP: HCPCS | Mod: ORL | Performed by: INTERNAL MEDICINE

## 2023-01-01 PROCEDURE — 99233 SBSQ HOSP IP/OBS HIGH 50: CPT | Performed by: FAMILY MEDICINE

## 2023-01-01 PROCEDURE — 82248 BILIRUBIN DIRECT: CPT | Performed by: FAMILY MEDICINE

## 2023-01-01 PROCEDURE — 82610 CYSTATIN C: CPT | Performed by: NURSE PRACTITIONER

## 2023-01-01 PROCEDURE — 80053 COMPREHEN METABOLIC PANEL: CPT | Performed by: INTERNAL MEDICINE

## 2023-01-01 PROCEDURE — 83880 ASSAY OF NATRIURETIC PEPTIDE: CPT | Mod: ORL | Performed by: STUDENT IN AN ORGANIZED HEALTH CARE EDUCATION/TRAINING PROGRAM

## 2023-01-01 PROCEDURE — 250N000011 HC RX IP 250 OP 636: Performed by: NURSE PRACTITIONER

## 2023-01-01 PROCEDURE — 85730 THROMBOPLASTIN TIME PARTIAL: CPT | Performed by: RADIOLOGY

## 2023-01-01 PROCEDURE — 36415 COLL VENOUS BLD VENIPUNCTURE: CPT | Performed by: FAMILY MEDICINE

## 2023-01-01 PROCEDURE — 250N000013 HC RX MED GY IP 250 OP 250 PS 637: Performed by: INTERNAL MEDICINE

## 2023-01-01 PROCEDURE — 99232 SBSQ HOSP IP/OBS MODERATE 35: CPT

## 2023-01-01 PROCEDURE — 83605 ASSAY OF LACTIC ACID: CPT | Performed by: EMERGENCY MEDICINE

## 2023-01-01 PROCEDURE — 250N000011 HC RX IP 250 OP 636: Performed by: EMERGENCY MEDICINE

## 2023-01-01 PROCEDURE — 80048 BASIC METABOLIC PNL TOTAL CA: CPT | Performed by: INTERNAL MEDICINE

## 2023-01-01 PROCEDURE — 76770 US EXAM ABDO BACK WALL COMP: CPT

## 2023-01-01 PROCEDURE — 250N000013 HC RX MED GY IP 250 OP 250 PS 637: Performed by: NURSE PRACTITIONER

## 2023-01-01 PROCEDURE — C9803 HOPD COVID-19 SPEC COLLECT: HCPCS

## 2023-01-01 PROCEDURE — 250N000012 HC RX MED GY IP 250 OP 636 PS 637: Performed by: FAMILY MEDICINE

## 2023-01-01 PROCEDURE — 36415 COLL VENOUS BLD VENIPUNCTURE: CPT

## 2023-01-01 PROCEDURE — 99232 SBSQ HOSP IP/OBS MODERATE 35: CPT | Performed by: FAMILY MEDICINE

## 2023-01-01 PROCEDURE — 99207 PR CDG-CUT & PASTE-POTENTIAL IMPACT ON LEVEL: CPT | Performed by: FAMILY MEDICINE

## 2023-01-01 PROCEDURE — 94640 AIRWAY INHALATION TREATMENT: CPT

## 2023-01-01 PROCEDURE — 250N000013 HC RX MED GY IP 250 OP 250 PS 637: Performed by: FAMILY MEDICINE

## 2023-01-01 PROCEDURE — 99239 HOSP IP/OBS DSCHRG MGMT >30: CPT | Performed by: FAMILY MEDICINE

## 2023-01-01 PROCEDURE — 80048 BASIC METABOLIC PNL TOTAL CA: CPT | Mod: ORL | Performed by: INTERNAL MEDICINE

## 2023-01-01 PROCEDURE — 99223 1ST HOSP IP/OBS HIGH 75: CPT | Performed by: FAMILY MEDICINE

## 2023-01-01 PROCEDURE — 87635 SARS-COV-2 COVID-19 AMP PRB: CPT | Performed by: EMERGENCY MEDICINE

## 2023-01-01 PROCEDURE — 72146 MRI CHEST SPINE W/O DYE: CPT | Mod: MG

## 2023-01-01 PROCEDURE — 250N000009 HC RX 250: Performed by: FAMILY MEDICINE

## 2023-01-01 PROCEDURE — 99418 PROLNG IP/OBS E/M EA 15 MIN: CPT | Performed by: NURSE PRACTITIONER

## 2023-01-01 PROCEDURE — 94640 AIRWAY INHALATION TREATMENT: CPT | Mod: 76

## 2023-01-01 PROCEDURE — 36415 COLL VENOUS BLD VENIPUNCTURE: CPT | Performed by: INTERNAL MEDICINE

## 2023-01-01 PROCEDURE — 88333 PATH CONSLTJ SURG CYTO XM 1: CPT | Mod: 26 | Performed by: PATHOLOGY

## 2023-01-01 PROCEDURE — 99152 MOD SED SAME PHYS/QHP 5/>YRS: CPT

## 2023-01-01 PROCEDURE — 84484 ASSAY OF TROPONIN QUANT: CPT | Performed by: EMERGENCY MEDICINE

## 2023-01-01 PROCEDURE — 250N000013 HC RX MED GY IP 250 OP 250 PS 637

## 2023-01-01 PROCEDURE — 83690 ASSAY OF LIPASE: CPT | Performed by: EMERGENCY MEDICINE

## 2023-01-01 PROCEDURE — 80048 BASIC METABOLIC PNL TOTAL CA: CPT | Performed by: FAMILY MEDICINE

## 2023-01-01 PROCEDURE — 210N000002 HC R&B HEART CARE

## 2023-01-01 PROCEDURE — 99215 OFFICE O/P EST HI 40 MIN: CPT | Performed by: NURSE PRACTITIONER

## 2023-01-01 PROCEDURE — 82310 ASSAY OF CALCIUM: CPT

## 2023-01-01 PROCEDURE — 250N000013 HC RX MED GY IP 250 OP 250 PS 637: Performed by: PAIN MEDICINE

## 2023-01-01 PROCEDURE — 96374 THER/PROPH/DIAG INJ IV PUSH: CPT

## 2023-01-01 PROCEDURE — 85018 HEMOGLOBIN: CPT | Performed by: FAMILY MEDICINE

## 2023-01-01 PROCEDURE — 83735 ASSAY OF MAGNESIUM: CPT | Performed by: EMERGENCY MEDICINE

## 2023-01-01 PROCEDURE — 99231 SBSQ HOSP IP/OBS SF/LOW 25: CPT

## 2023-01-01 PROCEDURE — 84132 ASSAY OF SERUM POTASSIUM: CPT | Mod: ORL | Performed by: INTERNAL MEDICINE

## 2023-01-01 PROCEDURE — 88333 PATH CONSLTJ SURG CYTO XM 1: CPT | Mod: TC | Performed by: INTERNAL MEDICINE

## 2023-01-01 PROCEDURE — 99207 PR NO BILLABLE SERVICE THIS VISIT: CPT | Performed by: NURSE PRACTITIONER

## 2023-01-01 PROCEDURE — 99309 SBSQ NF CARE MODERATE MDM 30: CPT | Performed by: NURSE PRACTITIONER

## 2023-01-01 PROCEDURE — P9603 ONE-WAY ALLOW PRORATED MILES: HCPCS | Mod: ORL | Performed by: INTERNAL MEDICINE

## 2023-01-01 PROCEDURE — 99233 SBSQ HOSP IP/OBS HIGH 50: CPT | Performed by: CLINICAL NURSE SPECIALIST

## 2023-01-01 PROCEDURE — 93306 TTE W/DOPPLER COMPLETE: CPT | Mod: 26 | Performed by: INTERNAL MEDICINE

## 2023-01-01 PROCEDURE — 250N000011 HC RX IP 250 OP 636

## 2023-01-01 PROCEDURE — 97166 OT EVAL MOD COMPLEX 45 MIN: CPT | Mod: GO

## 2023-01-01 PROCEDURE — 85025 COMPLETE CBC W/AUTO DIFF WBC: CPT | Performed by: INTERNAL MEDICINE

## 2023-01-01 PROCEDURE — 85027 COMPLETE CBC AUTOMATED: CPT | Performed by: INTERNAL MEDICINE

## 2023-01-01 PROCEDURE — 99285 EMERGENCY DEPT VISIT HI MDM: CPT | Mod: 25

## 2023-01-01 PROCEDURE — 250N000011 HC RX IP 250 OP 636: Performed by: RADIOLOGY

## 2023-01-01 PROCEDURE — 93005 ELECTROCARDIOGRAM TRACING: CPT | Performed by: EMERGENCY MEDICINE

## 2023-01-01 PROCEDURE — 83735 ASSAY OF MAGNESIUM: CPT | Performed by: INTERNAL MEDICINE

## 2023-01-01 PROCEDURE — 88307 TISSUE EXAM BY PATHOLOGIST: CPT | Mod: 26 | Performed by: PATHOLOGY

## 2023-01-01 PROCEDURE — 96375 TX/PRO/DX INJ NEW DRUG ADDON: CPT

## 2023-01-01 PROCEDURE — 99223 1ST HOSP IP/OBS HIGH 75: CPT | Performed by: INTERNAL MEDICINE

## 2023-01-01 PROCEDURE — 258N000003 HC RX IP 258 OP 636: Performed by: FAMILY MEDICINE

## 2023-01-01 PROCEDURE — P9603 ONE-WAY ALLOW PRORATED MILES: HCPCS | Performed by: INTERNAL MEDICINE

## 2023-01-01 PROCEDURE — 88360 TUMOR IMMUNOHISTOCHEM/MANUAL: CPT | Mod: 26 | Performed by: PATHOLOGY

## 2023-01-01 PROCEDURE — 85049 AUTOMATED PLATELET COUNT: CPT | Performed by: FAMILY MEDICINE

## 2023-01-01 PROCEDURE — 99232 SBSQ HOSP IP/OBS MODERATE 35: CPT | Performed by: PAIN MEDICINE

## 2023-01-01 PROCEDURE — 250N000009 HC RX 250: Performed by: NURSE PRACTITIONER

## 2023-01-01 PROCEDURE — 83735 ASSAY OF MAGNESIUM: CPT | Performed by: FAMILY MEDICINE

## 2023-01-01 PROCEDURE — 80048 BASIC METABOLIC PNL TOTAL CA: CPT | Performed by: NURSE PRACTITIONER

## 2023-01-01 PROCEDURE — 82805 BLOOD GASES W/O2 SATURATION: CPT | Performed by: EMERGENCY MEDICINE

## 2023-01-01 PROCEDURE — 120N000004 HC R&B MS OVERFLOW

## 2023-01-01 PROCEDURE — 99205 OFFICE O/P NEW HI 60 MIN: CPT | Performed by: INTERNAL MEDICINE

## 2023-01-01 PROCEDURE — 97116 GAIT TRAINING THERAPY: CPT | Mod: GP

## 2023-01-01 PROCEDURE — 88341 IMHCHEM/IMCYTCHM EA ADD ANTB: CPT | Mod: 26 | Performed by: PATHOLOGY

## 2023-01-01 PROCEDURE — 250N000013 HC RX MED GY IP 250 OP 250 PS 637: Performed by: CLINICAL NURSE SPECIALIST

## 2023-01-01 PROCEDURE — 81001 URINALYSIS AUTO W/SCOPE: CPT | Performed by: FAMILY MEDICINE

## 2023-01-01 PROCEDURE — 93306 TTE W/DOPPLER COMPLETE: CPT

## 2023-01-01 PROCEDURE — 84132 ASSAY OF SERUM POTASSIUM: CPT | Performed by: EMERGENCY MEDICINE

## 2023-01-01 PROCEDURE — P9604 ONE-WAY ALLOW PRORATED TRIP: HCPCS | Performed by: INTERNAL MEDICINE

## 2023-01-01 PROCEDURE — 80048 BASIC METABOLIC PNL TOTAL CA: CPT | Mod: ORL | Performed by: STUDENT IN AN ORGANIZED HEALTH CARE EDUCATION/TRAINING PROGRAM

## 2023-01-01 PROCEDURE — 97161 PT EVAL LOW COMPLEX 20 MIN: CPT | Mod: GP

## 2023-01-01 PROCEDURE — G0463 HOSPITAL OUTPT CLINIC VISIT: HCPCS | Performed by: INTERNAL MEDICINE

## 2023-01-01 PROCEDURE — 96372 THER/PROPH/DIAG INJ SC/IM: CPT | Performed by: EMERGENCY MEDICINE

## 2023-01-01 PROCEDURE — 36415 COLL VENOUS BLD VENIPUNCTURE: CPT | Performed by: RADIOLOGY

## 2023-01-01 PROCEDURE — 272N000710 US BIOPSY LIVER

## 2023-01-01 PROCEDURE — 85004 AUTOMATED DIFF WBC COUNT: CPT | Performed by: EMERGENCY MEDICINE

## 2023-01-01 PROCEDURE — 84484 ASSAY OF TROPONIN QUANT: CPT | Performed by: FAMILY MEDICINE

## 2023-01-01 PROCEDURE — 36415 COLL VENOUS BLD VENIPUNCTURE: CPT | Performed by: NURSE PRACTITIONER

## 2023-01-01 PROCEDURE — G1010 CDSM STANSON: HCPCS

## 2023-01-01 PROCEDURE — 36415 COLL VENOUS BLD VENIPUNCTURE: CPT | Performed by: EMERGENCY MEDICINE

## 2023-01-01 PROCEDURE — 99232 SBSQ HOSP IP/OBS MODERATE 35: CPT | Performed by: NURSE PRACTITIONER

## 2023-01-01 PROCEDURE — 83880 ASSAY OF NATRIURETIC PEPTIDE: CPT | Performed by: EMERGENCY MEDICINE

## 2023-01-01 PROCEDURE — 93970 EXTREMITY STUDY: CPT

## 2023-01-01 PROCEDURE — 272N000221 US BIOPSY LIVER

## 2023-01-01 PROCEDURE — 250N000011 HC RX IP 250 OP 636: Performed by: INTERNAL MEDICINE

## 2023-01-01 PROCEDURE — 85610 PROTHROMBIN TIME: CPT | Performed by: RADIOLOGY

## 2023-01-01 PROCEDURE — P9047 ALBUMIN (HUMAN), 25%, 50ML: HCPCS | Performed by: INTERNAL MEDICINE

## 2023-01-01 PROCEDURE — 71250 CT THORAX DX C-: CPT | Mod: MG

## 2023-01-01 PROCEDURE — 99233 SBSQ HOSP IP/OBS HIGH 50: CPT | Performed by: INTERNAL MEDICINE

## 2023-01-01 PROCEDURE — 255N000002 HC RX 255 OP 636: Performed by: FAMILY MEDICINE

## 2023-01-01 RX ORDER — NALOXONE HYDROCHLORIDE 0.4 MG/ML
0.2 INJECTION, SOLUTION INTRAMUSCULAR; INTRAVENOUS; SUBCUTANEOUS
Status: DISCONTINUED | OUTPATIENT
Start: 2023-01-01 | End: 2023-01-01 | Stop reason: HOSPADM

## 2023-01-01 RX ORDER — ACETAMINOPHEN 325 MG/1
975 TABLET ORAL EVERY 8 HOURS
Start: 2023-01-01

## 2023-01-01 RX ORDER — AMOXICILLIN 250 MG
1 CAPSULE ORAL 2 TIMES DAILY
COMMUNITY
End: 2023-01-01 | Stop reason: DRUGHIGH

## 2023-01-01 RX ORDER — HYDROMORPHONE HYDROCHLORIDE 1 MG/ML
0.5 INJECTION, SOLUTION INTRAMUSCULAR; INTRAVENOUS; SUBCUTANEOUS
Status: DISCONTINUED | OUTPATIENT
Start: 2023-01-01 | End: 2023-01-01

## 2023-01-01 RX ORDER — ALBUTEROL SULFATE 0.83 MG/ML
2.5 SOLUTION RESPIRATORY (INHALATION) EVERY 4 HOURS PRN
Qty: 90 ML
Start: 2023-01-01

## 2023-01-01 RX ORDER — NALOXONE HYDROCHLORIDE 0.4 MG/ML
0.4 INJECTION, SOLUTION INTRAMUSCULAR; INTRAVENOUS; SUBCUTANEOUS
Status: DISCONTINUED | OUTPATIENT
Start: 2023-01-01 | End: 2023-01-01 | Stop reason: HOSPADM

## 2023-01-01 RX ORDER — IPRATROPIUM BROMIDE AND ALBUTEROL SULFATE 2.5; .5 MG/3ML; MG/3ML
3 SOLUTION RESPIRATORY (INHALATION)
Status: DISCONTINUED | OUTPATIENT
Start: 2023-01-01 | End: 2023-01-01

## 2023-01-01 RX ORDER — ONDANSETRON 2 MG/ML
4 INJECTION INTRAMUSCULAR; INTRAVENOUS EVERY 6 HOURS PRN
Status: DISCONTINUED | OUTPATIENT
Start: 2023-01-01 | End: 2023-01-01 | Stop reason: HOSPADM

## 2023-01-01 RX ORDER — HEPARIN SODIUM 5000 [USP'U]/.5ML
5000 INJECTION, SOLUTION INTRAVENOUS; SUBCUTANEOUS EVERY 12 HOURS
Status: DISCONTINUED | OUTPATIENT
Start: 2023-01-01 | End: 2023-01-01

## 2023-01-01 RX ORDER — SODIUM CHLORIDE 9 MG/ML
INJECTION, SOLUTION INTRAVENOUS CONTINUOUS
Status: DISCONTINUED | OUTPATIENT
Start: 2023-01-01 | End: 2023-01-01

## 2023-01-01 RX ORDER — ASPIRIN 81 MG/1
81 TABLET ORAL DAILY
Status: DISCONTINUED | OUTPATIENT
Start: 2023-01-01 | End: 2023-01-01 | Stop reason: HOSPADM

## 2023-01-01 RX ORDER — FUROSEMIDE 10 MG/ML
20 INJECTION INTRAMUSCULAR; INTRAVENOUS EVERY 12 HOURS
Status: DISCONTINUED | OUTPATIENT
Start: 2023-01-01 | End: 2023-01-01

## 2023-01-01 RX ORDER — SALIVA STIMULANT COMB. NO.3
2 SPRAY, NON-AEROSOL (ML) MUCOUS MEMBRANE
Status: DISCONTINUED | OUTPATIENT
Start: 2023-01-01 | End: 2023-01-01 | Stop reason: HOSPADM

## 2023-01-01 RX ORDER — METHOCARBAMOL 500 MG/1
250 TABLET, FILM COATED ORAL 4 TIMES DAILY
Start: 2023-01-01

## 2023-01-01 RX ORDER — AMOXICILLIN 250 MG
1 CAPSULE ORAL EVERY OTHER DAY
COMMUNITY

## 2023-01-01 RX ORDER — LORAZEPAM 0.5 MG/1
0.25 TABLET ORAL EVERY 4 HOURS PRN
Status: DISCONTINUED | OUTPATIENT
Start: 2023-01-01 | End: 2023-01-01 | Stop reason: HOSPADM

## 2023-01-01 RX ORDER — ACETAMINOPHEN 325 MG/1
650 TABLET ORAL EVERY 4 HOURS PRN
Status: DISCONTINUED | OUTPATIENT
Start: 2023-01-01 | End: 2023-01-01 | Stop reason: HOSPADM

## 2023-01-01 RX ORDER — PROCHLORPERAZINE 25 MG
12.5 SUPPOSITORY, RECTAL RECTAL EVERY 12 HOURS PRN
Status: DISCONTINUED | OUTPATIENT
Start: 2023-01-01 | End: 2023-01-01 | Stop reason: HOSPADM

## 2023-01-01 RX ORDER — LORAZEPAM 2 MG/ML
0.5 INJECTION INTRAMUSCULAR EVERY 6 HOURS PRN
Status: DISCONTINUED | OUTPATIENT
Start: 2023-01-01 | End: 2023-01-01

## 2023-01-01 RX ORDER — METHYLPREDNISOLONE SODIUM SUCCINATE 125 MG/2ML
125 INJECTION, POWDER, LYOPHILIZED, FOR SOLUTION INTRAMUSCULAR; INTRAVENOUS ONCE
Status: COMPLETED | OUTPATIENT
Start: 2023-01-01 | End: 2023-01-01

## 2023-01-01 RX ORDER — FUROSEMIDE 20 MG
20 TABLET ORAL
Start: 2023-01-01 | End: 2023-01-01

## 2023-01-01 RX ORDER — ACETAMINOPHEN 325 MG/1
975 TABLET ORAL EVERY 8 HOURS
Status: DISCONTINUED | OUTPATIENT
Start: 2023-01-01 | End: 2023-01-01 | Stop reason: HOSPADM

## 2023-01-01 RX ORDER — POLYETHYLENE GLYCOL 3350 17 G/17G
1 POWDER, FOR SOLUTION ORAL DAILY
COMMUNITY

## 2023-01-01 RX ORDER — FUROSEMIDE 20 MG
20 TABLET ORAL
Start: 2023-01-01 | End: 2023-07-03

## 2023-01-01 RX ORDER — SALIVA STIMULANT COMB. NO.3
2 SPRAY, NON-AEROSOL (ML) MUCOUS MEMBRANE 4 TIMES DAILY PRN
Start: 2023-01-01

## 2023-01-01 RX ORDER — METHYLPREDNISOLONE SODIUM SUCCINATE 125 MG/2ML
60 INJECTION, POWDER, LYOPHILIZED, FOR SOLUTION INTRAMUSCULAR; INTRAVENOUS EVERY 12 HOURS
Status: DISCONTINUED | OUTPATIENT
Start: 2023-01-01 | End: 2023-01-01

## 2023-01-01 RX ORDER — FUROSEMIDE 20 MG
20 TABLET ORAL DAILY
Status: DISCONTINUED | OUTPATIENT
Start: 2023-01-01 | End: 2023-01-01

## 2023-01-01 RX ORDER — LIDOCAINE 40 MG/G
CREAM TOPICAL
Status: DISCONTINUED | OUTPATIENT
Start: 2023-01-01 | End: 2023-01-01 | Stop reason: HOSPADM

## 2023-01-01 RX ORDER — ONDANSETRON 4 MG/1
4 TABLET, ORALLY DISINTEGRATING ORAL EVERY 6 HOURS PRN
Status: DISCONTINUED | OUTPATIENT
Start: 2023-01-01 | End: 2023-01-01 | Stop reason: HOSPADM

## 2023-01-01 RX ORDER — FENTANYL CITRATE 50 UG/ML
25-50 INJECTION, SOLUTION INTRAMUSCULAR; INTRAVENOUS EVERY 5 MIN PRN
Status: DISCONTINUED | OUTPATIENT
Start: 2023-01-01 | End: 2023-01-01 | Stop reason: HOSPADM

## 2023-01-01 RX ORDER — METHOCARBAMOL 500 MG/1
500 TABLET, FILM COATED ORAL 4 TIMES DAILY
Status: DISCONTINUED | OUTPATIENT
Start: 2023-01-01 | End: 2023-01-01

## 2023-01-01 RX ORDER — LEVOFLOXACIN 250 MG/1
TABLET, FILM COATED ORAL
Qty: 6 TABLET | Refills: 0 | Status: SHIPPED | OUTPATIENT
Start: 2023-01-01 | End: 2023-01-01

## 2023-01-01 RX ORDER — LORAZEPAM 1 MG/1
1 TABLET ORAL EVERY 6 HOURS PRN
Status: DISCONTINUED | OUTPATIENT
Start: 2023-01-01 | End: 2023-01-01

## 2023-01-01 RX ORDER — ASPIRIN 81 MG/1
81 TABLET ORAL DAILY
COMMUNITY

## 2023-01-01 RX ORDER — ALBUMIN (HUMAN) 12.5 G/50ML
SOLUTION INTRAVENOUS
Status: DISPENSED
Start: 2023-01-01 | End: 2023-01-01

## 2023-01-01 RX ORDER — ANASTROZOLE 1 MG/1
1 TABLET ORAL DAILY
Status: DISCONTINUED | OUTPATIENT
Start: 2023-01-01 | End: 2023-01-01 | Stop reason: HOSPADM

## 2023-01-01 RX ORDER — SALIVA STIMULANT COMB. NO.3
2 SPRAY, NON-AEROSOL (ML) MUCOUS MEMBRANE 4 TIMES DAILY
Status: DISCONTINUED | OUTPATIENT
Start: 2023-01-01 | End: 2023-01-01 | Stop reason: HOSPADM

## 2023-01-01 RX ORDER — METHOCARBAMOL 500 MG/1
250 TABLET ORAL 4 TIMES DAILY
Status: DISCONTINUED | OUTPATIENT
Start: 2023-01-01 | End: 2023-01-01 | Stop reason: HOSPADM

## 2023-01-01 RX ORDER — METHYLPREDNISOLONE SODIUM SUCCINATE 125 MG/2ML
60 INJECTION, POWDER, LYOPHILIZED, FOR SOLUTION INTRAMUSCULAR; INTRAVENOUS EVERY 24 HOURS
Status: DISCONTINUED | OUTPATIENT
Start: 2023-01-01 | End: 2023-01-01

## 2023-01-01 RX ORDER — IPRATROPIUM BROMIDE AND ALBUTEROL SULFATE 2.5; .5 MG/3ML; MG/3ML
3 SOLUTION RESPIRATORY (INHALATION) EVERY 6 HOURS PRN
Qty: 90 ML
Start: 2023-01-01 | End: 2023-01-01

## 2023-01-01 RX ORDER — FUROSEMIDE 10 MG/ML
20 INJECTION INTRAMUSCULAR; INTRAVENOUS DAILY
Status: DISCONTINUED | OUTPATIENT
Start: 2023-01-01 | End: 2023-01-01 | Stop reason: HOSPADM

## 2023-01-01 RX ORDER — FUROSEMIDE 20 MG
20 TABLET ORAL
Status: DISCONTINUED | OUTPATIENT
Start: 2023-01-01 | End: 2023-01-01 | Stop reason: HOSPADM

## 2023-01-01 RX ORDER — GUAIFENESIN 200 MG/10ML
300 LIQUID ORAL 3 TIMES DAILY
COMMUNITY

## 2023-01-01 RX ORDER — ALBUMIN (HUMAN) 12.5 G/50ML
25 SOLUTION INTRAVENOUS ONCE
Status: COMPLETED | OUTPATIENT
Start: 2023-01-01 | End: 2023-01-01

## 2023-01-01 RX ORDER — IBUPROFEN 400 MG/1
400 TABLET, FILM COATED ORAL EVERY 6 HOURS PRN
Status: DISCONTINUED | OUTPATIENT
Start: 2023-01-01 | End: 2023-01-01 | Stop reason: HOSPADM

## 2023-01-01 RX ORDER — HYDROMORPHONE HCL IN WATER/PF 6 MG/30 ML
0.2 PATIENT CONTROLLED ANALGESIA SYRINGE INTRAVENOUS ONCE
Status: COMPLETED | OUTPATIENT
Start: 2023-01-01 | End: 2023-01-01

## 2023-01-01 RX ORDER — OXYCODONE HYDROCHLORIDE 5 MG/1
2.5 TABLET ORAL EVERY 4 HOURS PRN
Qty: 16 TABLET | Refills: 0 | Status: SHIPPED | OUTPATIENT
Start: 2023-01-01

## 2023-01-01 RX ORDER — PREDNISONE 20 MG/1
20 TABLET ORAL DAILY
Status: DISCONTINUED | OUTPATIENT
Start: 2023-01-01 | End: 2023-01-01

## 2023-01-01 RX ORDER — POLYETHYLENE GLYCOL 3350 17 G/17G
17 POWDER, FOR SOLUTION ORAL DAILY
Status: DISCONTINUED | OUTPATIENT
Start: 2023-01-01 | End: 2023-01-01 | Stop reason: HOSPADM

## 2023-01-01 RX ORDER — GUAIFENESIN 200 MG/10ML
300 LIQUID ORAL 3 TIMES DAILY
Start: 2023-01-01 | End: 2023-01-01

## 2023-01-01 RX ORDER — ENOXAPARIN SODIUM 100 MG/ML
1 INJECTION SUBCUTANEOUS ONCE
Status: COMPLETED | OUTPATIENT
Start: 2023-01-01 | End: 2023-01-01

## 2023-01-01 RX ORDER — FUROSEMIDE 10 MG/ML
40 INJECTION INTRAMUSCULAR; INTRAVENOUS EVERY 12 HOURS
Status: COMPLETED | OUTPATIENT
Start: 2023-01-01 | End: 2023-01-01

## 2023-01-01 RX ORDER — SALIVA STIMULANT COMB. NO.3
2 SPRAY, NON-AEROSOL (ML) MUCOUS MEMBRANE 4 TIMES DAILY PRN
Status: DISCONTINUED | OUTPATIENT
Start: 2023-01-01 | End: 2023-01-01

## 2023-01-01 RX ORDER — CALCITONIN SALMON 200 [IU]/.09ML
1 SPRAY, METERED NASAL DAILY
Start: 2023-01-01

## 2023-01-01 RX ORDER — FUROSEMIDE 20 MG
20 TABLET ORAL ONCE
Status: DISCONTINUED | OUTPATIENT
Start: 2023-01-01 | End: 2023-01-01

## 2023-01-01 RX ORDER — LORAZEPAM 0.5 MG/1
0.25 TABLET ORAL EVERY 4 HOURS PRN
Qty: 4 TABLET | Refills: 0 | Status: SHIPPED | OUTPATIENT
Start: 2023-01-01

## 2023-01-01 RX ORDER — PROCHLORPERAZINE MALEATE 5 MG
5 TABLET ORAL EVERY 6 HOURS PRN
Status: DISCONTINUED | OUTPATIENT
Start: 2023-01-01 | End: 2023-01-01 | Stop reason: HOSPADM

## 2023-01-01 RX ORDER — HYDROMORPHONE HCL IN WATER/PF 6 MG/30 ML
0.2 PATIENT CONTROLLED ANALGESIA SYRINGE INTRAVENOUS 4 TIMES DAILY PRN
Status: DISCONTINUED | OUTPATIENT
Start: 2023-01-01 | End: 2023-01-01

## 2023-01-01 RX ORDER — ACETAMINOPHEN 325 MG/1
325-650 TABLET ORAL EVERY 6 HOURS PRN
Status: ON HOLD | COMMUNITY
End: 2023-01-01

## 2023-01-01 RX ORDER — ONDANSETRON 4 MG/1
4 TABLET, ORALLY DISINTEGRATING ORAL EVERY 6 HOURS PRN
COMMUNITY

## 2023-01-01 RX ORDER — POLYETHYLENE GLYCOL 3350 17 G/17G
17 POWDER, FOR SOLUTION ORAL DAILY
Qty: 510 G
Start: 2023-01-01 | End: 2023-01-01

## 2023-01-01 RX ORDER — FUROSEMIDE 10 MG/ML
40 INJECTION INTRAMUSCULAR; INTRAVENOUS ONCE
Status: COMPLETED | OUTPATIENT
Start: 2023-01-01 | End: 2023-01-01

## 2023-01-01 RX ORDER — LIDOCAINE 50 MG/G
OINTMENT TOPICAL 4 TIMES DAILY
Status: DISCONTINUED | OUTPATIENT
Start: 2023-01-01 | End: 2023-01-01 | Stop reason: HOSPADM

## 2023-01-01 RX ORDER — FLUMAZENIL 0.1 MG/ML
0.2 INJECTION, SOLUTION INTRAVENOUS
Status: DISCONTINUED | OUTPATIENT
Start: 2023-01-01 | End: 2023-01-01 | Stop reason: HOSPADM

## 2023-01-01 RX ORDER — IPRATROPIUM BROMIDE AND ALBUTEROL SULFATE 2.5; .5 MG/3ML; MG/3ML
3 SOLUTION RESPIRATORY (INHALATION) 3 TIMES DAILY
Qty: 90 ML
Start: 2023-01-01

## 2023-01-01 RX ORDER — IPRATROPIUM BROMIDE AND ALBUTEROL SULFATE 2.5; .5 MG/3ML; MG/3ML
3 SOLUTION RESPIRATORY (INHALATION) EVERY 6 HOURS PRN
Status: DISCONTINUED | OUTPATIENT
Start: 2023-01-01 | End: 2023-01-01 | Stop reason: HOSPADM

## 2023-01-01 RX ORDER — CALCITONIN SALMON 200 [IU]/.09ML
1 SPRAY, METERED NASAL DAILY
Status: DISCONTINUED | OUTPATIENT
Start: 2023-01-01 | End: 2023-01-01 | Stop reason: HOSPADM

## 2023-01-01 RX ORDER — ACETAMINOPHEN 325 MG/1
325-650 TABLET ORAL EVERY 6 HOURS PRN
Status: DISCONTINUED | OUTPATIENT
Start: 2023-01-01 | End: 2023-01-01 | Stop reason: HOSPADM

## 2023-01-01 RX ORDER — FUROSEMIDE 10 MG/ML
60 INJECTION INTRAMUSCULAR; INTRAVENOUS EVERY 12 HOURS
Status: DISCONTINUED | OUTPATIENT
Start: 2023-01-01 | End: 2023-01-01

## 2023-01-01 RX ADMIN — METHOCARBAMOL TABLETS 250 MG: 500 TABLET, COATED ORAL at 08:31

## 2023-01-01 RX ADMIN — IPRATROPIUM BROMIDE AND ALBUTEROL SULFATE 3 ML: .5; 3 SOLUTION RESPIRATORY (INHALATION) at 08:31

## 2023-01-01 RX ADMIN — METHOCARBAMOL TABLETS 250 MG: 500 TABLET, COATED ORAL at 15:09

## 2023-01-01 RX ADMIN — CALCITONIN SALMON 1 SPRAY: 200 SPRAY, METERED NASAL at 09:46

## 2023-01-01 RX ADMIN — SODIUM CHLORIDE: 9 INJECTION, SOLUTION INTRAVENOUS at 18:07

## 2023-01-01 RX ADMIN — METHOCARBAMOL TABLETS 250 MG: 500 TABLET, COATED ORAL at 20:49

## 2023-01-01 RX ADMIN — ACETAMINOPHEN 975 MG: 325 TABLET, FILM COATED ORAL at 16:14

## 2023-01-01 RX ADMIN — METHOCARBAMOL TABLETS 250 MG: 500 TABLET, COATED ORAL at 15:06

## 2023-01-01 RX ADMIN — FUROSEMIDE 20 MG: 10 INJECTION, SOLUTION INTRAMUSCULAR; INTRAVENOUS at 12:21

## 2023-01-01 RX ADMIN — SODIUM ZIRCONIUM CYCLOSILICATE 10 G: 10 POWDER, FOR SUSPENSION ORAL at 14:17

## 2023-01-01 RX ADMIN — LIDOCAINE: 50 OINTMENT TOPICAL at 13:15

## 2023-01-01 RX ADMIN — HYDROMORPHONE HYDROCHLORIDE 0.5 MG: 1 INJECTION, SOLUTION INTRAMUSCULAR; INTRAVENOUS; SUBCUTANEOUS at 11:21

## 2023-01-01 RX ADMIN — CALCITONIN SALMON 1 SPRAY: 200 SPRAY, METERED NASAL at 07:48

## 2023-01-01 RX ADMIN — ASPIRIN 81 MG: 81 TABLET, COATED ORAL at 09:27

## 2023-01-01 RX ADMIN — LIDOCAINE: 50 OINTMENT TOPICAL at 20:13

## 2023-01-01 RX ADMIN — POLYETHYLENE GLYCOL 3350 17 G: 17 POWDER, FOR SOLUTION ORAL at 09:34

## 2023-01-01 RX ADMIN — DICLOFENAC 2 G: 10 GEL TOPICAL at 16:14

## 2023-01-01 RX ADMIN — CALCITONIN SALMON 1 SPRAY: 200 SPRAY, METERED NASAL at 14:07

## 2023-01-01 RX ADMIN — OXYCODONE HYDROCHLORIDE 2.5 MG: 5 TABLET ORAL at 03:50

## 2023-01-01 RX ADMIN — OXYCODONE HYDROCHLORIDE 2.5 MG: 5 TABLET ORAL at 09:41

## 2023-01-01 RX ADMIN — DICLOFENAC 2 G: 10 GEL TOPICAL at 08:57

## 2023-01-01 RX ADMIN — IPRATROPIUM BROMIDE AND ALBUTEROL SULFATE 3 ML: .5; 3 SOLUTION RESPIRATORY (INHALATION) at 11:47

## 2023-01-01 RX ADMIN — DICLOFENAC 2 G: 10 GEL TOPICAL at 03:44

## 2023-01-01 RX ADMIN — LIDOCAINE: 50 OINTMENT TOPICAL at 08:57

## 2023-01-01 RX ADMIN — LIDOCAINE: 50 OINTMENT TOPICAL at 21:02

## 2023-01-01 RX ADMIN — ANASTROZOLE 1 MG: 1 TABLET, COATED ORAL at 08:57

## 2023-01-01 RX ADMIN — ACETAMINOPHEN 975 MG: 325 TABLET, FILM COATED ORAL at 01:14

## 2023-01-01 RX ADMIN — METHOCARBAMOL TABLETS 250 MG: 500 TABLET, COATED ORAL at 16:03

## 2023-01-01 RX ADMIN — ASPIRIN 81 MG: 81 TABLET, COATED ORAL at 09:33

## 2023-01-01 RX ADMIN — SODIUM ZIRCONIUM CYCLOSILICATE 10 G: 10 POWDER, FOR SUSPENSION ORAL at 09:32

## 2023-01-01 RX ADMIN — FUROSEMIDE 20 MG: 20 TABLET ORAL at 09:56

## 2023-01-01 RX ADMIN — LIDOCAINE: 50 OINTMENT TOPICAL at 17:29

## 2023-01-01 RX ADMIN — PREDNISONE 20 MG: 20 TABLET ORAL at 09:38

## 2023-01-01 RX ADMIN — ACETAMINOPHEN 975 MG: 325 TABLET, FILM COATED ORAL at 07:17

## 2023-01-01 RX ADMIN — ACETAMINOPHEN 975 MG: 325 TABLET, FILM COATED ORAL at 16:03

## 2023-01-01 RX ADMIN — ACETAMINOPHEN 975 MG: 325 TABLET, FILM COATED ORAL at 02:02

## 2023-01-01 RX ADMIN — LIDOCAINE: 50 OINTMENT TOPICAL at 08:46

## 2023-01-01 RX ADMIN — ANASTROZOLE 1 MG: 1 TABLET, COATED ORAL at 14:57

## 2023-01-01 RX ADMIN — METHOCARBAMOL TABLETS 250 MG: 500 TABLET, COATED ORAL at 13:15

## 2023-01-01 RX ADMIN — LIDOCAINE: 50 OINTMENT TOPICAL at 13:50

## 2023-01-01 RX ADMIN — FUROSEMIDE 60 MG: 10 INJECTION, SOLUTION INTRAMUSCULAR; INTRAVENOUS at 01:02

## 2023-01-01 RX ADMIN — ASPIRIN 81 MG: 81 TABLET, COATED ORAL at 07:46

## 2023-01-01 RX ADMIN — METHOCARBAMOL TABLETS 250 MG: 500 TABLET, COATED ORAL at 07:45

## 2023-01-01 RX ADMIN — FUROSEMIDE 40 MG: 10 INJECTION, SOLUTION INTRAVENOUS at 14:17

## 2023-01-01 RX ADMIN — METHOCARBAMOL TABLETS 250 MG: 500 TABLET, COATED ORAL at 20:51

## 2023-01-01 RX ADMIN — ACETAMINOPHEN 975 MG: 325 TABLET, FILM COATED ORAL at 17:29

## 2023-01-01 RX ADMIN — OXYCODONE HYDROCHLORIDE 2.5 MG: 5 TABLET ORAL at 16:14

## 2023-01-01 RX ADMIN — IPRATROPIUM BROMIDE AND ALBUTEROL SULFATE 3 ML: .5; 3 SOLUTION RESPIRATORY (INHALATION) at 20:30

## 2023-01-01 RX ADMIN — IPRATROPIUM BROMIDE AND ALBUTEROL SULFATE 3 ML: .5; 3 SOLUTION RESPIRATORY (INHALATION) at 11:40

## 2023-01-01 RX ADMIN — PREDNISONE 20 MG: 20 TABLET ORAL at 09:33

## 2023-01-01 RX ADMIN — ASPIRIN 81 MG: 81 TABLET, COATED ORAL at 10:25

## 2023-01-01 RX ADMIN — ANASTROZOLE 1 MG: 1 TABLET, COATED ORAL at 07:45

## 2023-01-01 RX ADMIN — PERFLUTREN 2 ML: 6.52 INJECTION, SUSPENSION INTRAVENOUS at 10:22

## 2023-01-01 RX ADMIN — LIDOCAINE: 50 OINTMENT TOPICAL at 10:39

## 2023-01-01 RX ADMIN — MIDAZOLAM 0.5 MG: 1 INJECTION INTRAMUSCULAR; INTRAVENOUS at 10:34

## 2023-01-01 RX ADMIN — ONDANSETRON 4 MG: 2 INJECTION INTRAMUSCULAR; INTRAVENOUS at 14:07

## 2023-01-01 RX ADMIN — ACETAMINOPHEN 975 MG: 325 TABLET, FILM COATED ORAL at 09:33

## 2023-01-01 RX ADMIN — FUROSEMIDE 20 MG: 20 TABLET ORAL at 08:31

## 2023-01-01 RX ADMIN — SODIUM ZIRCONIUM CYCLOSILICATE 10 G: 10 POWDER, FOR SUSPENSION ORAL at 20:51

## 2023-01-01 RX ADMIN — SODIUM ZIRCONIUM CYCLOSILICATE 10 G: 10 POWDER, FOR SUSPENSION ORAL at 08:46

## 2023-01-01 RX ADMIN — ACETAMINOPHEN 975 MG: 325 TABLET, FILM COATED ORAL at 07:45

## 2023-01-01 RX ADMIN — CALCITONIN SALMON 1 SPRAY: 200 SPRAY, METERED NASAL at 09:29

## 2023-01-01 RX ADMIN — ANASTROZOLE 1 MG: 1 TABLET, COATED ORAL at 08:35

## 2023-01-01 RX ADMIN — ALBUMIN HUMAN 25 G: 0.25 SOLUTION INTRAVENOUS at 16:02

## 2023-01-01 RX ADMIN — FUROSEMIDE 20 MG: 10 INJECTION, SOLUTION INTRAMUSCULAR; INTRAVENOUS at 04:04

## 2023-01-01 RX ADMIN — METHOCARBAMOL TABLETS 250 MG: 500 TABLET, COATED ORAL at 10:26

## 2023-01-01 RX ADMIN — CALCITONIN SALMON 1 SPRAY: 200 SPRAY, METERED NASAL at 10:27

## 2023-01-01 RX ADMIN — SODIUM ZIRCONIUM CYCLOSILICATE 10 G: 10 POWDER, FOR SUSPENSION ORAL at 08:57

## 2023-01-01 RX ADMIN — METHOCARBAMOL TABLETS 500 MG: 500 TABLET, COATED ORAL at 21:40

## 2023-01-01 RX ADMIN — LIDOCAINE: 50 OINTMENT TOPICAL at 16:14

## 2023-01-01 RX ADMIN — LIDOCAINE: 50 OINTMENT TOPICAL at 08:32

## 2023-01-01 RX ADMIN — LIDOCAINE: 50 OINTMENT TOPICAL at 21:40

## 2023-01-01 RX ADMIN — ACETAMINOPHEN 975 MG: 325 TABLET, FILM COATED ORAL at 08:30

## 2023-01-01 RX ADMIN — METHOCARBAMOL TABLETS 250 MG: 500 TABLET, COATED ORAL at 09:27

## 2023-01-01 RX ADMIN — DICLOFENAC 2 G: 10 GEL TOPICAL at 06:47

## 2023-01-01 RX ADMIN — LIDOCAINE: 50 OINTMENT TOPICAL at 06:13

## 2023-01-01 RX ADMIN — LIDOCAINE: 50 OINTMENT TOPICAL at 12:27

## 2023-01-01 RX ADMIN — SODIUM ZIRCONIUM CYCLOSILICATE 10 G: 10 POWDER, FOR SUSPENSION ORAL at 10:40

## 2023-01-01 RX ADMIN — LORAZEPAM 0.5 MG: 2 INJECTION INTRAMUSCULAR; INTRAVENOUS at 17:50

## 2023-01-01 RX ADMIN — FUROSEMIDE 20 MG: 20 TABLET ORAL at 08:36

## 2023-01-01 RX ADMIN — Medication 2 SPRAY: at 09:44

## 2023-01-01 RX ADMIN — METHOCARBAMOL TABLETS 250 MG: 500 TABLET, COATED ORAL at 09:06

## 2023-01-01 RX ADMIN — METHOCARBAMOL TABLETS 250 MG: 500 TABLET, COATED ORAL at 09:32

## 2023-01-01 RX ADMIN — METHOCARBAMOL TABLETS 250 MG: 500 TABLET, COATED ORAL at 13:50

## 2023-01-01 RX ADMIN — ASPIRIN 81 MG: 81 TABLET, COATED ORAL at 08:34

## 2023-01-01 RX ADMIN — METHYLPREDNISOLONE SODIUM SUCCINATE 62.5 MG: 125 INJECTION, POWDER, FOR SOLUTION INTRAMUSCULAR; INTRAVENOUS at 14:07

## 2023-01-01 RX ADMIN — OXYCODONE HYDROCHLORIDE 2.5 MG: 5 TABLET ORAL at 18:32

## 2023-01-01 RX ADMIN — DICLOFENAC 2 G: 10 GEL TOPICAL at 13:50

## 2023-01-01 RX ADMIN — DICLOFENAC 2 G: 10 GEL TOPICAL at 14:54

## 2023-01-01 RX ADMIN — LIDOCAINE: 50 OINTMENT TOPICAL at 15:09

## 2023-01-01 RX ADMIN — METHYLPREDNISOLONE SODIUM SUCCINATE 125 MG: 125 INJECTION, POWDER, FOR SOLUTION INTRAMUSCULAR; INTRAVENOUS at 14:22

## 2023-01-01 RX ADMIN — ANASTROZOLE 1 MG: 1 TABLET, COATED ORAL at 08:31

## 2023-01-01 RX ADMIN — METHYLPREDNISOLONE SODIUM SUCCINATE 62.5 MG: 125 INJECTION, POWDER, FOR SOLUTION INTRAMUSCULAR; INTRAVENOUS at 01:02

## 2023-01-01 RX ADMIN — CALCITONIN SALMON 1 SPRAY: 200 SPRAY, METERED NASAL at 08:57

## 2023-01-01 RX ADMIN — SODIUM ZIRCONIUM CYCLOSILICATE 10 G: 10 POWDER, FOR SUSPENSION ORAL at 09:29

## 2023-01-01 RX ADMIN — FUROSEMIDE 20 MG: 10 INJECTION, SOLUTION INTRAMUSCULAR; INTRAVENOUS at 16:02

## 2023-01-01 RX ADMIN — POLYETHYLENE GLYCOL 3350 17 G: 17 POWDER, FOR SOLUTION ORAL at 08:32

## 2023-01-01 RX ADMIN — LIDOCAINE: 50 OINTMENT TOPICAL at 09:33

## 2023-01-01 RX ADMIN — DICLOFENAC 2 G: 10 GEL TOPICAL at 09:33

## 2023-01-01 RX ADMIN — DICLOFENAC 2 G: 10 GEL TOPICAL at 13:15

## 2023-01-01 RX ADMIN — METHYLPREDNISOLONE SODIUM SUCCINATE 62.5 MG: 125 INJECTION, POWDER, FOR SOLUTION INTRAMUSCULAR; INTRAVENOUS at 15:07

## 2023-01-01 RX ADMIN — ACETAMINOPHEN 975 MG: 325 TABLET, FILM COATED ORAL at 00:25

## 2023-01-01 RX ADMIN — FUROSEMIDE 40 MG: 10 INJECTION, SOLUTION INTRAMUSCULAR; INTRAVENOUS at 14:26

## 2023-01-01 RX ADMIN — DICLOFENAC 2 G: 10 GEL TOPICAL at 08:32

## 2023-01-01 RX ADMIN — POLYETHYLENE GLYCOL 3350 17 G: 17 POWDER, FOR SOLUTION ORAL at 08:35

## 2023-01-01 RX ADMIN — DICLOFENAC 2 G: 10 GEL TOPICAL at 21:02

## 2023-01-01 RX ADMIN — POLYETHYLENE GLYCOL 3350 17 G: 17 POWDER, FOR SOLUTION ORAL at 08:57

## 2023-01-01 RX ADMIN — DICLOFENAC 2 G: 10 GEL TOPICAL at 12:27

## 2023-01-01 RX ADMIN — ASPIRIN 81 MG: 81 TABLET, COATED ORAL at 08:57

## 2023-01-01 RX ADMIN — FUROSEMIDE 40 MG: 10 INJECTION, SOLUTION INTRAVENOUS at 04:33

## 2023-01-01 RX ADMIN — LIDOCAINE: 50 OINTMENT TOPICAL at 14:54

## 2023-01-01 RX ADMIN — DICLOFENAC 2 G: 10 GEL TOPICAL at 20:49

## 2023-01-01 RX ADMIN — IPRATROPIUM BROMIDE AND ALBUTEROL SULFATE 3 ML: .5; 3 SOLUTION RESPIRATORY (INHALATION) at 07:30

## 2023-01-01 RX ADMIN — PREDNISONE 20 MG: 20 TABLET ORAL at 08:31

## 2023-01-01 RX ADMIN — METHOCARBAMOL TABLETS 250 MG: 500 TABLET, COATED ORAL at 21:02

## 2023-01-01 RX ADMIN — LIDOCAINE: 50 OINTMENT TOPICAL at 20:49

## 2023-01-01 RX ADMIN — ACETAMINOPHEN 975 MG: 325 TABLET, FILM COATED ORAL at 17:53

## 2023-01-01 RX ADMIN — HYDROMORPHONE HYDROCHLORIDE 0.2 MG: 0.2 INJECTION, SOLUTION INTRAMUSCULAR; INTRAVENOUS; SUBCUTANEOUS at 14:40

## 2023-01-01 RX ADMIN — POLYETHYLENE GLYCOL 3350 17 G: 17 POWDER, FOR SOLUTION ORAL at 07:45

## 2023-01-01 RX ADMIN — DICLOFENAC 2 G: 10 GEL TOPICAL at 08:46

## 2023-01-01 RX ADMIN — ACETAMINOPHEN 975 MG: 325 TABLET, FILM COATED ORAL at 01:23

## 2023-01-01 RX ADMIN — ANASTROZOLE 1 MG: 1 TABLET, COATED ORAL at 09:27

## 2023-01-01 RX ADMIN — METHOCARBAMOL TABLETS 250 MG: 500 TABLET, COATED ORAL at 12:27

## 2023-01-01 RX ADMIN — OXYCODONE HYDROCHLORIDE 2.5 MG: 5 TABLET ORAL at 01:36

## 2023-01-01 RX ADMIN — IPRATROPIUM BROMIDE AND ALBUTEROL SULFATE 3 ML: .5; 3 SOLUTION RESPIRATORY (INHALATION) at 20:22

## 2023-01-01 RX ADMIN — DICLOFENAC 2 G: 10 GEL TOPICAL at 17:29

## 2023-01-01 RX ADMIN — DICLOFENAC 2 G: 10 GEL TOPICAL at 12:22

## 2023-01-01 RX ADMIN — PREDNISONE 20 MG: 20 TABLET ORAL at 08:57

## 2023-01-01 RX ADMIN — METHOCARBAMOL TABLETS 500 MG: 500 TABLET, COATED ORAL at 17:50

## 2023-01-01 RX ADMIN — ANASTROZOLE 1 MG: 1 TABLET, COATED ORAL at 09:32

## 2023-01-01 RX ADMIN — ENOXAPARIN SODIUM 40 MG: 100 INJECTION SUBCUTANEOUS at 14:22

## 2023-01-01 RX ADMIN — METHOCARBAMOL TABLETS 250 MG: 500 TABLET, COATED ORAL at 16:13

## 2023-01-01 RX ADMIN — ASPIRIN 81 MG: 81 TABLET, COATED ORAL at 08:31

## 2023-01-01 RX ADMIN — OXYCODONE HYDROCHLORIDE 2.5 MG: 5 TABLET ORAL at 10:34

## 2023-01-01 RX ADMIN — MAGNESIUM HYDROXIDE 30 ML: 400 SUSPENSION ORAL at 15:12

## 2023-01-01 RX ADMIN — FUROSEMIDE 20 MG: 20 TABLET ORAL at 08:46

## 2023-01-01 RX ADMIN — ACETAMINOPHEN 975 MG: 325 TABLET, FILM COATED ORAL at 06:46

## 2023-01-01 ASSESSMENT — ACTIVITIES OF DAILY LIVING (ADL)
ADLS_ACUITY_SCORE: 35
ADLS_ACUITY_SCORE: 43
ADLS_ACUITY_SCORE: 37
ADLS_ACUITY_SCORE: 43
ADLS_ACUITY_SCORE: 22
ADLS_ACUITY_SCORE: 37
DRESSING/BATHING_DIFFICULTY: NO
ADLS_ACUITY_SCORE: 37
ADLS_ACUITY_SCORE: 43
ADLS_ACUITY_SCORE: 25
ADLS_ACUITY_SCORE: 43
ADLS_ACUITY_SCORE: 37
ADLS_ACUITY_SCORE: 22
ADLS_ACUITY_SCORE: 25
ADLS_ACUITY_SCORE: 35
ADLS_ACUITY_SCORE: 39
ADLS_ACUITY_SCORE: 43
TOILETING_ISSUES: NO
ADLS_ACUITY_SCORE: 43
ADLS_ACUITY_SCORE: 37
ADLS_ACUITY_SCORE: 22
DOING_ERRANDS_INDEPENDENTLY_DIFFICULTY: NO
ADLS_ACUITY_SCORE: 39
ADLS_ACUITY_SCORE: 43
ADLS_ACUITY_SCORE: 25
ADLS_ACUITY_SCORE: 37
CHANGE_IN_FUNCTIONAL_STATUS_SINCE_ONSET_OF_CURRENT_ILLNESS/INJURY: NO
ADLS_ACUITY_SCORE: 25
EQUIPMENT_CURRENTLY_USED_AT_HOME: CANE, STRAIGHT
ADLS_ACUITY_SCORE: 25
FALL_HISTORY_WITHIN_LAST_SIX_MONTHS: NO
ADLS_ACUITY_SCORE: 22
ADLS_ACUITY_SCORE: 35
ADLS_ACUITY_SCORE: 43
ADLS_ACUITY_SCORE: 37
ADLS_ACUITY_SCORE: 25
ADLS_ACUITY_SCORE: 40
ADLS_ACUITY_SCORE: 22
ADLS_ACUITY_SCORE: 37
ADLS_ACUITY_SCORE: 22
ADLS_ACUITY_SCORE: 37
ADLS_ACUITY_SCORE: 25
ADLS_ACUITY_SCORE: 35
ADLS_ACUITY_SCORE: 37
ADLS_ACUITY_SCORE: 37
ADLS_ACUITY_SCORE: 43
ADLS_ACUITY_SCORE: 35
ADLS_ACUITY_SCORE: 37
ADLS_ACUITY_SCORE: 22
ADLS_ACUITY_SCORE: 35
ADLS_ACUITY_SCORE: 43
ADLS_ACUITY_SCORE: 40
ADLS_ACUITY_SCORE: 43
ADLS_ACUITY_SCORE: 22
ADLS_ACUITY_SCORE: 43
ADLS_ACUITY_SCORE: 43
ADLS_ACUITY_SCORE: 40
ADLS_ACUITY_SCORE: 43
ADLS_ACUITY_SCORE: 37
ADLS_ACUITY_SCORE: 37
ADLS_ACUITY_SCORE: 39
ADLS_ACUITY_SCORE: 25
ADLS_ACUITY_SCORE: 39
ADLS_ACUITY_SCORE: 37
ADLS_ACUITY_SCORE: 22
CONCENTRATING,_REMEMBERING_OR_MAKING_DECISIONS_DIFFICULTY: NO
WEAR_GLASSES_OR_BLIND: NO
ADLS_ACUITY_SCORE: 39
ADLS_ACUITY_SCORE: 39
ADLS_ACUITY_SCORE: 37
DEPENDENT_IADLS:: INDEPENDENT
ADLS_ACUITY_SCORE: 37
ADLS_ACUITY_SCORE: 43
ADLS_ACUITY_SCORE: 37
ADLS_ACUITY_SCORE: 37
ADLS_ACUITY_SCORE: 43
ADLS_ACUITY_SCORE: 35
ADLS_ACUITY_SCORE: 35
ADLS_ACUITY_SCORE: 37
ADLS_ACUITY_SCORE: 22
ADLS_ACUITY_SCORE: 43
ADLS_ACUITY_SCORE: 22
ADLS_ACUITY_SCORE: 40
ADLS_ACUITY_SCORE: 22
ADLS_ACUITY_SCORE: 43
ADLS_ACUITY_SCORE: 22
ADLS_ACUITY_SCORE: 43
DIFFICULTY_EATING/SWALLOWING: NO
ADLS_ACUITY_SCORE: 37
ADLS_ACUITY_SCORE: 22
ADLS_ACUITY_SCORE: 39
ADLS_ACUITY_SCORE: 37
ADLS_ACUITY_SCORE: 25
ADLS_ACUITY_SCORE: 43
ADLS_ACUITY_SCORE: 39
WALKING_OR_CLIMBING_STAIRS_DIFFICULTY: NO

## 2023-01-01 ASSESSMENT — ENCOUNTER SYMPTOMS
SHORTNESS OF BREATH: 1
BACK PAIN: 1

## 2023-01-01 ASSESSMENT — PAIN SCALES - GENERAL: PAINLEVEL: WORST PAIN (10)

## 2023-05-18 PROBLEM — I50.21 ACUTE SYSTOLIC CONGESTIVE HEART FAILURE (H): Status: ACTIVE | Noted: 2023-01-01

## 2023-05-18 NOTE — PHARMACY-ADMISSION MEDICATION HISTORY
"Pharmacist Admission Medication History    Admission medication history is complete. The information provided in this note is only as accurate as the sources available at the time of the update.    Medication reconciliation/reorder completed by provider prior to medication history? No    Information Source(s): Patient and CareEverywhere/SureScripts via in-person    Pertinent Information:     Changes made to PTA medication list:    Added: asa    Deleted: patient reports she is no longer taking atorvastatin and lisinopril  (\"everything was so low\" she doesn't need them        Changed: None    Medication Affordability:       Allergies reviewed with patient and updates made in EHR: no    Medication History Completed By: Julián Vang RPH 5/18/2023 12:38 PM    Prior to Admission medications    Medication Sig Last Dose Taking? Auth Provider Long Term End Date   acetaminophen (TYLENOL) 325 MG tablet Take 325-650 mg by mouth every 6 hours as needed for mild pain 5/17/2023 Yes Unknown, Entered By History     anastrozole (ARIMIDEX) 1 MG tablet [ANASTROZOLE (ARIMIDEX) 1 MG TABLET] TAKE 1 TABLET BY MOUTH DAILY 5/17/2023 Yes Dulce Duran MD Yes    aspirin 81 MG EC tablet Take 81 mg by mouth daily 5/17/2023 Yes Unknown, Entered By History     cholecalciferol, vitamin D3, 1,000 unit tablet [CHOLECALCIFEROL, VITAMIN D3, 1,000 UNIT TABLET] Take 500 Units by mouth daily.  5/17/2023 Yes Provider, Historical     furosemide (LASIX) 20 MG tablet Take 1 tablet (20 mg) by mouth daily as needed (for swelling)  Patient taking differently: Take 20 mg by mouth daily 5/17/2023 Yes Gaby Quintana, APRN CNP Yes    LORazepam (ATIVAN) 1 MG tablet Take by mouth as needed Past Month Yes Reported, Patient     multivit-iron-min-folic acid 3,500-18-0.4 unit-mg-mg Chew [MULTIVIT-IRON-MIN-FOLIC ACID 3,500-18-0.4 UNIT-MG-MG CHEW] Chew 1 tablet daily.  5/17/2023 Yes Provider, Historical                  "

## 2023-05-18 NOTE — ED PROVIDER NOTES
EMERGENCY DEPARTMENT ENCOUNTER      NAME: Mayela Espino  AGE: 89 year old female  YOB: 1932  MRN: [id number]  EVALUATION DATE & TIME: No admission date for patient encounter.    PCP: Marco Bonner    ED PROVIDER: Juna Beyer MD    Chief Complaint   Patient presents with     Back Pain     Right flank area     FINAL IMPRESSION:  1. Acute systolic congestive heart failure (H)      ED COURSE & MEDICAL DECISION MAKING:    10:29 AM I met with the patient in waiting room, obtained history, performed an initial exam, and discussed options and plan for diagnostics and treatment here in the ED.     Pertinent Labs & Imaging studies reviewed. (See chart for details)  89 year old female presents to the Emergency Department for evaluation of chest pain shortness of breath and right-sided flank pain.  Patient has a past medical history of chronic kidney disease and congestive heart failure was as well as hypertension.  1 to 2-week history of escalating symptoms.  Shortness of breath.  Pain to the right lateral chest right posterior inferior chest and right flank.  I initially examined the patient out in triage due to the capacity issues with boarding patients in the emergency department.  She was noted to be significantly hypoxic.  She was started on O2 supplementation.  Patient was dyspneic but not in respiratory distress.  Indicating pain that seem to be most likely in the right lateral chest right posterior inferior upper back.  There was a respiratory component to it.  Palpation of the abdomen did not seem to elicit significant discomfort.  In addition there was markedly decreased breath sounds on the right side suggesting pulmonary pathology.  Differential broad at this point.  Considerations for ACS CHF PE COPD pneumonia.  Initiating plan of care with imaging laboratory testing patient will likely require admission to the hospital.    1:12 PM  Chronic kidney disease mildly worsened today compared to  her baseline.  BNP markedly elevated.  Patient's O2 sats of 80% were treated with 3 L nasal cannula O2 which did stabilize her oxygenation above 92%.  She was still tachypneic but felt improved with the oxygen supplementation.  Patient has known suppressed ejection fraction with EF of 30% on most recent echo.  Initiated diuresis while awaiting additional work-up.  Given the right-sided chest pain I did have concern for the possibility of pulmonary embolism however given her chronic kidney disease I could not perform intravenous contrast.  Patient is a smoker and given her age, chronic lung disease VQ scan likely to be nondiagnostic and at least in the interim we will give the patient a dose of anticoagulation to cover for possible associated pulmonary embolism while we pursue ultrasound imaging of her lower extremities.  Given patient's hypoxic respiratory failure and CHF will need admission to the hospital for diuresis and additional work-up.  There are no beds available at this hospital so the patient has been previously entered into the transfer portal and we are waiting from system operations as to whether there is a bed available.    Still no word from system operations we reached out and called them and they are still working on it.  Continuing to manage patient while working on hospital disposition.    2:28 PM  Informed that there is no beds within the system so we will be admitting to boarding status.  Updated patient and her friend.  Admitting services paged.        Medical Decision Making    History:    Supplemental history from: Documented in chart, if applicable and Family Member/Significant Other    External Record(s) reviewed: Documented in chart, if applicable.    Work Up:    Chart documentation includes differential considered and any EKGs or imaging independently interpreted by provider, where specified.    In additional to work up documented, I considered the following work up: Documented in chart,  if applicable.    External consultation:    Discussion of management with another provider: Documented in chart, if applicable    Complicating factors:    Care impacted by chronic illness: Chronic Lung Disease    Care affected by social determinants of health: N/A    Disposition considerations: Admit.         At the conclusion of the encounter I discussed the results of all of the tests and the disposition. The questions were answered. The patient or family acknowledged understanding and was agreeable with the care plan.         MEDICATIONS GIVEN IN THE EMERGENCY:  Medications   enoxaparin ANTICOAGULANT (LOVENOX) injection 40 mg (40 mg Subcutaneous $Given 5/18/23 1422)   methylPREDNISolone sodium succinate (solu-MEDROL) injection 125 mg (125 mg Intravenous $Given 5/18/23 1422)   furosemide (LASIX) injection 40 mg (40 mg Intravenous $Given 5/18/23 1426)   HYDROmorphone (DILAUDID) injection 0.2 mg (0.2 mg Intravenous $Given 5/18/23 1440)       NEW PRESCRIPTIONS STARTED AT TODAY'S ER VISIT  New Prescriptions    No medications on file          =================================================================    HPI    Patient information was obtained from: Patient    Use of : N/A         Mayela Espino is a 89 year old female with a pertinent history of CKD, HTN, breast cancer, CHF, who presents to this ED via walk-in for evaluation of back pain.    Per triage, patient presents with a couple of weeks of back pain, but presents with progressive shortness of breath. Her O2 stats were in the low 80s on arrival, here on 6L nasal canula. Decreased oxygen to 3 L when oxygen was at 92%. She has a previous history of breast cancer. No pain associated with shortness of breath. She has a history of being treated for COPD, though she does not have the diagnosis.    Patient reports pain to her back on the right side for the past couple of weeks that takes her breath away. She states it makes it hard to talk or stand.  Daughter notes the patient was hospitalized last summer for heart problems. No other current complaints.      REVIEW OF SYSTEMS   Review of Systems   Respiratory: Positive for shortness of breath.    Musculoskeletal: Positive for back pain.   All other systems reviewed and are negative.       PAST MEDICAL HISTORY:  Past Medical History:   Diagnosis Date     Abnormal mini-mental status exam     scored 16 2012, ?dementia     Breast cancer (H)      Chronic kidney disease      Congestive heart failure (H)      Essential tremor      HTN (hypertension)      Tobacco use        PAST SURGICAL HISTORY:  Past Surgical History:   Procedure Laterality Date     EYE SURGERY       JOINT REPLACEMENT       LUMPECTOMY BREAST Bilateral 1982    Cyst removed     OTHER SURGICAL HISTORY Left     ear prosthesis     OTHER SURGICAL HISTORY      l rola     OTHER SURGICAL HISTORY      left hip ORIF     UT MASTECTOMY, MODIFIED RADICAL Bilateral 6/14/2021    Procedure: Bilateral mastectomies;  Surgeon: Dulce Duran MD;  Location: [facility];  Service: General           CURRENT MEDICATIONS:    acetaminophen (TYLENOL) 325 MG tablet  anastrozole (ARIMIDEX) 1 MG tablet  aspirin 81 MG EC tablet  cholecalciferol, vitamin D3, 1,000 unit tablet  furosemide (LASIX) 20 MG tablet  LORazepam (ATIVAN) 1 MG tablet  multivit-iron-min-folic acid 3,500-18-0.4 unit-mg-mg Chew        ALLERGIES:  Allergies   Allergen Reactions     Latex Unknown     Added based on information entered during case entry, please review and add reactions, type, and severity as needed     Latex Rash       FAMILY HISTORY:  Family History   Problem Relation Age of Onset     Hypertension Mother      Cerebrovascular Disease Father 70.00     No Known Problems Son        SOCIAL HISTORY:   Social History     Socioeconomic History     Marital status:    Tobacco Use     Smoking status: Every Day     Packs/day: 0.25     Types: Cigarettes     Smokeless tobacco: Never   Substance  "and Sexual Activity     Alcohol use: No     Drug use: Never   Social History Narrative    Has one son who lives in [place]       VITALS:  /80   Pulse 80   Temp 97.5  F (36.4  C) (Oral)   Resp 18   Ht 1.549 m (5' 1\")   Wt 44.5 kg (98 lb)   SpO2 97%   BMI 18.52 kg/m      PHYSICAL EXAM    PHYSICAL EXAM    Constitutional: Well developed, Well nourished, patient appears to be in acute distress  HENT: Normocephalic, Atraumatic, Bilateral external ears normal, Oropharynx normal, mucous membranes moist, Nose normal. Neck-  Normal range of motion, No tenderness, Supple, No stridor.   Eyes: PERRL, EOMI, Conjunctiva normal, No discharge.   Respiratory: Decreased breath sounds throughout with significantly decreased breath sounds noted in the posterior right pulmonary examination.  Patient is tachypneic and in mild respiratory distress.  Cardiovascular: Normal heart raSoft, No tenderness, No masses, No flank tenderness. No rebound or guarding.te, Regular rhythm, No murmurs Chest wall nontender.    GI: No clearly reproducible pain to palpation.  : No cva tenderness    Musculoskeletal: 2+ DP pulses. No edema. No cyanosis. Good range of motion in all major joints. No tenderness to palpation. No tenderness of the CTLS spine.   Integument: Warm, Dry, No erythema, No rash. No petechiae.   Neurologic: Alert & oriented x 3, Normal motor function, Normal sensory function, No focal deficits noted.   Psychiatric: Affect normal, Judgment normal, Mood normal. Cooperative.     LAB:  All pertinent labs reviewed and interpreted.  Results for orders placed or performed during the hospital encounter of 05/18/23   CT Chest Abdomen Pelvis w/o Contrast    Impression    IMPRESSION:    1.  Small to moderate left and trace right pleural effusions and related atelectasis.   2.  Suboptimal assessment of the lung parenchyma due to motion-related blurring. Multiple foci of peripheral atelectasis in the bases. Few pulmonary nodules are " present largest of which is 5 mm. Per 2017 Fleischner Society guidelines, follow-up CT of the   chest in 12 months is considered optional.  3.  Enlarged, heterogeneous right lobe of the thyroid consistent with mediastinal border.  4.  Multiple randomly low-attenuation liver lesions are present the largest of which measures 1.5 cm. These are indeterminate. These could be further characterized with ultrasound or liver MRI.  5.  Sigmoid diverticulosis but no diverticulitis.  6.  Atrophied left kidney with nonobstructing calculi are present.  7.  Numerous thoracic and lumbar vertebral compression deformities. The lumbar compression deformities are unchanged from 03/04/2022. Multiple thoracic compression deformities have worsened since 2018.   US Lower Extremity Venous Duplex Bilateral    Impression    IMPRESSION:    1.  No deep venous thrombosis in the bilateral lower extremities.  2.  [place] fluid collection subcutaneous tissues distal medial right thigh consistent with a liquefied hematoma or seroma.   Comprehensive metabolic panel   Result Value Ref Range    Sodium 142 136 - 145 mmol/L    Potassium 4.6 3.5 - 5.0 mmol/L    Chloride 98 98 - 107 mmol/L    Carbon Dioxide (CO2) 35 (H) 22 - 31 mmol/L    Anion Gap 9 5 - 18 mmol/L    Urea Nitrogen 46 (H) 8 - 28 mg/dL    Creatinine 2.60 (H) 0.60 - 1.10 mg/dL    Calcium 9.0 8.5 - 10.5 mg/dL    Glucose 151 (H) 70 - 125 mg/dL    Alkaline Phosphatase 107 45 - 120 U/L    AST 32 0 - 40 U/L    ALT 22 0 - 45 U/L    Protein Total 7.2 6.0 - 8.0 g/dL    Albumin 3.3 (L) 3.5 - 5.0 g/dL    Bilirubin Total 0.9 0.0 - 1.0 mg/dL    GFR Estimate 17 (L) >60 mL/min/1.73m2   Result Value Ref Range    Lipase <9 0 - 52 U/L   Lactic acid whole blood   Result Value Ref Range    Lactic Acid 1.1 0.7 - 2.0 mmol/L   Result Value Ref Range    Troponin I 0.08 0.00 - 0.29 ng/mL   Result Value Ref Range    Magnesium 2.1 1.8 - 2.6 mg/dL   B-Type Natriuretic Peptide ([facility] Only)   Result Value Ref Range     BNP 4,024 (H) 0 - 167 pg/mL   Symptomatic COVID-19 Virus (Coronavirus) by PCR Nasopharyngeal    Specimen: Nasopharyngeal; Swab   Result Value Ref Range    SARS CoV2 PCR Negative Negative   Blood gas venous   Result Value Ref Range    pH Venous 7.37 7.35 - 7.45    pCO2 Venous 66 (H) 35 - 50 mm Hg    pO2 Venous 19 (L) 25 - 47 mm Hg    Bicarbonate Venous 38 (H) 24 - 30 mmol/L    Base Excess/Deficit (+/-) 12.7   mmol/L    Oxyhemoglobin Venous 23.1 (L) 70.0 - 75.0 %    O2 Sat, Venous 23.5 (L) 70.0 - 75.0 %   CBC with platelets and differential   Result Value Ref Range    WBC Count 4.0 4.0 - 11.0 10e3/uL    RBC Count 4.16 3.80 - 5.20 10e6/uL    Hemoglobin 13.1 11.7 - 15.7 g/dL    Hematocrit 43.0 35.0 - 47.0 %     (H) 78 - 100 fL    MCH 31.5 26.5 - 33.0 pg    MCHC 30.5 (L) 31.5 - 36.5 g/dL    RDW 15.4 (H) 10.0 - 15.0 %    Platelet Count 120 (L) 150 - 450 10e3/uL    % Neutrophils 70 %    % Lymphocytes 20 %    % Monocytes 8 %    % Eosinophils 1 %    % Basophils 1 %    % Immature Granulocytes 0 %    NRBCs per 100 WBC 0 <1 /100    Absolute Neutrophils 2.8 1.6 - 8.3 10e3/uL    Absolute Lymphocytes 0.8 0.8 - 5.3 10e3/uL    Absolute Monocytes 0.3 0.0 - 1.3 10e3/uL    Absolute Eosinophils 0.1 0.0 - 0.7 10e3/uL    Absolute Basophils 0.0 0.0 - 0.2 10e3/uL    Absolute Immature Granulocytes 0.0 <=0.4 10e3/uL    Absolute NRBCs 0.0 10e3/uL   ECG 12-LEAD WITH MUSE ([facility])   Result Value Ref Range    Systolic Blood Pressure 104 mmHg    Diastolic Blood Pressure 61 mmHg    Ventricular Rate 73 BPM    Atrial Rate 73 BPM    NH Interval 130 ms    QRS Duration 86 ms     ms    QTc 467 ms    P Axis 68 degrees    R AXIS 4 degrees    T Axis 100 degrees    Interpretation ECG       Sinus rhythm  Left ventricular hypertrophy with repolarization abnormality  Abnormal ECG  When compared with ECG of 01-JUN-2022 10:43,  Premature atrial complexes are no longer Present  Confirmed by SEE ED PROVIDER NOTE FOR, ECG INTERPRETATION ([id number]),   SIMONA GILLIAM ([id number]) on 5/18/2023 11:31:33 AM         RADIOLOGY:  Reviewed all pertinent imaging. Please see official radiology report.  US Lower Extremity Venous Duplex Bilateral   Final Result   IMPRESSION:      1.  No deep venous thrombosis in the bilateral lower extremities.   2.  [place] fluid collection subcutaneous tissues distal medial right thigh consistent with a liquefied hematoma or seroma.      CT Chest Abdomen Pelvis w/o Contrast   Final Result   IMPRESSION:      1.  Small to moderate left and trace right pleural effusions and related atelectasis.    2.  Suboptimal assessment of the lung parenchyma due to motion-related blurring. Multiple foci of peripheral atelectasis in the bases. Few pulmonary nodules are present largest of which is 5 mm. Per 2017 Fleischner Society guidelines, follow-up CT of the    chest in 12 months is considered optional.   3.  Enlarged, heterogeneous right lobe of the thyroid consistent with mediastinal border.   4.  Multiple randomly low-attenuation liver lesions are present the largest of which measures 1.5 cm. These are indeterminate. These could be further characterized with ultrasound or liver MRI.   5.  Sigmoid diverticulosis but no diverticulitis.   6.  Atrophied left kidney with nonobstructing calculi are present.   7.  Numerous thoracic and lumbar vertebral compression deformities. The lumbar compression deformities are unchanged from 03/04/2022. Multiple thoracic compression deformities have worsened since 2018.          EKG:    Performed at: 11:20     Impression: sinus rhythm. Left ventricular hypertrophy with repolarization abnormality.    Rate: 73 bpm  Rhythm: sinus   Axis: 4  WI Interval: 130 ms  QRS Interval: 86 ms  QTc Interval: 467 ms  ST Changes: none  Comparison: when compared with ECG of 01-JUN-2022 10:43, premature atrial complexes are no longer present.    I have independently reviewed and interpreted the EKG(s) documented above.        Helene ROSARIO,  am serving as a scribe to document services personally performed by Helene Mak based on my observation and the provider's statements to me. I, Juan Beyer MD, attest that Helene Mak is acting in a scribe capacity, has observed my performance of the services and has documented them in accordance with my direction.    Juan Beyer MD  [facility] EMERGENCY ROOM  [address] [facility] [id number] [phone]     Juan Beyer MD  05/18/23 1428       Juan Beyer MD  05/18/23 0990

## 2023-05-18 NOTE — ED NOTES
Patient placed on oxygen by triage RN at 6l/minute via nasal cannula due to oxygen saturation 80% on room air.

## 2023-05-18 NOTE — ED TRIAGE NOTES
A few weeks of back pain, but coming in for back pain  Upon arrival in triage patient oxygen was in low 80's 81-82%  Placed her on oxygen 6 liters via nc.  Decreased oxygen to 3 liters when oxygen was at 92%    M ОЛЬГА Martinez and MAZIN Beyer MD at the bedside.    In triage.    Patient reported back pain for 2-3 weeks, but coming in today since pain progressed so bad.  Hard to talk and definitely hard to stand or walk.

## 2023-05-18 NOTE — H&P
[facility]    History and Physical - Hospitalist Service       Date of Admission:  5/18/2023    Assessment & Plan      Mayela Espino is a 89 year old female admitted on 5/18/2023. She presents complaining of right-sided flank pain and shortness of breath with pain radiating around to the front of her chest intermittently.  Worse with activity and deep breathing.  In the ER CT scan without contrast unremarkable and VBG consistent with compensated respiratory acidosis.  Spine reveals multiple compression fractures.      Right flank/chest pain  Etiology unclear  VQ scan ordered  Subcutaneous heparin  Renal ultrasound  Reviewed CT chest abdomen pelvis  Serial troponins  Echocardiogram    Acute on chronic heart failure with reduced ejection fraction  IV diuresis  Repeat echo  Currently not on a beta-blocker  No ACE or ARB due to chronic kidney disease  Further plan pending echo  Consider addition of beta-blockade once acute exacerbation improved    Acute hypoxic respiratory failure/COPD exacerbation/compensated respiratory acidosis  Supplemental oxygen  Wean as able  Rule out PE  Scheduled DuoNebs  Continue steroids started in the ER    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  Caution with diuresis  Repeat basic in the morning    Thyroid mass  Outpatient follow-up    Pulmonary nodules  Outpatient follow-up    Compression fractures  At multiple levels  Wondering if this might be the source of her pain  Further evaluation pending other imaging studies  Not sure if she would tolerate MRI    Thrombocytopenia    History of tobacco abuse    Generalized anxiety  Likely contributing to pain  As needed Ativan    Remote history of breast cancer    CODE STATUS  Addressed with patient  She could not make a decision as she was too uncomfortable  Explained that she will be full code until she decides not to be  We will readdress in a.m.           Diet: Combination Diet Regular Diet Adult,  Regular Diet; 2 gm NA Diet    DVT Prophylaxis: Heparin SQ  Vides Catheter: Not present  Lines: None     Cardiac Monitoring: ACTIVE order. Indication: Acute decompensated heart failure (48 hours)  Code Status: Full Code                # Hypoalbuminemia: Lowest albumin = 3.3 g/dL at 5/18/2023 10:38 AM, will monitor as appropriate   # Drug Induced Platelet Defect: home medication list includes an antiplatelet medication  # Acute Kidney Injury, unspecified: based on a >150% or 0.3 mg/dL increase in last creatinine compared to past 90 day average, will monitor renal function                Disposition Plan      Expected Discharge Date: 05/20/2023                  Sj Winter MD  Hospitalist Service  [facility]  Securely message with [employer] (more info)  Text page via [facility] Paging/Directory     ______________________________________________________________________    Chief Complaint   Shortness of breath with chest pain    History is obtained from the patient, electronic health record, and with direct discussion with the ER physician.    History of Present Illness   Mayela Espino is a 89 year old female who presents to the ER complaining of progressive right-sided chest and flank pain with associated shortness of breath.  Symptoms have been present over the last 1 to 2 weeks.  No history of fall.  In the ER she was noted to be hypoxic and was started on some supplemental oxygen.  She was noted to be wheezing and mildly tachypneic.  ECG was not consistent with acute coronary syndrome.  Initial troponin negative.  Laboratory work-up revealed acute on chronic kidney injury.  Patient was found to have a significantly elevated BNP.  She was given Lasix in the ER.  At the time my visit she is complaining of persistent pain which she points to the right flank area.  She has not had any history of kidney stones.  Pain is plus minus worse with deep breathing.  It is worse with moving.  Noted on CT  that she has multiple spinal fractures of indeterminate age.  These could be related.  Patient has a known ejection fraction of 30%.  She is a former heavy smoker and quit about 1 year ago.  She is not on oxygen at home.  Again denies history of DVT or PE.  Not diabetic.      Past Medical History    Past Medical History:   Diagnosis Date     Abnormal mini-mental status exam     scored 16 2012, ?dementia     Breast cancer (H)      Chronic kidney disease      Congestive heart failure (H)      Essential tremor      HTN (hypertension)      Tobacco use        Past Surgical History   Past Surgical History:   Procedure Laterality Date     EYE SURGERY       JOINT REPLACEMENT       LUMPECTOMY BREAST Bilateral 1982    Cyst removed     OTHER SURGICAL HISTORY Left     ear prosthesis     OTHER SURGICAL HISTORY      l rola     OTHER SURGICAL HISTORY      left hip ORIF     SC MASTECTOMY, MODIFIED RADICAL Bilateral 6/14/2021    Procedure: Bilateral mastectomies;  Surgeon: Dulce Duran MD;  [place] [facility] [place]  Service: General       Prior to Admission Medications   Prior to Admission Medications   Prescriptions Last Dose Informant Patient Reported? Taking?   LORazepam (ATIVAN) 1 MG tablet Past Month  Yes Yes   Sig: Take by mouth as needed   acetaminophen (TYLENOL) 325 MG tablet 5/17/2023  Yes Yes   Sig: Take 325-650 mg by mouth every 6 hours as needed for mild pain   anastrozole (ARIMIDEX) 1 MG tablet 5/17/2023  No Yes   Sig: [ANASTROZOLE (ARIMIDEX) 1 MG TABLET] TAKE 1 TABLET BY MOUTH DAILY   aspirin 81 MG EC tablet 5/17/2023  Yes Yes   Sig: Take 81 mg by mouth daily   cholecalciferol, vitamin D3, 1,000 unit tablet 5/17/2023  Yes Yes   Sig: [CHOLECALCIFEROL, VITAMIN D3, 1,000 UNIT TABLET] Take 500 Units by mouth daily.    furosemide (LASIX) 20 MG tablet 5/17/2023  No Yes   Sig: Take 1 tablet (20 mg) by mouth daily as needed (for swelling)   Patient taking differently: Take 20 mg by mouth daily   multivit-iron-min-folic  acid 3,500-18-0.4 unit-mg-mg Chew 5/17/2023  Yes Yes   Sig: [MULTIVIT-IRON-MIN-FOLIC ACID 3,500-18-0.4 UNIT-MG-MG CHEW] Chew 1 tablet daily.       Facility-Administered Medications: None        Review of Systems    The 10 point Review of Systems is negative other than noted in the HPI or here.     Social History   I have reviewed this patient's social history and updated it with pertinent information if needed.  Social History     Tobacco Use     Smoking status: Every Day     Packs/day: 0.25     Types: Cigarettes     Smokeless tobacco: Never   Substance Use Topics     Alcohol use: No     Drug use: Never       Family History   I have reviewed this patient's family history and updated it with pertinent information if needed.  Family History   Problem Relation Age of Onset     Hypertension Mother      Cerebrovascular Disease Father 70.00     No Known Problems Son        Allergies   Allergies   Allergen Reactions     Latex Unknown     Added based on information entered during case entry, please review and add reactions, type, and severity as needed     Latex Rash        Physical Exam   Vital Signs: Temp: 97.5  F (36.4  C) Temp src: Oral BP: 122/67 Pulse: 71   Resp: 16 SpO2: 93 % O2 Device: Nasal cannula Oxygen Delivery: 3 LPM  Weight: 98 lbs 0 oz    GENERAL:  Alert, in distress with motion, appears stated age   HEAD:  Normocephalic, without obvious abnormality, atraumatic   EYES:  PERRL, conjunctiva/corneas clear, no scleral icterus, EOM's intact   THROAT: Lips, mucosa, and tongue normal; teeth and gums normal, mouth dry   NECK: Supple, symmetrical, trachea midline   BACK:   Symmetric, no curvature, ROM normal   LUNGS:    Crackles in both bases with intermittent end expiratory wheezes in both lung fields, good air movement   CHEST WALL:  No tenderness or deformity   HEART:  Regular rate and rhythm, S1 and S2 normal, no murmur, rub, or gallop    ABDOMEN:   Soft, non-tender, bowel sounds active all four quadrants, no  masses, no organomegaly, no rebound or guarding   EXTREMITIES: Extremities normal, atraumatic, no cyanosis or edema    SKIN: Dry to touch, no exanthems in the visualized areas   NEURO: Alert, oriented x 4, moves all four extremities freely/spontaneously   PSYCH: Cooperative, behavior is appropriate          Data     I have personally reviewed the following data over the past 24 hrs:    4.0  \   13.1   / 120 (L)     142 98 46 (H) /  151 (H)   4.6 35 (H) 2.60 (H) \       ALT: 22 AST: 32 AP: 107 TBILI: 0.9   ALB: 3.3 (L) TOT PROTEIN: 7.2 LIPASE: <9       Trop: N/A BNP: 4,024 (H)       Procal: N/A CRP: N/A Lactic Acid: 1.1         Imaging results reviewed over the past 24 hrs:   Recent Results (from the past 24 hour(s))   CT Chest Abdomen Pelvis w/o Contrast    Narrative    EXAM: CT CHEST ABDOMEN PELVIS W/O CONTRAST  LOCATION: [facility]  DATE/TIME: 5/18/2023 12:14 PM CDT    INDICATION: Shortness of breath, chest pain, chronic kidney disease  COMPARISON: Portable chest radiography 06/01/2022; MRI lumbar spine 03/04/2022; thoracic spine CT 03/30/2018  TECHNIQUE: CT scan of the chest, abdomen, and pelvis was performed without IV contrast. Multiplanar reformats were obtained. Dose reduction techniques were used.   CONTRAST: None.    FINDINGS:   LUNGS AND PLEURA: Small to moderate left pleural effusion is present layering posteriorly. Trace right pleural effusion. Focal band of atelectasis posterior left lower lobe related to pleural fluid. A band of atelectasis is present in the lingula.   Smaller territories of atelectasis are present in the paradiaphragmatic right lower lobe and along the inferior major fissures. There are 3 adjacent subpleural nodules in the right middle lobe along the minor fissure largest of which is 5 mm (series 4,   image 93). There is a subpleural nodule in the lateral left lower lobe which measures 4 mm (series 3, image 77) and 3 mm nodule in the anterior left lower lobe  along the major fissure (series 3, image 63). Anterior bowing of the membranous central   airways consistent with imaging at partial expiratory phase of the respiratory cycle. Motion artifacts limit assessment of the subsegmental airways particularly in the lower lungs.    MEDIASTINUM/AXILLAE: Heterogeneity and enlargement of the isthmus and right lobe of the thyroid gland which extend into the right paratracheal mediastinum slightly displacing the trachea towards the left. There are mildly enlarged subaortic and lower   paratracheal lymph nodes. Mild generalized enlargement of the heart chambers. No pericardial effusion. Tortuous proximal great vessels. Mild to moderate arch and descending aorta atheromatous calcifications. The distal descending thoracic aorta has   fusiform ectasia measuring up to 4.1 cm in diameter. The esophagus is decompressed.    CORONARY ARTERY CALCIFICATION: Mild.    Assessment of the upper abdominal viscera is influenced by motion-related blurring.    HEPATOBILIARY: Several low-attenuation lesions are randomly distributed in the liver with larger lesion in the right lobe measuring 15 mm (series 3, image 136). Gallbladder is not distended. No calcified gallstones. No bile duct enlargement.    PANCREAS: Pancreas is fatty replaced. No definite pancreas parenchymal lesions.    SPLEEN: Normal.    ADRENAL GLANDS: Lobular low-attenuation left adrenal gland suggesting hyperplasia. Right adrenal gland is also low-attenuation with a lesser degree of tissue thickening.    KIDNEYS/BLADDER: The left kidney is atrophied. There are calcifications at the cortical medullary junction of the upper and lower pole but no hydronephrosis. Compensatory hypertrophy of the right kidney. No definite right nephroureterolithiasis. Urinary   bladder is normal.    BOWEL: No dilated bowel or bowel wall thickening. In sigmoid diverticulosis.    LYMPH NODES: No lymphadenopathy in the abdomen or pelvis.    VASCULATURE:  Advanced aortoiliac atheromatous calcifications with minimal lobulated contours of the infrarenal aorta but no aneurysm.    PELVIC ORGANS: No pelvic masses.    MUSCULOSKELETAL: There is a left hip joint replacement. No periprosthetic fracture. Generalized demineralization of the bones. No pelvis or hip insufficiency fractures. There are numerous superior and inferior endplate deformities of the thoracolumbar   spine with greatest degree of compression at T8, T9, and T11 where there is a round 50% loss of vertebral body height. Lesser compression deformities of T5-T7, T10 and all lumbar vertebra. The lumbar compression deformities are unchanged from 03/04/2022.   No aggressive or destructive bone lesions.      Impression    IMPRESSION:    1.  Small to moderate left and trace right pleural effusions and related atelectasis.   2.  Suboptimal assessment of the lung parenchyma due to motion-related blurring. Multiple foci of peripheral atelectasis in the bases. Few pulmonary nodules are present largest of which is 5 mm. Per 2017 Fleischner Society guidelines, follow-up CT of the   chest in 12 months is considered optional.  3.  Enlarged, heterogeneous right lobe of the thyroid consistent with mediastinal border.  4.  Multiple randomly low-attenuation liver lesions are present the largest of which measures 1.5 cm. These are indeterminate. These could be further characterized with ultrasound or liver MRI.  5.  Sigmoid diverticulosis but no diverticulitis.  6.  Atrophied left kidney with nonobstructing calculi are present.  7.  Numerous thoracic and lumbar vertebral compression deformities. The lumbar compression deformities are unchanged from 03/04/2022. Multiple thoracic compression deformities have worsened since 2018.   US Lower Extremity Venous Duplex Bilateral    Narrative    EXAM: US LOWER EXTREMITY VENOUS DUPLEX BILATERAL  LOCATION: [facility]  DATE/TIME: 5/18/2023 2:17 PM  CDT    INDICATION: Lower extremity swelling  COMPARISON: None.  TECHNIQUE: Venous Duplex ultrasound of bilateral lower extremities with and without compression, augmentation and duplex. Color flow and spectral Doppler with waveform analysis performed.    FINDINGS: Exam includes the common femoral, femoral, popliteal veins as well as segmentally visualized deep calf veins and greater saphenous vein.     RIGHT: No deep vein thrombosis. No superficial thrombophlebitis. No popliteal cyst. There is a oblong subcutaneous fluid collection in the distal medial right thigh measuring 4.2 x 2.2 x 6.0 cm. No associated blood flow on color Doppler    LEFT: No deep vein thrombosis. No superficial thrombophlebitis. No popliteal cyst.      Impression    IMPRESSION:    1.  No deep venous thrombosis in the bilateral lower extremities.  2.  [place] fluid collection subcutaneous tissues distal medial right thigh consistent with a liquefied hematoma or seroma.     Recent Labs   Lab 05/18/23  1038   WBC 4.0   HGB 13.1   *   *      POTASSIUM 4.6   CHLORIDE 98   CO2 35*   BUN 46*   CR 2.60*   ANIONGAP 9   ITA 9.0   *   ALBUMIN 3.3*   PROTTOTAL 7.2   BILITOTAL 0.9   ALKPHOS 107   ALT 22   AST 32   LIPASE <9

## 2023-05-19 NOTE — PROGRESS NOTES
Met pt on 2L with sat in the mid 90's. Neb treatment and breathing exercise techniques was done with pt. Pt tolerated well.        05/18/23 2030   Tech Time   $Tech Time (10 minute increments) 4   Oxygen Therapy   SpO2 95 %   O2 Device Nasal cannula   Oxygen Delivery 2 LPM   Nebulizer Assessment & Treatment   $RT Use ONLY Delivery Method Nebulizer - Initial   Nebulizer Device Mask   Pretreatment Heart Rate (beats/min) 77   Pretreatment Resp Rate (breaths/min) 20   Pretreatment O2 sats - (TCU only) 95   Pretreat Breath Sounds - Bilat - All Lobes diminished   Breath Sounds Post-Respiratory Treatment   Posttreatment Heart Rate (beats/min) 76   Posttreatment Resp Rate (breaths/min) 22   Post treatment O2 Sats - (TCU only) 99   Breath Sounds Posttreatment All Fields All Fields   Breath Sounds Posttreatment All Fields no change       Will continue to monitor and assess pt.    Shoshana Villanueva, RT

## 2023-05-19 NOTE — ED NOTES
Dr. Winter called regarding VQ scan delay, spoke with charge nurse who reports she will look into it.

## 2023-05-19 NOTE — PLAN OF CARE
Problem: Plan of Care - These are the overarching goals to be used throughout the patient stay.    Goal: Optimal Comfort and Wellbeing  Outcome: Unable to Meet  Intervention: Monitor Pain and Promote Comfort  Recent Flowsheet Documentation  Taken 5/19/2023 0100 by Americo Begum RN  Pain Management Interventions: medication (see MAR)    Pt alert and oriented x 4. VSS. Tele- SR with inverted T wave. Denies chest pain and N/V. REYNOSO. Lung sounds diminished with crackles. Crackles louder on bases. On 2 L/min NC. Would de sat down to mid 80s on RA with activity. Will continue to monitor and intervene as needed. Up with assist 1 to bedside commode. Voiding frequently; on IV lasix. Calls appropriately.

## 2023-05-19 NOTE — CONSULTS
"Care Management Initial Consult      General Information  Assessment completed with: Patient,    Type of CM/SW Visit: Initial Assessment  Primary Care Provider verified and updated as needed: Yes   Readmission within the last 30 days: no previous admission in last 30 days   Reason for Consult: discharge planning, health care directive, transportation  Advance Care Planning: Advance Care Planning Reviewed: no concerns identified        Communication Assessment  Patient's communication style: spoken language (English or Bilingual)        Cognitive  Cognitive/Neuro/Behavioral:                        Living Environment:   People in home: alone     Current living Arrangements: house      Able to return to prior arrangements: yes (If returned to near baseline or better.)  Living Arrangement Comments: Pt refuses to consider an alternative living environment at this time.    Family/Social Support:  Care provided by: self, friend (Pt states she has a singular neighbor available to assist and has nobody else as her son lives \"out of town\".)  Provides care for: no one, unable/limited ability to care for self  Marital Status:   Neighbor          Description of Support System: Supportive, Involved (As able.)    Support Assessment: Lacks necessary supervision and assistance (Pt states a lack of support at home.)    Current Resources:   Patient receiving home care services: No  Community Resources: None  Equipment currently used at home:    Supplies currently used at home: None (None identified.)    Employment/Financial:  Employment Status: retired     Financial Concerns: No concerns identified   Referral to Financial Worker: No     Does the patient's insurance plan have a 3 day qualifying hospital stay waiver?  Yes   Will the waiver be used for post-acute placement? Maybe. Therapy consults currently pending.    Lifestyle & Psychosocial Needs:  Social Determinants of Health     Tobacco Use: High Risk (6/1/2022)    Patient " "History      Smoking Tobacco Use: Every Day      Smokeless Tobacco Use: Never      Passive Exposure: Not on file   Alcohol Use: Not on file   Financial Resource Strain: Not on file   Food Insecurity: Not on file   Transportation Needs: Not on file   Physical Activity: Not on file   Stress: Not on file   Social Connections: Not on file   Intimate Partner Violence: Not on file   Depression: Not at risk (3/7/2022)    PHQ-2      PHQ-2 Score: 0   Housing Stability: Not on file     Functional Status:  Prior to admission patient needed assistance:   Dependent ADLs:: Ambulation-cane  Dependent IADLs:: Independent (Pt states independence when at baseline.)  Assessment of Functional Status: Not at  functional baseline    Mental Health Status:  Mental Health Status: No Current Concerns       Chemical Dependency Status:  Chemical Dependency Status: No Current Concerns           Values/Beliefs:  Spiritual, Cultural Beliefs, Pent[employer]al Practices, Values that affect care: no (None identified.)             Additional Information:  Writer met with patient to introduce Care Management(CM), obtain an initial assessment, and provide BABIN info if necessary. Pt states [place]   [place], and is independent with I/ADL. Pt states she still drives and has only one neighbor available for assistance if needed. Pt states she has no interest in considering a move to a more supportive living environment at this time. Pt intends on return home at time of discharge.    5/18 per VICTORIA Gabriel.-\"89 year old female who presents to the ER complaining of progressive right-sided chest and flank pain with associated shortness of breath.  Symptoms have been present over the last 1 to 2 weeks.  No history of fall.  In the ER she was noted to be hypoxic and was started on some supplemental oxygen.  She was noted to be wheezing and mildly tachypneic.  ECG was not consistent with acute coronary syndrome.  Initial troponin negative.  Laboratory " "work-up revealed acute on chronic kidney injury.  Patient was found to have a significantly elevated BNP.  She was given Lasix in the ER.  At the time my visit she is complaining of persistent pain which she points to the right flank area.  She has not had any history of kidney stones.  Pain is plus minus worse with deep breathing.  It is worse with moving.  Noted on CT that she has multiple spinal fractures of indeterminate age.  These could be related.  Patient has a known ejection fraction of 30%.  She is a former heavy smoker and quit about 1 year ago.  She is not on oxygen at home.  Again denies history of DVT or PE.  Not diabetic.\"     PT, OT, CORE, and NUTR Consults pending. CM to follow-up on consults and assist with service coordination needs as indicated. Pt may need assistance with transport at time of discharge.    Valentin Valentino RN        "

## 2023-05-19 NOTE — PROVIDER NOTIFICATION
05/19/23 1140   Oxygen Therapy   SpO2 91 %   O2 Device Nasal cannula   Oxygen Delivery 2 LPM   Nebulizer Assessment & Treatment   $RT Use ONLY Delivery Method Nebulizer - Additional   Pretreatment Heart Rate (beats/min) 89   Pretreatment O2 sats - (TCU only) 91   Pretreat Breath Sounds - Bilat - All Lobes clear   Pretreat Breath Sounds - RLL diminished;crackles, fine   Patient Position HOB elevated   Breath Sounds Post-Respiratory Treatment   Posttreatment Heart Rate (beats/min) 89   Post treatment O2 Sats - (TCU only) 97   Signs of Intolerance (SVN) none   Breath Sounds Posttreatment All Fields All Fields   Breath Sounds Posttreatment All Fields aeration increased     Pt given nebs x2. Pt no respiratory distress, no labored breathing, 02 sats adequate on 2 lpm. Pt does have faint crackles RLL. RT will continue to follow.

## 2023-05-19 NOTE — PROGRESS NOTES
[facility]    Medicine Progress Note - Hospitalist Service    Date of Admission:  5/18/2023    Assessment & Plan   Mayela Espino is a 89 year old female admitted on 5/18/2023. She presents complaining of right-sided flank pain and shortness of breath with pain radiating around to the front of her chest intermittently.  Worse with activity and deep breathing.  In the ER CT scan without contrast unremarkable and VBG consistent with compensated respiratory acidosis.  Spine reveals multiple compression fractures.  Pain improved 5/19 but now experiencing nausea.  Still hypoxic.        Right flank/chest pain  Etiology unclear  VQ scan ordered but not tolerated  Renal ultrasound reviewed  Reviewed CT chest abdomen pelvis  Serial troponins negative  Echocardiogram reviewed with EF of 20 to 25%  Ordered MRI due to the multiple compression fractures  She did not tolerate this  Neurosurgical consultation  Miacalcin  Pain team     Acute on chronic heart failure with reduced ejection fraction  IV diuresis  Repeat echo reviewed  Currently not on a beta-blocker  No ACE or ARB due to chronic kidney disease  Further plan pending echo  Consider addition of beta-blockade once acute exacerbation improved     Acute hypoxic respiratory failure/COPD exacerbation/compensated respiratory acidosis  Supplemental oxygen  Wean as able  Did not tolerate V/Q  Scheduled DuoNebs  Continue steroids started in the ER at decreased dose     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  Worsening with diuresis  We will ask nephrology to see  Volume status difficult to assess     Thyroid mass  Outpatient follow-up     Pulmonary nodules  Outpatient follow-up     Compression fractures  At multiple levels  Wondering if this might be the source of her pain  Further evaluation pending other imaging studies  Ordered MRI which she did not tolerate  Miacalcin  Pain control  Neurosurgery consultation requested  Question if  she needs TLSO     Thrombocytopenia     History of tobacco abuse     Generalized anxiety  Likely contributing to pain  As needed Ativan     Remote history of breast cancer     CODE STATUS  Currently full code       Diet: Combination Diet Regular Diet Adult, Regular Diet; 2 gm NA Diet    DVT Prophylaxis: Pneumatic Compression Devices  Vides Catheter: Not present  Lines: None     Cardiac Monitoring: ACTIVE order. Indication: Acute decompensated heart failure (48 hours)  Code Status: Full Code      Clinically Significant Risk Factors              # Hypoalbuminemia: Lowest albumin = 3.1 g/dL at 5/19/2023  6:13 AM, will monitor as appropriate   # Thrombocytopenia: Lowest platelets = 114 in last 2 days, will monitor for bleeding  # Acute Kidney Injury, unspecified: based on a >150% or 0.3 mg/dL increase in last creatinine compared to past 90 day average, will monitor renal function   # Acute heart failure with reduced ejection fraction: last echo with EF <40% and receiving IV diuretics                Disposition Plan     Expected Discharge Date: 05/20/2023      Destination: home            Sj Winter MD  Hospitalist Service  [facility]  Securely message with [employer] (more info)  Text page via [employer] Paging/Directory   ______________________________________________________________________    Interval History   Pain improved but now having issues with nausea.  Retching while I am in the room.  Renal function is worsening.  Discontinue diuresis.  We will ask nephrology to see tomorrow.    Physical Exam   Vital Signs: Temp: 97.6  F (36.4  C) Temp src: Oral BP: 137/89 Pulse: 94   Resp: 18 SpO2: 90 % O2 Device: Nasal cannula Oxygen Delivery: 2 LPM  Weight: 98 lbs 0 oz    GENERAL:  Alert, appears comfortable, in no acute distress, appears stated age   HEAD:  Normocephalic, without obvious abnormality, atraumatic   EYES:  PERRL, conjunctiva/corneas clear, no scleral icterus, EOM's intact   NECK:  Supple, symmetrical, trachea midline   BACK:   Symmetric, no curvature, ROM normal   LUNGS:    Distant breath sounds without wheezes   CHEST WALL:  No tenderness or deformity   HEART:  Regular rate and rhythm, S1 and S2 normal, no murmur, rub, or gallop    ABDOMEN:   Soft, non-tender, bowel sounds active all four quadrants, no masses, no organomegaly, no rebound or guarding   EXTREMITIES: Extremities normal, atraumatic, no cyanosis or edema    SKIN: Dry to touch, no exanthems in the visualized areas   NEURO: Alert, oriented x 4, moves all four extremities freely/spontaneously   PSYCH: Cooperative, behavior is appropriate            52 MINUTES SPENT BY ME on the date of service doing chart review, history, exam, documentation & further activities per the note.      Data     I have personally reviewed the following data over the past 24 hrs:    N/A  \   N/A   / N/A     142 99 60 (H) /  237 (H)   5.3 (H) 31 2.91 (H) \       ALT: 20 AST: 27 AP: 99 TBILI: 0.5   ALB: 3.1 (L) TOT PROTEIN: 7.2 LIPASE: N/A       Imaging results reviewed over the past 24 hrs:   Recent Results (from the past 24 hour(s))   Echocardiogram Complete   Result Value    LVEF  25-30% (severely reduced)    Narrative    [id number]  [id number]  [id number]  [id number]^YESSENIA^JOSE^[place] [address]     Name: RIANNA RUIZ  MRN: [id number]  : 1932  Study Date: 2023 09:08 AM  Age: 89 yrs  Gender: Female  Patient Location: [facility]  Reason For Study: CHF  Ordering Physician: JOSE CASAS  Performed By: [facility]     BSA: 1.4 m2  Height: 61 in  Weight: 98 lb  HR: 93  BP: 103/64 mmHg  ______________________________________________________________________________  Procedure  Complete Portable Echo Adult. Definity (NDC #87987-442) given intravenously.  2.0 ml; lot # [id number]. Adequate quality color and spectral Doppler were performed  and interpreted. Adequate quality two-dimensional was performed  and  interpreted.  ______________________________________________________________________________  Interpretation Summary     1. The left ventricle is mildly dilated. Left ventricular function is  decreased. The ejection fraction is 25-30% (severely reduced). There is severe  global hypokinesia of the left ventricle.  2. Normal right ventricle size and systolic function.  3. The left atrium is moderately dilated.  4. There is mild to moderate (1-2+) mitral regurgitation.  5. There is mild (1+) aortic regurgitation.  6. Moderate left pleural effusion  ______________________________________________________________________________  Left Ventricle  The left ventricle is mildly dilated. Left ventricular function is decreased.  The ejection fraction is 25-30% (severely reduced). There is normal left  ventricular wall thickness. Left ventricular diastolic function is abnormal.  There is severe global hypokinesia of the left ventricle.     Right Ventricle  Normal right ventricle size and systolic function.     Atria  The left atrium is moderately dilated. Right atrial size is normal. There is  no color Doppler evidence of an atrial shunt.     Mitral Valve  There is mild to moderate mitral annular calcification. The mitral valve  leaflets are mildly thickened. There is mild to moderate (1-2+) mitral  regurgitation. There is no mitral valve stenosis.     Tricuspid Valve  Tricuspid valve leaflets appear normal. There is no evidence of tricuspid  stenosis or clinically significant tricuspid regurgitation. Right ventricular  systolic pressure could not be approximated due to inadequate tricuspid  regurgitation. No tricuspid regurgitation. No significant tricuspid stenosis.     Aortic Valve  Moderate aortic valve calcification is present. There is mild (1+) aortic  regurgitation. No aortic stenosis is present.     Pulmonic Valve  The pulmonic valve is not well seen, but is grossly normal. This degree of  valvular regurgitation is  within normal limits. There is trace pulmonic  valvular regurgitation.     Vessels  The aorta root is normal. Normal size ascending aorta. IVC diameter <2.1 cm  collapsing >50% with sniff suggests a normal RA pressure of 3 mmHg.     Pericardium  There is no pericardial effusion. Moderate left pleural effusion.     ______________________________________________________________________________  MMode/2D Measurements & Calculations  RVDd: 2.5 cm  IVSd: 0.73 cm  LVIDd: 5.2 cm  LVIDs: 4.8 cm  LVPWd: 0.79 cm  FS: 7.3 %     LV mass(C)d: 134.3 grams  LV mass(C)dI: 96.2 grams/m2  Ao root diam: 3.2 cm  asc Aorta Diam: 3.6 cm  LVOT diam: 2.0 cm  LVOT area: 3.1 cm2  LA Volume (BP): 50.7 ml  LA Volume Index (BP): 36.2 ml/m2     LA Volume Indexed (AL/bp): 38.7 ml/m2  RWT: 0.31  TAPSE: 2.9 cm     Doppler Measurements & Calculations  MV E max brent: 133.0 cm/sec  MV max P.3 mmHg  MV mean P.3 mmHg  MV V2 VTI: 11.4 cm  MVA(VTI): 5.1 cm2  MV dec slope: 1119 cm/sec2  MV dec time: 0.12 sec  Ao V2 max: 141.6 cm/sec  Ao max P.0 mmHg  Ao V2 mean: 104.1 cm/sec  Ao mean P.9 mmHg  Ao V2 VTI: 21.3 cm  KENDELL(I,D): 2.7 cm2  KENDELL(V,D): 2.7 cm2  AI P1/2t: 603.9 msec  LV V1 max P.1 mmHg  LV V1 max: 123.1 cm/sec  LV V1 VTI: 18.5 cm  SV(LVOT): 58.1 ml  SI(LVOT): 41.7 ml/m2  PA V2 max: 105.1 cm/sec  PA max P.4 mmHg  PA acc time: 0.06 sec     AV Brent Ratio (DI): 0.87  KENDELL Index (cm2/m2): 2.0  Medial E/e': 19.4  RV S Brent: 12.4 cm/sec     ______________________________________________________________________________  Report approved by: Sierra Iverson 2023 10:39 AM           Recent Labs   Lab 23  0613 23  1645 23  1038   WBC  --  5.3 4.0   HGB  --  12.4 13.1   MCV  --  102* 103*   PLT  --  114* 120*     --  142   POTASSIUM 5.3*  --  4.6   CHLORIDE 99  --  98   CO2 31  --  35*   BUN 60*  --  46*   CR 2.91*  --  2.60*   ANIONGAP 12  --  9   ITA 9.0  --  9.0   *  --  151*   ALBUMIN 3.1*  --   3.3*   PROTTOTAL 7.2  --  7.2   BILITOTAL 0.5  --  0.9   ALKPHOS 99  --  107   ALT 20  --  22   AST 27  --  32   LIPASE  --   --  <9

## 2023-05-19 NOTE — CONSULTS
[facility] ACUTE PAIN SERVICE CONSULTATION     Date of Admission:  5/18/2023  Date of Consult (When I saw the patient): 05/19/23  Physician requesting consult: Dr. Winter   Reason for consult: back pain  Primary Care Physician: Johnathan Brenner     Assessment/Plan:     Mayela Espino is a 89 year old female who was admitted on 5/18/2023.  Pain team was asked to see the patient for back pain. Admitted for multiple compression fractures, respiratory acidosis. History of acute on chronic heart failure, COPD, chronic kidney disease, thyroid mass, anxiety, breast cancer.  Describes pain as 6-7/10 and spasm, aching in right mid back. Patient reports nausea. The patient denies vomiting, constipation, diarrhea, chest pain, shortness of breath. The patient is a former smoker and denies chemical dependency history. Noted on CT that she has multiple spinal fractures of indeterminate age. She is not on oxygen at home but is here.    Opioid Induced Respiratory Depression Risk Assessment: high: age, renal function, smoker, ativan use    PLAN:   1) Pain is consistent with compression fractures. Labs and imaging indicated: I have personally reviewed pertinent notes, labs, tests, and radiologic imaging in patient's chart. Treatment plan includes multimodal pain approach, Hospital Medicine Service for medical management, Neurosurgery, PT/OT consulted. Patient educated regarding multimodal pain approach, medications as listed below. Patient is understanding of the plan. All questions and concerns addressed to patient's satisfaction.   2)Multimodal Medication Therapy  Topical: lidocaine alternating with Voltaren   NSAID'S: CrCl 9ml/min, none  Steroids: Solumedrol 62.5mg q12h IV  Muscle Relaxants: Robaxin 500mg qid  Adjuvants: APAP 975mg q8h  Antidepressants/anxiolytics: Ativan 0.5mg iv q6hprn , as well as 1mg oral  q6hprn   Opioids: oxycodone 2.5mg q4hprn  IV Pain medication: Dilaudid 0.5mg q2hprn - decrease to 0.2 qid  prn  3)Non-medication interventions: ice, heat, rest   Acupuncture consult - offered and declined  Integrative consult - offered and declined  4)Constipation Prophylaxis: Scheduled and prn: Miralax     -Opioid prescriber has been none  -MN  pulled from system on 5/19. This indicates lorazepam use only, no opioids  Discharge Recommendations - We recommend prescribing the following at the time of discharge: TBD.      History of Present Illness (HPI):       Mayela Espino is a 89 year old female who presented for back pain.  Past medical history as above. The pain is reported to be acute on chronic, located in the low back, and it does not radiate.  Current pain is rated at 6-7/10 and goal is 0/10.  She presents complaining of right-sided flank pain and shortness of breath with pain radiating around to the front of her chest intermittently.  Worse with activity and deep breathing.  In the ER CT scan without contrast unremarkable and VBG consistent with compensated respiratory acidosis.  Spine reveals multiple compression fractures. Symptoms have been present over the last 1 to 2 weeks.  No history of fall. She reports she lives independently, still drives.       Per MN  review, the patient does not have an opioid tolerance. Opioid induced side effects noted and include: none    Reviewed medical record, labs, imaging, ED note, and care everywhere.     Past pain treatments have included: APAP      Home pain medications/psych medications/anticoagulation medications include: Lorazepam 1mg q6hprn    Last UDS: not recent     Medical History   PAST MEDICAL HISTORY:   Past Medical History:   Diagnosis Date     Abnormal mini-mental status exam     scored 16 2012, ?dementia     Breast cancer (H)      Chronic kidney disease      Congestive heart failure (H)      Essential tremor      HTN (hypertension)      Tobacco use        PAST SURGICAL HISTORY:   Past Surgical History:   Procedure Laterality Date     EYE SURGERY        JOINT REPLACEMENT       LUMPECTOMY BREAST Bilateral 1982    Cyst removed     OTHER SURGICAL HISTORY Left     ear prosthesis     OTHER SURGICAL HISTORY      l rola     OTHER SURGICAL HISTORY      left hip ORIF     LA MASTECTOMY, MODIFIED RADICAL Bilateral 6/14/2021    Procedure: Bilateral mastectomies;  Surgeon: Dulce Duran MD;  Location: [facility];  Service: General       FAMILY HISTORY:   Family History   Problem Relation Age of Onset     Hypertension Mother      Cerebrovascular Disease Father 70.00     No Known Problems Son        SOCIAL HISTORY:   Social History     Tobacco Use     Smoking status: Every Day     Packs/day: 0.25     Types: Cigarettes     Smokeless tobacco: Never   Vaping Use     Vaping status: Not on file   Substance Use Topics     Alcohol use: No        HEALTH & LIFESTYLE PRACTICES  Tobacco:  reports that she has been smoking. She has been smoking an average of .25 packs per day. She has never used smokeless tobacco.  Alcohol:  reports no history of alcohol use.  Illicit drugs:  reports no history of drug use.    Allergies  Allergies   Allergen Reactions     Latex Unknown     Added based on information entered during case entry, please review and add reactions, type, and severity as needed     Latex Rash       Problem List  Patient Active Problem List    Diagnosis Date Noted     Acute systolic congestive heart failure (H) 05/18/2023     Priority: Medium     Lumbosacral plexopathy 03/07/2022     Priority: Medium     Insufficient social support 03/07/2022     Priority: Medium     Heart failure with reduced ejection fraction (H) 02/14/2022     Priority: Medium     Cerebrovascular accident (CVA) due to embolism of precerebral artery (H) 07/09/2021     Priority: Medium     Bilateral malignant neoplasm of breast in female, estrogen receptor positive, unspecified site of breast (H) 05/26/2021     Priority: Medium     Added automatically from request for surgery [id number]         Cigarette  "nicotine dependence, uncomplicated 03/04/2019     Priority: Medium     Generalized anxiety disorder 03/04/2019     Priority: Medium     Goiter 03/04/2019     Priority: Medium     Hypertensive chronic kidney disease w stg 1-4/unsp chr kdny 03/04/2019     Priority: Medium     Infiltrating ductal carcinoma of breast (H) 03/04/2019     Priority: Medium     Essential tremor      Priority: Medium     Tobacco abuse 03/16/2016     Priority: Medium     Anxiety 03/16/2016     Priority: Medium     Chronic kidney disease, stage 3 (H) 03/16/2016     Priority: Medium     Gait disturbance 03/16/2016     Priority: Medium     Tremor 03/16/2016     Priority: Medium     Knee pain 05/16/2013     Priority: Medium     Osteoarthritis of knee 01/20/2009     Priority: Medium     Status post hip replacement 01/31/2008     Priority: Medium     Overview:   ICD 10           Prior to Admission Medications   (Not in a hospital admission)      Review of Systems  Complete ROS reviewed, unless noted in HPI, all other systems reviewed (with patient) and all others found to be negative.      Objective:     Physical Exam:  /72 (BP Location: Left arm, Patient Position: Semi-Mahan's, Cuff Size: Adult Small)   Pulse 92   Temp 98.8  F (37.1  C) (Oral)   Resp 18   Ht 1.549 m (5' 1\")   Wt 44.5 kg (98 lb)   SpO2 91%   BMI 18.52 kg/m    Weight:   Vitals:    05/18/23 1025 05/18/23 1034   Weight: 44.5 kg (98 lb) 44.5 kg (98 lb)      Body mass index is 18.52 kg/m .    General Appearance:  Alert, cooperative, no distress   Head:  Normocephalic, without obvious abnormality, atraumatic   Eyes:  PERRL, conjunctiva/corneas clear, EOM's intact   ENT/Throat: Lips, mucosa, and tongue normal; teeth and gums normal   Lymph/Neck: Supple, symmetrical, trachea midline   Lungs:   Crackles in bases, expiratory wheezes, respirations unlabored, NC O2   Cardiovascular/Heart:  Regular rate and rhythm, S1, S2 normal,no murmur, rub or gallop.    Abdomen:   Soft, " non-tender, bowel sounds active all four quadrants   Musculoskeletal: Extremities normal, atraumatic   Skin: Skin warm, dry    Neurologic: Alert and oriented X 3, Moves all 4 extremities     Psych: Affect is appropriate      Imaging: Reviewed I have personally reviewed pertinent labs, tests, and radiologic imaging in patient's chart.  Echocardiogram Complete    Result Date: 2023  [id number]^YESSENIA^[place] [address]  Name: RIANNA RUIZ MRN: [id number] : 1932 Study Date: 2023 09:08 AM Age: 89 yrs Gender: Female Patient Location: [facility] Reason For Study: CHF Ordering Physician: JOSE CASAS Performed By: KANNAN  BSA: 1.4 m2 Height: 61 in Weight: 98 lb HR: 93 BP: 103/64 mmHg ______________________________________________________________________________ Procedure Complete Portable Echo Adult. Definity (NDC #25538-666) given intravenously. 2.0 ml; lot # [id number]. Adequate quality color and spectral Doppler were performed and interpreted. Adequate quality two-dimensional was performed and interpreted. ______________________________________________________________________________ Interpretation Summary  1. The left ventricle is mildly dilated. Left ventricular function is decreased. The ejection fraction is 25-30% (severely reduced). There is severe global hypokinesia of the left ventricle. 2. Normal right ventricle size and systolic function. 3. The left atrium is moderately dilated. 4. There is mild to moderate (1-2+) mitral regurgitation. 5. There is mild (1+) aortic regurgitation. 6. Moderate left pleural effusion ______________________________________________________________________________ Left Ventricle The left ventricle is mildly dilated. Left ventricular function is decreased. The ejection fraction is 25-30% (severely reduced). There is normal left ventricular wall thickness. Left ventricular diastolic function is  abnormal. There is severe global hypokinesia of the left ventricle.  Right Ventricle Normal right ventricle size and systolic function.  Atria The left atrium is moderately dilated. Right atrial size is normal. There is no color Doppler evidence of an atrial shunt.  Mitral Valve There is mild to moderate mitral annular calcification. The mitral valve leaflets are mildly thickened. There is mild to moderate (1-2+) mitral regurgitation. There is no mitral valve stenosis.  Tricuspid Valve Tricuspid valve leaflets appear normal. There is no evidence of tricuspid stenosis or clinically significant tricuspid regurgitation. Right ventricular systolic pressure could not be approximated due to inadequate tricuspid regurgitation. No tricuspid regurgitation. No significant tricuspid stenosis.  Aortic Valve Moderate aortic valve calcification is present. There is mild (1+) aortic regurgitation. No aortic stenosis is present.  Pulmonic Valve The pulmonic valve is not well seen, but is grossly normal. This degree of valvular regurgitation is within normal limits. There is trace pulmonic valvular regurgitation.  Vessels The aorta root is normal. Normal size ascending aorta. IVC diameter <2.1 cm collapsing >50% with sniff suggests a normal RA pressure of 3 mmHg.  Pericardium There is no pericardial effusion. Moderate left pleural effusion.  ______________________________________________________________________________ MMode/2D Measurements & Calculations RVDd: 2.5 cm IVSd: 0.73 cm LVIDd: 5.2 cm LVIDs: 4.8 cm LVPWd: 0.79 cm FS: 7.3 %  LV mass(C)d: 134.3 grams LV mass(C)dI: 96.2 grams/m2 Ao root diam: 3.2 cm asc Aorta Diam: 3.6 cm LVOT diam: 2.0 cm LVOT area: 3.1 cm2 LA Volume (BP): 50.7 ml LA Volume Index (BP): 36.2 ml/m2  LA Volume Indexed (AL/bp): 38.7 ml/m2 RWT: 0.31 TAPSE: 2.9 cm  Doppler Measurements & Calculations MV E max román: 133.0 cm/sec MV max P.3 mmHg MV mean P.3 mmHg MV V2 VTI: 11.4 cm MVA(VTI): 5.1 cm2 MV dec  slope: 1119 cm/sec2 MV dec time: 0.12 sec Ao V2 max: 141.6 cm/sec Ao max P.0 mmHg Ao V2 mean: 104.1 cm/sec Ao mean P.9 mmHg Ao V2 VTI: 21.3 cm KENDELL(I,D): 2.7 cm2 KENDELL(V,D): 2.7 cm2 AI P1/2t: 603.9 msec LV V1 max P.1 mmHg LV V1 max: 123.1 cm/sec LV V1 VTI: 18.5 cm SV(LVOT): 58.1 ml SI(LVOT): 41.7 ml/m2 PA V2 max: 105.1 cm/sec PA max P.4 mmHg PA acc time: 0.06 sec  AV Brent Ratio (DI): 0.87 KENDELL Index (cm2/m2): 2.0 Medial E/e': 19.4 RV S Brent: 12.4 cm/sec  ______________________________________________________________________________ Report approved by: Sierra Iverson 2023 10:39 AM       US Renal Complete Non-Vascular    Result Date: 2023  EXAM: US RENAL COMPLETE NON-VASCULAR LOCATION: [facility] DATE/TIME: 2023 5:17 PM CDT INDICATION: Right flank pain. Chronic kidney disease. COMPARISON: CT abdomen pelvis 2023. TECHNIQUE: Routine Bilateral Renal and Bladder Ultrasound. FINDINGS: RIGHT KIDNEY: Completely atrophied and not well visualized. LEFT KIDNEY: 4.7 x 2.8 x 2.8 cm. Atrophic. Normal cortical echogenicity. No hydronephrosis. Nonobstructing 2 mm calculus at the lower pole, as seen on today's CT. No visualized renal mass. BLADDER: Small right posterolateral bladder diverticulum measuring up to 2.2 cm. Otherwise, normal.     IMPRESSION: 1.  Right kidney is completely atrophied and not well visualized. 2.  Atrophic left kidney, without hydronephrosis. 3.  Nonobstructing 2 mm left renal calculus. 4.  Small right posterolateral bladder diverticulum.    US Lower Extremity Venous Duplex Bilateral    Result Date: 2023  EXAM: US LOWER EXTREMITY VENOUS DUPLEX BILATERAL LOCATION: [facility] DATE/TIME: 2023 2:17 PM CDT INDICATION: Lower extremity swelling COMPARISON: None. TECHNIQUE: Venous Duplex ultrasound of bilateral lower extremities with and without compression, augmentation and duplex. Color flow and spectral Doppler  with waveform analysis performed. FINDINGS: Exam includes the common femoral, femoral, popliteal veins as well as segmentally visualized deep calf veins and greater saphenous vein. RIGHT: No deep vein thrombosis. No superficial thrombophlebitis. No popliteal cyst. There is a oblong subcutaneous fluid collection in the distal medial right thigh measuring 4.2 x 2.2 x 6.0 cm. No associated blood flow on color Doppler LEFT: No deep vein thrombosis. No superficial thrombophlebitis. No popliteal cyst.     IMPRESSION: 1.  No deep venous thrombosis in the bilateral lower extremities. 2.  [place] fluid collection subcutaneous tissues distal medial right thigh consistent with a liquefied hematoma or seroma.    CT Chest Abdomen Pelvis w/o Contrast    Result Date: 5/18/2023  EXAM: CT CHEST ABDOMEN PELVIS W/O CONTRAST LOCATION: [facility] DATE/TIME: 5/18/2023 12:14 PM CDT INDICATION: Shortness of breath, chest pain, chronic kidney disease COMPARISON: Portable chest radiography 06/01/2022; MRI lumbar spine 03/04/2022; thoracic spine CT 03/30/2018 TECHNIQUE: CT scan of the chest, abdomen, and pelvis was performed without IV contrast. Multiplanar reformats were obtained. Dose reduction techniques were used. CONTRAST: None. FINDINGS: LUNGS AND PLEURA: Small to moderate left pleural effusion is present layering posteriorly. Trace right pleural effusion. Focal band of atelectasis posterior left lower lobe related to pleural fluid. A band of atelectasis is present in the lingula. Smaller territories of atelectasis are present in the paradiaphragmatic right lower lobe and along the inferior major fissures. There are 3 adjacent subpleural nodules in the right middle lobe along the minor fissure largest of which is 5 mm (series 4, image 93). There is a subpleural nodule in the lateral left lower lobe which measures 4 mm (series 3, image 77) and 3 mm nodule in the anterior left lower lobe along the major fissure  (series 3, image 63). Anterior bowing of the membranous central airways consistent with imaging at partial expiratory phase of the respiratory cycle. Motion artifacts limit assessment of the subsegmental airways particularly in the lower lungs. MEDIASTINUM/AXILLAE: Heterogeneity and enlargement of the isthmus and right lobe of the thyroid gland which extend into the right paratracheal mediastinum slightly displacing the trachea towards the left. There are mildly enlarged subaortic and lower paratracheal lymph nodes. Mild generalized enlargement of the heart chambers. No pericardial effusion. Tortuous proximal great vessels. Mild to moderate arch and descending aorta atheromatous calcifications. The distal descending thoracic aorta has fusiform ectasia measuring up to 4.1 cm in diameter. The esophagus is decompressed. CORONARY ARTERY CALCIFICATION: Mild. Assessment of the upper abdominal viscera is influenced by motion-related blurring. HEPATOBILIARY: Several low-attenuation lesions are randomly distributed in the liver with larger lesion in the right lobe measuring 15 mm (series 3, image 136). Gallbladder is not distended. No calcified gallstones. No bile duct enlargement. PANCREAS: Pancreas is fatty replaced. No definite pancreas parenchymal lesions. SPLEEN: Normal. ADRENAL GLANDS: Lobular low-attenuation left adrenal gland suggesting hyperplasia. Right adrenal gland is also low-attenuation with a lesser degree of tissue thickening. KIDNEYS/BLADDER: The left kidney is atrophied. There are calcifications at the cortical medullary junction of the upper and lower pole but no hydronephrosis. Compensatory hypertrophy of the right kidney. No definite right nephroureterolithiasis. Urinary bladder is normal. BOWEL: No dilated bowel or bowel wall thickening. In sigmoid diverticulosis. LYMPH NODES: No lymphadenopathy in the abdomen or pelvis. VASCULATURE: Advanced aortoiliac atheromatous calcifications with minimal lobulated  contours of the infrarenal aorta but no aneurysm. PELVIC ORGANS: No pelvic masses. MUSCULOSKELETAL: There is a left hip joint replacement. No periprosthetic fracture. Generalized demineralization of the bones. No pelvis or hip insufficiency fractures. There are numerous superior and inferior endplate deformities of the thoracolumbar spine with greatest degree of compression at T8, T9, and T11 where there is a round 50% loss of vertebral body height. Lesser compression deformities of T5-T7, T10 and all lumbar vertebra. The lumbar compression deformities are unchanged from 03/04/2022.  No aggressive or destructive bone lesions.     IMPRESSION: 1.  Small to moderate left and trace right pleural effusions and related atelectasis. 2.  Suboptimal assessment of the lung parenchyma due to motion-related blurring. Multiple foci of peripheral atelectasis in the bases. Few pulmonary nodules are present largest of which is 5 mm. Per 2017 Fleischner Society guidelines, follow-up CT of the  chest in 12 months is considered optional. 3.  Enlarged, heterogeneous right lobe of the thyroid consistent with mediastinal border. 4.  Multiple randomly low-attenuation liver lesions are present the largest of which measures 1.5 cm. These are indeterminate. These could be further characterized with ultrasound or liver MRI. 5.  Sigmoid diverticulosis but no diverticulitis. 6.  Atrophied left kidney with nonobstructing calculi are present. 7.  Numerous thoracic and lumbar vertebral compression deformities. The lumbar compression deformities are unchanged from 03/04/2022. Multiple thoracic compression deformities have worsened since 2018.      Labs: Reviewed I have personally reviewed pertinent labs, tests, and radiologic imaging in patient's chart.  Recent Results (from the past 24 hour(s))   Troponin I    Collection Time: 05/18/23  4:45 PM   Result Value Ref Range    Troponin I 0.08 0.00 - 0.29 ng/mL   CBC with platelets and differential     Collection Time: 05/18/23  4:45 PM   Result Value Ref Range    WBC Count 5.3 4.0 - 11.0 10e3/uL    RBC Count 3.95 3.80 - 5.20 10e6/uL    Hemoglobin 12.4 11.7 - 15.7 g/dL    Hematocrit 40.1 35.0 - 47.0 %     (H) 78 - 100 fL    MCH 31.4 26.5 - 33.0 pg    MCHC 30.9 (L) 31.5 - 36.5 g/dL    RDW 15.3 (H) 10.0 - 15.0 %    Platelet Count 114 (L) 150 - 450 10e3/uL    % Neutrophils 84 %    % Lymphocytes 12 %    % Monocytes 3 %    % Eosinophils 1 %    % Basophils 0 %    % Immature Granulocytes 0 %    NRBCs per 100 WBC 0 <1 /100    Absolute Neutrophils 4.5 1.6 - 8.3 10e3/uL    Absolute Lymphocytes 0.6 (L) 0.8 - 5.3 10e3/uL    Absolute Monocytes 0.1 0.0 - 1.3 10e3/uL    Absolute Eosinophils 0.0 0.0 - 0.7 10e3/uL    Absolute Basophils 0.0 0.0 - 0.2 10e3/uL    Absolute Immature Granulocytes 0.0 <=0.4 10e3/uL    Absolute NRBCs 0.0 10e3/uL   UA with Microscopic reflex to Culture    Collection Time: 05/18/23  6:03 PM    Specimen: Urine, Clean Catch   Result Value Ref Range    Color Urine Light Yellow Colorless, Straw, Light Yellow, Yellow    Appearance Urine Clear Clear    Glucose Urine Negative Negative mg/dL    Bilirubin Urine Negative Negative    Ketones Urine Negative Negative mg/dL    Specific Gravity Urine 1.012 1.001 - 1.030    Blood Urine Negative Negative    pH Urine 6.5 5.0 - 7.0    Protein Albumin Urine Negative Negative mg/dL    Urobilinogen Urine <2.0 <2.0 mg/dL    Nitrite Urine Negative Negative    Leukocyte Esterase Urine Negative Negative    Mucus Urine Present (A) None Seen /LPF    RBC Urine <1 <=2 /HPF    WBC Urine <1 <=5 /HPF    Squamous Epithelials Urine <1 <=1 /HPF   Troponin I    Collection Time: 05/18/23 10:48 PM   Result Value Ref Range    Troponin I 0.08 0.00 - 0.29 ng/mL   Basic metabolic panel    Collection Time: 05/19/23  6:13 AM   Result Value Ref Range    Sodium 142 136 - 145 mmol/L    Potassium 5.3 (H) 3.5 - 5.0 mmol/L    Chloride 99 98 - 107 mmol/L    Carbon Dioxide (CO2) 31 22 - 31 mmol/L     Anion Gap 12 5 - 18 mmol/L    Urea Nitrogen 60 (H) 8 - 28 mg/dL    Creatinine 2.91 (H) 0.60 - 1.10 mg/dL    Calcium 9.0 8.5 - 10.5 mg/dL    Glucose 237 (H) 70 - 125 mg/dL    GFR Estimate 15 (L) >60 mL/min/1.73m2   Hepatic panel    Collection Time: 05/19/23  6:13 AM   Result Value Ref Range    Bilirubin Total 0.5 0.0 - 1.0 mg/dL    Bilirubin Direct 0.2 <=0.5 mg/dL    Protein Total 7.2 6.0 - 8.0 g/dL    Albumin 3.1 (L) 3.5 - 5.0 g/dL    Alkaline Phosphatase 99 45 - 120 U/L    AST 27 0 - 40 U/L    ALT 20 0 - 45 U/L   Magnesium    Collection Time: 05/19/23  6:13 AM   Result Value Ref Range    Magnesium 2.2 1.8 - 2.6 mg/dL   Echocardiogram Complete    Collection Time: 05/19/23 10:19 AM   Result Value Ref Range    LVEF  25-30% (severely reduced)        Total time spent 65 minutes with greater than 50% in consultation, education and coordination of care.     Also discussed with RN, pharmacist.     Please see A&P for additional details of medical decision making.    Elements of Medical Decision Making as described above. High risk therapy including opioids, high risk drug therapy including oral and/or parenteral controlled substances    Thank you for this consultation.    Emani TURNER, ABNERP-C  Acute Care Pain Management Program  [facility] ([facility], [place], [place])  Monday-Friday 8a-4p   Page via online paging system or [employer]

## 2023-05-20 NOTE — PROGRESS NOTES
[facility]    ([facility], [facility], [facility])  Daily PAIN Progress Note    Assessment/Plan:  Mayela Espino is a 89 year old female who was admitted on 5/18/2023.  I was asked to see the patient for severe back pain. Admitted for multiple compression fractures, CT indicated numerous superior and inferior endplate deformities of the thoracolumbar   spine with greatest degree of compression at T8, T9, and T11 where there is a round 50% loss of vertebral body height. Lesser compression deformities of T5-T7, T10 and all lumbar vertebra. The lumbar compression deformities are unchanged from 03/04/2022. History of heart failure, chronic kidney disease, thyroid mass, pulmonary nodules.    shows 1 refill of lorazepam although no opioid use. Describes pain as mostly spams, some abdominal pain and nausea today.  In 24 hours she has utilized 1 dose of IV Dilaudid.  And no oral opioids.    PLAN:  Acute pain secondary to compression fractures and patient is opioid naïve.  She has been quite anxious and in pain, also hypoxic on 3 L. Consider Palliative Care referral.   Multimodal Medication Therapy:     Adjuvants: On Robaxin 4 times daily may need to decrease dose given CrtCl to 250mg QID, scheduled Tylenol    Opioids: Agree with oxycodone 2.5 mg as needed, would decrease IV Dilaudid as able.    Non-medication interventions- Ice and PT     Constipation Prophylaxis-on scheduled and as needed laxatives    Follow up /Discharge Recommendations - We recommend prescribing the following at the time of discharge: robaxin and perhaps oxycodone           Subjective:    Pain is mostly in the right low back and wraps around the abdomen. No fall prior to admission. Back was stiff and then began to spasms prior to admission.     Discussed with night RN who notes O2 drop while standing last night and did required closed face mask O2.             Acute systolic congestive heart failure (H)   Patient  "Active Problem List   Diagnosis     Tobacco abuse     Anxiety     Chronic kidney disease, stage 3 (H)     Gait disturbance     Tremor     Essential tremor     Cigarette nicotine dependence, uncomplicated     Generalized anxiety disorder     Goiter     Hypertensive chronic kidney disease w stg 1-4/unsp chr kdny     Infiltrating ductal carcinoma of breast (H)     Knee pain     Osteoarthritis of knee     Status post hip replacement     Bilateral malignant neoplasm of breast in female, estrogen receptor positive, unspecified site of breast (H)     Cerebrovascular accident (CVA) due to embolism of precerebral artery (H)     Heart failure with reduced ejection fraction (H)     Lumbosacral plexopathy     Insufficient social support     Acute systolic congestive heart failure (H)        History   Drug Use Unknown         Tobacco Use      Smoking status: Every Day        Packs/day: 0.25        Types: Cigarettes      Smokeless tobacco: Never    Vaping Use      Vaping status: None          acetaminophen  975 mg Oral Q8H     anastrozole  1 mg Oral Daily     aspirin  81 mg Oral Daily     calcitonin (salmon)  1 spray Alternating Nostrils Daily     diclofenac  2 g Topical 4x Daily     [Held by provider] furosemide  20 mg Oral Daily     ipratropium - albuterol 0.5 mg/2.5 mg/3 mL  3 mL Nebulization 4x daily     lidocaine   Topical 4x Daily     methocarbamol  500 mg Oral 4x Daily     methylPREDNISolone  62.5 mg Intravenous Q24H     polyethylene glycol  17 g Oral Daily     sodium chloride (PF)  3 mL Intracatheter Q8H       Objective:  Vital signs in last 24 hours:  B/P: 127/86, T: 97.9, P: 92, R: 18   Blood pressure 127/86, pulse 92, temperature 97.9  F (36.6  C), temperature source Oral, resp. rate 18, height 1.549 m (5' 1\"), weight 43.8 kg (96 lb 9 oz), SpO2 92 %.      Weight:   Wt Readings from Last 2 Encounters:   05/20/23 43.8 kg (96 lb 9 oz)   06/01/22 42.6 kg (94 lb)           Intake/Output:    Intake/Output Summary (Last 24 " hours) at 5/20/2023 0655  Last data filed at 5/20/2023 0409  Gross per 24 hour   Intake 501.67 ml   Output --   Net 501.67 ml        Review of Systems:   As per subjective, all others negative.    Physical Exam:     General Appearance:  Alert, cooperative, no distress, appears stated age   Patient is sitting up in bed    Head:  Normocephalic, without obvious abnormality, atraumatic   Eyes:  PERRL, conjunctiva/corneas clear, EOM's intact   ENT/Throat: Lips dry     Lymph/Neck: Supple, symmetrical, trachea midline, no adenopathy, thyroid: not enlarged, symmetric    Lungs:   Clear  to auscultation bilaterally, respirations unlabored   Chest Wall:  No tenderness or deformity   Cardiovascular/Heart:  Regular    Abdomen:   Soft, non-tender, bowel sounds active all four quadrants,  no masses, no organomegaly   Musculoskeletal: Extremities normal, atraumatic  I    Skin: Skin is pale    Neurologic: Alert and oriented X 3, Moves all 4 extremities           Imaging:  Personally Reviewed.    Results for orders placed or performed during the hospital encounter of 05/18/23   CT Chest Abdomen Pelvis w/o Contrast    Impression    IMPRESSION:    1.  Small to moderate left and trace right pleural effusions and related atelectasis.   2.  Suboptimal assessment of the lung parenchyma due to motion-related blurring. Multiple foci of peripheral atelectasis in the bases. Few pulmonary nodules are present largest of which is 5 mm. Per 2017 Fleischner Society guidelines, follow-up CT of the   chest in 12 months is considered optional.  3.  Enlarged, heterogeneous right lobe of the thyroid consistent with mediastinal border.  4.  Multiple randomly low-attenuation liver lesions are present the largest of which measures 1.5 cm. These are indeterminate. These could be further characterized with ultrasound or liver MRI.  5.  Sigmoid diverticulosis but no diverticulitis.  6.  Atrophied left kidney with nonobstructing calculi are present.  7.   Numerous thoracic and lumbar vertebral compression deformities. The lumbar compression deformities are unchanged from 03/04/2022. Multiple thoracic compression deformities have worsened since 2018.   US Lower Extremity Venous Duplex Bilateral    Impression    IMPRESSION:    1.  No deep venous thrombosis in the bilateral lower extremities.  2.  [place] fluid collection subcutaneous tissues distal medial right thigh consistent with a liquefied hematoma or seroma.   US Renal Complete Non-Vascular    Impression    IMPRESSION:    1.  Right kidney is completely atrophied and not well visualized.    2.  Atrophic left kidney, without hydronephrosis.    3.  Nonobstructing 2 mm left renal calculus.    4.  Small right posterolateral bladder diverticulum.   Echocardiogram Complete   Result Value Ref Range    LVEF  25-30% (severely reduced)         Lab Results:  Personally Reviewed.   Last Comprehensive Metabolic Panel:  Sodium   Date Value Ref Range Status   05/20/2023 141 136 - 145 mmol/L Final   07/10/2021 145 (H) 133 - 144 mmol/L Final     Potassium   Date Value Ref Range Status   05/20/2023 5.4 (H) 3.5 - 5.0 mmol/L Final   07/10/2021 3.9 3.4 - 5.3 mmol/L Final     Chloride   Date Value Ref Range Status   05/20/2023 99 98 - 107 mmol/L Final   07/10/2021 112 (H) 94 - 109 mmol/L Final     Carbon Dioxide   Date Value Ref Range Status   07/10/2021 31 20 - 32 mmol/L Final     Carbon Dioxide (CO2)   Date Value Ref Range Status   05/20/2023 31 22 - 31 mmol/L Final     Anion Gap   Date Value Ref Range Status   05/20/2023 11 5 - 18 mmol/L Final   07/10/2021 2 (L) 3 - 14 mmol/L Final     Glucose   Date Value Ref Range Status   05/20/2023 136 (H) 70 - 125 mg/dL Final   07/10/2021 120 (H) 70 - 99 mg/dL Final     Urea Nitrogen   Date Value Ref Range Status   05/20/2023 83 (H) 8 - 28 mg/dL Final   07/10/2021 28 7 - 30 mg/dL Final     Creatinine   Date Value Ref Range Status   05/20/2023 3.09 (H) 0.60 - 1.10 mg/dL Final   07/10/2021 1.52  (H) 0.52 - 1.04 mg/dL Final     GFR Estimate   Date Value Ref Range Status   05/20/2023 14 (L) >60 mL/min/1.73m2 Final     Comment:     eGFR calculated using 2021 CKD-EPI equation.   07/10/2021 30 (L) >60 mL/min/[1.73_m2] Final     Comment:     Non  GFR Calc  Starting 12/18/2018, serum creatinine based estimated GFR (eGFR) will be   calculated using the Chronic Kidney Disease Epidemiology Collaboration   (CKD-EPI) equation.       Calcium   Date Value Ref Range Status   05/20/2023 8.4 (L) 8.5 - 10.5 mg/dL Final   07/10/2021 8.5 8.5 - 10.1 mg/dL Final        UA: not checked           Please see A&P for additional details of medical decision making.  Medical complexity over the past 24 hours:  - Intensive monitoring for MEDICATION TOXICITY  - Prescription DRUG MANAGEMENT performed    Today I met with the patient. Discussed pain management with the clinical pharmacist and nurse.  Number of problems addressed: fracture pain, medication reaction (hypoxia with muscle relaxers). Amount of data analyzed:  CrtCl>3, 7Pages review from nursing, pain, attending , reviewed hospitalist notes, reviewed nurses notes, reviewed pharmacy notes and reviewed the following image ct with comp fxs. Offered prescription drug management and also checked the [place] . Risk of complications: offering drug therapy that will require intensive monitoring. Opioid administration /drug therapy requires intensive monitoring due to risk of confusion, hypoxia.       Destiny Newman APRN, CNS-BC, CNP, ACHPN  Acute Care Pain Management Program   Hours of pain coverage 7a-1700- after 1700 please call the house officer   [facility] ([place] ALAS, [place], [place], [place])   Page via Log[employer]- Click HERE to page Destiny or Maria Teresa text web console

## 2023-05-20 NOTE — PROGRESS NOTES
05/20/23 1218   Appointment Info   Signing Clinician's Name / Credentials (OT) Lisa Lam, OTR/L   Living Environment   People in Home alone   Current Living Arrangements condominium   Living Environment Comments able to live on main level; walk-in shower   Self-Care   Usual Activity Tolerance good   Current Activity Tolerance moderate   Equipment Currently Used at Home grab bar, toilet;grab bar, tub/shower;shower chair;walker, rolling;cane, quad  (reacher, long handle shoe horn, sock aid)   Fall history within last six months no   Activity/Exercise/Self-Care Comment Ind with ADLs/IADLs; receives A with cleaning. Pt's neighbor is able to assist as needed   General Information   Onset of Illness/Injury or Date of Surgery 05/18/23   Referring Physician Dr. Booth   Patient/Family Therapy Goal Statement (OT) to get rid of the pain   Additional Occupational Profile Info/Pertinent History of Current Problem per chart,89 year old female admitted on 5/18/2023. She presents complaining of right-sided flank pain and shortness of breath with pain radiating around to the front of her chest intermittently.  Worse with activity and deep breathing.  In the ER CT scan without contrast unremarkable and VBG consistent with compensated respiratory acidosis.  Spine reveals multiple compression fractures.   Existing Precautions/Restrictions fall   Left Upper Extremity (Weight-bearing Status) full weight-bearing (FWB)   Right Upper Extremity (Weight-bearing Status) full weight-bearing (FWB)   Left Lower Extremity (Weight-bearing Status) full weight-bearing (FWB)   Right Lower Extremity (Weight-bearing Status) full weight-bearing (FWB)   Cognitive Status Examination   Orientation Status orientation to person, place and time   Affect/Mental Status (Cognitive) WNL   Follows Commands WNL   Visual Perception   Visual Impairment/Limitations corrective lenses for reading   Sensory   Sensory Quick Adds sensation intact   Pain Assessment    Patient Currently in Pain   (only with movement)   Posture   Posture forward head position   Range of Motion Comprehensive   General Range of Motion no range of motion deficits identified   Strength Comprehensive (MMT)   Comment, General Manual Muscle Testing (MMT) Assessment generalized weakness   Muscle Tone Assessment   Muscle Tone Quick Adds No deficits were identified   Coordination   Upper Extremity Coordination No deficits were identified   Bed Mobility   Bed Mobility supine-sit   Supine-Sit Dickinson (Bed Mobility) supervision   Transfers   Transfers bed-chair transfer;sit-stand transfer   Transfer Skill: Bed to Chair/Chair to Bed   Bed-Chair Dickinson (Transfers) supervision   Sit-Stand Transfer   Sit-Stand Dickinson (Transfers) supervision   Balance   Balance Assessment no deficits were identified   Clinical Impression   Criteria for Skilled Therapeutic Interventions Met (OT) Yes, treatment indicated   OT Diagnosis decreased ADL independence   Influenced by the following impairments Acute systolic congestive heart failure abd spinal compression fx   OT Problem List-Impairments impacting ADL problems related to;activity tolerance impaired;pain;strength   Assessment of Occupational Performance 3-5 Performance Deficits   Identified Performance Deficits dressing, toileting, func mob   Planned Therapy Interventions (OT) ADL retraining   Clinical Decision Making Complexity (OT) moderate complexity   Risk & Benefits of therapy have been explained evaluation/treatment results reviewed   OT Total Evaluation Time   OT Eval, Moderate Complexity Minutes (23863) 10   OT Goals   Therapy Frequency (OT) Daily   OT Predicted Duration/Target Date for Goal Attainment 05/27/23   OT Goals Lower Body Dressing;Hygiene/Grooming;Bed Mobility;Toilet Transfer/Toileting   OT: Hygiene/Grooming modified independent;while standing   OT: Lower Body Dressing Modified independent;using adaptive equipment   OT: Bed Mobility  Modified independent;supine to/from sitting   OT: Toilet Transfer/Toileting Modified independent;using adaptive equipment   Interventions   Interventions Quick Adds Self-Care/Home Management   Self-Care/Home Management   Treatment Detail/Skilled Intervention pt supine in bed and agreeable to therapy. Pt on 4L O2 with O2 sats in 80s at rest and during activity. Educ on PLB and log roll for bed mob. Pt SBA for supine to sit bed mob using log roll tech with elevated HOB and bed rail. Pt SBA for STS from EOB, bed and chair transfers with FWW. Pt reports increased pain with movement and requested to end the session declining all other activity. increased time and effort needed d/t increased pain. Pt left in chair with bed alarm on and all needs within reach.   OT Discharge Planning   OT Plan transfers, standing activity tolerance, Lb dressing   OT Discharge Recommendation (DC Rec) home with assist   OT Rationale for DC Rec Pt is not at ADL baseline d/t increased pain and would benefit from continued skilled OT to increase ADL independence and activity tolerance. Pt able to d/t home with assist from neighbor as needed if pt returns near ADL baseline. Anticipate that pt will return to ADL baseline once pain is controlled.   OT Brief overview of current status SBA for func mob and bed mob   Total Session Time   Total Session Time (sum of timed and untimed services) 10

## 2023-05-20 NOTE — PROVIDER NOTIFICATION
05/20/23 1147   RCAT Assessment   Reason for Assessment COPD   Pulmonary Status 3   Surgical Status 0   Chest X-ray 0   Respiratory Pattern 0   Mental Status 0   Breath Sounds 2   Cough Effectiveness 0   Level of Activity 1   O2 Required for SpO2>=92% 2   Acuity Level (points) 8   Acuity Level  4   Re-eval Interval Guideline Every 3 days   Re-evaluation Date 05/22/23   Aerosol Therapy (SVN)   Patient Position HOB elevated   Respiratory Treatment Status (SVN) given   Breath Sounds   Breath Sounds All Fields   All Lung Fields Breath Sounds Anterior:;clear     Changed Duoneb qid to q6h prn for wheezing per RCAT protocol.    Jitendra Vargas, RT'

## 2023-05-20 NOTE — PLAN OF CARE
Problem: Pain Acute  Goal: Optimal Pain Control and Function  Outcome: Progressing  Intervention: Develop Pain Management Plan  Recent Flowsheet Documentation  Taken 5/19/2023 1121 by Gabby Wynn RN  Pain Management Interventions: medication (see MAR)  Intervention: Prevent or Manage Pain  Recent Flowsheet Documentation  Taken 5/19/2023 0750 by Gbaby Wynn RN  Medication Review/Management: medications reviewed     Problem: Muscle Strength Impairment  Goal: Improved Muscle Strength  Outcome: Progressing  Intervention: Optimize Muscle Strength  Recent Flowsheet Documentation  Taken 5/19/2023 1800 by Gabby Wynn RN  Activity Management: back to bed  Activity Assistance Provided: assistance, 1 person  Taken 5/19/2023 1200 by Gabby Wynn RN  Activity Management: up in chair  Activity Assistance Provided: assistance, 1 person     Problem: Nausea and Vomiting  Goal: Nausea and Vomiting Relief  Outcome: Progressing   Goal Outcome Evaluation:    Patient A&Ox4, very anxious and in pain. Offered multiple medications, pt refused tylenol, oxy, robaxin. Offered repositioning, but was too anxious to move. Pt later on agreed on IV dilaudid and zofran. Dilaudid made patient feel dizzy. IV ativan was given in evening which helped patient calm down. Pt was also able to take oral robaxin. Pt refused to eat all day, did order dinner incase she wanted to eat tonight. Nuc med scan and MRI were unable to be completed due to patient's excessive pain. MD was notified.

## 2023-05-20 NOTE — PLAN OF CARE
Goal Outcome Evaluation:      Plan of Care Reviewed With: patient  Patient is alert and oriented and able to communicate her needs to staff. She denies any c/o pain, until she attempts to move in bed. Medicated with Scheduled Tylenol and Roboxin,Lidocaine and Volteran Ointment Patient is scheduled for an MRI and is worried about hoe she will transfer onto the equipment. Appetite is fair and patient prefers to eat ice cream at every meal. EF is 20 to 25%. IV fluids discontinued and IV lasis will be ordered.Will continue to monitor.

## 2023-05-20 NOTE — PROGRESS NOTES
Pt alert and oriented. Denies chest pain or SOB. Has 3L on via oxymask, able to make needs known. Pain in back with movement but denies pain. Does desat with activity- and takes a while to recover. Will continue to monitor.

## 2023-05-20 NOTE — PROGRESS NOTES
Care Management Follow Up    Length of Stay (days): 2    Expected Discharge Date: 05/21/2023     Concerns to be Addressed: no discharge needs identified, denies needs/concerns at this time (PT&OT Consults pending currently.)     Patient plan of care discussed at interdisciplinary rounds: Yes    Anticipated Discharge Disposition: Home Care, Home     Anticipated Discharge Services: None  Anticipated Discharge DME: None    Patient/family educated on Medicare website which has current facility and service quality ratings: no  Education Provided on the Discharge Plan: Yes (Pt was encouraged to express any d/c related or other concerns.)  Patient/Family in Agreement with the Plan: unable to assess    Referrals Placed by CM/SW:  (Not yet indicated.)  Private pay costs discussed: Not applicable    Additional Information:  Chart reviewed. Pt is determined to return home at time of discharge, transportation TBD. Current plan for MRI and pain team has been initiated. CM following for discharge recommendations.      TEJ Santiago

## 2023-05-20 NOTE — PROGRESS NOTES
[facility]    Medicine Progress Note - Hospitalist Service    Date of Admission:  5/18/2023    Assessment & Plan   Mayela Espino is a 89 year old female admitted on 5/18/2023. She presents complaining of right-sided flank pain and shortness of breath with pain radiating around to the front of her chest intermittently.  Worse with activity and deep breathing.  In the ER CT scan without contrast unremarkable and VBG consistent with compensated respiratory acidosis.  Spine reveals multiple compression fractures.  Pain improved 5/19 but now experiencing nausea.  Still hypoxic.        Right flank/chest pain  Etiology unclear  VQ scan ordered but not tolerated/not available on the weekend  Renal ultrasound reviewed  Reviewed CT chest abdomen pelvis  Serial troponins negative  Echocardiogram reviewed with EF of 20 to 25%  Ordered MRI due to the multiple compression fractures  She did not tolerate this but is willing to try again 5/20  Neurosurgical consultation ordered  Miacalcin  Pain team initiated     Acute on chronic heart failure with reduced ejection fraction  IV diuresis discontinued due to rising creatinine  Repeat echo reviewed  Currently not on a beta-blocker/could consider but will hold off in light of COPD and current acute issues  No ACE or ARB due to chronic kidney disease/would not start  Consider addition of beta-blockade once acute exacerbation improved/likely outside of the hospital     Acute hypoxic respiratory failure/COPD exacerbation/compensated respiratory acidosis  Supplemental oxygen  Wean as able  Did not tolerate V/Q  Scheduled DuoNebs  Not wheezing 5/20  Changed to oral steroids     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  Worsening with diuresis/discontinued  Nephrology input appreciated  Volume status difficult to assess  Continue to trend renal function     Thyroid mass  Outpatient follow-up     Pulmonary nodules  Outpatient follow-up     Compression  fractures  At multiple levels  Suspect this might be the source of her pain  Ordered MRI which she did not tolerate  Miacalcin  Pain control  Neurosurgery consultation requested  Question if she needs TLSO  Patient willing to try MRI again 5/20     Thrombocytopenia     History of tobacco abuse     Generalized anxiety  Likely contributing to pain  As needed Ativan     Remote history of breast cancer     CODE STATUS  Patient wanted to be full code.  Spent some time discussing this again 5/20 and she agrees that she would prefer to be DNR/DNI but still wants interventions undertaken to get her improved and hopefully back close to baseline.  Explained that she is in a difficult situation with her poor cardiac function, worsening renal function, and lung disease.    Barriers to discharge  Pain control, improvement in renal function, and overall clinical improvement.  If not improving would ask for palliative care involvement on Monday.    Total time 45 minutes       Diet: Snacks/Supplements Adult: Ensure Enlive; Between Meals  Renal Diet (non-dialysis)      Vides Catheter: Not present  Lines: None     Cardiac Monitoring: ACTIVE order. Indication: Acute decompensated heart failure (48 hours)  Code Status: No CPR- Do NOT Intubate      Clinically Significant Risk Factors        # Hyperkalemia: Highest K = 5.4 mmol/L in last 2 days, will monitor as appropriate       # Hypoalbuminemia: Lowest albumin = 3.1 g/dL at 5/19/2023  6:13 AM, will monitor as appropriate   # Thrombocytopenia: Lowest platelets = 114 in last 2 days, will monitor for bleeding  # Acute Kidney Injury, unspecified: based on a >150% or 0.3 mg/dL increase in last creatinine compared to past 90 day average, will monitor renal function   # Acute heart failure with reduced ejection fraction: last echo with EF <40% and receiving IV diuretics        # Severe Malnutrition: based on nutrition assessment, PRESENT ON ADMISSION        Disposition Plan      Expected  Discharge Date: 05/21/2023      Destination: home  Discharge Comments: Pending clinical progression          Sj Winter MD  Hospitalist Service  [facility]  Securely message with [employer] (more info)  Text page via [employer] Paging/Directory   ______________________________________________________________________    Interval History   Patient is doing okay.  Looks more comfortable today.  Still states she is having intermittent pain in the right back and flank area but only with motion currently.  No pain with deep breathing.  No neurologic findings.  Spent some time talking about goals of care and she does not want to be full code.  Talked about pursuing MRI again and she is willing to do so.    Physical Exam   Vital Signs: Temp: 98.1  F (36.7  C) Temp src: Oral BP: 119/61 Pulse: 93   Resp: 16 SpO2: 93 % O2 Device: Nasal cannula with humidification Oxygen Delivery: 4 LPM  Weight: 96 lbs 8.98 oz    GENERAL:  Alert, appears uncomfortable with motion, in no acute distress, appears stated age   HEAD:  Normocephalic, without obvious abnormality, atraumatic   EYES:  PERRL, conjunctiva/corneas clear, no scleral icterus, EOM's intact   NOSE: Nares normal, septum midline, mucosa normal, no drainage   NECK: Supple, symmetrical, trachea midline   BACK:   Symmetric, no curvature, ROM normal, no pain along the posterior spinous processes   LUNGS:    Distant breath sounds but clear   CHEST WALL:  No tenderness or deformity   HEART:  Regular rate and rhythm, S1 and S2 normal, no murmur, rub, or gallop    ABDOMEN:   Soft, non-tender, bowel sounds active all four quadrants, no masses, no organomegaly, no rebound or guarding   EXTREMITIES: Extremities normal, atraumatic, no cyanosis or edema    SKIN: Dry to touch, no exanthems in the visualized areas   NEURO: Alert, oriented x 4, moves all four extremities freely/spontaneously   PSYCH: Cooperative, behavior is appropriate            Data     I have  personally reviewed the following data over the past 24 hrs:    N/A  \   N/A   / N/A     141 99 83 (H) /  136 (H)   5.4 (H) 31 3.09 (H) \       Imaging results reviewed over the past 24 hrs:   No results found for this or any previous visit (from the past 24 hour(s)).  Recent Labs   Lab 05/20/23  0425 05/19/23  0613 05/18/23  1645 05/18/23  1038   WBC  --   --  5.3 4.0   HGB  --   --  12.4 13.1   MCV  --   --  102* 103*   PLT  --   --  114* 120*    142  --  142   POTASSIUM 5.4* 5.3*  --  4.6   CHLORIDE 99 99  --  98   CO2 31 31  --  35*   BUN 83* 60*  --  46*   CR 3.09* 2.91*  --  2.60*   ANIONGAP 11 12  --  9   ITA 8.4* 9.0  --  9.0   * 237*  --  151*   ALBUMIN  --  3.1*  --  3.3*   PROTTOTAL  --  7.2  --  7.2   BILITOTAL  --  0.5  --  0.9   ALKPHOS  --  99  --  107   ALT  --  20  --  22   AST  --  27  --  32   LIPASE  --   --   --  <9

## 2023-05-20 NOTE — PROGRESS NOTES
"CLINICAL NUTRITION SERVICES - ASSESSMENT NOTE     Nutrition Prescription    RECOMMENDATIONS FOR MDs/PROVIDERS TO ORDER:  May want to check a Hgb A1c     Malnutrition Status:    % Weight Loss:  None noted  % Intake:  No decreased intake noted  Subcutaneous Fat Loss:  Orbital region severe depletion, Upper arm region severe depletion and Thoracic region severe depletion  Muscle Loss:  Clavicle bone region severe depletion and Dorsal hand region moderate depletion  Fluid Retention:  Mild     Malnutrition Diagnosis: Severe malnutrition  In Context of:  Chronic illness or disease/ Aging    Recommendations already ordered by Registered Dietitian (RD):  Ensure bid      Future/Additional Recommendations:  Will monitor po, labs, wt      REASON FOR ASSESSMENT  Mayela Espino is a/an 89 year old female assessed by the dietitian for Provider Order - 2g na diet education    Pt admitted with right flank pain, CHF, COPD, respiratory failure, JOSE RAFAEL on CKD, thyroid mass, compression fractures    NUTRITION HISTORY  NKFA     She states she follows a low sodium diet at home. She limits the sodium in foods. She does however on occasion add some salt to food.     She drinks 1 vanilla Ensure daily (1/2 in morning, 1/2 in afternoon)     She gets help with groceries, but makes her own meals. She tried a few meal service deliveries but felt they were really dry.     Blood sugars elevated, but I do not see a hx of DM.     CURRENT NUTRITION ORDERS  Diet: 2 g Sodium  Intake/Tolerance: Intake not yet recorded here    LABS  Labs reviewed, K-5.4, Bun -83, Cr-3.09, glucose (meter)-237, 136    MEDICATIONS  Medications reviewed, lasix, solumedrol, miralax   IVF: NaCL .9% at 50ml/hr    ANTHROPOMETRICS  Height: 154.9 cm (5' 1\")  Most Recent Weight: 43.8 kg (96 lb 9 oz)    IBW: 48 kg  BMI: Underweight BMI <18.5  Weight History:   Wt Readings from Last 10 Encounters:   05/20/23 43.8 kg (96 lb 9 oz)   06/01/22 42.6 kg (94 lb)   04/04/22 42.6 kg (94 lb) "   03/04/22 42.2 kg (93 lb)   02/24/22 42.5 kg (93 lb 11.2 oz)   02/14/22 41.3 kg (91 lb)   12/20/21 42.9 kg (94 lb 9.6 oz)   11/29/21 43.7 kg (96 lb 6.4 oz)   07/10/21 40.1 kg (88 lb 6.4 oz)   06/15/21 41.6 kg (91 lb 11.2 oz)       Dosing Weight: 44 kg    ASSESSED NUTRITION NEEDS  Estimated Energy Needs: 9644-3966 kcals/day (30 - 35 kcals/kg )  Justification: Underweight  Estimated Protein Needs: 44-52 grams protein/day (1 - 1.2 grams of pro/kg)  Justification: Maintenance  Estimated Fluid Needs: 5227-2143 mL/day (25 - 30 mL/kg)   Justification: Maintenance    PHYSICAL FINDINGS  See malnutrition section below.  Per flowsheet:   Edema-+1 right leg, ankle  GI-WDL  Skin-no skin breakdown noted        MALNUTRITION:  % Weight Loss:  None noted  % Intake:  No decreased intake noted  Subcutaneous Fat Loss:  Orbital region severe depletion, Upper arm region severe depletion and Thoracic region severe depletion  Muscle Loss:  Clavicle bone region severe depletion and Dorsal hand region moderate depletion  Fluid Retention:  Mild     Malnutrition Diagnosis: Severe malnutrition  In Context of:  Chronic illness or disease/ Aging     NUTRITION DIAGNOSIS  Malnutrition related to COPD and aging  as evidenced by VLBW, severe fat and muscle loss noted, mild fluid retention     INTERVENTIONS  Implementation  Rec a Hgb A1c   Start Ensure bid vanilla  No further education provided beyond discussion. Pt has learned diet and had no questions       Goals  Patient to consume % of nutritionally adequate meals three times per day, or the equivalent with supplements/snacks.  Maintain wt/promote appropriate wt gain      Monitoring/Evaluation  Progress toward goals will be monitored and evaluated per protocol.

## 2023-05-20 NOTE — CONSULTS
RENAL CONSULT NOTE    REQUESTING PHYSICIAN: MD Moy    REASON FOR CONSULT: JOSE RAFAEL on CKD    PLAN:  Stop IVF's  Start BID IV Lasix  BMP in a.m., ck cystatin C.GFR  Low Na diet/low K diet  Goals of care discussion warranted, and will attempt to engage her, palliative consult warranted.   She is very near ESRD, and would NOT be a dialysis candidate.     ASSESSMENT:  JOSE RAFAEL on CKD 4-5: concerning ongoing decline with likely CRS, very little renal reserve to tolerate even minor volume status changes  CKD since at least 2018, b/l sCR in the 1.7-2.0 range, eGFR grossly overestimated and likely near ESRD; she would NOT be a candidate for dialysis with severe heart failure  Renal US, severely atropic kidneys R<<<L, no hydro or obvious acute pathology.    Can get some systemic absorption with voltaren gel, but benefit for pain may outweigh risk/concerns    Lytes/Acid-Base:  Hyperkalemic, initiate diuretics, low K diet    Acute on Chronic decomp heart failure:  EF 20-25%, not on ACE/ARB and would NOT start in this setting given sig hemodynamic sensitivity and near end stage renal dz. Defer decision on BB to [facility] vs cards    BP:  Mostly controlled     Acute pain:  Back, MRI pending, may need TLSO, Pain service managing    Pulm nodules:  New finding, h/o smoker, high risk for malignancy     COPD:  Acute hypoxic resp failure:  With decomp heart failure, on steroids in ER and nebs, 02 support as needed, not     HPI: Mayela Espino is a 89 year old female for whom we have been asked to see for JOSE RAFAEL on severe CKD. CKD dating back >5 years,  Progressive.  Baseline sCR in the 1.7-2.0 range, eGFR in the mid teens, but very likely grossly overestimates renal function.  Her kidneys are severely atrophic.  CKD 5 nearing ESRD.  Sig heart failure, EF 25%.   Admitted with SOB, R flank pain, back pain.  REYNOSO.       REVIEW OF SYSTEMS:  Acute on chronic back pain, compression fx.  Nausea yesterday has resolved, no emesis  Still having some  REYNOSO/SOB; not typically on 02 at baseline.           Past Medical History:   Diagnosis Date     Abnormal mini-mental status exam     scored 16 2012, ?dementia     Breast cancer (H)      Chronic kidney disease      Congestive heart failure (H)      Essential tremor      HTN (hypertension)      Tobacco use        I have reviewed this patient's family history and updated it with pertinent information if needed.  Family History   Problem Relation Age of Onset     Hypertension Mother      Cerebrovascular Disease Father 70.00     No Known Problems Son          Social History     Tobacco Use     Smoking status: Every Day     Packs/day: 0.25     Types: Cigarettes     Smokeless tobacco: Never   Substance Use Topics     Alcohol use: No     Drug use: Never          No current facility-administered medications on file prior to encounter.  acetaminophen (TYLENOL) 325 MG tablet, Take 325-650 mg by mouth every 6 hours as needed for mild pain  anastrozole (ARIMIDEX) 1 MG tablet, [ANASTROZOLE (ARIMIDEX) 1 MG TABLET] TAKE 1 TABLET BY MOUTH DAILY  aspirin 81 MG EC tablet, Take 81 mg by mouth daily  cholecalciferol, vitamin D3, 1,000 unit tablet, [CHOLECALCIFEROL, VITAMIN D3, 1,000 UNIT TABLET] Take 500 Units by mouth daily.   furosemide (LASIX) 20 MG tablet, Take 1 tablet (20 mg) by mouth daily as needed (for swelling) (Patient taking differently: Take 20 mg by mouth daily)  LORazepam (ATIVAN) 1 MG tablet, Take by mouth as needed  multivit-iron-min-folic acid 3,500-18-0.4 unit-mg-mg Chew, [MULTIVIT-IRON-MIN-FOLIC ACID 3,500-18-0.4 UNIT-MG-MG CHEW] Chew 1 tablet daily.   [DISCONTINUED] lisinopril-hydrochlorothiazide (PRINZIDE,ZESTORETIC) 10-12.5 mg per tablet, [LISINOPRIL-HYDROCHLOROTHIAZIDE (PRINZIDE,ZESTORETIC) 10-12.5 MG PER TABLET] Take 0.5-0.75 tablets by mouth daily as needed (BP). For BP >/= 140/90 take 0.5 tablet (1/2);   For BP > 180/100 takes 0.75 tablet (3/4)          ALLERGIES/SENSITIVITIES:  Allergies   Allergen Reactions  "    Latex Unknown     Added based on information entered during case entry, please review and add reactions, type, and severity as needed     Latex Rash          PHYSICAL EXAM:  /61 (BP Location: Left arm)   Pulse 93   Temp 98.1  F (36.7  C) (Oral)   Resp 16   Ht 1.549 m (5' 1\")   Wt 43.8 kg (96 lb 9 oz)   SpO2 93%   BMI 18.25 kg/m      Patient Vitals for the past 72 hrs:   Weight   05/20/23 0435 43.8 kg (96 lb 9 oz)   05/18/23 1034 44.5 kg (98 lb)   05/18/23 1025 44.5 kg (98 lb)     Body mass index is 18.25 kg/m .    Intake/Output Summary (Last 24 hours) at 5/20/2023 1349  Last data filed at 5/20/2023 0800  Gross per 24 hour   Intake 621.67 ml   Output --   Net 621.67 ml         General - A & O x 3, NAD, pleasant, seems sl in denial, says kidney are \"fine\" and stable   HEENT - PERRLA, no scleral icterus  Respiratory - dim, sats upper 80's on 4L per NC   Cardiovascular - AP Rate controlled, +  murmur  Abdomen - soft, BS present, no bruits, no fluid wave  Extremities - well perfused, no edema, very sarcopenic   Integumentary - intact, good turgor, no rash/lesions  Neurologic - grossly intact  Psych:  Judgement intact, affect WNL  :  Making some urine, not measured   Wt stable    Laboratory:  Recent Labs   Lab Test 05/18/23  1645 05/18/23  1038 06/01/22  1046 03/04/22  0925 07/10/21  0922 07/09/21  0911   WBC 5.3 4.0   < > 4.8   < > 5.0   HGB 12.4 13.1   < > 12.2   < > 12.2   * 103*   < > 103*   < > 98   * 120*   < > 123*   < > 125*   INR  --   --   --  1.03  --  1.13*    < > = values in this interval not displayed.      Recent Labs   Lab Test 05/20/23  0425 05/19/23  0613   POTASSIUM 5.4* 5.3*   CHLORIDE 99 99   BUN 83* 60*      Recent Labs   Lab Test 05/19/23  0613 05/18/23  1803 05/18/23  1038 03/20/19  1140 03/30/18  1859   ALBUMIN 3.1*  --  3.3*   < >  --    BILITOTAL 0.5  --  0.9   < >  --    ALT 20  --  22   < >  --    AST 27  --  32   < >  --    PROTEIN  --  Negative  --   --  " Negative    < > = values in this interval not displayed.       Personally reviewed today's laboratory studies      Thank you for involving us in the care of this patient. We will continue to follow along with you.      Ashley Baez, SHIRA-BC  [facility]  [phone]

## 2023-05-21 NOTE — PROGRESS NOTES
RENAL PROGRESS NOTE    PLAN:  Cont  BID IV Lasix, inc 40 BID x2 more doses   BMP in a.m  Low Na diet/low K diet  Goals of care discussion warranted, and will attempt to engage her, palliative consult warranted.   She is very near ESRD, and would NOT be a dialysis candidate.      ASSESSMENT:  JOSE RAFAEL on CKD 4-5: concerning ongoing decline with likely CRS, very little renal reserve to tolerate even minor volume status changes  CKD since at least 2018, b/l sCR in the 1.7-2.0 (coinciding Cystatin C 4.1, eGFR 10), eGFR grossly overestimated and likely near ESRD; she would NOT be a candidate for dialysis with severe heart failure  Renal US, severely atropic kidneys R<<<L, no hydro or obvious acute pathology.    Can get some systemic absorption with voltaren gel, but benefit for pain may outweigh risk/concerns     Lytes/Acid-Base:  Hyperkalemic, initiate diuretics, low K diet, add Lokelma     Acute on Chronic decomp heart failure:  EF 20-25%, not on ACE/ARB and would NOT start in this setting given sig hemodynamic sensitivity and near end stage renal dz. Defer decision on BB to [facility] vs cards     BP:  Mostly controlled      Acute pain:  Back, MRI pending, may need TLSO, Pain service managing     Pulm nodules:  New finding, h/o smoker, high risk for malignancy      COPD:  Acute hypoxic resp failure:  With decomp heart failure, on steroids in ER and nebs, 02 support as needed, not     SUBJECTIVE:  Pt seen briefly in room, neurosurgery in room, will attempt to see later today.  Chart reviewed, RF stable.  Decent UO.     OBJECTIVE:  Physical Exam   Temp: 97.6  F (36.4  C) Temp src: Oral BP: (!) 147/82 Pulse: 87   Resp: 18 SpO2: 95 % O2 Device: Nasal cannula Oxygen Delivery: 5 LPM  Vitals:    05/18/23 1034 05/20/23 0435 05/21/23 0141   Weight: 44.5 kg (98 lb) 43.8 kg (96 lb 9 oz) 41.9 kg (92 lb 4.8 oz)     Vital Signs with Ranges  Temp:  [97.3  F (36.3  C)-98.1  F (36.7  C)] 97.6  F (36.4  C)  Pulse:  [86-90] 87  Resp:  [18]  18  BP: (123-147)/(68-82) 147/82  SpO2:  [84 %-98 %] 95 %  I/O last 3 completed shifts:  In: 600 [P.O.:600]  Out: 750 [Urine:750]    Temp (24hrs), Av.7  F (36.5  C), Min:97.3  F (36.3  C), Max:98.1  F (36.7  C)      Patient Vitals for the past 72 hrs:   Weight   23 0141 41.9 kg (92 lb 4.8 oz)   23 0435 43.8 kg (96 lb 9 oz)       Intake/Output Summary (Last 24 hours) at 2023 1215  Last data filed at 2023 0945  Gross per 24 hour   Intake 480 ml   Output 1150 ml   Net -670 ml       PHYSICAL EXAM:  General - Alert and oriented x3, appears comfortable, NAD, working with neurosurgery currently  Cardiovascular - rate and BP currently controlled   Respiratory - sats low 90's on 5L  Abd: BS present, no guarding or pain with palpation, no ascites  Extremities - No lower extremity edema bilaterally  Neuro:  Grossly intact, no focal deficits  MSK:  Grossly intact by cursory chair exam   Psych:  Normal affect    LABORATORY STUDIES:     Recent Labs   Lab 23  0623  16423  1038   WBC  --  5.3 4.0   RBC  --  3.95 4.16   HGB  --  12.4 13.1   HCT  --  40.1 43.0   * 114* 120*       Basic Metabolic Panel:  Recent Labs   Lab 23  0607 23  0425 23  0613 23  1038    141 142 142   POTASSIUM 5.7* 5.4* 5.3* 4.6   CHLORIDE 101 99 99 98   CO2 30 31 31 35*   BUN 81* 83* 60* 46*   CR 3.06* 3.09* 2.91* 2.60*   * 136* 237* 151*   ITA 8.8 8.4* 9.0 9.0       INRNo lab results found in last 7 days.     Recent Labs   Lab Test 23  0607 23  1645 23  1038 22  1046 22  0925 07/10/21  0922 21  0911   INR  --   --   --   --  1.03  --  1.13*   WBC  --  5.3 4.0   < > 4.8   < > 5.0   HGB  --  12.4 13.1   < > 12.2   < > 12.2   * 114* 120*   < > 123*   < > 125*    < > = values in this interval not displayed.       Personally reviewed current labs      SHIRA Mcnair-BC  [facility]  [phone]

## 2023-05-21 NOTE — PLAN OF CARE
Problem: Plan of Care - These are the overarching goals to be used throughout the patient stay.    Goal: Readiness for Transition of Care  Outcome: Progressing     Problem: Muscle Strength Impairment  Goal: Improved Muscle Strength  Outcome: Progressing   Goal Outcome Evaluation:    Patient is alert and oriented. Pt denies pain or SOB. Pt is NSR on tele. Pt is ambulating without issue. Physical therapy signed off. Plan is for Pt to receive acupuncture and then go down for MRI tomorrow.     Zoe Cornelius RN
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
## 2023-05-21 NOTE — PLAN OF CARE
Problem: Nausea and Vomiting  Goal: Nausea and Vomiting Relief  Outcome: Progressing     Problem: Muscle Strength Impairment  Goal: Improved Muscle Strength  Outcome: Progressing    Pt resting comfortably, no events. Continues to require 4L NC, desats on  RA. Reports mild REYNOSO but denies any SOB at rest. Up to bathroom w/ SBA ,belt & walker. Refused to have MRI tonight d/t persistent pain. Requested to have acupuncture session prior to scan. She states current oral & topical analgesics are not working well for her. She reports no pain at rest but spasms with activity.

## 2023-05-21 NOTE — PROGRESS NOTES
05/21/23 1000   Appointment Info   Signing Clinician's Name / Credentials (PT) ISABEL Hayes   Student Supervision Direct supervision provided   Living Environment   People in Home alone   Current Living Arrangements condominium   Home Accessibility stairs to enter home;stairs within home   Number of Stairs, Main Entrance none   Number of Stairs, Within Home, Primary greater than 10 stairs   Stair Railings, Within Home, Primary none   Living Environment Comments able to live on main level   Self-Care   Usual Activity Tolerance good   Current Activity Tolerance good   Equipment Currently Used at Home walker, rolling;cane, straight   Fall history within last six months no   Activity/Exercise/Self-Care Comment indep with ADL's at baseline, has help cleaning from a cleaning service; does not leave house as often in the winter but is able to get groceries delivered   General Information   Onset of Illness/Injury or Date of Surgery 05/18/23   Referring Physician Sandoval Johnson DO   Patient/Family Therapy Goals Statement (PT) decrease pain   Pertinent History of Current Problem (include personal factors and/or comorbidities that impact the POC) admitted for SOB and right sided flank pain   Existing Precautions/Restrictions no known precautions/restrictions   Weight-Bearing Status - LLE weight-bearing as tolerated   Weight-Bearing Status - RLE weight-bearing as tolerated   Cognition   Affect/Mental Status (Cognition) WFL   Range of Motion (ROM)   ROM Comment WFL   Strength (Manual Muscle Testing)   Strength Comments WFL   Transfers   Transfers sit-stand transfer   Maintains Weight-bearing Status (Transfers) able to maintain;verbal cues to maintain   Transfer Safety Concerns Noted decreased step length;decreased weight-shifting ability   Impairments Contributing to Impaired Transfers impaired balance;pain   Sit-Stand Transfer   Sit-Stand [place] (Transfers) supervision;verbal cues;contact guard   Assistive  Device (Sit-Stand Transfers) walker, front-wheeled;cane, straight   Gait/Stairs (Locomotion)   [place] Level (Gait) supervision;contact guard   Assistive Device (Gait) walker, front-wheeled   Distance in Feet 10   Distance in Feet (Gait) 325 with FWW, 25 with SPC.   Pattern (Gait) step-through   Deviations/Abnormal Patterns (Gait) gait speed decreased;mariah decreased   Maintains Weight-bearing Status (Gait) able to maintain   Clinical Impression   Criteria for Skilled Therapeutic Intervention Yes, treatment indicated   PT Diagnosis (PT) impaired functional mobility   Influenced by the following impairments pain, decreased strength and balance   Functional limitations due to impairments gait, transfers, stairs   Clinical Presentation (PT Evaluation Complexity) Stable/Uncomplicated   Clinical Presentation Rationale pt presents as medically diagnosed   Clinical Decision Making (Complexity) low complexity   Planned Therapy Interventions (PT) balance training;patient/family education;stair training;strengthening;stretching;ROM (range of motion);gait training   Anticipated Equipment Needs at Discharge (PT) walker, rolling   Risk & Benefits of therapy have been explained evaluation/treatment results reviewed;patient   PT Total Evaluation Time   PT Eval, Low Complexity Minutes (32075) 10   Physical Therapy Goals   PT Frequency One time eval and treatment only   PT Predicted Duration/Target Date for Goal Attainment 05/23/23   PT Goals Transfers;Gait   PT: Transfers Modified independent;Sit to/from stand;Assistive device;Goal Met   PT: Gait Modified independent;Assistive device;150 feet;Goal Met   Interventions   Interventions Quick Adds Gait Training;Therapeutic Activity   Therapeutic Activity   Treatment Detail/Skilled Intervention sit<>stand x 2 once with FWW, once with cane, Mod I.   Gait Training   Gait Training Minutes (16307) 25   Symptoms Noted During/After Treatment (Gait Training) fatigue   Treatment  Detail/Skilled Intervention Pt was on 6L O2. Ambulated x 325' with FWW, Mod I with slow stable gait. Pt reported feeling fatigued following ambulation, O2 was checked and was WNL at 92% and went up to 97% after a short seated rest break. Pt was educated on PLB when walking and for recovery to aid in SOB and to keep O2 levels up, pt expressed understanding. Trialed ambulation x 25' with SPC, SBA, pt was less stable demonstrated by decreased gait speed and using objects for UE support while ambulating back to room, no LOB was noted. Increased time to educate pt on AD choices for safety and pain management techniques and to answer pt's questions.   [place] Level (Gait Training) independent   Physical Assistance Level (Gait Training) supervision   Weight Bearing (Gait Training) weight-bearing as tolerated   Assistive Device (Gait Training) rolling walker;straight cane   Pattern Analysis (Gait Training) swing-through gait   Gait Analysis Deviations decreased mariah;decreased stride length;decreased step length   Impairments (Gait Analysis/Training) balance impaired;pain;strength decreased   PT Discharge Planning   PT Plan dc PT   PT Discharge Recommendation (DC Rec) home with outpatient physical therapy;home   PT Rationale for DC Rec pt is able to transfer and ambulate. Has good home enviornment and adequate social support. Recommended OP PT for continued focus on strength and balance to [facility] to [facility], pt reported that she was previously going to OP PT and would like to continue.   PT Brief overview of current status transfers amb Mod I.   Total Session Time   Timed Code Treatment Minutes 25   Total Session Time (sum of timed and untimed services) 35

## 2023-05-21 NOTE — PROGRESS NOTES
Physical Therapy Discharge Summary    Reason for therapy discharge:    Discharged to home with outpatient therapy.    Progress towards therapy goal(s). See goals on Care Plan in [facility] electronic health record for goal details.  Goals met    Therapy recommendation(s):    Continued therapy is recommended.  Rationale/Recommendations:  OP PT.

## 2023-05-21 NOTE — PROGRESS NOTES
[facility]    Medicine Progress Note - Hospitalist Service    Date of Admission:  5/18/2023    Assessment & Plan   Mayela Espino is a 89 year old female admitted on 5/18/2023. She presents complaining of right-sided flank pain and shortness of breath with pain radiating around to the front of her chest intermittently.  Worse with activity and deep breathing.  In the ER CT scan without contrast unremarkable and VBG consistent with compensated respiratory acidosis.  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  MRI lumbar spine ordered to further assess compression fractures and back pain.  Neurosurgery, nephrology and acute pain team following.  Awaiting pain control, monitoring for worsening of kidney function.  Patient would benefit likely for palliative care consultation and will order for Monday to discuss goals of care.       Right flank/chest pain  Multiple compression vertebral fractures.   Etiology unclear  VQ scan ordered but not tolerated/not available on the weekend  Renal ultrasound reviewed  Reviewed CT chest abdomen pelvis  Serial troponins negative  Echocardiogram reviewed with EF of 20 to 25%  Ordered MRI due to the multiple compression fractures  Neurosurgical consultation ordered on 5/20.  Miacalcin  Appreciate pain team recommendations.     Acute on chronic  Severe heart failure with reduced ejection fraction  Echocardiogram: LVEF 20-25%, global hypokinesis, moderate MR.   Appreciate nephrology assistance in diuresis.   Currently not on a beta-blocker/could consider but will hold off in light of COPD and current acute issues  No ACE or ARB due to chronic kidney disease/would not start  Consider addition of beta-blockade once acute exacerbation improved/likely outside of the  hospital  Albumin 25% 25 g and furosemide 20 mg  q12h started on 5/20/23.      Acute hypoxic respiratory failure/COPD exacerbation/compensated respiratory acidosis  Continue Supplemental oxygen  Wean as able  Did not tolerate V/Q  Scheduled DuoNebs  Not wheezing 5/20  Continue prednisone 20 mg daily.     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  Hyperkalemia  Creatinine 3.06, potassium 5.7.   Repeat renal panel in a.m.  Nephrology concerned that patient is near end stage.   Patient is not a dialysis candidate.   Palliative care consultation for Monday.     Severe protein calorie malnutrition  Cachexia.   RD consultation    Thyroid mass  Outpatient follow-up     Pulmonary nodules  Outpatient follow-up     Thrombocytopenia  Stable 120.   Transfuse for platelets < 10,000.     Generalized anxiety  Likely contributing to pain  As needed Ativan     Remote history of breast cancer  History of tobacco abuse        Diet: Snacks/Supplements Adult: Ensure Enlive; Between Meals  Renal Diet (non-dialysis)    DVT Prophylaxis: Pneumatic Compression Devices  Vides Catheter: Not present  Lines: None     Cardiac Monitoring: ACTIVE order. Indication: Acute decompensated heart failure (48 hours)  Code Status: No CPR- Do NOT Intubate      Clinically Significant Risk Factors        # Hyperkalemia: Highest K = 5.7 mmol/L in last 2 days, will monitor as appropriate       # Hypoalbuminemia: Lowest albumin = 3.1 g/dL at 5/19/2023  6:13 AM, will monitor as appropriate   # Thrombocytopenia: Lowest platelets = 121 in last 2 days, will monitor for bleeding  # Acute Kidney Injury, unspecified: based on a >150% or 0.3 mg/dL increase in last creatinine compared to past 90 day average, will monitor renal function   # Acute heart failure with reduced ejection fraction: last echo with EF <40% and receiving IV diuretics       # Cachexia: Estimated body mass index is 17.44 kg/m  as calculated from the following:    Height as of this  "encounter: 1.549 m (5' 1\").    Weight as of this encounter: 41.9 kg (92 lb 4.8 oz)., PRESENT ON ADMISSION  # Severe Malnutrition: based on nutrition assessment, PRESENT ON ADMISSION        Disposition Plan      Expected Discharge Date: 05/22/2023      Destination: home  Discharge Comments: Pending clinical progression          Sandoval Johnson DO  Hospitalist Service  [facility]  Securely message with [employer] (more info)  Text page via [employer] Paging/Directory   ______________________________________________________________________    Interval History   Mayela has no new complaints today.  She ambulated briefly in the hallway with PT today.  She has intermittent right flank pain that she describes as a spasm.  We are    Physical Exam   Vital Signs: Temp: 97.4  F (36.3  C) Temp src: Oral BP: 136/76 Pulse: 86   Resp: 18 SpO2: 95 % O2 Device: Nasal cannula Oxygen Delivery: 5 LPM  Weight: 92 lbs 4.8 oz     GENERAL:  Alert, appears uncomfortable with motion, in no acute distress, appears stated age, cachexia.   HEAD:  Normocephalic, without obvious abnormality, atraumatic   NECK: Supple, symmetrical, trachea midline   BACK:   Symmetric, no curvature, ROM normal, no pain along the posterior spinous processes   LUNGS:   CTAB   HEART:  RRR, S1 and S2 normal, no murmur, rub, or gallop    ABDOMEN:   Soft, non-tender, bowel sounds active all four quadrants, no masses, no organomegaly, no rebound or guarding   EXTREMITIES: Extremities normal, atraumatic, no cyanosis or edema    SKIN: Dry to touch, no exanthems in the visualized areas   NEURO: Alert, oriented x 4, moves all four extremities freely/spontaneously   PSYCH: Cooperative, behavior is appropriate      Medical Decision Making   50 MINUTES SPENT BY ME on the date of service doing chart review, history, exam, documentation & further activities per the note.      Data     I have personally reviewed the following data over the past 24 hrs:    N/A  \  "  N/A   / 121 (L)     141 101 81 (H) /  146 (H)   5.7 (H) 30 3.06 (H) \       Imaging results reviewed over the past 24 hrs:   No results found for this or any previous visit (from the past 24 hour(s)).

## 2023-05-21 NOTE — PLAN OF CARE
Problem: Nausea and Vomiting  Goal: Nausea and Vomiting Relief  Outcome: Progressing     Problem: Muscle Strength Impairment  Goal: Improved Muscle Strength  Outcome: Progressing    Pt resting comfortably in recliner, no events. C/o mild back pain relieved w/ scheduled tylenol & robaxin. Writer attempting to wean pt down on O2, currently on 2L NC, continue to monitor. Tele showing SR w/ PACs. Plan to have acupuncture session tomorrow then determine if she will undergo MRI scan.

## 2023-05-21 NOTE — PROGRESS NOTES
[facility] ACUTE PAIN SERVICE    ([facility], [facility], [facility])  Daily PAIN Progress Note    Assessment/Plan:  Mayela Espino is a 89 year old female who was admitted on 5/18/2023.  I was asked to see the patient for severe back pain. Admitted for multiple compression fractures, CT indicated numerous superior and inferior endplate deformities of the thoracolumbar   spine with greatest degree of compression at T8, T9, and T11 where there is a round 50% loss of vertebral body height. Lesser compression deformities of T5-T7, T10 and all lumbar vertebra. The lumbar compression deformities are unchanged from 03/04/2022. History of heart failure, chronic kidney disease, thyroid mass, pulmonary nodules.    shows 1 refill of lorazepam although no opioid use. Describes pain as mostly spasms, 0/10 at rest. Seen while sitting up in the chair. Has not utilized opioids since 5/19.     Pain team will sign off.     PLAN:  Acute pain secondary to compression fractures and patient is opioid naïve.  She has been quite anxious and in pain, also hypoxic on 3 L. Consider Palliative Care referral. Yesterday decreased robaxin to 250mg- tolerating pain.   Multimodal Medication Therapy:     Adjuvants: On Robaxin decreased to 250mg QID, scheduled Tylenol, No NSAIDs acute on chronic CKD    Opioids: Agree with oxycodone 2.5 mg as needed, discontinue IV dilaudid     Non-medication interventions- Ice and PT     Constipation Prophylaxis-on scheduled and as needed laxatives    Follow up /Discharge Recommendations - We recommend prescribing the following at the time of discharge: robaxin and perhaps oxycodone           Subjective:    Pain is controlled with robaxin. Minimal at rest. Excited for acupuncture tomorrow.   Discussed with RN (timing of imaging and pain control/anxiety).  Left note for acupuncture services to see Mayela tomorrow before imaging.     Acute systolic congestive heart failure (H)   Patient Active  "Problem List   Diagnosis     Tobacco abuse     Anxiety     Chronic kidney disease, stage 3 (H)     Gait disturbance     Tremor     Essential tremor     Cigarette nicotine dependence, uncomplicated     Generalized anxiety disorder     Goiter     Hypertensive chronic kidney disease w stg 1-4/unsp chr kdny     Infiltrating ductal carcinoma of breast (H)     Knee pain     Osteoarthritis of knee     Status post hip replacement     Bilateral malignant neoplasm of breast in female, estrogen receptor positive, unspecified site of breast (H)     Cerebrovascular accident (CVA) due to embolism of precerebral artery (H)     Heart failure with reduced ejection fraction (H)     Lumbosacral plexopathy     Insufficient social support     Acute systolic congestive heart failure (H)        History   Drug Use Unknown         Tobacco Use      Smoking status: Every Day        Packs/day: 0.25        Types: Cigarettes      Smokeless tobacco: Never    Vaping Use      Vaping status: None          acetaminophen  975 mg Oral Q8H     anastrozole  1 mg Oral Daily     aspirin  81 mg Oral Daily     calcitonin (salmon)  1 spray Alternating Nostrils Daily     diclofenac  2 g Topical 4x Daily     furosemide  20 mg Intravenous Q12H     lidocaine   Topical 4x Daily     methocarbamol  250 mg Oral 4x Daily     polyethylene glycol  17 g Oral Daily     predniSONE  20 mg Oral Daily     sodium chloride (PF)  3 mL Intracatheter Q8H     sodium zirconium cyclosilicate  10 g Oral Daily       Objective:  Vital signs in last 24 hours:  B/P: 127/86, T: 97.9, P: 92, R: 18   Blood pressure 136/76, pulse 86, temperature 97.4  F (36.3  C), temperature source Oral, resp. rate 18, height 1.549 m (5' 1\"), weight 41.9 kg (92 lb 4.8 oz), SpO2 (!) 84 %.      Weight:   Wt Readings from Last 2 Encounters:   05/21/23 41.9 kg (92 lb 4.8 oz)   06/01/22 42.6 kg (94 lb)           Intake/Output:    Intake/Output Summary (Last 24 hours) at 5/20/2023 0655  Last data filed at " 5/20/2023 0409  Gross per 24 hour   Intake 501.67 ml   Output --   Net 501.67 ml        Review of Systems:   As per subjective, all others negative.    Physical Exam:     General Appearance:  Alert, cooperative, no distress, appears stated age   Patient is sitting up in the chair   Head:  Normocephalic, without obvious abnormality, atraumatic   Eyes:  PERRL, conjunctiva/corneas clear, EOM's intact   ENT/Throat: Lips dry     Lymph/Neck: Supple, symmetrical, trachea midline, no adenopathy, thyroid: not enlarged, symmetric    Lungs:   diminished to auscultation bilaterally, respirations unlabored   Chest Wall:  No tenderness or deformity   Cardiovascular/Heart:  Regular    Abdomen:   Soft, non-tender, bowel sounds active all four quadrants,  no masses, no organomegaly   Musculoskeletal: Extremities normal, atraumatic   Skin: Skin is pale    Neurologic: Alert and oriented X 3, Moves all 4 extremities           Imaging:  Personally Reviewed.    Results for orders placed or performed during the hospital encounter of 05/18/23   CT Chest Abdomen Pelvis w/o Contrast    Impression    IMPRESSION:    1.  Small to moderate left and trace right pleural effusions and related atelectasis.   2.  Suboptimal assessment of the lung parenchyma due to motion-related blurring. Multiple foci of peripheral atelectasis in the bases. Few pulmonary nodules are present largest of which is 5 mm. Per 2017 Fleischner Society guidelines, follow-up CT of the   chest in 12 months is considered optional.  3.  Enlarged, heterogeneous right lobe of the thyroid consistent with mediastinal border.  4.  Multiple randomly low-attenuation liver lesions are present the largest of which measures 1.5 cm. These are indeterminate. These could be further characterized with ultrasound or liver MRI.  5.  Sigmoid diverticulosis but no diverticulitis.  6.  Atrophied left kidney with nonobstructing calculi are present.  7.  Numerous thoracic and lumbar vertebral  compression deformities. The lumbar compression deformities are unchanged from 03/04/2022. Multiple thoracic compression deformities have worsened since 2018.   US Lower Extremity Venous Duplex Bilateral    Impression    IMPRESSION:    1.  No deep venous thrombosis in the bilateral lower extremities.  2.  [place] fluid collection subcutaneous tissues distal medial right thigh consistent with a liquefied hematoma or seroma.   US Renal Complete Non-Vascular    Impression    IMPRESSION:    1.  Right kidney is completely atrophied and not well visualized.    2.  Atrophic left kidney, without hydronephrosis.    3.  Nonobstructing 2 mm left renal calculus.    4.  Small right posterolateral bladder diverticulum.   Echocardiogram Complete   Result Value Ref Range    LVEF  25-30% (severely reduced)         Lab Results:  Personally Reviewed.   Last Comprehensive Metabolic Panel:  Sodium   Date Value Ref Range Status   05/21/2023 141 136 - 145 mmol/L Final   07/10/2021 145 (H) 133 - 144 mmol/L Final     Potassium   Date Value Ref Range Status   05/21/2023 5.7 (H) 3.5 - 5.0 mmol/L Final   07/10/2021 3.9 3.4 - 5.3 mmol/L Final     Chloride   Date Value Ref Range Status   05/21/2023 101 98 - 107 mmol/L Final   07/10/2021 112 (H) 94 - 109 mmol/L Final     Carbon Dioxide   Date Value Ref Range Status   07/10/2021 31 20 - 32 mmol/L Final     Carbon Dioxide (CO2)   Date Value Ref Range Status   05/21/2023 30 22 - 31 mmol/L Final     Anion Gap   Date Value Ref Range Status   05/21/2023 10 5 - 18 mmol/L Final   07/10/2021 2 (L) 3 - 14 mmol/L Final     Glucose   Date Value Ref Range Status   05/21/2023 146 (H) 70 - 125 mg/dL Final   07/10/2021 120 (H) 70 - 99 mg/dL Final     Urea Nitrogen   Date Value Ref Range Status   05/21/2023 81 (H) 8 - 28 mg/dL Final   07/10/2021 28 7 - 30 mg/dL Final     Creatinine   Date Value Ref Range Status   05/21/2023 3.06 (H) 0.60 - 1.10 mg/dL Final   07/10/2021 1.52 (H) 0.52 - 1.04 mg/dL Final     GFR  Estimate   Date Value Ref Range Status   05/21/2023 14 (L) >60 mL/min/1.73m2 Final     Comment:     eGFR calculated using 2021 CKD-EPI equation.   07/10/2021 30 (L) >60 mL/min/[1.73_m2] Final     Comment:     Non  GFR Calc  Starting 12/18/2018, serum creatinine based estimated GFR (eGFR) will be   calculated using the Chronic Kidney Disease Epidemiology Collaboration   (CKD-EPI) equation.       Calcium   Date Value Ref Range Status   05/21/2023 8.8 8.5 - 10.5 mg/dL Final   07/10/2021 8.5 8.5 - 10.1 mg/dL Final        UA: not checked         Please see A&P for additional details of medical decision making.  MANAGEMENT DISCUSSED with the following over the past 24 hours: nurse and patient   NOTE(S)/MEDICAL RECORDS REVIEWED over the past 24 hours: hospitalist  Tests personally interpreted in the past 24 hours:  - renal ultraound showing kideny atrophy  Tests REVIEWED in the past 24 hours:  - Crt 3.06  SUPPLEMENTAL HISTORY, in addition to the patient's history, over the past 24 hours obtained from:   - nurse  Medical complexity over the past 24 hours:  - Prescription DRUG MANAGEMENT performed          Destiny Newman APRN, CNS-BC, CNP, ACHPN  Acute Care Pain Management Program   Hours of pain coverage 7a-1700- after 1700 please call the house officer    [employer] (Alexis ALAS, [place], [place], [place])   Page via [email or web address]- Click HERE to page Destiny or Maria Teresa text web console

## 2023-05-22 NOTE — PROGRESS NOTES
RENAL PROGRESS NOTE        ASSESSMENT & PLAN:   PLAN:  -Avoid nephrotoxins, renal dose medications, daily wt, daily I/O  S/P IV lasix 40 x2, gave 2o IV lasix today  -will likely convert to PO lasix tomorrow (was on 20 mg PO lasix PRN PTA).  -Low Na diet/low K diet  -Goals of care discussion warranted, and will attempt to engage her, palliative consulted  -She is very near ESRD, and would NOT be a dialysis candidate.  -BMP daily      ASSESSMENT:    JOSE RAFAEL on CKD 4-5: concerning ongoing decline with likely CRS, very little renal reserve to tolerate even minor volume status changes. CKD since at least 2018, b/l sCR in the 1.7-2.0 (coinciding Cystatin C 4.1, eGFR 10), eGFR grossly overestimated and likely near ESRD; she would NOT be a candidate for dialysis with severe heart failure. Renal US, severely atropic kidneys R<<<L, no hydro or obvious acute pathology.  Can get some systemic absorption with voltaren gel, but benefit for pain may outweigh risk/concerns. Cr down trending 2.6>2.9>3.0>2.6, moniotr     Hyperkalemia:  initiate diuretics, low K diet, add Lokelma     Acute on Chronic decomp heart failure:  EF 20-25%, not on ACE/ARB and would NOT start in this setting given sig hemodynamic sensitivity and near end stage renal dz. Defer decision on BB to WW vs cards     HTN/BP:  Mostly controlled      Acute pain:  Back, MRI pending, may need TLSO, Pain service managing     Pulm nodules:  New finding, h/o smoker, high risk for malignancy      COPD/Acute hypoxic resp failure:  With decomp heart failure, on steroids in ER and nebs, 02 support as needed. Management per Primary team.    SUBJECTIVE:    Pt sitting up in chair  She reports living independently PTA, has communication with family members  She reports overall feeling okay today  Denies n, v, c, d, fever, rash or CP  Wt down trending with IV lasix 40 mg BID 44.5>41  2L NC  Cr 3.0>2.6, palliative consulted for ESRD/CKD4-5 status, and pt a poor candidate for  dialysis with severe HF  We had a long discussion about current level of renal function, likelihood of CKD disease progression and that dialysis may be more harm than good and that she is not a good dialysis candidate. She is aware of palliative consult.   We discussed plan to continue diuresis, trend renal function   Labs look slightly retracted, ongoing LLE, discuss plan for continue IV lasix today, will likely convert to lower dose PO lasix tomorrow   Answered all questions    OBJECTIVE:  Physical Exam   Temp: 97.5  F (36.4  C) Temp src: Oral BP: 135/79 Pulse: 75   Resp: 20 SpO2: 94 % O2 Device: Nasal cannula Oxygen Delivery: 2 LPM  Vitals:    05/20/23 0435 05/21/23 0141 05/22/23 0200   Weight: 43.8 kg (96 lb 9 oz) 41.9 kg (92 lb 4.8 oz) 41 kg (90 lb 6.4 oz)     Vital Signs with Ranges  Temp:  [97.4  F (36.3  C)-97.7  F (36.5  C)] 97.5  F (36.4  C)  Pulse:  [69-87] 75  Resp:  [16-20] 20  BP: (128-139)/(64-79) 135/79  SpO2:  [91 %-96 %] 94 %  I/O last 3 completed shifts:  In: 840 [P.O.:840]  Out: 1500 [Urine:1500]    Patient Vitals for the past 72 hrs:   Weight   05/22/23 0200 41 kg (90 lb 6.4 oz)   05/21/23 0141 41.9 kg (92 lb 4.8 oz)   05/20/23 0435 43.8 kg (96 lb 9 oz)       Intake/Output Summary (Last 24 hours) at 5/22/2023 1006  Last data filed at 5/22/2023 0916  Gross per 24 hour   Intake 480 ml   Output 1600 ml   Net -1120 ml       PHYSICAL EXAM:  GEN: NAD  CV: RRR  Lung: diminished bases BL, on 2L NC  Ab: soft and NT; not distended; normal bs  Ext: +LLE BL and well perfused  Skin: no rash      LABORATORY STUDIES:     Recent Labs   Lab 05/22/23  0725 05/21/23  0607 05/18/23  1645 05/18/23  1038   WBC 6.2  --  5.3 4.0   RBC 3.60*  --  3.95 4.16   HGB 11.3*  --  12.4 13.1   HCT 36.1  --  40.1 43.0   * 121* 114* 120*       Basic Metabolic Panel:  Recent Labs   Lab 05/22/23  0725 05/21/23  0607 05/20/23  0425 05/19/23  0613 05/18/23  1038    141 141 142 142   POTASSIUM 4.7 5.7* 5.4* 5.3* 4.6    CHLORIDE 97* 101 99 99 98   CO2 35* 30 31 31 35*   BUN 83* 81* 83* 60* 46*   CR 2.69* 3.06* 3.09* 2.91* 2.60*    146* 136* 237* 151*   ITA 8.6 8.8 8.4* 9.0 9.0       INRNo lab results found in last 7 days.     Recent Labs   Lab Test 05/22/23  0725 05/21/23  0607 05/18/23  1645 06/01/22  1046 03/04/22  0925 07/10/21  0922 07/09/21  0911   INR  --   --   --   --  1.03  --  1.13*   WBC 6.2  --  5.3   < > 4.8   < > 5.0   HGB 11.3*  --  12.4   < > 12.2   < > 12.2   * 121* 114*   < > 123*   < > 125*    < > = values in this interval not displayed.       Personally reviewed current labs    [facility]  [facility]  [phone]

## 2023-05-22 NOTE — PROGRESS NOTES
[facility]    Medicine Progress Note - Hospitalist Service    Date of Admission:  5/18/2023    Assessment & Plan   Mayela Espino is a 89 year old female admitted on 5/18/2023. She presents complaining of right-sided flank pain and shortness of breath with pain radiating around to the front of her chest intermittently.  Worse with activity and deep breathing.  In the ER CT scan without contrast unremarkable and VBG consistent with compensated respiratory acidosis.  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  MRI lumbar spine ordered to further assess compression fractures and back pain.  Neurosurgery, nephrology and acute pain team following.  Awaiting pain control, monitoring for worsening of kidney function.  Palliative care has met with the patient.  She is DNR.  Not yet ready for hospice.      Right flank/chest pain  Multiple compression vertebral fractures.   Etiology unclear  VQ scan ordered but not tolerated/not available on the weekend  Renal ultrasound reviewed  Reviewed CT chest abdomen pelvis  Serial troponins negative  Echocardiogram reviewed with EF of 20 to 25%  Ordered MRI due to the multiple compression fractures  Neurosurgical consultation ordered on 5/20.  Miacalcin  Appreciate pain team recommendations.  5/23 willing to finally do MRI which has been ordered  Further plan for TLSO etc. pending results  Long discussion with patient's son via telephone about current situation and options  They agree with TCU  Patient now agrees to TCU     Acute on chronic  Severe heart failure with reduced ejection fraction  Echocardiogram: LVEF 20-25%, global hypokinesis, moderate MR.   Appreciate nephrology assistance in diuresis.   Currently not on a beta-blocker/could consider but will  hold off in light of COPD and current acute issues  No ACE or ARB due to chronic kidney disease/would not start  Consider addition of beta-blockade once acute exacerbation improved/likely outside of the hospital  Albumin 25% 25 g and furosemide 20 mg  q12h started on 5/20/23.       Acute hypoxic respiratory failure/COPD exacerbation/compensated respiratory acidosis  Continue Supplemental oxygen  Wean as able  Scheduled DuoNebs  Not wheezing 5/20  Continue prednisone 20 mg daily.     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  Hyperkalemia  Repeat renal panel in a.m.  Nephrology concerned that patient is near end stage.   Patient is not a dialysis candidate.   Palliative care consultation appreciated  Contacted son and he is aware that she is not a dialysis candidate  He did not realize she was so close to end-stage disease     Severe protein calorie malnutrition  Cachexia.   RD consultation     Thyroid mass  Outpatient follow-up     Pulmonary nodules  Outpatient follow-up     Thrombocytopenia  Stable 120.   Transfuse for platelets < 10,000.     Generalized anxiety  Likely contributing to pain  As needed Ativan     Remote history of breast cancer  History of tobacco abuse       Diet: Renal Diet (non-dialysis)  Snacks/Supplements Adult: Nepro Oral Supplement; Between Meals      Vides Catheter: Not present  Lines: None     Cardiac Monitoring: ACTIVE order. Indication: Acute decompensated heart failure (48 hours)  Code Status: No CPR- Do NOT Intubate      Clinically Significant Risk Factors        # Hyperkalemia: Highest K = 5.7 mmol/L in last 2 days, will monitor as appropriate       # Hypoalbuminemia: Lowest albumin = 3.1 g/dL at 5/19/2023  6:13 AM, will monitor as appropriate   # Thrombocytopenia: Lowest platelets = 110 in last 2 days, will monitor for bleeding    # Acute heart failure with reduced ejection fraction: last echo with EF <40% and receiving IV diuretics       # Cachexia: Estimated body mass  "index is 17.08 kg/m  as calculated from the following:    Height as of this encounter: 1.549 m (5' 1\").    Weight as of this encounter: 41 kg (90 lb 6.4 oz)., PRESENT ON ADMISSION  # Severe Malnutrition: based on nutrition assessment, PRESENT ON ADMISSION        Disposition Plan      Expected Discharge Date: 05/23/2023      Destination: home  Discharge Comments: Pending clinical progression          Sj Winter MD  Hospitalist Service  [facility]  Securely message with [employer] (more info)  Text page via [employer] Paging/Directory   ______________________________________________________________________    Interval History   Patient is feeling well today.  Able to have a long discussion about the MRI.  Discussed options and ultimately she is willing to now try an MRI.  Contacted her son at her request and discussed current situation including her renal failure heart failure and hypoxia.  He is in agreement with TCU.  He is planning to come home from [facility] to check on his mother next week.  [place].  Case discussed directly also with nephrology and palliative care.    Physical Exam   Vital Signs: Temp: 97.6  F (36.4  C) Temp src: Oral BP: 123/67 Pulse: 76   Resp: 20 SpO2: 91 % O2 Device: Nasal cannula Oxygen Delivery: 2 LPM  Weight: 90 lbs 6.4 oz    GENERAL:  Alert, appears comfortable, in no acute distress, appears stated age   HEAD:  Normocephalic, without obvious abnormality, atraumatic   EYES:  PERRL, conjunctiva/corneas clear, no scleral icterus, EOM's intact   NOSE: Nares normal, septum midline, mucosa normal, no drainage   LUNGS:   Clear to auscultation bilaterally, no rales, rhonchi, or wheezing, symmetric chest rise on inhalation, respirations unlabored   HEART:  Regular rate and rhythm, S1 and S2 normal, no murmur, rub, or gallop    ABDOMEN:   Soft, non-tender, bowel sounds active all four quadrants, no masses, no " organomegaly, no rebound or guarding   EXTREMITIES: Extremities normal, atraumatic, no cyanosis or edema    SKIN: Dry to touch, no exanthems in the visualized areas   NEURO: Alert, oriented x 4, moves all four extremities freely/spontaneously   PSYCH: Cooperative, behavior is appropriate          Data     I have personally reviewed the following data over the past 24 hrs:    N/A  \   N/A   / N/A     140 96 (L) 84 (H) /  101   4.5 33 (H) 2.68 (H) \       Imaging results reviewed over the past 24 hrs:   No results found for this or any previous visit (from the past 24 hour(s)).  Recent Labs   Lab 05/23/23  0442 05/22/23  0725 05/21/23  0607 05/20/23  0425 05/19/23  0613 05/18/23  1645 05/18/23  1038   WBC  --  6.2  --   --   --  5.3 4.0   HGB  --  11.3*  --   --   --  12.4 13.1   MCV  --  100  --   --   --  102* 103*   PLT  --  110* 121*  --   --  114* 120*    142 141   < > 142  --  142   POTASSIUM 4.5 4.7 5.7*   < > 5.3*  --  4.6   CHLORIDE 96* 97* 101   < > 99  --  98   CO2 33* 35* 30   < > 31  --  35*   BUN 84* 83* 81*   < > 60*  --  46*   CR 2.68* 2.69* 3.06*   < > 2.91*  --  2.60*   ANIONGAP 11 10 10   < > 12  --  9   ITA 8.5 8.6 8.8   < > 9.0  --  9.0    103 146*   < > 237*  --  151*   ALBUMIN  --   --   --   --  3.1*  --  3.3*   PROTTOTAL  --   --   --   --  7.2  --  7.2   BILITOTAL  --   --   --   --  0.5  --  0.9   ALKPHOS  --   --   --   --  99  --  107   ALT  --   --   --   --  20  --  22   AST  --   --   --   --  27  --  32   LIPASE  --   --   --   --   --   --  <9    < > = values in this interval not displayed.     Total time 57 minutes

## 2023-05-22 NOTE — PROGRESS NOTES
"[facility] was introduced to the patient today including our role in the home management of their heart failure.  Heart Failure Education Materials were shared today with the patient.  Introduction to the expectations including daily weight tracking using the Daily Weight Log  Home B/P monitoring as appropriate( to bring in home monitoring cuff to the clinic appointment for verification)  Low Sodium diet of 2000mg or less daily, review of label reading, aim for fresh and avoid processed items  Daily Fluid restriction of 2000ml  considerations  Exercise importance as able explained  Monitor and report s/s of heart failure. How to report these changes.  Follow up appointments and testing expectations.    [facility]  [facility]   [phone] Nurse [facility]   Heart [facility]   [address] [place] [address]    Pt main concern is pain and she brings this up multiple times during education. Redirected Pt to topic at hand. Informed she will have to speak to her nurse about her concerns with the pain and \"what else will they do.\"    She lives at home independently and cooks for herself. She shares she goes to [employer] often and gets 6 grilled chickens but takes the skin off. We reviewed avoiding hot foods at the grocery store, fast food, takeout, restaurants, need to read labels, and no salt seasonings. Discussed the fluid and sodium restrictions. She states she tried [facility] before and didn't like it. Advised she needs to track sodium more closely. She states she will start monitoring her fluids. Lastly, reemphasized on when to call with worsening wt gain and/or symptoms.    Ayla GATES RN  BSN    "

## 2023-05-22 NOTE — PROGRESS NOTES
Pt has slept well at intervals. Prefers HOB 30-45 degrees. Back pain with movement relief with tylenol. Moves well. SBA with walker to bathroom, steady on feet. REYNOSO. 02 2L/NC. Lungs clear. Urine output adequate. VSS. NSR. Bed alarm for safety & call light in reach.

## 2023-05-22 NOTE — CONSULTS
"[facility]  Palliative Care Consultation Note    Patient: Mayela Espino  Date of Admission:  5/18/2023    Requesting Clinician / Team: Dr Johnson  Reason for consult: Goals of care (severe heart failure, near end stage CKD, acute on chronic pain)    Recommendations:  Encounter for palliative care  Psychosocial support    Psychosocial support was provided to patient and/or family.    Prognosis: With 3 serious health issues, pt would likely be eligible     Palliative performance scale (PPS): ~50    CODE status: DNR/DNI    Goals of Care: Had an initial palliative care goals of care meeting with Mayela. She right away asks, \"why do I need palliative care?\"    Spent time explaining the role of palliative care and the reason for the consult (reviewed her multiple serious health issues). She offers, \"I have had these known about for a while. I don't really understand what has changed, why it is such an issue now.\" Got her permission to review several of her main health issues (the change in her EF over the last year and her CKD change) as well as reviewed the concerns Nephrology has for her, re: dialysis in the setting of her heart disease. She does understand that they have told her it could be too tough on her, in the setting of her heart disease. She has not seen Cardiology in 1 year. When discussed her current EF and near eligibility for hospice, just from a cardiac perspective, she asks \"is there anything else that can be done for my heart?\" During our time together, we discussed that she has generally been doing well, in her own condo, but sometimes, fatigue and shortness of breath limit her activity, just a little. She does her own cooking and dishes but would value some assistance with meals (meal delivery) as well as would really value a service that can   o Surrogate: Names her son (only son; she is ) Carlos. She has not done a HCD before. She is open to looking at a short one. She " does mention that her son is on her checking account (?financial POA).    Disposition: TBD    Symptoms    Pain related to back fractures. She did meet with Neurosurgery (see 5-21-23 note for details). She does not recall much about the indepth discussion it appears they had about the risks of not using a brace. Is finding her current pain regimen working. Really liked acupuncture.   o Continue current regimen.   o Wonder if needs more discussion re: her Neurosurgery conversation, to make sure she understood.    PLAN:  -[facility]  -Pt has not seen Cardiology for over a year/does not have an outpatient cardiologist at the moment, and does wonder if there is any way to improve her heart function. Consider Cardiology consultation.   -SW consult placed to see if there is more assistance she can get at home, with food and getting rid of excess items she has collected over the years.   -Will see if Palliative Care LICSW can connect with her this week for psychosocial support.   -Palliative medicine will follow up Tuesday at 100 PM to continue goals of care discussion (she had 3 visits-OT, NA, RN during today's visit).Will plan to bring in a short health care directive. If any specialist (eg Nephrology or Hospitalist) wants to join then, would be welcome.     Total time spent was 80 minutes regarding palliative care goals of care on the date of the encounter. This case was discussed with the primary team via  .    JOHNNY Wilkinson, FNP-BC, PMHNP-BC  Palliative Care Nurse Practitioner  [facility]  [phone]  Team Pager [phone] (8am-430pm M-F)    During regular M-F work hours -- if you are not sure who specifically to contact -- please contact us by calling [phone].    Assessments:  Mayela Espino is a 89 year old female with a medical history significant for     Today, the patient was seen for: Goals of care (severe heart failure, near end stage CKD, acute on chronic  pain)    Neurosurgery note from this week-end:  Lengthy discussion with Mayela about her fractures and treatment options. Explained that with her chest imaging, it is not possible for me to determine acuity of her fractures and I would recommend pursuing an MRI thoracic spine to direct further treatment. She was told 1 yr ago that she had multiple spinal compression fractures. She ultimately does not wish to pursue any additional imaging to determine acuity of her fractures including CT or MRI. She would like to do acupuncture and see if her pain goes away like it did with PT 1 yr ago. She did agree to considering an MRI and bracing if her pain does not improve.      We talked at length about bracing and the importance if fractures are acute in using one to help to stabilize and prevent fracture worsening. She understands this and chooses to defer bracing at this time. We talked about the risk of fractures worsening resulting in potential permanent neurologic damage without treatment. She has no point tenderness on exam, arguing that her fractures may be chronic, but we cannot be sure. We will sign off today. Please page our team with questions or concerns, if patient completes any additional spine-specific imaging to determine acuity.     Prognosis, Goals, & Planning:      Functional Status just prior to hospitalization: 2 (Ambulatory and capable of all selfcare but unable to carry out any work activities; may need help with IADLs up and about > 50% of waking hours)      Prognosis, Goals, and/or Advance Care Planning were addressed today: Yes        Summary/Comments: Pt is open to looking at/likely completing a HCD.      Patient's decision making preferences: independently          Patient has decision-making capacity today for complex decisions: Yes            I have concerns about the patient/family's health literacy today: No           Patient has a completed Health Care Directive: No.       Code status: No CPR /  No Intubation    Coping, Meaning, & Spirituality:   Mood, coping, and/or meaning in the context of serious illness were addressed today: Yes  Summary/Comments: Not doing so well. She is really on her own. , only son and 2 grandchildren live in [place].     Social: She is really on her own. , only son and 2 grandchildren live in [place].     History of Present Illness:  Per ER, Mayela Espino is a 89 year old female with a pertinent history of CKD, HTN, breast cancer, CHF, who presented to this ED on May 18th via walk-in for evaluation of back pain. Per triage, patient presents with a couple of weeks of back pain, but presents with progressive shortness of breath. Her O2 stats were in the low 80s on arrival, here on 6L nasal canula. Decreased oxygen to 3 L when oxygen was at 92%. She has a previous history of breast cancer. No pain associated with shortness of breath. She has a history of being treated for COPD, though she does not have the diagnosis.     Patient reports pain to her back on the right side for the past couple of weeks that takes her breath away. She states it makes it hard to talk or stand.     Key Palliative Symptom Data:  SOB-Denies  Pain-Just had acupuncture, no pain. Then after used restroom, had some back spasms. Got some robaxin. Starting to work.   Nausea-No  Anxiety-Mood a little low. Does not have many people to talk to right now.     ROS:  A complete 14 point ROS was negative unless stated otherwise above in HPI     Past Medical History:  Past Medical History:   Diagnosis Date     Abnormal mini-mental status exam     scored 16 2012, ?dementia     Breast cancer (H)      Chronic kidney disease      Congestive heart failure (H)      Essential tremor      HTN (hypertension)      Tobacco use      Past Surgical History:  Past Surgical History:   Procedure Laterality Date     EYE SURGERY       JOINT REPLACEMENT       LUMPECTOMY BREAST Bilateral 1982    Cyst removed     OTHER SURGICAL HISTORY  Left     ear prosthesis     OTHER SURGICAL HISTORY      l rola     OTHER SURGICAL HISTORY      left hip ORIF     AZ MASTECTOMY, MODIFIED RADICAL Bilateral 6/14/2021    Procedure: Bilateral mastectomies;  Surgeon: Dulce Duran MD;  [place] [facility] [place]  Service: General         Family History:  Family History   Problem Relation Age of Onset     Hypertension Mother      Cerebrovascular Disease Father 70.00     No Known Problems Son         Allergies:  Allergies   Allergen Reactions     Latex Unknown     Added based on information entered during case entry, please review and add reactions, type, and severity as needed     Latex Rash     Medications:  I have reviewed this patient's medication profile and medications from this hospitalization.   Noted:  Reviewed    Physical Exam:  Vital Signs: Temp: 97.5  F (36.4  C) Temp src: Oral BP: 135/79 Pulse: 75   Resp: 20 SpO2: 94 % O2 Device: Nasal cannula Oxygen Delivery: 2 LPM  Weight: 90 lbs 6.4 oz  General: Not in acute distress.  Pulmonary: Unlabored. Speaking in full sentences  Cardiovascular: No overt jugular venous distension. Non-edematous. Perfusing.  Abdomen: Non-distended.  Skin: Warm. Dry. No bruises or rashes noted.    Data reviewed:  Recent imaging reviewed, my comments on pertinents:   EXAM: CT CHEST ABDOMEN PELVIS W/O CONTRAST  LOCATION: [facility]  DATE/TIME: 5/18/2023 12:14 PM CDT     INDICATION: Shortness of breath, chest pain, chronic kidney disease  COMPARISON: Portable chest radiography 06/01/2022; MRI lumbar spine 03/04/2022; thoracic spine CT 03/30/2018  TECHNIQUE: CT scan of the chest, abdomen, and pelvis was performed without IV contrast. Multiplanar reformats were obtained. Dose reduction techniques were used.   CONTRAST: None.     FINDINGS:   LUNGS AND PLEURA: Small to moderate left pleural effusion is present layering posteriorly. Trace right pleural effusion. Focal band of atelectasis posterior left lower lobe  related to pleural fluid. A band of atelectasis is present in the lingula.   Smaller territories of atelectasis are present in the paradiaphragmatic right lower lobe and along the inferior major fissures. There are 3 adjacent subpleural nodules in the right middle lobe along the minor fissure largest of which is 5 mm (series 4,   image 93). There is a subpleural nodule in the lateral left lower lobe which measures 4 mm (series 3, image 77) and 3 mm nodule in the anterior left lower lobe along the major fissure (series 3, image 63). Anterior bowing of the membranous central   airways consistent with imaging at partial expiratory phase of the respiratory cycle. Motion artifacts limit assessment of the subsegmental airways particularly in the lower lungs.     MEDIASTINUM/AXILLAE: Heterogeneity and enlargement of the isthmus and right lobe of the thyroid gland which extend into the right paratracheal mediastinum slightly displacing the trachea towards the left. There are mildly enlarged subaortic and lower   paratracheal lymph nodes. Mild generalized enlargement of the heart chambers. No pericardial effusion. Tortuous proximal great vessels. Mild to moderate arch and descending aorta atheromatous calcifications. The distal descending thoracic aorta has   fusiform ectasia measuring up to 4.1 cm in diameter. The esophagus is decompressed.     CORONARY ARTERY CALCIFICATION: Mild.     Assessment of the upper abdominal viscera is influenced by motion-related blurring.     HEPATOBILIARY: Several low-attenuation lesions are randomly distributed in the liver with larger lesion in the right lobe measuring 15 mm (series 3, image 136). Gallbladder is not distended. No calcified gallstones. No bile duct enlargement.     PANCREAS: Pancreas is fatty replaced. No definite pancreas parenchymal lesions.     SPLEEN: Normal.     ADRENAL GLANDS: Lobular low-attenuation left adrenal gland suggesting hyperplasia. Right adrenal gland is also  low-attenuation with a lesser degree of tissue thickening.     KIDNEYS/BLADDER: The left kidney is atrophied. There are calcifications at the cortical medullary junction of the upper and lower pole but no hydronephrosis. Compensatory hypertrophy of the right kidney. No definite right nephroureterolithiasis. Urinary   bladder is normal.     BOWEL: No dilated bowel or bowel wall thickening. In sigmoid diverticulosis.     LYMPH NODES: No lymphadenopathy in the abdomen or pelvis.     VASCULATURE: Advanced aortoiliac atheromatous calcifications with minimal lobulated contours of the infrarenal aorta but no aneurysm.     PELVIC ORGANS: No pelvic masses.     MUSCULOSKELETAL: There is a left hip joint replacement. No periprosthetic fracture. Generalized demineralization of the bones. No pelvis or hip insufficiency fractures. There are numerous superior and inferior endplate deformities of the thoracolumbar   spine with greatest degree of compression at T8, T9, and T11 where there is a round 50% loss of vertebral body height. Lesser compression deformities of T5-T7, T10 and all lumbar vertebra. The lumbar compression deformities are unchanged from 03/04/2022.   No aggressive or destructive bone lesions.                                                                      IMPRESSION:     1.  Small to moderate left and trace right pleural effusions and related atelectasis.   2.  Suboptimal assessment of the lung parenchyma due to motion-related blurring. Multiple foci of peripheral atelectasis in the bases. Few pulmonary nodules are present largest of which is 5 mm. Per 2017 Fleischner Society guidelines, follow-up CT of the   chest in 12 months is considered optional.  3.  Enlarged, heterogeneous right lobe of the thyroid consistent with mediastinal border.  4.  Multiple randomly low-attenuation liver lesions are present the largest of which measures 1.5 cm. These are indeterminate. These could be further characterized with  ultrasound or liver MRI.  5.  Sigmoid diverticulosis but no diverticulitis.  6.  Atrophied left kidney with nonobstructing calculi are present.  7.  Numerous thoracic and lumbar vertebral compression deformities. The lumbar compression deformities are unchanged from 03/04/2022. Multiple thoracic compression deformities have worsened since 2018.    EXAM: US RENAL COMPLETE NON-VASCULAR  LOCATION: [facility]  DATE/TIME: 5/18/2023 5:17 PM CDT     INDICATION: Right flank pain. Chronic kidney disease.  COMPARISON: CT abdomen pelvis 05/18/2023.  TECHNIQUE: Routine Bilateral Renal and Bladder Ultrasound.     FINDINGS:     RIGHT KIDNEY: Completely atrophied and not well visualized.     LEFT KIDNEY: 4.7 x 2.8 x 2.8 cm. Atrophic. Normal cortical echogenicity. No hydronephrosis. Nonobstructing 2 mm calculus at the lower pole, as seen on today's CT. No visualized renal mass.     BLADDER: Small right posterolateral bladder diverticulum measuring up to 2.2 cm. Otherwise, normal.                                                                      IMPRESSION:     1.  Right kidney is completely atrophied and not well visualized.     2.  Atrophic left kidney, without hydronephrosis.     3.  Nonobstructing 2 mm left renal calculus.     4.  Small right posterolateral bladder diverticulum.    Procedure  Complete Portable Echo Adult. Definity (NDC #76903-117) given intravenously.  2.0 ml; lot # [id number]. Adequate quality color and spectral Doppler were performed  and interpreted. Adequate quality two-dimensional was performed and  interpreted.  ______________________________________________________________________________  Interpretation Summary     1. The left ventricle is mildly dilated. Left ventricular function is  decreased. The ejection fraction is 25-30% (severely reduced). There is severe  global hypokinesia of the left ventricle.  2. Normal right ventricle size and systolic function.  3. The left atrium is  moderately dilated.  4. There is mild to moderate (1-2+) mitral regurgitation.  5. There is mild (1+) aortic regurgitation.  6. Moderate left pleural effusion  ______________________________________________________________________________  Left Ventricle  The left ventricle is mildly dilated. Left ventricular function is decreased.  The ejection fraction is 25-30% (severely reduced). There is normal left  ventricular wall thickness. Left ventricular diastolic function is abnormal.  There is severe global hypokinesia of the left ventricle.     Right Ventricle  Normal right ventricle size and systolic function.     Atria  The left atrium is moderately dilated. Right atrial size is normal. There is  no color Doppler evidence of an atrial shunt.     Mitral Valve  There is mild to moderate mitral annular calcification. The mitral valve  leaflets are mildly thickened. There is mild to moderate (1-2+) mitral  regurgitation. There is no mitral valve stenosis.     Tricuspid Valve  Tricuspid valve leaflets appear normal. There is no evidence of tricuspid  stenosis or clinically significant tricuspid regurgitation. Right ventricular  systolic pressure could not be approximated due to inadequate tricuspid  regurgitation. No tricuspid regurgitation. No significant tricuspid stenosis.     Aortic Valve  Moderate aortic valve calcification is present. There is mild (1+) aortic  regurgitation. No aortic stenosis is present.     Pulmonic Valve  The pulmonic valve is not well seen, but is grossly normal. This degree of  valvular regurgitation is within normal limits. There is trace pulmonic  valvular regurgitation.     Vessels  The aorta root is normal. Normal size ascending aorta. IVC diameter <2.1 cm  collapsing >50% with sniff suggests a normal RA pressure of 3 mmHg.     Pericardium  There is no pericardial effusion. Moderate left pleural effusion.      ______________________________________________________________________________  MMode/2D Measurements & Calculations  RVDd: 2.5 cm  IVSd: 0.73 cm  LVIDd: 5.2 cm  LVIDs: 4.8 cm  LVPWd: 0.79 cm  FS: 7.3 %     LV mass(C)d: 134.3 grams  LV mass(C)dI: 96.2 grams/m2  Ao root diam: 3.2 cm  asc Aorta Diam: 3.6 cm  LVOT diam: 2.0 cm  LVOT area: 3.1 cm2  LA Volume (BP): 50.7 ml  LA Volume Index (BP): 36.2 ml/m2     LA Volume Indexed (AL/bp): 38.7 ml/m2  RWT: 0.31  TAPSE: 2.9 cm     Doppler Measurements & Calculations  MV E max brent: 133.0 cm/sec  MV max P.3 mmHg  MV mean P.3 mmHg  MV V2 VTI: 11.4 cm  MVA(VTI): 5.1 cm2  MV dec slope: 1119 cm/sec2  MV dec time: 0.12 sec  Ao V2 max: 141.6 cm/sec  Ao max P.0 mmHg  Ao V2 mean: 104.1 cm/sec  Ao mean P.9 mmHg  Ao V2 VTI: 21.3 cm  KENDELL(I,D): 2.7 cm2  KENDELL(V,D): 2.7 cm2  AI P1/2t: 603.9 msec  LV V1 max P.1 mmHg  LV V1 max: 123.1 cm/sec  LV V1 VTI: 18.5 cm  SV(LVOT): 58.1 ml  SI(LVOT): 41.7 ml/m2  PA V2 max: 105.1 cm/sec  PA max P.4 mmHg  PA acc time: 0.06 sec     AV Brent Ratio (DI): 0.87  KENDELL Index (cm2/m2): 2.0  Medial E/e': 19.4  RV S Brent: 12.4 cm/sec     ______________________________________________________________________________  Report approved by: Sierra Iverson 2023 10:39 AM    Recent lab data reviewed, my comments on pertinents:   CMP  Recent Labs   Lab 23  0725 23  0607 23  0425 23  0613 23  1038    141 141 142 142   POTASSIUM 4.7 5.7* 5.4* 5.3* 4.6   CHLORIDE 97* 101 99 99 98   CO2 35* 30 31 31 35*   ANIONGAP 10 10 11 12 9    146* 136* 237* 151*   BUN 83* 81* 83* 60* 46*   CR 2.69* 3.06* 3.09* 2.91* 2.60*   GFRESTIMATED 16* 14* 14* 15* 17*   ITA 8.6 8.8 8.4* 9.0 9.0   MAG 2.1 2.2 2.2 2.2 2.1   PROTTOTAL  --   --   --  7.2 7.2   ALBUMIN  --   --   --  3.1* 3.3*   BILITOTAL  --   --   --  0.5 0.9   ALKPHOS  --   --   --  99 107   AST  --   --   --  27 32   ALT  --   --   --  20 22      CBC  Recent Labs   Lab 05/22/23  0725 05/21/23  0607 05/18/23  1645 05/18/23  1038   WBC 6.2  --  5.3 4.0   RBC 3.60*  --  3.95 4.16   HGB 11.3*  --  12.4 13.1   HCT 36.1  --  40.1 43.0     --  102* 103*   MCH 31.4  --  31.4 31.5   MCHC 31.3*  --  30.9* 30.5*   RDW 15.6*  --  15.3* 15.4*   * 121* 114* 120*     INRNo lab results found in last 7 days.  Arterial Blood GasNo lab results found in last 7 days.

## 2023-05-22 NOTE — CONSULTS
"ACUPUNCTURIST TREATMENT NOTE    Name: Mayela Espino  :  1932  MRN:  [id number]    Acupuncture Treatment  Patient Type: Medical  Intervention Reason: Pain  Pain Location: (R) back  Pre-session Pain Ratin  Post-session Pain Ratin  Patient complaint:: (R) back pain  Initial insertions: (L) Si 3, (L) Ling gu, (R) Bl 60, 62, 63, 65, Li 10, Yin naidu  Number of needles inserted: 9  Number of needles removed: 9         \"Risks and benefits of acupuncture were discussed with patient. Consent for treatment was given. We thank you for the referral.\"     Shubham Iniguez    Date:  2023  Time:  1:52 PM    "

## 2023-05-22 NOTE — PROGRESS NOTES
[facility]    Medicine Progress Note - Hospitalist Service    Date of Admission:  5/18/2023    Assessment & Plan   Mayela Espino is a 89 year old female admitted on 5/18/2023. She presents complaining of right-sided flank pain and shortness of breath with pain radiating around to the front of her chest intermittently.  Worse with activity and deep breathing.  In the ER CT scan without contrast unremarkable and VBG consistent with compensated respiratory acidosis.  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  MRI lumbar spine ordered to further assess compression fractures and back pain.  Neurosurgery, nephrology and acute pain team following.    Generally improving 5/22.  Meeting with palliative care.  Likely discharge to TCU if able to find 1 in the next day or 2.  Her condition is quite guarded and she is at very high risk for clinical decompensation.      Right flank/chest pain  Multiple compression vertebral fractures.   Neurosurgery has recommended MRI  Patient has refused  Pain is slowly improving  Presumed due to a compression fracture  Continue Miacalcin  PT and OT  Likely TCU    Acute on chronic  Severe heart failure with reduced ejection fraction  Echocardiogram: LVEF 20-25%, global hypokinesis, moderate MR.   Appreciate nephrology assistance in diuresis.   Currently not on a beta-blocker/could consider but will hold off in light of COPD and current acute issues  No ACE or ARB due to chronic kidney disease/would not start  Consider addition of beta-blockade once acute exacerbation improved/likely outside of the hospital  Albumin 25% 25 g and furosemide 20 mg  q12h started on 5/20/23.  Given 1 dose of IV Lasix 5/22  Likely back on orals 5/23    Acute hypoxic respiratory failure/COPD  "exacerbation/compensated respiratory acidosis  Continue Supplemental oxygen  Wean as able  Did not tolerate V/Q  Scheduled DuoNebs  Not wheezing  Continue prednisone 20 mg daily.     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  Hyperkalemia  Both improving  Repeat renal panel in a.m.  Nephrology concerned that patient is near end stage.   Patient is not a dialysis candidate.   Palliative care consultation appreciated     Severe protein calorie malnutrition  Cachexia.   RD consultation     Thyroid mass  Outpatient follow-up     Pulmonary nodules  Outpatient follow-up     Thrombocytopenia  Stable 120.   Transfuse for platelets < 10,000.     Generalized anxiety  Likely contributing to pain  As needed Ativan     Remote history of breast cancer  History of tobacco abuse          Diet: Renal Diet (non-dialysis)  Snacks/Supplements Adult: Nepro Oral Supplement; Between Meals    DVT Prophylaxis: Pneumatic Compression Devices  Vides Catheter: Not present  Lines: None     Cardiac Monitoring: ACTIVE order. Indication: Acute decompensated heart failure (48 hours)  Code Status: No CPR- Do NOT Intubate      Clinically Significant Risk Factors        # Hyperkalemia: Highest K = 5.7 mmol/L in last 2 days, will monitor as appropriate       # Hypoalbuminemia: Lowest albumin = 3.1 g/dL at 5/19/2023  6:13 AM, will monitor as appropriate   # Thrombocytopenia: Lowest platelets = 110 in last 2 days, will monitor for bleeding    # Acute heart failure with reduced ejection fraction: last echo with EF <40% and receiving IV diuretics       # Cachexia: Estimated body mass index is 17.08 kg/m  as calculated from the following:    Height as of this encounter: 1.549 m (5' 1\").    Weight as of this encounter: 41 kg (90 lb 6.4 oz).   # Severe Malnutrition: based on nutrition assessment         Disposition Plan      Expected Discharge Date: 05/23/2023      Destination: home  Discharge Comments: Pending clinical progression          Sj " MAZIN Winter MD  Hospitalist Service  [facility]  Securely message with [employer] (more info)  Text page via [facility] Paging/Directory   ______________________________________________________________________    Interval History   Patient is doing okay.  Having less pain.  Thought that acupuncture helped.  We talked about maybe going to a TCU and she is open to this idea.  She lives alone.  Tried to explain again the situation she is in in terms of poor heart function, end-stage renal disease, and known lung issues.  She seemed to grasp this but thinks that she is going to get back to prehospital.    Physical Exam   Vital Signs: Temp: 97.3  F (36.3  C) Temp src: Oral BP: 124/70 Pulse: 76   Resp: 22 SpO2: 94 % O2 Device: Nasal cannula with humidification Oxygen Delivery: 2 LPM  Weight: 90 lbs 6.4 oz    GENERAL:  Alert, appears comfortable, in no acute distress, appears stated age   HEAD:  Normocephalic, without obvious abnormality, atraumatic   EYES:  PERRL, conjunctiva/corneas clear, no scleral icterus, EOM's intact   BACK:   Symmetric, no curvature, ROM normal   LUNGS:   Clear to auscultation bilaterally, no rales, rhonchi, or wheezing, symmetric chest rise on inhalation, respirations unlabored   HEART:  Regular rate and rhythm, S1 and S2 normal, no murmur, rub, or gallop    ABDOMEN:   Soft, non-tender, bowel sounds active all four quadrants, no masses, no organomegaly, no rebound or guarding   EXTREMITIES: Extremities normal, atraumatic, no cyanosis or edema    SKIN: Dry to touch, no exanthems in the visualized areas   NEURO: Alert, oriented x 4, moves all four extremities freely/spontaneously   PSYCH: Cooperative, behavior is appropriate            Data     I have personally reviewed the following data over the past 24 hrs:    6.2  \   11.3 (L)   / 110 (L)     142 97 (L) 83 (H) /  103   4.7 35 (H) 2.69 (H) \       Imaging results reviewed over the past 24 hrs:   No results found for this or any  previous visit (from the past 24 hour(s)).  Recent Labs   Lab 05/22/23  0725 05/21/23  0607 05/20/23  0425 05/19/23  0613 05/18/23  1645 05/18/23  1038   WBC 6.2  --   --   --  5.3 4.0   HGB 11.3*  --   --   --  12.4 13.1     --   --   --  102* 103*   * 121*  --   --  114* 120*    141 141 142  --  142   POTASSIUM 4.7 5.7* 5.4* 5.3*  --  4.6   CHLORIDE 97* 101 99 99  --  98   CO2 35* 30 31 31  --  35*   BUN 83* 81* 83* 60*  --  46*   CR 2.69* 3.06* 3.09* 2.91*  --  2.60*   ANIONGAP 10 10 11 12  --  9   ITA 8.6 8.8 8.4* 9.0  --  9.0    146* 136* 237*  --  151*   ALBUMIN  --   --   --  3.1*  --  3.3*   PROTTOTAL  --   --   --  7.2  --  7.2   BILITOTAL  --   --   --  0.5  --  0.9   ALKPHOS  --   --   --  99  --  107   ALT  --   --   --  20  --  22   AST  --   --   --  27  --  32   LIPASE  --   --   --   --   --  <9     tt 40 min

## 2023-05-22 NOTE — PLAN OF CARE
Goal Outcome Evaluation:  Patient alert and oriented. Up SBA with walker and gait belt ambulated in room. Tolerating diet. Voiding spontaneously.   Denies chest pain. Shortness of breath with activity. Complained of pain and discomfort in back improved with topical and scheduled tylenol.   VSS. LS diminished coarse crackles in RLL. +1 to +2 edema in bilateral LE. Patient declined scheduled MRI, discussed with Dr. Winter.   Plan discharge pending medical progression safe discharge planning. Will monitor and continue plan of care.     Problem: Muscle Strength Impairment  Goal: Improved Muscle Strength  Outcome: Progressing     Problem: Pain Acute  Goal: Optimal Pain Control and Function  Outcome: Progressing     Problem: Malnutrition  Goal: Improved Nutritional Intake  Outcome: Progressing

## 2023-05-22 NOTE — PROGRESS NOTES
Patient VSS, RA, A&O. Denies pain, rested with eyes closed up in the recliner. NSR on tele. No significant events during shift.

## 2023-05-22 NOTE — PROGRESS NOTES
Care Management Follow Up    Length of Stay (days): 4    Expected Discharge Date: 05/23/2023     Concerns to be Addressed: no discharge needs identified, denies needs/concerns at this time (PT&OT Consults pending currently.)     Additional Information:  CM following. PT recs home with out patient, home. Patient independent at baseline. CM will continue to follow along, assist with any discharge needs. Palliative consult placed, will await outcome of that as well. She may need a ride home at discharge.        Ashley Davenport RN    3pm. Addendum: Tried to see patient, she was meeting with Palliative. Patient may need TCU for safety reasons, she is high risk for readmission. Did send referrals to around her area where she lives, and patient is not reluctant to consider TCU placement after hospital.

## 2023-05-23 NOTE — PROGRESS NOTES
RENAL PROGRESS NOTE        ASSESSMENT & PLAN:   PLAN:  -Avoid nephrotoxins, renal dose medications, daily wt, daily I/O  -Convert IV lasix to 20 mg PO lasix daily (PTA was on PRN lasix 20mg, however will likely need daily diuresis upon discharge).   -Low Na diet/low K diet  -Palliative goals of care meeting at 1 pm today, nephrology will try to make meeting.  -She is very near ESRD, and would NOT be a dialysis candidate.  -Has Nephrology follow up appointment 6/20/23 @ 1 pm with Mairah Tucker NP  -[facility] daily      ASSESSMENT:    JOSE RAFAEL on CKD 4-5: concerning ongoing decline with likely CRS, very little renal reserve to tolerate even minor volume status changes. CKD since at least 2018, b/l sCR in the 1.7-2.0 (coinciding Cystatin C 4.1, eGFR 10), eGFR grossly overestimated and likely near ESRD; she would NOT be a candidate for dialysis with severe heart failure. Renal US, severely atropic kidneys R<<<L, no hydro or obvious acute pathology.  Can get some systemic absorption with voltaren gel, but benefit for pain may outweigh risk/concerns. Cr down trending 2.6>2.9>3.0>2.6, moniotr     Hyperkalemia:  initiate diuretics, low K diet, add Lokelma     Acute on Chronic decomp heart failure:  EF 20-25%, not on ACE/ARB and would NOT start in this setting given sig hemodynamic sensitivity and near end stage renal dz. Defer decision on BB to WW vs cards     HTN/BP:  Mostly controlled      Acute pain:  Back, MRI pending, may need TLSO, Pain service managing     Pulm nodules:  New finding, h/o smoker, high risk for malignancy      COPD/Acute hypoxic resp failure:  With decomp heart failure, on steroids in ER and nebs, 02 support as needed. Management per Primary team.    SUBJECTIVE:    Pt sitting up in chair  She reports living independently PTA, has communication with family members  Denies n, v, c, d, fever, rash or CP  Cr 3.0>2.6, palliative consulted for ESRD/CKD4-5 status, and pt a poor candidate for dialysis with severe  "HF  We had a long discussion again today about current level of renal function, likelihood of CKD disease progression and that dialysis may be more harm than good and that she is not a good dialysis candidate. She met with palliative yesterday and plan is for a goals of care conference today at 1 pm which I plan to attend.    Pt seems to be hyper focused on chronic back pain, and her level of kidney function and goals of care is not her number one focus right now. She states \" my primary care says my kidneys are fine and will be until the day I dye\".   Discussed vonnie/Plan with Dr. Lutz today. Plan TCU dispo, and goal is to establish goals of care with not offering dialysis.   We discussed plan to continue diuresis, trend renal function ([facility] will need 20 mg PO lasix MWF on discharge)  Discussed plan to convert to PO lasix for fluid maintenance with discharge  Answered all questions    OBJECTIVE:  Physical Exam   Temp: 97.4  F (36.3  C) Temp src: Oral BP: 120/71 Pulse: 75   Resp: 18 SpO2: 98 % O2 Device: Nasal cannula with humidification Oxygen Delivery: 3 LPM  Vitals:    05/20/23 0435 05/21/23 0141 05/22/23 0200   Weight: 43.8 kg (96 lb 9 oz) 41.9 kg (92 lb 4.8 oz) 41 kg (90 lb 6.4 oz)     Vital Signs with Ranges  Temp:  [97.3  F (36.3  C)-98.3  F (36.8  C)] 97.4  F (36.3  C)  Pulse:  [65-91] 75  Resp:  [18-22] 18  BP: (117-143)/(67-78) 120/71  SpO2:  [87 %-98 %] 98 %  I/O last 3 completed shifts:  In: 0   Out: 1200 [Urine:1200]    Patient Vitals for the past 72 hrs:   Weight   05/22/23 0200 41 kg (90 lb 6.4 oz)   05/21/23 0141 41.9 kg (92 lb 4.8 oz)       Intake/Output Summary (Last 24 hours) at 5/22/2023 1006  Last data filed at 5/22/2023 0916  Gross per 24 hour   Intake 480 ml   Output 1600 ml   Net -1120 ml       PHYSICAL EXAM:  GEN: NAD  CV: RRR  Lung: diminished bases BL  Ab: soft and NT; not distended; normal bs  Ext: +LLE mild BL and well perfused  Skin: no rash  Neuro: no asterixis       LABORATORY " STUDIES:     Recent Labs   Lab 05/22/23  0725 05/21/23  0607 05/18/23  1645 05/18/23  1038   WBC 6.2  --  5.3 4.0   RBC 3.60*  --  3.95 4.16   HGB 11.3*  --  12.4 13.1   HCT 36.1  --  40.1 43.0   * 121* 114* 120*       Basic Metabolic Panel:  Recent Labs   Lab 05/23/23  0442 05/22/23  0725 05/21/23  0607 05/20/23  0425 05/19/23  0613 05/18/23  1038    142 141 141 142 142   POTASSIUM 4.5 4.7 5.7* 5.4* 5.3* 4.6   CHLORIDE 96* 97* 101 99 99 98   CO2 33* 35* 30 31 31 35*   BUN 84* 83* 81* 83* 60* 46*   CR 2.68* 2.69* 3.06* 3.09* 2.91* 2.60*    103 146* 136* 237* 151*   ITA 8.5 8.6 8.8 8.4* 9.0 9.0       INRNo lab results found in last 7 days.     Recent Labs   Lab Test 05/22/23  0725 05/21/23  0607 05/18/23  1645 06/01/22  1046 03/04/22  0925 07/10/21  0922 07/09/21  0911   INR  --   --   --   --  1.03  --  1.13*   WBC 6.2  --  5.3   < > 4.8   < > 5.0   HGB 11.3*  --  12.4   < > 12.2   < > 12.2   * 121* 114*   < > 123*   < > 125*    < > = values in this interval not displayed.       Personally reviewed current labs    Mariah SILVA-BC  [facility]  [phone]

## 2023-05-23 NOTE — PLAN OF CARE
Goal Outcome Evaluation:  Patient alert and oriented. Up in chair this shift. Up ambulated in room to bathroom. Tolerating diet. Voiding spontaneously.   Complained of right back and flank pain. Available PRN and scheduled medication given. Patient reported decrease in pain.   Denies chest pain. Shortness of breath with activity. VSS. LS diminished coarse in RLL. Palliative care following.   Anticipate discharge pending medical progression and discharge planning.  Will monitor and continue plan of care.       Problem: Pain Acute  Goal: Optimal Pain Control and Function  Outcome: Progressing     Problem: Muscle Strength Impairment  Goal: Improved Muscle Strength  Outcome: Progressing

## 2023-05-23 NOTE — CONSULTS
Integrative Therapy Consult    Healing PresenceYes  Essential Oils: Topical (EO/Topical Oil)     Support Healing process blend - HC, Lavender Massage Oil - HC       Healing Music: TV/Care channel     Breathwork:       Guided Imagery:       Acupressure: Hands on Li4     Oshibori:       Energy Therapy:       Healing Touch:       Reiki:       Qi Gong:     Massage: Hand, Foot      Targeted Massage:    Sleep Promotion:       Other Therapy:       Intervention Reason: Comfort, Well Being, Uplifting     Pre and Post Session Scores: Patient Desires Treatment: yes                             Delivery:         Referrals:      Loren Rivera

## 2023-05-23 NOTE — PLAN OF CARE
NSR on tele. No n/v this shift. Pt having pain to right mid back that radiates around right side. PRN oxy given x 1 an hour after scheduled tylenol given and pain creams applied as they did not work effectively per pt to minimize pain. Po oxycodone effective. Pt received scheduled robaxin and pain creams at HS. Will continue to monitor pain. Bed alarm on for safety. Hourly safety checks complete.  Problem: Nausea and Vomiting  Goal: Nausea and Vomiting Relief  Outcome: Adequate for Care Transition     Problem: Pain Acute  Goal: Optimal Pain Control and Function  Outcome: Progressing  Intervention: Develop Pain Management Plan  Recent Flowsheet Documentation  Taken 5/22/2023 1832 by Sultana Tolentino, RN  Pain Management Interventions: medication (see MAR)  Taken 5/22/2023 1729 by Sultana Tolentino, RN  Pain Management Interventions: medication (see MAR)  Intervention: Prevent or Manage Pain  Recent Flowsheet Documentation  Taken 5/22/2023 2000 by Sultana Tolentino, RN  Medication Review/Management: medications reviewed  Taken 5/22/2023 1600 by Sultana Tolentino, RN  Medication Review/Management: medications reviewed

## 2023-05-23 NOTE — CONSULTS
"Care Management Follow Up    Length of Stay (days): 5    Expected Discharge Date: 05/23/2023     Concerns to be Addressed: discharge planning, assist at home  Patient plan of care discussed at interdisciplinary rounds: Yes    Anticipated Discharge Disposition: Home Care, Home     Anticipated Discharge Services: None  Anticipated Discharge DME: None    Patient/family educated on Medicare website which has current facility and service quality ratings: Yes  Education Provided on the Discharge Plan: Yes   Patient/Family in Agreement with the Plan:  Yes    Referrals Placed by CM/SW:  (Not yet indicated.)  Private pay costs discussed: Not applicable    Additional Information:  CM consulted d/t pt requested additional assistance in the home and meal delivery.    CM met with pt to further discuss.    [facility] - referral sent requesting for \"help around the home.\"  [id number].    [employer] Whe[employer]  - Discussed with pt.  Placed their contact info placed on AVS.    [facility] - accepted for homecare RN, PT, HHA, MSW (goal for [facility]K services).  Pt agreeable; no preferences for homecare agency.    Anticipate discharge home with homecares.  Pt does not have home oxygen at home; may need home O2 eval prior to discharge.    3:39 PM  Reapproached pt regarding discharge to TCU.  Pt agreeable to discharge to U, [facility] preferred.    [facility] ([phone]) - can accept per Nancy, between 1300-1500pm, cutoff time of 17:30pm.  Notified Dr. Winter.  Update given to [facility], bedside nursing.    Pt has a friend who may be able to transport at time of discharge. Discussed potential out of pocket cost of [facility] wheelchair transport.  Pt requests [facility] wheelchair transport at time of discharge.    [facility] 13[facility]38-14:13pm; with oxygen tank.  CM can cancel if needed.    CM will continue to follow.    Ursula Vyas RN      "

## 2023-05-23 NOTE — PROGRESS NOTES
SPIRITUAL HEALTH SERVICES Progress Note      Saw pt Mayela Espino per [facility].    Patient/Family Understanding of Illness and Goals of Care - Mayela shared she admitted due to pain spasms in her back    Distress and Loss - Mayela has had multiple losses as she aged, within the last few years she lost a beloved sister who's loss still brings tears to her eyes    Strengths, Coping, and Resources - She is close to her son but he lives in [facility] [place] [facility].  Mayela engaged in life review and reflection on her family    Meaning, Beliefs, and Spirituality - Mayela requested prayer for healing and engaged in expressions of Awe for Gods vastness and grandness    Plan of Care - Spiritual car will cont to assess and visits trough [facility]      Jordan Barreto M.Div., [facility]  Staff    [phone]

## 2023-05-23 NOTE — PROGRESS NOTES
[facility]  Palliative Care Daily Progress Note      Requesting Clinician / Team: Dr Johnson  Reason for consult: Goals of care (severe heart failure, near end stage CKD, acute on chronic pain)     Recommendations:  Encounter for palliative care  Psychosocial support    Psychosocial support was provided to patient and/or family.    Prognosis: With 3 serious health issues, pt would likely be eligible (discussed indepth today, discussed hospice and an informational hospice consult; will think over).    Palliative performance scale (PPS): ~50    CODE status: DNR/DNI    Goals of Care: Restorative    PLAN:  -See summary of 2 palliative care meetings today in section further down  -Hospitalist (and/or Neurosurgery) review with pt and her son, the best imaging (MRI vs CT) as well as what are some treatment options, look ahead to what to do with imaging findings, given her co-morbidities.    -Plan for TCU (although home care was in seeing pt when we arrived). She would like to talk with CM about transportation needs as well.   -Has short health care directive now; encouraged review/completion when able.   -SW consult for more assistance at home, with food and getting rid of excess items she has collected over the years.   -Will see if Palliative Care LICSW can connect with her this week for psychosocial support.   -Outpatient Palliative Care Clinic referral and an informational hospice consult discussed. No decision made yet on these.   -Palliative Care will follow up tomorrow. Did offer to pt to be available to talk with son when see her. No firm decision on this either.   -May benefit from a SLUMS and/or ACLS for baseline assessment of cognitive function. Think she will do fairly well, yet may be good to know for safety and discharge planning.     Surrogate: Named her son Carlos (has one son/child; he just retired, she is ). She has not done a HCD before. She is open to looking at a short one.  She does mention that her son is on her checking account (?financial POA).     Disposition: TBD (?TCU?)     Symptoms    Pain related to back fractures. She did meet with Neurosurgery (see 5-21-23 note for details). She does not recall much about the indepth discussion it appears they had about the risks of not using a brace. Is finding her current pain regimen working. Really liked acupuncture.     Healing Touch  ? Continue current regimen.   ? Appreciate Hospitalist review of her wish to have the MRI and have it reviewed by Neurosurgery.      Total time spent was 110 minutes regarding palliative care goals of care with 2 separate visits with pt face to face today as well as in prolonged conversations/visit, in pre and post meeting discussions with Nephrology and Hospitalist on the date of the encounter. This case was discussed with the primary team via direct communcation.     JOHNNY Wilkinson, FNP-BC, PMHNP-BC  Palliative Care Nurse Practitioner  [facility]  [phone]  Team Pager [phone] (8am-430pm M-F)     During regular M-F work hours -- if you are not sure who specifically to contact -- please contact us by calling [phone].     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 Explained that with her chest imaging, it is not possible for me to determine acuity of her fractures and I would recommend pursuing an MRI thoracic spine to direct further treatment. She was told 1 yr ago that she had multiple spinal compression fractures. She ultimately does not wish to pursue any additional imaging to determine acuity of her fractures including CT or MRI. She would like to do acupuncture and see if her pain  "goes away like it did with PT 1 yr ago. She did agree to considering an MRI and bracing if her pain does not improve.      We talked at length about bracing and the importance if fractures are acute in using one to help to stabilize and prevent fracture worsening. She understands this and chooses to defer bracing at this time. We talked about the risk of fractures worsening resulting in potential permanent neurologic damage without treatment. She has no point tenderness on exam, arguing that her fractures may be chronic, but we cannot be sure. We will sign off today. Please page our team with questions or concerns, if patient completes any additional spine-specific imaging to determine acuity.    Initial Palliative Care Visit yesterday:    Had an initial palliative care goals of care meeting with Mayela yesterday. She right away asks, \"why do I need palliative care?\"    Spent time explaining the role of palliative care and the reason for the consult (reviewed her multiple serious health issues). She offers, \"I have had these known about for a while. I don't really understand what has changed, why it is such an issue now.\" Got her permission to review several of her main health issues (the change in her EF over the last year and her CKD change) as well as reviewed the concerns Nephrology has for her, re: dialysis in the setting of her heart disease. She does understand that they have told her it could be too tough on her, in the setting of her heart disease. She has not seen Cardiology in 1 year. When discussed her current EF and near eligibility for hospice, just from a cardiac perspective, she asks \"is there anything else that can be done for my heart?\" During our time together, we discussed that she has generally been doing well, in her own condo, but sometimes, fatigue and shortness of breath limit her activity, just a little. She does her own cooking and dishes but would value some assistance with meals (meal " "delivery) as well as would really value a service that can help her declutter. Pt has not seen Cardiology for over a year/does not have an outpatient cardiologist at the moment, and does wonder if there is any way to improve her heart function. Consider Cardiology consultation. Addendum: Per Hospitalist, cardiology consultation at this time was not indicated.     Summary of two meetings today:   1st meeting: follow up visit with sonya Pedro this AM, reviewed content discussed yesterday (her health issues, kidney function, heart disease, COPD, and back compression fractures). Reviewed that her body has been compensating for a while, yet may be gradually losing that ability. Spoke with her about trying to prepare, for if there was a change, in the near future. Got her permission to discuss hospice which she quickly said, \"Isn't that for if you are dying?\" Reviewed the changes in offering hospice over the years given the outcomes and known improved support for pts (and families).Reviewed in moderate detail hospice and what can happen on hospice, option for an informational hospice consult, can disenroll if does not like.  She did like the sound of massage and music therapy. Reviewed with her she does not need to make a decision about this today. She did ask about Palliative Care access out of the hospital. Discussed the option for a referral to the Outpatient Palliative Care Clinic (will think over).Today, her main concern is her back pain. Got her permission to review the Neurosurgery NP note with her. She would like to have a chance to have the MRI offered again and then see Neurosurgery to review the results. Reviewed was uncertain hat exactly would the treatments be if acute changes were noted, and whether there would be contraindications given her heart and kidney issues. Sent a message to Hospitalist re: MRI/neurosurgery.   -When asked her what is most important to her at this time, knowing what she does about her " "serious health issues, she relates, \"to be as active and healthy as possible until the end (her Mom lived to be almost 100, was cognitively intact and generally healthy until the last few months).    2nd meeting from 130-215 with Nephrology APRN, reviewed content discussed above, and worries, re: kidney function and other health system decline in the future. Pt had just spoken with her son and was very focused on getting more information about her back pain evaluation and not being discharged before understands better. Shared did relay this interest to Hospitalist. Offered to call son during this meeting. She declined this.      Prognosis, Goals, & Planning:      Functional Status just prior to hospitalization: 2 (Ambulatory and capable of all selfcare but unable to carry out any work activities; may need help with IADLs up and about > 50% of waking hours)       Prognosis, Goals, and/or Advance Care Planning were addressed today: Yes        Summary/Comments: Pt is open to looking at/likely completing a HCD.       Patient's decision making preferences: independently          Patient has decision-making capacity today for complex decisions: Yes            I have concerns about the patient/family's health literacy today: No           Patient has a completed Health Care Directive: No.        Code status: No CPR / No Intubation     Coping, Meaning, & Spirituality:   Mood, coping, and/or meaning in the context of serious illness were addressed today: Yes  Summary/Comments: Not doing so well. She is really on her own. , only son and 2 grandchildren live in [place].      Social: She is really on her own. , only son and 2 grandchildren live in [place].      History of Present Illness:  Per ER, Mayela Espino is a 89 year old female with a pertinent history of CKD, HTN, breast cancer, CHF, who presented to this ED on May 18th via walk-in for evaluation of back pain. Per triage, patient presents with a couple of weeks of " back pain, but presents with progressive shortness of breath. Her O2 stats were in the low 80s on arrival, here on 6L nasal canula. Decreased oxygen to 3 L when oxygen was at 92%. She has a previous history of breast cancer. No pain associated with shortness of breath. She has a history of being treated for COPD, though she does not have the diagnosis.     Patient reports pain to her back on the right side for the past couple of weeks that takes her breath away. She states it makes it hard to talk or stand.      Key Palliative Symptom Data:  SOB-Denies  Pain-No pain at rest in the chair, but reports spasms come on fast. Has had both robaxin and Oxycodone.    Nausea-No  Anxiety-Mood a little low. Does not have many people to talk to right now.      ROS:  A complete 14 point ROS was negative unless stated otherwise above in HPI          Review of Systems:     Besides above, an additional ROS was not done today.           Medications:     I have reviewed this patient's medication profile and medications during this hospitalization.    Noted meds:  Reviewed           Physical Exam:   Vitals were reviewed  Temp: 97.4  F (36.3  C) Temp src: Oral BP: 120/71 Pulse: 75   Resp: 18 SpO2: 98 % O2 Device: Nasal cannula with humidification Oxygen Delivery: 3 LPM  General: Not in acute distress.  Pulmonary: Unlabored. Speaking in full sentences.  Cardiovascular: No overt jugular venous distension. Non-edematous.  Abdomen: Non-distended.   Skin: Warm. Dry. No bruises or rashes noted.           Data Reviewed:     Reviewed recent pertinent imaging, comments:   EXAM: CT CHEST ABDOMEN PELVIS W/O CONTRAST  LOCATION: [facility]  DATE/TIME: 5/18/2023 12:14 PM CDT     INDICATION: Shortness of breath, chest pain, chronic kidney disease  COMPARISON: Portable chest radiography 06/01/2022; MRI lumbar spine 03/04/2022; thoracic spine CT 03/30/2018  TECHNIQUE: CT scan of the chest, abdomen, and pelvis was performed  without IV contrast. Multiplanar reformats were obtained. Dose reduction techniques were used.   CONTRAST: None.     FINDINGS:   LUNGS AND PLEURA: Small to moderate left pleural effusion is present layering posteriorly. Trace right pleural effusion. Focal band of atelectasis posterior left lower lobe related to pleural fluid. A band of atelectasis is present in the lingula.   Smaller territories of atelectasis are present in the paradiaphragmatic right lower lobe and along the inferior major fissures. There are 3 adjacent subpleural nodules in the right middle lobe along the minor fissure largest of which is 5 mm (series 4,   image 93). There is a subpleural nodule in the lateral left lower lobe which measures 4 mm (series 3, image 77) and 3 mm nodule in the anterior left lower lobe along the major fissure (series 3, image 63). Anterior bowing of the membranous central   airways consistent with imaging at partial expiratory phase of the respiratory cycle. Motion artifacts limit assessment of the subsegmental airways particularly in the lower lungs.     MEDIASTINUM/AXILLAE: Heterogeneity and enlargement of the isthmus and right lobe of the thyroid gland which extend into the right paratracheal mediastinum slightly displacing the trachea towards the left. There are mildly enlarged subaortic and lower   paratracheal lymph nodes. Mild generalized enlargement of the heart chambers. No pericardial effusion. Tortuous proximal great vessels. Mild to moderate arch and descending aorta atheromatous calcifications. The distal descending thoracic aorta has   fusiform ectasia measuring up to 4.1 cm in diameter. The esophagus is decompressed.     CORONARY ARTERY CALCIFICATION: Mild.     Assessment of the upper abdominal viscera is influenced by motion-related blurring.     HEPATOBILIARY: Several low-attenuation lesions are randomly distributed in the liver with larger lesion in the right lobe measuring 15 mm (series 3, image  136). Gallbladder is not distended. No calcified gallstones. No bile duct enlargement.     PANCREAS: Pancreas is fatty replaced. No definite pancreas parenchymal lesions.     SPLEEN: Normal.     ADRENAL GLANDS: Lobular low-attenuation left adrenal gland suggesting hyperplasia. Right adrenal gland is also low-attenuation with a lesser degree of tissue thickening.     KIDNEYS/BLADDER: The left kidney is atrophied. There are calcifications at the cortical medullary junction of the upper and lower pole but no hydronephrosis. Compensatory hypertrophy of the right kidney. No definite right nephroureterolithiasis. Urinary   bladder is normal.     BOWEL: No dilated bowel or bowel wall thickening. In sigmoid diverticulosis.     LYMPH NODES: No lymphadenopathy in the abdomen or pelvis.     VASCULATURE: Advanced aortoiliac atheromatous calcifications with minimal lobulated contours of the infrarenal aorta but no aneurysm.     PELVIC ORGANS: No pelvic masses.     MUSCULOSKELETAL: There is a left hip joint replacement. No periprosthetic fracture. Generalized demineralization of the bones. No pelvis or hip insufficiency fractures. There are numerous superior and inferior endplate deformities of the thoracolumbar   spine with greatest degree of compression at T8, T9, and T11 where there is a round 50% loss of vertebral body height. Lesser compression deformities of T5-T7, T10 and all lumbar vertebra. The lumbar compression deformities are unchanged from 03/04/2022.   No aggressive or destructive bone lesions.                                                                      IMPRESSION:     1.  Small to moderate left and trace right pleural effusions and related atelectasis.   2.  Suboptimal assessment of the lung parenchyma due to motion-related blurring. Multiple foci of peripheral atelectasis in the bases. Few pulmonary nodules are present largest of which is 5 mm. Per 2017 Fleischner Society guidelines, follow-up CT of the    chest in 12 months is considered optional.  3.  Enlarged, heterogeneous right lobe of the thyroid consistent with mediastinal border.  4.  Multiple randomly low-attenuation liver lesions are present the largest of which measures 1.5 cm. These are indeterminate. These could be further characterized with ultrasound or liver MRI.  5.  Sigmoid diverticulosis but no diverticulitis.  6.  Atrophied left kidney with nonobstructing calculi are present.  7.  Numerous thoracic and lumbar vertebral compression deformities. The lumbar compression deformities are unchanged from 03/04/2022. Multiple thoracic compression deformities have worsened since 2018.    Reviewed recent labs, comments:     CMPRecent Labs   Lab 05/23/23  0442 05/22/23  0725 05/21/23  0607 05/20/23  0425 05/19/23  0613 05/18/23  1038    142 141 141 142 142   POTASSIUM 4.5 4.7 5.7* 5.4* 5.3* 4.6   CHLORIDE 96* 97* 101 99 99 98   CO2 33* 35* 30 31 31 35*   ANIONGAP 11 10 10 11 12 9    103 146* 136* 237* 151*   BUN 84* 83* 81* 83* 60* 46*   CR 2.68* 2.69* 3.06* 3.09* 2.91* 2.60*   GFRESTIMATED 16* 16* 14* 14* 15* 17*   ITA 8.5 8.6 8.8 8.4* 9.0 9.0   MAG  --  2.1 2.2 2.2 2.2 2.1   PROTTOTAL  --   --   --   --  7.2 7.2   ALBUMIN  --   --   --   --  3.1* 3.3*   BILITOTAL  --   --   --   --  0.5 0.9   ALKPHOS  --   --   --   --  99 107   AST  --   --   --   --  27 32   ALT  --   --   --   --  20 22     CBC  Recent Labs   Lab 05/22/23  0725 05/21/23  0607 05/18/23  1645 05/18/23  1038   WBC 6.2  --  5.3 4.0   RBC 3.60*  --  3.95 4.16   HGB 11.3*  --  12.4 13.1   HCT 36.1  --  40.1 43.0     --  102* 103*   MCH 31.4  --  31.4 31.5   MCHC 31.3*  --  30.9* 30.5*   RDW 15.6*  --  15.3* 15.4*   * 121* 114* 120*     INRNo lab results found in last 7 days.  Arterial Blood GasNo lab results found in last 7 days.

## 2023-05-24 NOTE — PROGRESS NOTES
RENAL PROGRESS NOTE        ASSESSMENT & PLAN:   PLAN:  -Avoid nephrotoxins, renal dose medications, daily wt, daily I/O  -continue PO lasix 20mg on MWF upon discharge. Will continue lasix 20mg PO daily while here.  -Low Na diet/low K diet  -Palliative involved and discussing goals of care  -She is very near ESRD, and would NOT be a dialysis candidate.  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 CKD since at least 2018, b/l sCR in the 1.7-2.0 (coinciding Cystatin C 4.1, eGFR 10), eGFR grossly overestimated and likely near ESRD; she would NOT be a candidate for dialysis with severe heart failure. Renal US, severely atropic kidneys R<<<L, no hydro or obvious acute pathology.  Can get some systemic absorption with voltaren gel, but benefit for pain may outweigh risk/concerns. Cr down trending 2.6>2.9>3.0>2.6>2.4 (18eGFR), monitor.     Hyperkalemia:  initiate diuretics, low K diet, add Lokelma     Acute on Chronic decomp heart failure:  EF 20-25%, not on ACE/ARB and would NOT start in this setting given sig hemodynamic sensitivity and near end stage renal dz. Defer decision on BB to WW vs cards     HTN/BP:  Mostly controlled      Acute pain:  Back, MRI pending, may need TLSO, Pain service managing     Pulm nodules:  New finding, h/o smoker, high risk for malignancy      COPD/Acute hypoxic resp failure:  With decomp heart failure, on steroids in ER and nebs, 02 support as needed. Management per Primary team.    SUBJECTIVE:    She reports living independently PTA, has communication with family members  Denies n, v, c, d, fever, rash or CP  MRI yesterday  Pt denies pain today, wanting acupuncture before leaving for TCU   Reports intermittent back pain, worse with position changes  Cr 3.0>2.6>2.4, palliative consulted for  ESRD/CKD4-5 status, and pt a poor candidate for dialysis with severe HF  We had a long discussion about current level of renal function, likelihood of CKD disease progression and that dialysis may be more harm than good and that she is not a good dialysis candidate. She met with palliative yesterday and plan is for a goals of care conference. Pt agreeable to TCU upon discharge  Pt seems to be hyper focused on chronic back pain, and her level of kidney function and goals of care is not her number one focus right now.   Cr 2.6>2.4, improved today. Renal following along   Answered all questions    OBJECTIVE:  Physical Exam   Temp: 97.6  F (36.4  C) Temp src: Oral BP: 119/59 Pulse: 76   Resp: 22 SpO2: 96 % O2 Device: Nasal cannula Oxygen Delivery: 4 LPM  Vitals:    05/20/23 0435 05/21/23 0141 05/22/23 0200   Weight: 43.8 kg (96 lb 9 oz) 41.9 kg (92 lb 4.8 oz) 41 kg (90 lb 6.4 oz)     Vital Signs with Ranges  Temp:  [97.2  F (36.2  C)-97.6  F (36.4  C)] 97.6  F (36.4  C)  Pulse:  [72-86] 76  Resp:  [20-24] 22  BP: (119-158)/(59-82) 119/59  SpO2:  [95 %-98 %] 96 %  I/O last 3 completed shifts:  In: 360 [P.O.:360]  Out: 800 [Urine:800]    Patient Vitals for the past 72 hrs:   Weight   05/22/23 0200 41 kg (90 lb 6.4 oz)       Intake/Output Summary (Last 24 hours) at 5/22/2023 1006  Last data filed at 5/22/2023 0916  Gross per 24 hour   Intake 480 ml   Output 1600 ml   Net -1120 ml       PHYSICAL EXAM:  GEN: NAD  CV: RRR  Lung: diminished bases BL  Ab: soft and NT; not distended; normal bs  Ext: +LLE mild BL and well perfused  Skin: no rash  Neuro: no asterixis       LABORATORY STUDIES:     Recent Labs   Lab 05/24/23  0430 05/22/23  0725 05/21/23  0607 05/18/23  1645 05/18/23  1038   WBC  --  6.2  --  5.3 4.0   RBC  --  3.60*  --  3.95 4.16   HGB 10.9* 11.3*  --  12.4 13.1   HCT  --  36.1  --  40.1 43.0   * 110* 121* 114* 120*       Basic Metabolic Panel:  Recent Labs   Lab 05/24/23  0430 05/23/23  0442 05/22/23  0725  05/21/23  0607 05/20/23  0425 05/19/23  0613    140 142 141 141 142   POTASSIUM 4.5 4.5 4.7 5.7* 5.4* 5.3*   CHLORIDE 98 96* 97* 101 99 99   CO2 36* 33* 35* 30 31 31   BUN 77* 84* 83* 81* 83* 60*   CR 2.47* 2.68* 2.69* 3.06* 3.09* 2.91*   GLC 97 101 103 146* 136* 237*   ITA 8.3* 8.5 8.6 8.8 8.4* 9.0       INRNo lab results found in last 7 days.     Recent Labs   Lab Test 05/24/23  0430 05/22/23  0725 05/21/23  0607 05/18/23  1645 06/01/22  1046 03/04/22  0925 07/10/21  0922 07/09/21  0911   INR  --   --   --   --   --  1.03  --  1.13*   WBC  --  6.2  --  5.3   < > 4.8   < > 5.0   HGB 10.9* 11.3*  --  12.4   < > 12.2   < > 12.2   * 110*   < > 114*   < > 123*   < > 125*    < > = values in this interval not displayed.       Personally reviewed current labs    Mariah SILVA-BC  [facility]  [phone]

## 2023-05-24 NOTE — PROGRESS NOTES
"SPIRITUAL HEALTH SERVICES ([facility]) Progress Note  [facility].  [address]     Visited  pt Mayela Espino per Change of Conditions .     Patient/Family Understanding of Illness and Goals of Care - Mayela received news that her recent MRI showed that her cancer has returned and has spread to her bones. She is hoping that the diagnosis is incorrect and asked for prayers of healing.      Distress and Loss - Mayela's  and sister have predeceased her.Her son in [place] is asking her to move to [place]. She said \"that's a big move.\"  She's been in her condominium for 28 years and has many longstanding friends.      Strengths, Coping, and Resources - Her [employer] dillan (Tenriism) is a source of strength for her. She is asking some profound theological questions regarding the relationship of the Holy Spirit and Bret.      Meaning, Beliefs, and Spirituality - She and her  have studied the bible from cover to cover. Though she does not have a Synagogue since moving to [facility], her dillan and theological positions are informed by her [employer]ism dillan and her study of Scripture. She believes in the power of prayer and the power of prayer for healing.       Plan of Care - [facility] will follow as able during this hospitalization.        Katherine Thompson, Ph.D.,[facility]  , Spiritual Health Services      [facility] available 24/7 for emergency requests/referrals, either by having the on-call  paged or by entering an ASAP/STAT consult in Epic (this will also page the on-call ).       "

## 2023-05-24 NOTE — PROGRESS NOTES
[facility]    Medicine Progress Note - Hospitalist Service    Date of Admission:  5/18/2023    Assessment & Plan   Mayela Espino is a 89 year old female admitted on 5/18/2023. She presents complaining of right-sided flank pain and shortness of breath with pain radiating around to the front of her chest intermittently.  Worse with activity and deep breathing.  In the ER CT scan without contrast unremarkable and VBG consistent with compensated respiratory acidosis.  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  MRI lumbar spine ordered to further assess compression fractures and back pain.  Neurosurgery, nephrology and acute pain team following.  Awaiting pain control, monitoring for worsening of kidney function.  Palliative care has met with the patient.  She is DNR.  Not yet ready for hospice.    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  This likely represents recurrence of breast cancer.  Patient and family made aware.  They would like her to meet with oncology to discuss any potential options.  Oncology consult placed.  Patient has TCU available and can discharge 5/25 after seeing oncology unless something changes.      Right flank/chest pain  Multiple compression vertebral fractures.   Etiology unclear  VQ scan ordered but not tolerated/not available on the weekend  Renal ultrasound reviewed  Reviewed CT chest abdomen pelvis  Serial troponins negative  Echocardiogram reviewed with EF of 20 to 25%  Ordered MRI due to the multiple compression fractures  Neurosurgical consultation ordered on 5/20.  Miacalcin  Appreciate pain team recommendations.  5/23 willing to finally do MRI which has been ordered  Further plan for TLSO etc. pending  results  5/23 Long discussion with patient's son via telephone about current situation and options  They agree with TCU  Patient now agrees to TCU  MRI obtained 5/23 with metastatic disease that was not seen on CT  Patient and family made aware  That would like for her to see oncology prior to going to TCU     Acute on chronic  Severe heart failure with reduced ejection fraction  Echocardiogram: LVEF 20-25%, global hypokinesis, moderate MR.   Appreciate nephrology assistance in diuresis.   Currently not on a beta-blocker/could consider but will hold off in light of COPD and current acute issues  No ACE or ARB due to chronic kidney disease/would not start  Consider addition of beta-blockade once acute exacerbation improved/likely outside of the hospital  Albumin 25% 25 g and furosemide 20 mg  q12h started on 5/20/23.   Now appears euvolemic with stable renal function on 20 mg of Lasix daily     Acute hypoxic respiratory failure/COPD exacerbation/compensated respiratory acidosis  Continue Supplemental oxygen/will need O2 at TCU  Scheduled DuoNebs  Not wheezing   We will discontinue prednisone     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  Hyperkalemia  Nephrology concerned that patient is near end stage.   Patient is not a dialysis candidate.   Palliative care consultation appreciated  Contacted son and he is aware that she is not a dialysis candidate  He did not realize she was so close to end-stage disease  Tolerating low-dose oral Lasix with stable renal function currently     Severe protein calorie malnutrition  Cachexia.   RD consultation     Thyroid mass  Outpatient follow-up     Pulmonary nodules  Outpatient follow-up     Thrombocytopenia  Transfuse for platelets < 10,000  Stable     Generalized anxiety  Likely contributing to pain  As needed Ativan     Remote history of breast cancer  Now with metastatic disease to spine and liver  Oncology referral made  Patient states that she was seen by   "Dulce Duran.  Does not believe she saw medical oncologist  Did not have chemo or radiation  Can likely discharge 5/25 after oncology consultation    History of tobacco abuse       Diet: Renal Diet (non-dialysis)  Snacks/Supplements Adult: Nepro Oral Supplement; Between Meals    DVT Prophylaxis: Pneumatic Compression Devices  Vides Catheter: Not present  Lines: None     Cardiac Monitoring: None  Code Status: No CPR- Do NOT Intubate                # Hypoalbuminemia: Lowest albumin = 3.1 g/dL at 5/19/2023  6:13 AM, will monitor as appropriate   # Thrombocytopenia: Lowest platelets = 117 in last 2 days, will monitor for bleeding    # Acute heart failure with reduced ejection fraction: last echo with EF <40% and receiving IV diuretics       # Cachexia: Estimated body mass index is 17.08 kg/m  as calculated from the following:    Height as of this encounter: 1.549 m (5' 1\").    Weight as of this encounter: 41 kg (90 lb 6.4 oz).   # Severe Malnutrition: based on nutrition assessment         Disposition Plan      Expected Discharge Date: 05/25/2023, 10:38 AM    Destination: home  Discharge Comments: Pending clinical progression          Sj Winter MD  Hospitalist Service  [facility]  Securely message with [employer] (more info)  Text page via [employer] Paging/Directory   ______________________________________________________________________    Interval History   Patient made aware of MRI.  I contacted her son and discussed findings with him as well.  They both would like to have her see oncology to discuss any potential options for palliative care/chemo.  She is otherwise feeling well.  Her back pain is improved.  She is having no nausea or vomiting.  No fevers or chills.  Up and ambulating.  Care discussed directly with palliative care as well    Physical Exam   Vital Signs: Temp: 97.6  F (36.4  C) Temp src: Oral BP: 119/59 Pulse: 76   Resp: 22 SpO2: 96 % O2 Device: Nasal cannula Oxygen Delivery: 4 " LPM  Weight: 90 lbs 6.4 oz    GENERAL:  Alert, appears comfortable, in no acute distress, appears stated age   HEAD:  Normocephalic, without obvious abnormality, atraumatic   NECK: Supple, symmetrical, trachea midline   BACK:   Symmetric, no curvature, ROM normal   LUNGS:   Clear to auscultation bilaterally, no rales, rhonchi, or wheezing, symmetric chest rise on inhalation, respirations unlabored   HEART:  Regular rate and rhythm, S1 and S2 normal, no murmur, rub, or gallop    ABDOMEN:   Soft, non-tender, bowel sounds active all four quadrants, no masses, no organomegaly, no rebound or guarding   EXTREMITIES: Extremities normal, atraumatic, no cyanosis or edema    SKIN: Dry to touch, no exanthems in the visualized areas   NEURO: Alert, oriented x 4, moves all four extremities freely/spontaneously   PSYCH: Cooperative, behavior is appropriate          Data     I have personally reviewed the following data over the past 24 hrs:    N/A  \   10.9 (L)   / 117 (L)     141 98 77 (H) /  97   4.5 36 (H) 2.47 (H) \       Imaging results reviewed over the past 24 hrs:   Recent Results (from the past 24 hour(s))   MR Thoracic Spine w/o Contrast    Narrative    EXAM: MR THORACIC SPINE W/O CONTRAST  LOCATION: [facility]  DATE/TIME: 5/23/2023 5:36 PM CDT    INDICATION: pain fractures acuity  COMPARISON: 05/18/2023 CT. 03/30/2018 CT.  TECHNIQUE: Routine Thoracic Spine MRI without IV contrast.    FINDINGS:   12 rib-bearing thoracic vertebrae.    Extensive osseous metastatic disease.     T8 compression fracture with 60% central height loss, worsened from 2018, previously 40% height loss, with diffuse T2 STIR hyperintensity throughout the T8 vertebral body. Marrow edema at the T9 posterior spinous process with associated T2 hypointense   fracture line.     Mild T5 superior plate compression deformity with approximately 27% height loss is unchanged from 2018. Mild T6 superior endplate compression deformity  with approximately 13% height loss is new from 2018 but without associated marrow edema. T7   compression deformity with approximately 37% central height loss similar to the 2022 CT. Moderate T9, T10, T11, T12, L1, and L2 compression deformities which are all new or worsened compared to 2018 but without associated marrow edema.     Multilevel thoracic spondylosis. No high-grade spinal canal or neural foraminal stenosis.    No abnormal cord signal.     No acute extraspinal abnormality. Bilateral pleural effusions. Multiple hepatic metastases..      Impression    IMPRESSION:  1.  Extensive osseous metastatic disease.  2.  Probable subacute or acute worsening of the T8 compression deformity, with 60% height loss, previously 40% in 2018.  3.  Probable acute nondisplaced fracture of the T9 posterior spinous process.  4.  Multiple old appearing pathologic compression deformities throughout the thoracic and lumbar spine.  5.  No high-grade spinal canal or neural foraminal stenosis.     Recent Labs   Lab 05/24/23  0430 05/23/23  0442 05/22/23  0725 05/21/23  0607 05/20/23  0425 05/19/23  0613 05/18/23  1645 05/18/23  1038   WBC  --   --  6.2  --   --   --  5.3 4.0   HGB 10.9*  --  11.3*  --   --   --  12.4 13.1   MCV  --   --  100  --   --   --  102* 103*   *  --  110* 121*  --   --  114* 120*    140 142 141   < > 142  --  142   POTASSIUM 4.5 4.5 4.7 5.7*   < > 5.3*  --  4.6   CHLORIDE 98 96* 97* 101   < > 99  --  98   CO2 36* 33* 35* 30   < > 31  --  35*   BUN 77* 84* 83* 81*   < > 60*  --  46*   CR 2.47* 2.68* 2.69* 3.06*   < > 2.91*  --  2.60*   ANIONGAP 7 11 10 10   < > 12  --  9   ITA 8.3* 8.5 8.6 8.8   < > 9.0  --  9.0   GLC 97 101 103 146*   < > 237*  --  151*   ALBUMIN  --   --   --   --   --  3.1*  --  3.3*   PROTTOTAL  --   --   --   --   --  7.2  --  7.2   BILITOTAL  --   --   --   --   --  0.5  --  0.9   ALKPHOS  --   --   --   --   --  99  --  107   ALT  --   --   --   --   --  20  --  22   AST  --    --   --   --   --  27  --  32   LIPASE  --   --   --   --   --   --   --  <9    < > = values in this interval not displayed.     tt 58 min

## 2023-05-24 NOTE — PROGRESS NOTES
Care Management Follow Up    Length of Stay (days): 6    Expected Discharge Date: 05/24/2023     Concerns to be Addressed: discharge planning     Patient plan of care discussed at interdisciplinary rounds: Yes    Anticipated Discharge Disposition: Transitional Care     Anticipated Discharge Services: None  Anticipated Discharge DME: None    Patient/family educated on Medicare website which has current facility and service quality ratings: no  Education Provided on the Discharge Plan: Yes  Patient/Family in Agreement with the Plan: yes    Referrals Placed by CM/SW: Post Acute Facilities  Private pay costs discussed: Not applicable    Additional Information:  Chart reviewed.  Discussed with Dr. Winter.    [facility] wheelchair to [facility], with oxygen, rescheduled for today 5/24 pickup time between 15:38-16:23pm.    Update given to Dr. Winter, bedside nursing, [facility], Charge RN, Nancy at [facility].    PAS done - does not require an OBRA level II assessment.  [id number]    2:06 PM  Cancel discharge today per Dr. Winter.  Heme/Onc consult still pending.  Bedside RN is aware, update given to Nancy at [facility] ([phone]).    2:09 PM  [facility] Wheelchair transport with O2, rescheduled to tomorrow Thurs 5/25 10:38-11:23AM, to [facility].  CM can change the pickup time/cancel if needed.    CM following.    Ursula Vyas RN

## 2023-05-24 NOTE — PLAN OF CARE
Patient reporting pain 7/10 traveling from her back to her abdomen. Voltaren and Lidocaine cream applied, tylenol given as scheduled. Patient reporting 6/10 pain 1 hr later. Oxycodone 2.5 mg given at 0941. Patient later reported it made her feel woozy. Patient reported complete relief of pain following acupuncture.    New malignant cancer diagnosis given to patient today. Patient spoke with palliative, , and son on phone. Waiting to see hematology and oncology. Discharge ride for today switched to tomorrow 5/25.    Tiffani Latif RN on 5/24/2023 at 1:31 PM        Problem: Pain Acute  Goal: Optimal Pain Control and Function  Outcome: Progressing  Intervention: Prevent or Manage Pain  Recent Flowsheet Documentation  Taken 5/24/2023 0611 by Tiffani Latif, RN  Medication Review/Management: medications reviewed   Goal Outcome Evaluation:

## 2023-05-24 NOTE — PROGRESS NOTES
[facility]  Palliative Care Daily Progress Note      Recommendations:  Encounter for palliative care  Psychosocial support    Psychosocial support was provided to patient and/or family.    Prognosis: With 3 serious health issues, pt would likely be eligible (discussed indepth today, discussed hospice and an informational hospice consult; will think over).    Palliative performance scale (PPS): ~50    CODE status: DNR/DNI    Goals of Care: She got the news of her MRI today (aware likely cancer). She is coping at the moment very well, letting family and some acquaintances know. She is looking forward to meeting with Oncology (did review the typical times Oncology comes and advised she could tip off her son, as likely will want him on the telephone when Oncology comes to see her. Again reviewed could have an informational hospice consult and/or referral to Palliative Care. She appreciates knowing this, and will think over.      PLAN:  -Pt aware of MRI findings and plan to meet with Oncology today.   -She is aware Oncology will review with her her MRI fndings, advise on if any additional evaluation needed, any treatment options, given her co-morbidities (?radiation, other).    -Plan for TCU when medically stable. Her son Mina is coming to stay for a while, in June (just retired).   -Has short health care directive now; reviewed it with her, encouraged completion when able (her son Mina is her only child).   -If Palliative Care LICSW on-site, think she would benefit from psychosocial support.   -Discussed again options for Outpatient Palliative Care Clinic referral and an informational hospice consult discussed. No decision made yet on these.   -Palliative Care will follow up tomorrow. Did offer to pt to be available to talk with son when see her. No firm decision on this either. Think she likely may accept a referral to the Outpatient Palliative Care clinic when colleague sees her tomorrow.   -She  "does/will need assistance from her son and CM, as far as helping her with her transportation, her condo, future support services.    -She would welcome any visit from pet therapy.      Surrogate: Son Carlos (has one son/child; he just retired, she is ). She has not done a HCD before. She is open to looking at a short one. She does mention that her son is on her checking account (? financial POA).     Disposition: TCU     Symptoms    Pain related to back fractures, now MRI showing evidence of extensive osseous metastatic disease. She did meet with Neurosurgery (see 5-21-23 note for details). Rates her pain as none now, as has been having more Oxycodone PRN. She knows it helps yet also makes her a little \"woozy.\" Is trying to find the right balance. Does not have any neuropathy. Cr. 2.47.  ? Healing Touch and Acupuncture  ? Continue current regimen.   ? Appreciate Oncology evaluation and recommendations (? Radiation, vs other).  ? Uncertain if there is any interventional pain option for her (she has asked about a nerve block).   ? Could consider low dose buprenorphine (Butrans patch, eg 5 mcq/hr), however, advise caution, given her co-morbidities and lives alone. May be best to see how she does with Oncology recommendations and TCU (she was observed walking at a fast pace with a walker in the lara, with a NA at her side), seemed comfortable.       Total time spent was 80 minutes, 60 minutes face to face, regarding palliative care goals of care with 2 separate visits with pt face to face today as well as in prolonged conversations/visit, with Hospitalist, RN, and NA on the date of the encounter. This case was discussed with the primary team via direct communcation.     JOHNNY Wilkinson, FNP-BC, PMHNP-BC  Palliative Care Nurse Practitioner  [facility]  [phone]  Team Pager [phone] (8am-430pm M-F)     During regular M-F work hours -- if you are not sure who specifically to " "contact -- please contact us by calling [phone].     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 Explained that with her chest imaging, it is not possible for me to determine acuity of her fractures and I would recommend pursuing an MRI thoracic spine to direct further treatment. She was told 1 yr ago that she had multiple spinal compression fractures. She ultimately does not wish to pursue any additional imaging to determine acuity of her fractures including CT or MRI. She would like to do acupuncture and see if her pain goes away like it did with PT 1 yr ago. She did agree to considering an MRI and bracing if her pain does not improve.      We talked at length about bracing and the importance if fractures are acute in using one to help to stabilize and prevent fracture worsening. She understands this and chooses to defer bracing at this time. We talked about the risk of fractures worsening resulting in potential permanent neurologic damage without treatment. She has no point tenderness on exam, arguing that her fractures may be chronic, but we cannot be sure. We will sign off today. Please page our team with questions or concerns, if patient completes any additional spine-specific imaging to determine acuity.     Initial Palliative Care Visit yesterday:    Had an initial palliative care goals of care meeting with Mayela yesterday. She right away asks, \"why do I need palliative care?\"    Spent time explaining the role of palliative care and the reason for the consult (reviewed her multiple serious health issues). She offers, \"I have had these known about for a while. I don't really understand what has changed, why it is such an " "issue now.\" Got her permission to review several of her main health issues (the change in her EF over the last year and her CKD change) as well as reviewed the concerns Nephrology has for her, re: dialysis in the setting of her heart disease. She does understand that they have told her it could be too tough on her, in the setting of her heart disease. She has not seen Cardiology in 1 year. When discussed her current EF and near eligibility for hospice, just from a cardiac perspective, she asks \"is there anything else that can be done for my heart?\" During our time together, we discussed that she has generally been doing well, in her own condo, but sometimes, fatigue and shortness of breath limit her activity, just a little. She does her own cooking and dishes but would value some assistance with meals (meal delivery) as well as would really value a service that can help her declutter. Pt has not seen Cardiology for over a year/does not have an outpatient cardiologist at the moment, and does wonder if there is any way to improve her heart function. Consider Cardiology consultation. Addendum: Per Hospitalist, cardiology consultation at this time was not indicated.      Summary of two meetings yesterday:   1st meeting: follow up visit with sonya Pedro this AM, reviewed content discussed yesterday (her health issues, kidney function, heart disease, COPD, and back compression fractures). Reviewed that her body has been compensating for a while, yet may be gradually losing that ability. Spoke with her about trying to prepare, for if there was a change, in the near future. Got her permission to discuss hospice which she quickly said, \"Isn't that for if you are dying?\" Reviewed the changes in offering hospice over the years given the outcomes and known improved support for pts (and families).Reviewed in moderate detail hospice and what can happen on hospice, option for an informational hospice consult, can disenroll if does " "not like.  She did like the sound of massage and music therapy. Reviewed with her she does not need to make a decision about this today. She did ask about Palliative Care access out of the hospital. Discussed the option for a referral to the Outpatient Palliative Care Clinic (will think over).Today, her main concern is her back pain. Got her permission to review the Neurosurgery NP note with her. She would like to have a chance to have the MRI offered again and then see Neurosurgery to review the results. Reviewed was uncertain hat exactly would the treatments be if acute changes were noted, and whether there would be contraindications given her heart and kidney issues. Sent a message to Hospitalist re: MRI/neurosurgery.   -When asked her what is most important to her at this time, knowing what she does about her serious health issues, she relates, \"to be as active and healthy as possible until the end (her Mom lived to be almost 100, was cognitively intact and generally healthy until the last few months).     2nd meeting from 130-215 with Nephrology APRN, reviewed content discussed above, and worries, re: kidney function and other health system decline in the future. Pt had just spoken with her son and was very focused on getting more information about her back pain evaluation and not being discharged before understands better. Shared did relay this interest to Hospitalist. Offered to call son during this meeting. She declined this.      Prognosis, Goals, & Planning:      Functional Status just prior to hospitalization: 2 (Ambulatory and capable of all selfcare but unable to carry out any work activities; may need help with IADLs up and about > 50% of waking hours)       Prognosis, Goals, and/or Advance Care Planning were addressed today: Yes        Summary/Comments: Pt is open to looking at/likely completing a HCD.       Patient's decision making preferences: independently          Patient has decision-making " "capacity today for complex decisions: Yes            I have concerns about the patient/family's health literacy today: No           Patient has a completed Health Care Directive: No.        Code status: No CPR / No Intubation     Coping, Meaning, & Spirituality:   Mood, coping, and/or meaning in the context of serious illness were addressed today: Yes  Summary/Comments: Not doing so well. She is really on her own. , only son and 2 grandchildren live in [place].      Social: She is really on her own. , only son and 2 grandchildren live in [place].      History of Present Illness:  Per ER, Mayela Espino is a 89 year old female with a pertinent history of CKD, HTN, breast cancer, CHF, who presented to this ED on May 18th via walk-in for evaluation of back pain. Per triage, patient presents with a couple of weeks of back pain, but presents with progressive shortness of breath. Her O2 stats were in the low 80s on arrival, here on 6L nasal canula. Decreased oxygen to 3 L when oxygen was at 92%. She has a previous history of breast cancer. No pain associated with shortness of breath. She has a history of being treated for COPD, though she does not have the diagnosis.     Patient reports pain to her back on the right side for the past couple of weeks that takes her breath away. She states it makes it hard to talk or stand.      Key Palliative Symptom Data:  SOB-Denies  Pain-No pain at rest in the chair, but reports spasms come on fast. Has had both robaxin and Oxycodone. Been using the Oxycodone a little more. Finds makes her a little \"woozy\"   Nausea-No  Anxiety-Seems to be coping with the news as well as possible.           Review of Systems:     Besides above, an additional ROS was not done today.           Medications:     I have reviewed this patient's medication profile and medications during this hospitalization.           Physical Exam:   Vitals were reviewed  Temp: 97.6  F (36.4  C) Temp src: Oral BP: 119/59 " Pulse: 76   Resp: 22 SpO2: 96 % O2 Device: Nasal cannula Oxygen Delivery: 4 LPM  General: Not in acute distress. Alert, coping well, conversant, thoughtful, hopeful.            Data Reviewed:     Reviewed recent pertinent imaging, comments:   EXAM: MR THORACIC SPINE W/O CONTRAST  LOCATION: [facility]  DATE/TIME: 5/23/2023 5:36 PM CDT     INDICATION: pain fractures acuity  COMPARISON: 05/18/2023 CT. 03/30/2018 CT.  TECHNIQUE: Routine Thoracic Spine MRI without IV contrast.     FINDINGS:   12 rib-bearing thoracic vertebrae.     Extensive osseous metastatic disease.      T8 compression fracture with 60% central height loss, worsened from 2018, previously 40% height loss, with diffuse T2 STIR hyperintensity throughout the T8 vertebral body. Marrow edema at the T9 posterior spinous process with associated T2 hypointense   fracture line.      Mild T5 superior plate compression deformity with approximately 27% height loss is unchanged from 2018. Mild T6 superior endplate compression deformity with approximately 13% height loss is new from 2018 but without associated marrow edema. T7   compression deformity with approximately 37% central height loss similar to the 2022 CT. Moderate T9, T10, T11, T12, L1, and L2 compression deformities which are all new or worsened compared to 2018 but without associated marrow edema.      Multilevel thoracic spondylosis. No high-grade spinal canal or neural foraminal stenosis.     No abnormal cord signal.      No acute extraspinal abnormality. Bilateral pleural effusions. Multiple hepatic metastases..                                                                      IMPRESSION:  1.  Extensive osseous metastatic disease.  2.  Probable subacute or acute worsening of the T8 compression deformity, with 60% height loss, previously 40% in 2018.  3.  Probable acute nondisplaced fracture of the T9 posterior spinous process.  4.  Multiple old appearing pathologic  compression deformities throughout the thoracic and lumbar spine.  5.  No high-grade spinal canal or neural foraminal stenosis.    Reviewed recent labs, comments:   ROUTINE ICU LABS (Last four results)  CMPRecent Labs   Lab 05/24/23 0430 05/23/23 0442 05/22/23 0725 05/21/23  0607 05/20/23  0425 05/19/23  0613 05/18/23  1038    140 142 141 141 142 142   POTASSIUM 4.5 4.5 4.7 5.7* 5.4* 5.3* 4.6   CHLORIDE 98 96* 97* 101 99 99 98   CO2 36* 33* 35* 30 31 31 35*   ANIONGAP 7 11 10 10 11 12 9   GLC 97 101 103 146* 136* 237* 151*   BUN 77* 84* 83* 81* 83* 60* 46*   CR 2.47* 2.68* 2.69* 3.06* 3.09* 2.91* 2.60*   GFRESTIMATED 18* 16* 16* 14* 14* 15* 17*   ITA 8.3* 8.5 8.6 8.8 8.4* 9.0 9.0   MAG 2.0  --  2.1 2.2 2.2 2.2 2.1   PROTTOTAL  --   --   --   --   --  7.2 7.2   ALBUMIN  --   --   --   --   --  3.1* 3.3*   BILITOTAL  --   --   --   --   --  0.5 0.9   ALKPHOS  --   --   --   --   --  99 107   AST  --   --   --   --   --  27 32   ALT  --   --   --   --   --  20 22     CBC  Recent Labs   Lab 05/24/23 0430 05/22/23 0725 05/21/23  0607 05/18/23  1645 05/18/23  1038   WBC  --  6.2  --  5.3 4.0   RBC  --  3.60*  --  3.95 4.16   HGB 10.9* 11.3*  --  12.4 13.1   HCT  --  36.1  --  40.1 43.0   MCV  --  100  --  102* 103*   MCH  --  31.4  --  31.4 31.5   MCHC  --  31.3*  --  30.9* 30.5*   RDW  --  15.6*  --  15.3* 15.4*   * 110* 121* 114* 120*     INRNo lab results found in last 7 days.  Arterial Blood GasNo lab results found in last 7 days.

## 2023-05-24 NOTE — CONSULTS
"ACUPUNCTURIST TREATMENT NOTE    Name: Mayela Espino  :  1932  MRN:  [id number]    Acupuncture Treatment  Patient Type: Medical  Intervention Reason: Pain  Pain Location: (R) back  Pre-session Pain Ratin  Post-session Pain Ratin  Patient complaint:: back pain primarily (R) side  Initial insertions: Yin Goode, (R) Churchill Ayleen, (R) (BL 60, BL 62, BL 63, BL 65); (R) (BL 16, BL 17, BL 18, BL 19, BL 20); Rula 3, (R) LI 4  Number of needles inserted: 14  Number of needles removed: 14         \"Risks and benefits of acupuncture were discussed with patient. Consent for treatment was given. We thank you for the referral.\"     Mayela Jara L.Ac.     Date:  2023  Time:  11:11 AM    "

## 2023-05-24 NOTE — PLAN OF CARE
Problem: Plan of Care - These are the overarching goals to be used throughout the patient stay.    Goal: Plan of Care Review  Description: The Plan of Care Review/Shift note should be completed every shift.  The Outcome Evaluation is a brief statement about your assessment that the patient is improving, declining, or no change.  This information will be displayed automatically on your shift note.  5/24/2023 0655 by Giuliano Baldwin, RN  Outcome: Progressing  5/24/2023 0655 by Giuliano Baldwin, RN  Outcome: Progressing   Goal Outcome Evaluation:         Pt A/O x 4. VSS on 2-4L o2 via NC. Complained of moderate pain overnight, treated with robaxin, tylenol and roxicodone with relief. IV access became occluded, no longer indicated, was removed. Currently no IV access. Ambulated to bathroom, voiding appropriately. Continue plan of care.

## 2023-05-25 NOTE — DISCHARGE SUMMARY
[facility] MEDICINE  DISCHARGE SUMMARY     Primary Care Physician: Johnathan Brenner  Admission Date: 5/18/2023   Discharge Provider: Carroll Egan MD Discharge Date: 5/25/2023   Diet:   Active Diet and Nourishment Order   Procedures     Snacks/Supplements Adult: Nepro Oral Supplement; Between Meals     Renal Diet (non-dialysis)     Diet       Code Status: No CPR- Do NOT Intubate   Activity: DCACTIVITY: Activity as tolerated and is guided by TCU therapy staff        Condition at Discharge: Fair     REASON FOR PRESENTATION(See Admission Note for Details)     Right flank pain with dyspnea    PRINCIPAL & ACTIVE DISCHARGE DIAGNOSES      Right flank/chest pain  Multiple compression vertebral fractures.   Etiology unclear  VQ scan ordered but not tolerated/not available on the weekend  Renal ultrasound reviewed  Reviewed CT chest abdomen pelvis  Serial troponins negative  Echocardiogram reviewed with EF of 20 to 25%  Ordered MRI due to the multiple compression fractures  Neurosurgical consultation ordered on 5/20.  Miacalcin  Appreciate pain team recommendations.  5/23 willing to finally do MRI which has been ordered  Further plan for TLSO etc. pending results  5/23 Long discussion with patient's son via telephone about current situation and options  They agree with TCU  Patient now agrees to TCU  MRI obtained 5/23 with metastatic disease that was not seen on CT  Patient and family made aware  Plan: We will see oncology today prior to discharge and then to TCU     Acute on chronic  Severe heart failure with reduced ejection fraction  Echocardiogram: LVEF 20-25%, global hypokinesis, moderate MR.   Appreciate nephrology assistance in diuresis.   Currently not on a beta-blocker/could consider but will hold off in light of COPD and current acute issues  No ACE or ARB due to chronic kidney disease/would not start  Consider addition of beta-blockade once acute exacerbation improved/likely  outside of the hospital  Albumin 25% 25 g and furosemide 20 mg  q12h started on 5/20/23.   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   Patient is not a dialysis candidate.   Palliative care consultation appreciated  Contacted son and he is aware that she is not a dialysis candidate  He did not realize she was so close to end-stage disease  Tolerating low-dose oral furosemide with stable renal function currently  Plan: Check basic metabolic panel at the TCU     Severe protein calorie malnutrition  Cachexia.   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 Dulce Duran.  Does not believe she saw medical oncologist  Did not have chemo or radiation  Discharge 5/25 after oncology consultation     History of tobacco abuse      Diet: Renal Diet (non-dialysis)  Snacks/Supplements Adult: Nepro Oral Supplement; Between Meals      Code Status: No CPR- Do NOT Intubate       Disposition: To TCU today            PENDING LABS     Unresulted Labs Ordered in the Past 30 Days of this Admission     No orders found from 4/18/2023 to 5/19/2023.            PROCEDURES ( this hospitalization only)      None    RECOMMENDATIONS TO OUTPATIENT PROVIDER FOR F/U VISIT     Follow-up Appointments     Follow Up Care      If you wish to  make a hospital follow up appt in the neurosurgery   department to further investigate the age of your thoracic and lumbar   compression fractures, please call our office at [phone].     You were seen by [facility] neurosurgery in the hospital on   5/21/23 and risks/benefits of further imaging to determine age of your   fractures including possible treatment with a spinal brace were discussed.   At the time, you chose not to pursue additional imaging and risks of not   treating your potentially acute spinal fractures were discussed.         Follow Up and recommended labs and tests      Follow up with halfway physician.  The following labs/tests are   recommended: Hemogram, basic metabolic panel.               DISPOSITION     TCU    SUMMARY OF HOSPITAL COURSE:      Mayela Espino is a 89 year old female admitted on 5/18/2023. She presents complaining of right-sided flank pain and shortness of breath with pain radiating around to the front of her chest intermittently.  Worse with activity and deep breathing.  In the ER CT scan without contrast unremarkable and VBG consistent with compensated respiratory acidosis.  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  MRI lumbar spine ordered to further assess compression fractures and back pain.  Neurosurgery, nephrology and acute pain team following.  Awaiting pain control, monitoring for worsening of kidney function.  Palliative care has met with the patient.  She is DNR.  Not yet ready for hospice.     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  This likely represents recurrence of breast cancer.  Patient and family made aware.  They would like her to meet with oncology to discuss any potential  options.  Oncology consult placed and we have been informed they will see her prior to discharge today.  Patient going to TCU today.     Discharge Medications with Med changes:     Current Discharge Medication List      START taking these medications    Details   artificial saliva (BIOTENE MT) SOLN solution Swish and spit 2 mLs (2 sprays) in mouth 4 times daily as needed for dry mouth    Associated Diagnoses: Lumbosacral plexopathy      calcitonin, salmon, (MIACALCIN) 200 UNIT/ACT nasal spray Spray 1 spray into one nostril alternating nostrils daily Alternate nostril each day.    Associated Diagnoses: Lumbosacral plexopathy      diclofenac (VOLTAREN) 1 % topical gel Apply 2 g topically 4 times daily    Associated Diagnoses: Lumbosacral plexopathy      ipratropium - albuterol 0.5 mg/2.5 mg/3 mL (DUONEB) 0.5-2.5 (3) MG/3ML neb solution Take 1 vial (3 mLs) by nebulization every 6 hours as needed for wheezing  Qty: 90 mL    Associated Diagnoses: Tobacco abuse      methocarbamol (ROBAXIN) 500 MG tablet Take 0.5 tablets (250 mg) by mouth 4 times daily    Associated Diagnoses: Lumbosacral plexopathy      oxyCODONE (ROXICODONE) 5 MG tablet Take 0.5 tablets (2.5 mg) by mouth every 4 hours as needed for moderate to severe pain  Qty: 16 tablet, Refills: 0    Associated Diagnoses: Lumbosacral plexopathy      polyethylene glycol (MIRALAX) 17 GM/Dose powder Take 17 g by mouth daily  Qty: 510 g    Associated Diagnoses: Lumbosacral plexopathy         CONTINUE these medications which have CHANGED    Details   acetaminophen (TYLENOL) 325 MG tablet Take 3 tablets (975 mg) by mouth every 8 hours    Associated Diagnoses: Lumbosacral plexopathy      furosemide (LASIX) 20 MG tablet Take 1 tablet (20 mg) by mouth Every Mon, Wed, Fri Morning    Associated Diagnoses: Heart failure with reduced ejection fraction (H)      LORazepam (ATIVAN) 0.5 MG tablet Take 0.5 tablets (0.25 mg) by mouth every 4 hours as needed for anxiety  Qty: 4 tablet,  Refills: 0    Associated Diagnoses: Lumbosacral plexopathy         CONTINUE these medications which have NOT CHANGED    Details   aspirin 81 MG EC tablet Take 81 mg by mouth daily      cholecalciferol, vitamin D3, 1,000 unit tablet [CHOLECALCIFEROL, VITAMIN D3, 1,000 UNIT TABLET] Take 500 Units by mouth daily.       multivit-iron-min-folic acid 3,500-18-0.4 unit-mg-mg Chew [MULTIVIT-IRON-MIN-FOLIC ACID 3,500-18-0.4 UNIT-MG-MG CHEW] Chew 1 tablet daily.          STOP taking these medications       anastrozole (ARIMIDEX) 1 MG tablet Comments:   Reason for Stopping:                     Rationale for medication changes:      See above for details    Hold anastrozole until discussed with oncology        Consults       CORE CLINIC EVALUATION IP CONSULT  OCCUPATIONAL THERAPY ADULT IP CONSULT  NUTRITION SERVICES ADULT IP CONSULT  CARE MANAGEMENT / SOCIAL WORK IP CONSULT  OCCUPATIONAL THERAPY ADULT IP CONSULT  PHYSICAL THERAPY ADULT IP CONSULT  NEUROSURGERY IP CONSULT  PAIN MANAGEMENT ADULT IP CONSULT  NEPHROLOGY IP CONSULT  ACUPUNCTURE IP CONSULT  ACUPUNCTURE IP CONSULT  PALLIATIVE CARE ADULT IP CONSULT  INTEGRATIVE THERAPIES CONSULT  SOCIAL WORK IP CONSULT  HEMATOLOGY & ONCOLOGY IP CONSULT  PHYSICAL THERAPY ADULT IP CONSULT  OCCUPATIONAL THERAPY ADULT IP CONSULT    Immunizations given this encounter       There is no immunization history on file for this patient.        Anticoagulation Information      ASA      SIGNIFICANT IMAGING FINDINGS     Results for orders placed or performed during the hospital encounter of 05/18/23   CT Chest Abdomen Pelvis w/o Contrast    Impression    IMPRESSION:    1.  Small to moderate left and trace right pleural effusions and related atelectasis.   2.  Suboptimal assessment of the lung parenchyma due to motion-related blurring. Multiple foci of peripheral atelectasis in the bases. Few pulmonary nodules are present largest of which is 5 mm. Per 2017 Fleischner Society guidelines, follow-up CT  of the   chest in 12 months is considered optional.  3.  Enlarged, heterogeneous right lobe of the thyroid consistent with mediastinal border.  4.  Multiple randomly low-attenuation liver lesions are present the largest of which measures 1.5 cm. These are indeterminate. These could be further characterized with ultrasound or liver MRI.  5.  Sigmoid diverticulosis but no diverticulitis.  6.  Atrophied left kidney with nonobstructing calculi are present.  7.  Numerous thoracic and lumbar vertebral compression deformities. The lumbar compression deformities are unchanged from 03/04/2022. Multiple thoracic compression deformities have worsened since 2018.   US Lower Extremity Venous Duplex Bilateral    Impression    IMPRESSION:    1.  No deep venous thrombosis in the bilateral lower extremities.  2.  [place] fluid collection subcutaneous tissues distal medial right thigh consistent with a liquefied hematoma or seroma.   US Renal Complete Non-Vascular    Impression    IMPRESSION:    1.  Right kidney is completely atrophied and not well visualized.    2.  Atrophic left kidney, without hydronephrosis.    3.  Nonobstructing 2 mm left renal calculus.    4.  Small right posterolateral bladder diverticulum.   MR Thoracic Spine w/o Contrast    Impression    IMPRESSION:  1.  Extensive osseous metastatic disease.  2.  Probable subacute or acute worsening of the T8 compression deformity, with 60% height loss, previously 40% in 2018.  3.  Probable acute nondisplaced fracture of the T9 posterior spinous process.  4.  Multiple old appearing pathologic compression deformities throughout the thoracic and lumbar spine.  5.  No high-grade spinal canal or neural foraminal stenosis.   Echocardiogram Complete   Result Value Ref Range    LVEF  25-30% (severely reduced)        SIGNIFICANT LABORATORY FINDINGS     Most Recent 3 CBC's:Recent Labs   Lab Test 05/24/23  0430 05/22/23  0725 05/21/23  0607 05/18/23  1645 05/18/23  1038   WBC  --  6.2   --  5.3 4.0   HGB 10.9* 11.3*  --  12.4 13.1   MCV  --  100  --  102* 103*   * 110* 121* 114* 120*     Most Recent 3 BMP's:Recent Labs   Lab Test 05/25/23  0419 05/24/23  0430 05/23/23  0442    141 140   POTASSIUM 4.7 4.5 4.5   CHLORIDE 96* 98 96*   CO2 38* 36* 33*   BUN 73* 77* 84*   CR 2.18* 2.47* 2.68*   ANIONGAP 7 7 11   ITA 9.2 8.3* 8.5   GLC 97 97 101     Most Recent 2 LFT's:Recent Labs   Lab Test 05/19/23  0613 05/18/23  1038   AST 27 32   ALT 20 22   ALKPHOS 99 107   BILITOTAL 0.5 0.9         Discharge Orders        Follow Up Care    If you wish to make a hospital follow up appt in the neurosurgery department to further investigate the age of your thoracic and lumbar compression fractures, please call our office at [phone].     You were seen by [facility] neurosurgery in the hospital on 5/21/23 and risks/benefits of further imaging to determine age of your fractures including possible treatment with a spinal brace were discussed. At the time, you chose not to pursue additional imaging and risks of not treating your potentially acute spinal fractures were discussed.     General info for SNF    Length of Stay Estimate: Short Term Care: Estimated # of Days <30  Condition at Discharge: Improving  Level of care:skilled   Rehabilitation Potential: Fair  Admission H&P remains valid and up-to-date: Yes  Recent Chemotherapy: N/A  Use Nursing Home Standing Orders: Yes     Mantoux instructions    Give two-step Mantoux (PPD) Per Facility Policy Yes     Follow Up and recommended labs and tests    Follow up with longterm physician.  The following labs/tests are recommended: Hemogram, basic metabolic panel.     Reason for your hospital stay    Mayela Espino is a 89 year old female admitted on 5/18/2023. She presents complaining of right-sided flank pain and shortness of breath with pain radiating around to the front of her chest intermittently.  Worse with activity and deep breathing.  In  the ER CT scan without contrast unremarkable and VBG consistent with compensated respiratory acidosis.  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  MRI lumbar spine ordered to further assess compression fractures and back pain.  Neurosurgery, nephrology and acute pain team following.  Awaiting pain control, monitoring for worsening of kidney function.  Palliative care has met with the patient.  She is DNR.  Not yet ready for hospice.  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  This likely represents recurrence of breast cancer.  Patient and family made aware.  They would like her to meet with oncology to discuss any potential options.  Oncology consult placed and still awaiting their consultation and recommendations.  Patient to TCU today.     Activity - Up with nursing assistance     No CPR- Do NOT Intubate     Physical Therapy Adult Consult    Evaluate and treat as clinically indicated.    Reason: Weakness and deconditioned     Occupational Therapy Adult Consult    Evaluate and treat as clinically indicated.    Reason: Weakness     Oxygen (SNF/TCU) Discharge    Currently needed 1-2 L     Diet    Follow this diet upon discharge: Orders Placed This Encounter      Snacks/Supplements Adult: Nepro Oral Supplement; Between Meals      Renal Diet (non-dialysis)       Examination   Physical Exam   Temp:  [97.3  F (36.3  C)-97.5  F (36.4  C)] 97.5  F (36.4  C)  Pulse:  [65-82] 72  Resp:  [16] 16  BP: (121-152)/(57-74) 121/63  SpO2:  [88 %-96 %] 96 %  Wt Readings from Last 1 Encounters:   05/25/23 41.3 kg (91 lb)       General Appearance: Alert and appears to be in no apparent distress.  Does appear cachectic  Respiratory: Mild to moderately decreased breath sounds  throughout with rales over the lower one third lung fields bilaterally slightly louder on the right  Cardiovascular: Moderately distant with regular rate and rhythm and no murmur  GI: Soft and nontender  Skin: No lesions in skin exposed area  Neurologic: Alert and appears to be in no apparent distress, cranial nerves II through XII intact, moves all 4 extremities, cranial nerves II through XII intactI, Carroll Egan MD, personally saw the patient today and spent greater than 30 minutes discharging this patient.      Please see EMR for more detailed significant labs, imaging, consultant notes etc.    I, Carroll Egan MD, personally saw the patient today and spent greater than 30 minutes discharging this patient.    Carroll Egan MD  [facility]    CC:Soma, Johnathan R

## 2023-05-25 NOTE — PROGRESS NOTES
Occupational Therapy Discharge Summary    Reason for therapy discharge:    Discharged to transitional care facility.    Progress towards therapy goal(s). See goals on Care Plan in [facility] electronic health record for goal details.  Goals not met.  Barriers to achieving goals:   discharge from facility.    Therapy recommendation(s):    Continued therapy is recommended.  Rationale/Recommendations:  Ensure modified independent with ADL.

## 2023-05-25 NOTE — PROGRESS NOTES
Pt A&Ox4, able to make needs known. Titrating supplement O2 to meet needs O2>90%, between 1-4LNC. Denies SOB. Reports back pain, scheduled medications administered, repositioning, denied needing any PRN medications. Denies CP, N/V. Pt up in chair majority of day. Plan to discharge to TCU this shift, pt updated on discharge plan. Milk of Mg given around 1430 per pt request.    Dmitriy Atkins RN on 5/25/2023 at 5:02 PM

## 2023-05-25 NOTE — PROGRESS NOTES
[facility]  Palliative Care Daily Progress Note      Code status: No CPR- Do NOT Intubate     Impressions, Recommendations & Counseling     SYMPTOM ASSESSMENT:  Back pain 2/2 back fractures, extensive osseous metastatic disease without neuropathy or neuropathic component.  - Healing Touch and Acupuncture  - Continue Oxycodone 2.5 mg po q4h prn. Has used 2 doses in last 24 hours.  - Continue Scheduled tylenol 975 mg po q8h (watch with liver metastasis)   - Continue Calcitonin nasal spray 1 spray daily alternating nostrils  - Voltaren gel topical QID  - Methocarbamol 250 mg po QID  - Appreciate Oncology evaluation and recommendations (? Radiation, vs other).  Uncertain if there is any interventional pain option for her (she has asked about a nerve block).   - Could consider low dose buprenorphine (Butrans patch, eg 5 mcq/hr), however, advise caution, given her co-morbidities and lives alone. May be best to see how she does with Oncology recommendations and TCU (she was observed walking at a fast pace with a walker in the lara, with a NA at her side), seemed comfortable.      Constipation-unspecified type -patient reports no BM since admission  -Milk of magnesia 30 mils x1 prior to discharge per patient request.    - Recommend adding senna 1 tab by mouth twice daily should this continue.    Xerostomia  - Biotene spray 2 sprays QID and q1h prn.    ADVANCED CARE PLANNING  - Code status: DNR/I  - Surrogate Decision maker(s): Carlos jama  - No HCD or POLST.  Time spent reviewing differences between POLST, honoring choices (healthcare directive) and power of  documents.  Patient wishes to hold off on completing POLST and honoring choices document at this time and reevaluate after discharge.  - Discussed following up in the outpatient palliative care clinic after patient meets with oncology for ongoing goals of care discussion and symptom management.  Referral placed.    GOALS OF CARE  DISCUSSION  - Goals are Life prolonging with limits (DNR/I).  - Plan is to discharge to TCU later this afternoon.        HPI   Notes and chart reviewed.      Mayela Espino is a 89 year old female admitted to [facility] on 5/18/2023 with complaints of SOB and 2 week history of right sided chest radiating into right flank pain. Chest, ABD and Pelvis CT showed compression fractures at T8, T9 and T11 and lesser compression at T5-7 and T10 and all lumbar vertabrae. Neurosurgery was consulted, met with patient and plan has been for more conservative management as patient declines surgery. She is to wear a brace when up. PMH of breast cancer, CKD, HTN and CHF. 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  Awaiting Oncology consult.    Today, the patient was seen for:  Goals of care discussion, xerostomia, constipation, pain            Interval History:     Chart review/discussion with unit or clinical team members:   VSS. Afebrile. No distress. No acute changes.    Per patient or family/caregivers today:  Met with patient today.  Spent time reviewing most bothersome symptoms presently which is dry mouth and constipation.  Reviewed remedies to assist with alleviating the symptoms and are outlined above.  Patient requesting completion of documents so that son would be able to advocate for her when she is no longer able to communicate wishes.  Reviewed in detail differences between POLST document, honoring choices and power of  for finances.  Patient most interested in completing power of  for finances which has not done by hospital staff.  Educated her on this and that this should be done with an .  Discussed role of POLST and honoring choices as this can help communicate her wishes so her son can advocate for her.  She declines wishing to complete that at this time but will do so shortly after discharge.    She is interested in pursuing consultation with oncology and  "potential biopsy.  She is uncertain if treatment would be beneficial to her as \"I am 89.\"  Reports that she would be open to treatments that have lesser side effects.  Patient also agreeable to following up in the outpatient palliative medicine clinic for ongoing goals of care discussion and symptom management.  She is eager to get to transitional care for strengthening so she can return to her condo.  Prognosis, Goals, or Advance Care Planning was addressed today with: Yes.  Mood, coping, and/or meaning in the context of serious illness were addressed today: Yes.           Review of Systems:     ROS was reviewed and is unremarkable.  Pain: low 3/10  Dyspnea: none  Weakness/fatigue: low  Anxiety: none  Nausea: none  Constipation: yes, no bm since admission  Patient is on opioids: assessed and I made recommendations about bowel care as above.          Medications:     I have reviewed this patient's medication profile and medications during this hospitalization.           Physical Exam:   Temp:  [97.3  F (36.3  C)-97.5  F (36.4  C)] 97.5  F (36.4  C)  Pulse:  [65-82] 82  Resp:  [16] 16  BP: (121-152)/(57-74) 121/63  SpO2:  [88 %-96 %] 90 %    General appearance: alert, appears stated age, cooperative and fatigued  Head: Normocephalic, without obvious abnormality, atraumatic, temporal wasting noted  Eyes: lids and lashes normal; sclera anicteric, pupils round  Nose: no discharge, NC in place  Throat: lips, mucosa, and tongue normal; teeth and gums normal  Lungs: non-labored  Extremities: no edema or cyanosis noted  Neurologic: alert, forgetful. Oriented x3             Data Reviewed:     Pertinent Labs  Lab Results: personally reviewed.     Lab Results   Component Value Date    WBC 6.2 05/22/2023    WBC 4.6 07/10/2021    HGB 10.9 05/24/2023    HGB 12.2 07/10/2021    HCT 36.1 05/22/2023    HCT 39.0 07/10/2021     05/22/2023    MCV 96 07/10/2021     05/24/2023     07/10/2021     AST   Date Value Ref " Range Status   05/19/2023 27 0 - 40 U/L Final     ALT   Date Value Ref Range Status   05/19/2023 20 0 - 45 U/L Final     Alkaline Phosphatase   Date Value Ref Range Status   05/19/2023 99 45 - 120 U/L Final     Albumin   Date Value Ref Range Status   05/19/2023 3.1 (L) 3.5 - 5.0 g/dL Final         Radiology Results  MR Thoracic Spine w/o Contrast    Result Date: 5/23/2023  EXAM: MR THORACIC SPINE W/O CONTRAST LOCATION: [facility] DATE/TIME: 5/23/2023 5:36 PM CDT INDICATION: pain fractures acuity COMPARISON: 05/18/2023 CT. 03/30/2018 CT. TECHNIQUE: Routine Thoracic Spine MRI without IV contrast. FINDINGS: 12 rib-bearing thoracic vertebrae. Extensive osseous metastatic disease. T8 compression fracture with 60% central height loss, worsened from 2018, previously 40% height loss, with diffuse T2 STIR hyperintensity throughout the T8 vertebral body. Marrow edema at the T9 posterior spinous process with associated T2 hypointense fracture line. Mild T5 superior plate compression deformity with approximately 27% height loss is unchanged from 2018. Mild T6 superior endplate compression deformity with approximately 13% height loss is new from 2018 but without associated marrow edema. T7 compression deformity with approximately 37% central height loss similar to the 2022 CT. Moderate T9, T10, T11, T12, L1, and L2 compression deformities which are all new or worsened compared to 2018 but without associated marrow edema. Multilevel thoracic spondylosis. No high-grade spinal canal or neural foraminal stenosis. No abnormal cord signal. No acute extraspinal abnormality. Bilateral pleural effusions. Multiple hepatic metastases..     IMPRESSION: 1.  Extensive osseous metastatic disease. 2.  Probable subacute or acute worsening of the T8 compression deformity, with 60% height loss, previously 40% in 2018. 3.  Probable acute nondisplaced fracture of the T9 posterior spinous process. 4.  Multiple old appearing  pathologic compression deformities throughout the thoracic and lumbar spine. 5.  No high-grade spinal canal or neural foraminal stenosis.    Echocardiogram Complete    Result Date: 2023  [id number]^YESSENIA^[place] [address]  Name: RIANNA RUIZ MRN: [id number] : 1932 Study Date: 2023 09:08 AM Age: 89 yrs Gender: Female Patient Location: [facility] Reason For Study: CHF Ordering Physician: JOSE CASAS Performed By: [facility]  BSA: 1.4 m2 Height: 61 in Weight: 98 lb HR: 93 BP: 103/64 mmHg ______________________________________________________________________________ Procedure Complete Portable Echo Adult. Definity (NDC #53407-942) given intravenously. 2.0 ml; lot # [id number]. Adequate quality color and spectral Doppler were performed and interpreted. Adequate quality two-dimensional was performed and interpreted. ______________________________________________________________________________ Interpretation Summary  1. The left ventricle is mildly dilated. Left ventricular function is decreased. The ejection fraction is 25-30% (severely reduced). There is severe global hypokinesia of the left ventricle. 2. Normal right ventricle size and systolic function. 3. The left atrium is moderately dilated. 4. There is mild to moderate (1-2+) mitral regurgitation. 5. There is mild (1+) aortic regurgitation. 6. Moderate left pleural effusion ______________________________________________________________________________ Left Ventricle The left ventricle is mildly dilated. Left ventricular function is decreased. The ejection fraction is 25-30% (severely reduced). There is normal left ventricular wall thickness. Left ventricular diastolic function is abnormal. There is severe global hypokinesia of the left ventricle.  Right Ventricle Normal right ventricle size and systolic function.  Atria The left atrium is moderately dilated. Right atrial size  is normal. There is no color Doppler evidence of an atrial shunt.  Mitral Valve There is mild to moderate mitral annular calcification. The mitral valve leaflets are mildly thickened. There is mild to moderate (1-2+) mitral regurgitation. There is no mitral valve stenosis.  Tricuspid Valve Tricuspid valve leaflets appear normal. There is no evidence of tricuspid stenosis or clinically significant tricuspid regurgitation. Right ventricular systolic pressure could not be approximated due to inadequate tricuspid regurgitation. No tricuspid regurgitation. No significant tricuspid stenosis.  Aortic Valve Moderate aortic valve calcification is present. There is mild (1+) aortic regurgitation. No aortic stenosis is present.  Pulmonic Valve The pulmonic valve is not well seen, but is grossly normal. This degree of valvular regurgitation is within normal limits. There is trace pulmonic valvular regurgitation.  Vessels The aorta root is normal. Normal size ascending aorta. IVC diameter <2.1 cm collapsing >50% with sniff suggests a normal RA pressure of 3 mmHg.  Pericardium There is no pericardial effusion. Moderate left pleural effusion.  ______________________________________________________________________________ MMode/2D Measurements & Calculations RVDd: 2.5 cm IVSd: 0.73 cm LVIDd: 5.2 cm LVIDs: 4.8 cm LVPWd: 0.79 cm FS: 7.3 %  LV mass(C)d: 134.3 grams LV mass(C)dI: 96.2 grams/m2 Ao root diam: 3.2 cm asc Aorta Diam: 3.6 cm LVOT diam: 2.0 cm LVOT area: 3.1 cm2 LA Volume (BP): 50.7 ml LA Volume Index (BP): 36.2 ml/m2  LA Volume Indexed (AL/bp): 38.7 ml/m2 RWT: 0.31 TAPSE: 2.9 cm  Doppler Measurements & Calculations MV E max román: 133.0 cm/sec MV max P.3 mmHg MV mean P.3 mmHg MV V2 VTI: 11.4 cm MVA(VTI): 5.1 cm2 MV dec slope: 1119 cm/sec2 MV dec time: 0.12 sec Ao V2 max: 141.6 cm/sec Ao max P.0 mmHg Ao V2 mean: 104.1 cm/sec Ao mean P.9 mmHg Ao V2 VTI: 21.3 cm KENDELL(I,D): 2.7 cm2 KENDELL(V,D): 2.7 cm2 AI P1/2t: 603.9  msec LV V1 max P.1 mmHg LV V1 max: 123.1 cm/sec LV V1 VTI: 18.5 cm SV(LVOT): 58.1 ml SI(LVOT): 41.7 ml/m2 PA V2 max: 105.1 cm/sec PA max P.4 mmHg PA acc time: 0.06 sec  AV Brent Ratio (DI): 0.87 KENDELL Index (cm2/m2): 2.0 Medial E/e': 19.4 RV S Brent: 12.4 cm/sec  ______________________________________________________________________________ Report approved by: Sierra Iverson 2023 10:39 AM       US Renal Complete Non-Vascular    Result Date: 2023  EXAM: US RENAL COMPLETE NON-VASCULAR LOCATION: [facility] DATE/TIME: 2023 5:17 PM CDT INDICATION: Right flank pain. Chronic kidney disease. COMPARISON: CT abdomen pelvis 2023. TECHNIQUE: Routine Bilateral Renal and Bladder Ultrasound. FINDINGS: RIGHT KIDNEY: Completely atrophied and not well visualized. LEFT KIDNEY: 4.7 x 2.8 x 2.8 cm. Atrophic. Normal cortical echogenicity. No hydronephrosis. Nonobstructing 2 mm calculus at the lower pole, as seen on today's CT. No visualized renal mass. BLADDER: Small right posterolateral bladder diverticulum measuring up to 2.2 cm. Otherwise, normal.     IMPRESSION: 1.  Right kidney is completely atrophied and not well visualized. 2.  Atrophic left kidney, without hydronephrosis. 3.  Nonobstructing 2 mm left renal calculus. 4.  Small right posterolateral bladder diverticulum.    US Lower Extremity Venous Duplex Bilateral    Result Date: 2023  EXAM: US LOWER EXTREMITY VENOUS DUPLEX BILATERAL LOCATION: [facility] DATE/TIME: 2023 2:17 PM CDT INDICATION: Lower extremity swelling COMPARISON: None. TECHNIQUE: Venous Duplex ultrasound of bilateral lower extremities with and without compression, augmentation and duplex. Color flow and spectral Doppler with waveform analysis performed. FINDINGS: Exam includes the common femoral, femoral, popliteal veins as well as segmentally visualized deep calf veins and greater saphenous vein. RIGHT: No deep vein  thrombosis. No superficial thrombophlebitis. No popliteal cyst. There is a oblong subcutaneous fluid collection in the distal medial right thigh measuring 4.2 x 2.2 x 6.0 cm. No associated blood flow on color Doppler LEFT: No deep vein thrombosis. No superficial thrombophlebitis. No popliteal cyst.     IMPRESSION: 1.  No deep venous thrombosis in the bilateral lower extremities. 2.  Antler fluid collection subcutaneous tissues distal medial right thigh consistent with a liquefied hematoma or seroma.    CT Chest Abdomen Pelvis w/o Contrast    Result Date: 5/18/2023  EXAM: CT CHEST ABDOMEN PELVIS W/O CONTRAST LOCATION: [facility] DATE/TIME: 5/18/2023 12:14 PM CDT INDICATION: Shortness of breath, chest pain, chronic kidney disease COMPARISON: Portable chest radiography 06/01/2022; MRI lumbar spine 03/04/2022; thoracic spine CT 03/30/2018 TECHNIQUE: CT scan of the chest, abdomen, and pelvis was performed without IV contrast. Multiplanar reformats were obtained. Dose reduction techniques were used. CONTRAST: None. FINDINGS: LUNGS AND PLEURA: Small to moderate left pleural effusion is present layering posteriorly. Trace right pleural effusion. Focal band of atelectasis posterior left lower lobe related to pleural fluid. A band of atelectasis is present in the lingula. Smaller territories of atelectasis are present in the paradiaphragmatic right lower lobe and along the inferior major fissures. There are 3 adjacent subpleural nodules in the right middle lobe along the minor fissure largest of which is 5 mm (series 4, image 93). There is a subpleural nodule in the lateral left lower lobe which measures 4 mm (series 3, image 77) and 3 mm nodule in the anterior left lower lobe along the major fissure (series 3, image 63). Anterior bowing of the membranous central airways consistent with imaging at partial expiratory phase of the respiratory cycle. Motion artifacts limit assessment of the subsegmental  airways particularly in the lower lungs. MEDIASTINUM/AXILLAE: Heterogeneity and enlargement of the isthmus and right lobe of the thyroid gland which extend into the right paratracheal mediastinum slightly displacing the trachea towards the left. There are mildly enlarged subaortic and lower paratracheal lymph nodes. Mild generalized enlargement of the heart chambers. No pericardial effusion. Tortuous proximal great vessels. Mild to moderate arch and descending aorta atheromatous calcifications. The distal descending thoracic aorta has fusiform ectasia measuring up to 4.1 cm in diameter. The esophagus is decompressed. CORONARY ARTERY CALCIFICATION: Mild. Assessment of the upper abdominal viscera is influenced by motion-related blurring. HEPATOBILIARY: Several low-attenuation lesions are randomly distributed in the liver with larger lesion in the right lobe measuring 15 mm (series 3, image 136). Gallbladder is not distended. No calcified gallstones. No bile duct enlargement. PANCREAS: Pancreas is fatty replaced. No definite pancreas parenchymal lesions. SPLEEN: Normal. ADRENAL GLANDS: Lobular low-attenuation left adrenal gland suggesting hyperplasia. Right adrenal gland is also low-attenuation with a lesser degree of tissue thickening. KIDNEYS/BLADDER: The left kidney is atrophied. There are calcifications at the cortical medullary junction of the upper and lower pole but no hydronephrosis. Compensatory hypertrophy of the right kidney. No definite right nephroureterolithiasis. Urinary bladder is normal. BOWEL: No dilated bowel or bowel wall thickening. In sigmoid diverticulosis. LYMPH NODES: No lymphadenopathy in the abdomen or pelvis. VASCULATURE: Advanced aortoiliac atheromatous calcifications with minimal lobulated contours of the infrarenal aorta but no aneurysm. PELVIC ORGANS: No pelvic masses. MUSCULOSKELETAL: There is a left hip joint replacement. No periprosthetic fracture. Generalized demineralization of the  bones. No pelvis or hip insufficiency fractures. There are numerous superior and inferior endplate deformities of the thoracolumbar spine with greatest degree of compression at T8, T9, and T11 where there is a round 50% loss of vertebral body height. Lesser compression deformities of T5-T7, T10 and all lumbar vertebra. The lumbar compression deformities are unchanged from 03/04/2022.  No aggressive or destructive bone lesions.     IMPRESSION: 1.  Small to moderate left and trace right pleural effusions and related atelectasis. 2.  Suboptimal assessment of the lung parenchyma due to motion-related blurring. Multiple foci of peripheral atelectasis in the bases. Few pulmonary nodules are present largest of which is 5 mm. Per 2017 Fleischner Society guidelines, follow-up CT of the  chest in 12 months is considered optional. 3.  Enlarged, heterogeneous right lobe of the thyroid consistent with mediastinal border. 4.  Multiple randomly low-attenuation liver lesions are present the largest of which measures 1.5 cm. These are indeterminate. These could be further characterized with ultrasound or liver MRI. 5.  Sigmoid diverticulosis but no diverticulitis. 6.  Atrophied left kidney with nonobstructing calculi are present. 7.  Numerous thoracic and lumbar vertebral compression deformities. The lumbar compression deformities are unchanged from 03/04/2022. Multiple thoracic compression deformities have worsened since 2018.    TTS: I have personally spent a total of 50 minutes today on unit in review of medical record, consultation with the medical providers and assessment of patient today with time spent coordinating of care and discussing goals of care with patient and family (reviewing diagnostic results, prognosis, symptom management, risks and benefits of management options) and development of plan of care as noted above.    JOHNNY Dejesus, [facility] Palliative Medicine Service: [facility]  Region  Securely message with the [employer] Web Console (learn more here) or  Text page via [facility] Paging/Directory

## 2023-05-25 NOTE — PROGRESS NOTES
Care Management Follow Up    Length of Stay (days): 7    Expected Discharge Date: 05/25/2023     Concerns to be Addressed: discharge planning     Patient plan of care discussed at interdisciplinary rounds: Yes    Anticipated Discharge Disposition: Transitional Care     Anticipated Discharge Services: None  Anticipated Discharge DME: None    Patient/family educated on Medicare website which has current facility and service quality ratings: no  Education Provided on the Discharge Plan: Yes  Patient/Family in Agreement with the Plan: yes    Referrals Placed by CM/SW: Post Acute Facilities  Private pay costs discussed: transportation costs, pt aware and agreeable    Additional Information:    [facility] Wheelchair transport with O2, rescheduled to today 5/25 16:04-16:50pm, to [facility] TCU.  Update given to bedside RNNancy at [facility] TCU ([phone]).    [facility] TCU - per Nancy.  If pt opts for chemo/radiation, TCU would need to re-evaluate meds/acceptance.    If pt considering moving to [facility], [facility] would need a private pay deposit $16,000.    CM following.    Ursula Vyas, RN

## 2023-05-25 NOTE — PLAN OF CARE
Goal Outcome Evaluation:    Patient VSS, 4L NC, A&O. Reported increasing pain and stiffness to back this morning, provided previously refused Voltaren gel scheduled and PRN oxycodone for severe pain, patient reported minor improvement to pain. Rested with eyes closed overnight. Writer answered questions concerning plan for transfer to [facility] this morning after visit by oncologist and potential treatment with acupuncture. SBA w/belt and walker to bathroom. No significant events overnight.     Problem: Muscle Strength Impairment  Goal: Improved Muscle Strength  Outcome: Progressing  Intervention: Optimize Muscle Strength  Recent Flowsheet Documentation  Taken 5/25/2023 0400 by Noé Wong, RN  Activity Management:    activity adjusted per tolerance    ambulated to bathroom    back to bed  Activity Assistance Provided: assistance, stand-by  Taken 5/25/2023 0010 by Noé Wong, RN  Activity Management: activity adjusted per tolerance  Activity Assistance Provided: assistance, stand-by     Problem: Pain Acute  Goal: Optimal Pain Control and Function  Intervention: Prevent or Manage Pain  Recent Flowsheet Documentation  Taken 5/25/2023 0010 by Noé Wong, RN  Sensory Stimulation Regulation:    auditory stimulation provided    care clustered    television on  Medication Review/Management: medications reviewed

## 2023-05-25 NOTE — CONSULTS
Integrative Therapy Consult    Healing PresenceYes  Essential Oils: Topical (EO/Topical Oil)     Lavender Massage Oil - HC       Healing Music:       Breathwork:       Guided Imagery:       Acupressure:       Oshibori:       Energy Therapy:       Healing Touch:       Reiki:       Qi Gong:     Massage: Foot      Targeted Massage:    Sleep Promotion:       Other Therapy:       Intervention Reason: Comfort, Uplifting     Pre and Post Session Scores: Patient Desires Treatment: yes                             Delivery:         Referrals:      Loren Rivera

## 2023-05-25 NOTE — PROGRESS NOTES
RENAL PROGRESS NOTE        ASSESSMENT & PLAN:   PLAN:  -Avoid nephrotoxins, renal dose medications, daily wt, daily I/O  -continue PO lasix 20mg on MWF upon discharge. Will continue lasix 20mg PO daily while here.  -Low Na diet/low K diet  -Palliative involved and discussing goals of care  -She is very near ESRD, and would NOT be a dialysis candidate.  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 CKD since at least 2018, b/l sCR in the 1.7-2.0 (coinciding Cystatin C 4.1, eGFR 10), eGFR grossly overestimated and likely near ESRD; she would NOT be a candidate for dialysis with severe heart failure. Renal US, severely atropic kidneys R<<<L, no hydro or obvious acute pathology.  Can get some systemic absorption with voltaren gel, but benefit for pain may outweigh risk/concerns. Cr down trending 2.6>2.9>3.0>2.6>2.4 (18eGFR), monitor.     Hyperkalemia:  initiate diuretics, low K diet, add Lokelma     Acute on Chronic decomp heart failure:  EF 20-25%, not on ACE/ARB and would NOT start in this setting given sig hemodynamic sensitivity and near end stage renal dz. Defer decision on BB to WW vs cards     HTN/BP:  Mostly controlled      Acute pain:  Back, MRI pending, may need TLSO, Pain service managing     Pulm nodules:  New finding, h/o smoker, high risk for malignancy      COPD/Acute hypoxic resp failure:  With decomp heart failure, on steroids in ER and nebs, 02 support as needed. Management per Primary team.    Dispo: 5/23/23 TCU    SUBJECTIVE:    She reports living independently PTA, has communication with family members  Denies n, v, c, d, fever, rash or CP  Reports intermittent back pain, worse with position changes  Cr 3.0>2.6>2.4, palliative consulted for ESRD/CKD4-5 status, and pt a poor candidate for dialysis with  severe HF  We had a long discussion about current level of renal function, likelihood of CKD disease progression and that dialysis may be more harm than good and that she is not a good dialysis candidate. She met with palliative yesterday and plan is for a goals of care conference. Pt agreeable to TCU upon discharge  Pt seems to be hyper focused on chronic back pain, and her level of kidney function and goals of care is not her number one focus right now.   Cr 2.6>2.4>2.1, improved today. Renal following along   Answered all questions    OBJECTIVE:  Physical Exam   Temp: 97.4  F (36.3  C) Temp src: Axillary BP: 126/57 Pulse: 73   Resp: 16 SpO2: 95 % O2 Device: Nasal cannula Oxygen Delivery: 2 LPM  Vitals:    05/21/23 0141 05/22/23 0200 05/25/23 0456   Weight: 41.9 kg (92 lb 4.8 oz) 41 kg (90 lb 6.4 oz) 41.3 kg (91 lb)     Vital Signs with Ranges  Temp:  [97.3  F (36.3  C)-97.4  F (36.3  C)] 97.4  F (36.3  C)  Pulse:  [73-77] 73  Resp:  [16] 16  BP: (126-152)/(57-74) 126/57  SpO2:  [95 %-96 %] 95 %  I/O last 3 completed shifts:  In: 720 [P.O.:720]  Out: 1300 [Urine:1300]    Patient Vitals for the past 72 hrs:   Weight   05/25/23 0456 41.3 kg (91 lb)       Intake/Output Summary (Last 24 hours) at 5/22/2023 1006  Last data filed at 5/22/2023 0916  Gross per 24 hour   Intake 480 ml   Output 1600 ml   Net -1120 ml       PHYSICAL EXAM:  GEN: NAD, elderly, thin  CV: RRR  Lung: diminished bases BL  Ab: soft and NT; not distended; normal bs  Ext: +LLE mild BL and well perfused  Skin: no rash  Neuro: no asterixis       LABORATORY STUDIES:     Recent Labs   Lab 05/24/23  0430 05/22/23  0725 05/21/23  0607 05/18/23  1645 05/18/23  1038   WBC  --  6.2  --  5.3 4.0   RBC  --  3.60*  --  3.95 4.16   HGB 10.9* 11.3*  --  12.4 13.1   HCT  --  36.1  --  40.1 43.0   * 110* 121* 114* 120*       Basic Metabolic Panel:  Recent Labs   Lab 05/25/23  0419 05/24/23  0430 05/23/23  0442 05/22/23  0725 05/21/23  0607 05/20/23  0425   NA  141 141 140 142 141 141   POTASSIUM 4.7 4.5 4.5 4.7 5.7* 5.4*   CHLORIDE 96* 98 96* 97* 101 99   CO2 38* 36* 33* 35* 30 31   BUN 73* 77* 84* 83* 81* 83*   CR 2.18* 2.47* 2.68* 2.69* 3.06* 3.09*   GLC 97 97 101 103 146* 136*   ITA 9.2 8.3* 8.5 8.6 8.8 8.4*       INRNo lab results found in last 7 days.     Recent Labs   Lab Test 05/24/23  0430 05/22/23  0725 05/21/23  0607 05/18/23  1645 06/01/22  1046 03/04/22  0925 07/10/21  0922 07/09/21  0911   INR  --   --   --   --   --  1.03  --  1.13*   WBC  --  6.2  --  5.3   < > 4.8   < > 5.0   HGB 10.9* 11.3*  --  12.4   < > 12.2   < > 12.2   * 110*   < > 114*   < > 123*   < > 125*    < > = values in this interval not displayed.       Personally reviewed current labs    Mariah SILVA-BC  [facility]  [phone]

## 2023-05-25 NOTE — CONSULTS
"ACUPUNCTURIST TREATMENT NOTE    Name: Mayela Espino  :  1932  MRN:  [id number]    Acupuncture Treatment  Patient Type: Medical  Intervention Reason: Desires Experience  Patient complaint:: nasal congestion  Initial insertions: LI 20, Yin Goode, Du 20, St 36  Number of needles inserted: 6  Number of needles removed: 6         \"Risks and benefits of acupuncture were discussed with patient. Consent for treatment was given. We thank you for the referral.\"     Mayela Jara L.Ac.     Date:  2023  Time:  11:36 AM    "

## 2023-05-25 NOTE — PLAN OF CARE
Pt discharged via wheelchair at 1630 to [facility]. All belongings accounted for & sent. Questions addressed, pt voiced understanding of material. PIV removed.

## 2023-05-25 NOTE — PROGRESS NOTES
Care Management Discharge Note    Discharge Date: 05/25/2023     Discharge Disposition: Transitional Care    Discharge Services: None    Discharge DME: None    Discharge Transportation: agency    Private pay costs discussed: transportation    Does the patient's insurance plan have a 3 day qualifying hospital stay waiver?  Yes   Will the waiver be used for post-acute placement? Yes    PAS Confirmation Code:  ([id number])  Patient/family educated on Medicare website which has current facility and service quality ratings: no    Education Provided on the Discharge Plan: Yes  Persons Notified of Discharge Plans: patient  Patient/Family in Agreement with the Plan: yes    Additional Information:    [facility] Wheelchair transport with O2, rescheduled to today 5/25 16:04-16:50pm, to [facility] TCU.    Pt agreeable to this discharge plan.    [facility]    CC referral sent per protocol.     No further CM needs identified.    Ursula Vyas RN

## 2023-05-25 NOTE — PROGRESS NOTES
SPIRITUAL HEALTH SERVICES Progress Note      Saw pt Mayela Espino per patient request    Patient/Family Understanding of Illness and Goals of Care - Mayela shared she hopes to discharge today to go to TCU to regain her strength.  She is discerning future plans following TCU and weighing if she should move out of state to be closer to her Son    Distress and Loss - Non expressed this visit    Strengths, Coping, and Resources - Mayela is close to  her son.  She is reflective and has an optimistic attitude    Meaning, Beliefs, and Spirituality -  Mayela reflected on feeling God's presence  in her life, and welcomed prayer     Plan of Care - [facility] will cont to assess and support through MECCA Barreto M.Div., [facility]  Staff    [phone]

## 2023-05-25 NOTE — PLAN OF CARE
"Problem: Pain Acute  Goal: Optimal Pain Control and Function  Outcome: Progressing    Problem: Muscle Strength Impairment  Goal: Improved Muscle Strength  Outcome: Progressing    Pt resting comfortably, denies pain at this time. Ambulated in hallways w/ 4L NC, stated feeling \"pretty good\" throughout. She is in good spirits after hearing the results of the MRI scan yesterday, awaiting to discuss options with heme/onc before discharging tomorrow. Transport will be here to take pt to [facility]CU sometime between [id number], pt aware.   "

## 2023-05-26 NOTE — TELEPHONE ENCOUNTER
[facility] Triage Nurse Telephone Encounter    Provider: JOHNNY Torres CNP   Facility: [facility] Facility Type:  TCU    Caller: SONJA  Call Back Number: [phone]    Allergies:    Allergies   Allergen Reactions     Latex Unknown     Added based on information entered during case entry, please review and add reactions, type, and severity as needed     Latex Rash        Reason for call: Pt admitted to the TCU yesterday, the pt is c/o that she has not had a BM in the last 5 days. Pt had MOM this afternoon and miralax this am. Pt is requesting magnesium acetate.  No abdominal pain or distention.     Verbal Order/Direction given by Provider: Increase MiraLAX to once daily and daily as needed, start senna S1 tablet twice daily and 1 tablet twice daily as needed.     Provider giving Order:  Cary Paniagua MD    Verbal Order given to: Sonja Westbrook RN

## 2023-05-30 NOTE — LETTER
5/30/2023        RE: Mayela Espino  [address] [address]        [facility] GERIATRICS    PRIMARY CARE PROVIDER AND CLINIC:  Johnathan Brenner MD, [address] / [place] [address]  Chief Complaint   Patient presents with     Hospital F/U      [facility] Medical Record Number:  [id number]  Place of Service where encounter took place:  [facility] [[id number]]    Mayela Espino  is a 89 year old  (12/18/1932), admitted to the above facility from  [facility]. Hospital stay 5/18/23 through 5/25/23..   HPI:      Per Epic chart review of hospital notes, discharge summary, imaging, labs, medications/medication changes:    Patient with PMH of HPrEF, HTN, CKD 4-5, CVA/TIA, JUVE, severe spinal stenosis, essential tremor, thyroid mass, h/o tobacco use disorder and breast cancer admitted to TCU for acute rehab and medical management following hospitalization as above for c/o right side flank pain and SOB. W/u revealed acute respiratory failure with hypoxia 2/2 CHF and COPD exacerbation, JOSE RAFAEL and hyperkalemia, and multiple vertebral compression fractures. Further, MRI revealed metastatic cancer to bone and liver thought 2/2 recurrence of breast cancer. Also with incidental findings of thyroid mass and pulmonary nodules. Patient was diuresed and continued on furosemide 20 mg three times weekly.Treated with supplemental O2, duonebs and short term prednisone which was discontinued prior to discharge. Neurosurgery consulted inpatient and recommended Miacalcin.  F/w oncology and cardiology recommended. Nephrology consulted inpatient and patient is not a candidate for dialysis. Thrombocytopenia also noted with last value 117.    Nursing is requesting clarification of Vit D dosing and orders to wean off O2. Otherwise no concerns reported.     VS, weights reviewed in facility EMR today and are stable.     Patient found in room sitting up in a chair. Alert, calm, NAD.  "Denies pain at this time. Is reporting troublesome cough which she states started \"a couple of days ago, after I arrived here.\" Reports congested cough but cannot \"cough anything up.\" Denies noting SOB currently. Denies fever or chills. Denies chest pain, reports intermittent dizziness with position changes, states used to use meclizine for this. Also reports h/o intermittent LE edema. Reports eating ok and does not note worsening of cough during eating. States appetite is unchanged. Reports occasional mild nausea in evening but states this goes away without requiring any medication and denies n/v. Reports is moving bowels ok now and stools are putty like. Denies noting issues with voiding.     Reports lives alone in a condo. States has no stairs. Reports uses walker occasionally just for balance. States son resides in [place] and has no other adult children but does report having some other family and friends in the area. POLST completed today with patient who outlines wishes as DNR and selective restorative treatments.    Discussed CXR, and adding guaifenesin to thin secretions. Patient is agreeable to this. Also discussed f/u appts with oncology and cardiology- reviewed patient's list of appts which son has been helping to schedule. Patient also requesting chocolate and states likes to nibble on this at home. Writer and dietician discussed today and concern d/t chocolate contains phosphorous and patient is currently on a renal diet 2/2 advanced renal disease.       CODE STATUS/ADVANCE DIRECTIVES DISCUSSION:  No CPR- Do NOT Intubate  DNR / DNI  ALLERGIES:   Allergies   Allergen Reactions     Latex Unknown     Added based on information entered during case entry, please review and add reactions, type, and severity as needed     Latex Rash      PAST MEDICAL HISTORY:   Past Medical History:   Diagnosis Date     Abnormal mini-mental status exam     scored 16 2012, ?dementia     Breast cancer (H)      Chronic kidney " disease      Congestive heart failure (H)      Essential tremor      HTN (hypertension)      Tobacco use       PAST SURGICAL HISTORY:   has a past surgical history that includes other surgical history (Left); other surgical history; other surgical history; Lumpectomy breast (Bilateral, 1982); joint replacement; Eye surgery; and Pr Mastectomy, Modified Radical (Bilateral, 6/14/2021).  FAMILY HISTORY: family history includes Cerebrovascular Disease (age of onset: 70.00) in her father; Hypertension in her mother; No Known Problems in her son.  SOCIAL HISTORY:   reports that she has been smoking. She has been smoking an average of .25 packs per day. She has never used smokeless tobacco. She reports that she does not drink alcohol and does not use drugs.  Patient's living condition: lives alone    Post Discharge Medication Reconciliation Status:   MED REC REQUIRED    Post Medication Reconciliation Status: discharge medications reconciled and changed, per note/orders       Current Outpatient Medications   Medication Sig     acetaminophen (TYLENOL) 325 MG tablet Take 3 tablets (975 mg) by mouth every 8 hours     artificial saliva (BIOTENE MT) SOLN solution Swish and spit 2 mLs (2 sprays) in mouth 4 times daily as needed for dry mouth     aspirin 81 MG EC tablet Take 81 mg by mouth daily     calcitonin, salmon, (MIACALCIN) 200 UNIT/ACT nasal spray Spray 1 spray into one nostril alternating nostrils daily Alternate nostril each day.     cholecalciferol, vitamin D3, 1,000 unit tablet [CHOLECALCIFEROL, VITAMIN D3, 1,000 UNIT TABLET] Take 500 Units by mouth daily.      diclofenac (VOLTAREN) 1 % topical gel Apply 2 g topically 4 times daily     furosemide (LASIX) 20 MG tablet Take 1 tablet (20 mg) by mouth Every Mon, Wed, Fri Morning     ipratropium - albuterol 0.5 mg/2.5 mg/3 mL (DUONEB) 0.5-2.5 (3) MG/3ML neb solution Take 1 vial (3 mLs) by nebulization every 6 hours as needed for wheezing     levofloxacin (LEVAQUIN) 250 MG  "tablet Give 500 mg po x 1 now. Then 250 mg po every other day. Over 10 days     LORazepam (ATIVAN) 0.5 MG tablet Take 0.5 tablets (0.25 mg) by mouth every 4 hours as needed for anxiety     methocarbamol (ROBAXIN) 500 MG tablet Take 0.5 tablets (250 mg) by mouth 4 times daily     multivit-iron-min-folic acid 3,500-18-0.4 unit-mg-mg Chew [MULTIVIT-IRON-MIN-FOLIC ACID 3,500-18-0.4 UNIT-MG-MG CHEW] Chew 1 tablet daily.      oxyCODONE (ROXICODONE) 5 MG tablet Take 0.5 tablets (2.5 mg) by mouth every 4 hours as needed for moderate to severe pain     polyethylene glycol (MIRALAX) 17 GM/Dose powder Take 1 Capful by mouth daily And every day prn     senna-docusate (SENOKOT-S/PERICOLACE) 8.6-50 MG tablet Take 1 tablet by mouth 2 times daily And bid prn     No current facility-administered medications for this visit.       ROS:  10 point ROS of systems including Constitutional, Eyes, Respiratory, Cardiovascular, Gastroenterology, Genitourinary, Integumentary, Musculoskeletal, Psychiatric were all negative except for pertinent positives noted in my HPI.    Vitals:  /53   Pulse 93   Temp 97.7  F (36.5  C)   Resp 18   Ht 1.549 m (5' 1\")   Wt 42.3 kg (93 lb 3.2 oz)   SpO2 95%   BMI 17.61 kg/m    Exam:    GENERAL APPEARANCE: Alert, in no distress   ENT: Mouth and posterior oropharynx normal, moist mucous membranes   EYES: EOM, conjunctivae, lids, pupils and irises normal   NECK: No adenopathy,masses or thyromegaly   RESP: respiratory effort and palpation of chest normal, Upper airway rattle and decreased LS left, wearing O2 2LNC- congested cough (nonproductive)  CV: RRR, VS as above, no edema noted  ABDOMEN: normal bowel sounds, soft, nontender, no hepatosplenomegaly or other masses   : palpation of bladder WNL   M/S: Gait and station normal   Digits and nails normal - GUTIÉRREZ, 2WW at side  SKIN: Inspection of skin and subcutaneous tissue baseline, Palpation of skin and subcutaneous tissue baseline  NEURO: Cranial nerves " 2-12 are normal tested and grossly at patient's baseline, Examination of sensation by touch normal   PSYCH: oriented X 3, affect and mood normal             Lab/Diagnostic data:  Recent labs in [facility] reviewed by me today.     ASSESSMENT/PLAN:    Acute respiratory failure with hypoxia (H)  Chronic obstructive pulmonary disease with acute exacerbation (H)  - treated with prednisone burst, Duonebs.  - continue on Duonebs- but will order scheduled TID  - add order for prn albuterol nebs  Acute systolic congestive heart failure (H)  Heart failure with reduced ejection fraction (H)  EF down to 25-30 % with global hypokinesia left ventricle. EF down from 50 -55% 7/2021-diuresed with lasix q 12 for a bit, kept on 3 x weekly. Appears euvolemic  - continue lasix 20 mg 3 x week.   - order CMP for tomorrow to ensure stability  - monitor VS, weights  Acute cough  - congested upper airway but unable to clear sputum/cough nonproductive  - order CXR 2 views and make changes as above to nebs  - order to add guaifenesin scheduled and prn to help thin secretions  - order CBC with diff for 5/31    Chronic kidney disease, stage 4 (severe) (H)  - JOSE RAFAEL inpatient- nephrology consulted- stage 4-5 and is not a candidate for dialysis.  Hyperkalemia  2/2 above- continues on low dose lasix 3 x weekly. BMP order as above  - follow renal diet as ordered- dietician follows in TCU and is looking in to renal friendly options for patient's chocolate craving  - BMP as above  - avoid nephrotoxins    History of breast cancer  S/p bilateral mastectomies 6/2021. 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 Arimidex stopped  - has f/u with oncology 6/6    Protein calorie malnutrition  - Body mass index is 17.61 kg/m .  Lab Results   Component Value Date    ALBUMIN 3.1  05/19/2023   - multifactorial  - dietician follows in TCU for recommendations to optimize nutrition/hydration status  - follow intakes/weights  (is on Renal diet)      Closed compression fracture of thoracolumbar vertebra with routine healing, subsequent encounter  Physical deconditioning  - acute/chronic- some flank pain inpatient. No c/o pain currently  - clarifying order for Vit D3 1000 units daily  - continue calcitonin  - continue scheduled acetaminophen and methocarbamol, topical diclofenac  - continue prn oxycodone  - PT/OT to optimize function, safety and independence  - Supportive cares and treatments  - ongoing discharge planning, SW follow and care conferences per unit protocol      History of CVA (cerebrovascular accident)  - left precerebral gyrus 7/2021, ED for TIA 3/2022. Completed 12 mo DPAT   - continue on ASA    Essential tremor  - by history, not noted today    Thyroid mass  -  h/o - incidental finding 7/2021    Pulmonary nodules  - incidental finding on imaging inpatient  - F/u CT recommended 12 months    Thrombocytopenia (H)  - mild, chronic 100 - 120s of late 117 5/24/23. No evidence of bleeding  - recheck CBC as above    Generalized anxiety disorder  - chronic, situational stressors in play  - continue prn lorazepam for now  - supportive approach  - monitor mood and behaviors  - ACP prn    Advanced care planning  - completed POLST with patient today. Wishes for DNR and selective restorative treatments at this time. Offered to contact patient's son to update but patient requested writer wait on this.    Orders:  Written on unit and discussed with patient and nursing as above      Electronically signed by:  JOHNNY Torres CNP                     Sincerely,        JOHNNY Torres CNP

## 2023-05-30 NOTE — PROGRESS NOTES
"[facility]    PRIMARY CARE PROVIDER AND CLINIC:  Johnathan Brenner MD, [address] / [place] [address]  Chief Complaint   Patient presents with     Hospital F/U      [facility] Medical Record Number:  [id number]  Place of Service where encounter took place:  [facility] ([facility]) [[id number]]    Mayela Espino  is a 89 year old  (12/18/1932), admitted to the above facility from  [facility]. Hospital stay 5/18/23 through 5/25/23..   HPI:      Per Epic chart review of hospital notes, discharge summary, imaging, labs, medications/medication changes:    Patient with PMH of HPrEF, HTN, CKD 4-5, CVA/TIA, JUVE, severe spinal stenosis, essential tremor, thyroid mass, h/o tobacco use disorder and breast cancer admitted to TCU for acute rehab and medical management following hospitalization as above for c/o right side flank pain and SOB. W/u revealed acute respiratory failure with hypoxia 2/2 CHF and COPD exacerbation, JOSE RAFAEL and hyperkalemia, and multiple vertebral compression fractures. Further, MRI revealed metastatic cancer to bone and liver thought 2/2 recurrence of breast cancer. Also with incidental findings of thyroid mass and pulmonary nodules. Patient was diuresed and continued on furosemide 20 mg three times weekly.Treated with supplemental O2, duonebs and short term prednisone which was discontinued prior to discharge. Neurosurgery consulted inpatient and recommended Miacalcin.  F/w oncology and cardiology recommended. Nephrology consulted inpatient and patient is not a candidate for dialysis. Thrombocytopenia also noted with last value 117.    Nursing is requesting clarification of Vit D dosing and orders to wean off O2. Otherwise no concerns reported.     VS, weights reviewed in facility EMR today and are stable.     Patient found in room sitting up in a chair. Alert, calm, NAD. Denies pain at this time. Is reporting troublesome cough which she states started \"a couple " "of days ago, after I arrived here.\" Reports congested cough but cannot \"cough anything up.\" Denies noting SOB currently. Denies fever or chills. Denies chest pain, reports intermittent dizziness with position changes, states used to use meclizine for this. Also reports h/o intermittent LE edema. Reports eating ok and does not note worsening of cough during eating. States appetite is unchanged. Reports occasional mild nausea in evening but states this goes away without requiring any medication and denies n/v. Reports is moving bowels ok now and stools are putty like. Denies noting issues with voiding.     Reports lives alone in a condo. States has no stairs. Reports uses walker occasionally just for balance. States son resides in [place] and has no other adult children but does report having some other family and friends in the area. POLST completed today with patient who outlines wishes as DNR and selective restorative treatments.    Discussed CXR, and adding guaifenesin to thin secretions. Patient is agreeable to this. Also discussed f/u appts with oncology and cardiology- reviewed patient's list of appts which son has been helping to schedule. Patient also requesting chocolate and states likes to nibble on this at home. Writer and dietician discussed today and concern d/t chocolate contains phosphorous and patient is currently on a renal diet 2/2 advanced renal disease.       CODE STATUS/ADVANCE DIRECTIVES DISCUSSION:  No CPR- Do NOT Intubate  DNR / DNI  ALLERGIES:   Allergies   Allergen Reactions     Latex Unknown     Added based on information entered during case entry, please review and add reactions, type, and severity as needed     Latex Rash      PAST MEDICAL HISTORY:   Past Medical History:   Diagnosis Date     Abnormal mini-mental status exam     scored 16 2012, ?dementia     Breast cancer (H)      Chronic kidney disease      Congestive heart failure (H)      Essential tremor      HTN (hypertension)  "     Tobacco use       PAST SURGICAL HISTORY:   has a past surgical history that includes other surgical history (Left); other surgical history; other surgical history; Lumpectomy breast (Bilateral, 1982); joint replacement; Eye surgery; and Pr Mastectomy, Modified Radical (Bilateral, 6/14/2021).  FAMILY HISTORY: family history includes Cerebrovascular Disease (age of onset: 70.00) in her father; Hypertension in her mother; No Known Problems in her son.  SOCIAL HISTORY:   reports that she has been smoking. She has been smoking an average of .25 packs per day. She has never used smokeless tobacco. She reports that she does not drink alcohol and does not use drugs.  Patient's living condition: lives alone    Post Discharge Medication Reconciliation Status:   MED REC REQUIRED    Post Medication Reconciliation Status: discharge medications reconciled and changed, per note/orders       Current Outpatient Medications   Medication Sig     acetaminophen (TYLENOL) 325 MG tablet Take 3 tablets (975 mg) by mouth every 8 hours     artificial saliva (BIOTENE MT) SOLN solution Swish and spit 2 mLs (2 sprays) in mouth 4 times daily as needed for dry mouth     aspirin 81 MG EC tablet Take 81 mg by mouth daily     calcitonin, salmon, (MIACALCIN) 200 UNIT/ACT nasal spray Spray 1 spray into one nostril alternating nostrils daily Alternate nostril each day.     cholecalciferol, vitamin D3, 1,000 unit tablet [CHOLECALCIFEROL, VITAMIN D3, 1,000 UNIT TABLET] Take 500 Units by mouth daily.      diclofenac (VOLTAREN) 1 % topical gel Apply 2 g topically 4 times daily     furosemide (LASIX) 20 MG tablet Take 1 tablet (20 mg) by mouth Every Mon, Wed, Fri Morning     ipratropium - albuterol 0.5 mg/2.5 mg/3 mL (DUONEB) 0.5-2.5 (3) MG/3ML neb solution Take 1 vial (3 mLs) by nebulization every 6 hours as needed for wheezing     levofloxacin (LEVAQUIN) 250 MG tablet Give 500 mg po x 1 now. Then 250 mg po every other day. Over 10 days     LORazepam  "(ATIVAN) 0.5 MG tablet Take 0.5 tablets (0.25 mg) by mouth every 4 hours as needed for anxiety     methocarbamol (ROBAXIN) 500 MG tablet Take 0.5 tablets (250 mg) by mouth 4 times daily     multivit-iron-min-folic acid 3,500-18-0.4 unit-mg-mg Chew [MULTIVIT-IRON-MIN-FOLIC ACID 3,500-18-0.4 UNIT-MG-MG CHEW] Chew 1 tablet daily.      oxyCODONE (ROXICODONE) 5 MG tablet Take 0.5 tablets (2.5 mg) by mouth every 4 hours as needed for moderate to severe pain     polyethylene glycol (MIRALAX) 17 GM/Dose powder Take 1 Capful by mouth daily And every day prn     senna-docusate (SENOKOT-S/PERICOLACE) 8.6-50 MG tablet Take 1 tablet by mouth 2 times daily And bid prn     No current facility-administered medications for this visit.       ROS:  10 point ROS of systems including Constitutional, Eyes, Respiratory, Cardiovascular, Gastroenterology, Genitourinary, Integumentary, Musculoskeletal, Psychiatric were all negative except for pertinent positives noted in my HPI.    Vitals:  /53   Pulse 93   Temp 97.7  F (36.5  C)   Resp 18   Ht 1.549 m (5' 1\")   Wt 42.3 kg (93 lb 3.2 oz)   SpO2 95%   BMI 17.61 kg/m    Exam:    GENERAL APPEARANCE: Alert, in no distress   ENT: Mouth and posterior oropharynx normal, moist mucous membranes   EYES: EOM, conjunctivae, lids, pupils and irises normal   NECK: No adenopathy,masses or thyromegaly   RESP: respiratory effort and palpation of chest normal, Upper airway rattle and decreased LS left, wearing O2 2LNC- congested cough (nonproductive)  CV: RRR, VS as above, no edema noted  ABDOMEN: normal bowel sounds, soft, nontender, no hepatosplenomegaly or other masses   : palpation of bladder WNL   M/S: Gait and station normal   Digits and nails normal - GUTIÉRREZ, 2WW at side  SKIN: Inspection of skin and subcutaneous tissue baseline, Palpation of skin and subcutaneous tissue baseline  NEURO: Cranial nerves 2-12 are normal tested and grossly at patient's baseline, Examination of sensation by " touch normal   PSYCH: oriented X 3, affect and mood normal             Lab/Diagnostic data:  Recent labs in [facility] reviewed by me today.     ASSESSMENT/PLAN:    Acute respiratory failure with hypoxia (H)  Chronic obstructive pulmonary disease with acute exacerbation (H)  - treated with prednisone burst, Duonebs.  - continue on Duonebs- but will order scheduled TID  - add order for prn albuterol nebs  Acute systolic congestive heart failure (H)  Heart failure with reduced ejection fraction (H)  EF down to 25-30 % with global hypokinesia left ventricle. EF down from 50 -55% 7/2021-diuresed with lasix q 12 for a bit, kept on 3 x weekly. Appears euvolemic  - continue lasix 20 mg 3 x week.   - order CMP for tomorrow to ensure stability  - monitor VS, weights  Acute cough  - congested upper airway but unable to clear sputum/cough nonproductive  - order CXR 2 views and make changes as above to nebs  - order to add guaifenesin scheduled and prn to help thin secretions  - order CBC with diff for 5/31    Chronic kidney disease, stage 4 (severe) (H)  - JOSE RAFAEL inpatient- nephrology consulted- stage 4-5 and is not a candidate for dialysis.  Hyperkalemia  2/2 above- continues on low dose lasix 3 x weekly. BMP order as above  - follow renal diet as ordered- dietician follows in TCU and is looking in to renal friendly options for patient's chocolate craving  - BMP as above  - avoid nephrotoxins    History of breast cancer  S/p bilateral mastectomies 6/2021. 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 Arimidex stopped  - has f/u with oncology 6/6    Protein calorie malnutrition  - Body mass index is 17.61 kg/m .  Lab Results   Component Value Date    ALBUMIN 3.1 05/19/2023   - multifactorial  - dietician follows in TCU for recommendations to optimize  nutrition/hydration status  - follow intakes/weights  (is on Renal diet)      Closed compression fracture of thoracolumbar vertebra with routine healing, subsequent encounter  Physical deconditioning  - acute/chronic- some flank pain inpatient. No c/o pain currently  - clarifying order for Vit D3 1000 units daily  - continue calcitonin  - continue scheduled acetaminophen and methocarbamol, topical diclofenac  - continue prn oxycodone  - PT/OT to optimize function, safety and independence  - Supportive cares and treatments  - ongoing discharge planning, SW follow and care conferences per unit protocol      History of CVA (cerebrovascular accident)  - left precerebral gyrus 7/2021, ED for TIA 3/2022. Completed 12 mo DPAT   - continue on ASA    Essential tremor  - by history, not noted today    Thyroid mass  -  h/o - incidental finding 7/2021    Pulmonary nodules  - incidental finding on imaging inpatient  - F/u CT recommended 12 months    Thrombocytopenia (H)  - mild, chronic 100 - 120s of late 117 5/24/23. No evidence of bleeding  - recheck CBC as above    Generalized anxiety disorder  - chronic, situational stressors in play  - continue prn lorazepam for now  - supportive approach  - monitor mood and behaviors  - ACP prn    Advanced care planning  - completed POLST with patient today. Wishes for DNR and selective restorative treatments at this time. Offered to contact patient's son to update but patient requested writer wait on this.    Orders:  Written on unit and discussed with patient and nursing as above      Electronically signed by:  JOHNNY Torres CNP

## 2023-05-31 NOTE — TELEPHONE ENCOUNTER
Patient: Mayela Espino    : 1932      Orders:    Levaquin 500 mg po x 1 now. (pneumonia)    Then Levaquin 250 mg po every other day (start dosing 48 hours from today's dose) for total course time of 10 days.      JOHNNY Torres CNP on 2023 at 11:15 AM

## 2023-06-01 NOTE — PROGRESS NOTES
[facility]    Chief Complaint   Patient presents with     RECHECK     HPI:  Mayela Espino is a 89 year old  (12/18/1932), who is being seen today for an episodic care visit at: [facility]) [[id number]].     Patient seen today in TCU for recheck of multiple acute medical issues:    1. Pneumonia of left lower lobe due to infectious organism    2. Heart failure with reduced ejection fraction (H)    3. Chronic obstructive pulmonary disease with acute exacerbation (H)    4. Nausea    5. Loose stools    6. Mild protein-calorie malnutrition (H)    7. Chronic kidney disease, stage 4 (severe) (H)    8. Thrombocytopenia (H)    9. Carcinoma of breast metastatic to bone, unspecified laterality (H)    10. Physical deconditioning      Nursing reports no new concerns.     Patient with new cough and congestion noted on 5/30 visit. CXR ordered at that time and revealed new left low lobe opacity and small pleural effusion. Levaquin was prescribed. Patient had also been started on scheduled duonebs and guaifenesin and continued on supplemental O2. Had just been hospitalized for acute respiratory failure with hypoxia 2/2 COPD and CHF exacerbations. Treated with burst of prednisone and diuresis. Continued on low dose lasix 3 x weekly. Recent labs reviewed and do reveal an elevated BUN/Cr as compared to recent baseline.     VS and weights reviewed in facility EMR today.    Patient found in room. Call placed to son as well so he could be present during visit and ask questions.  Is alert, calm, NAD. Reports fatigue and insomnia last night stating she hardly slept at all. Reports awake 2/2 cough and wheezing. Denies worsening SOB. Discussed prn lorazepam and reviewed MAR with patient/family. Patient historically uses prn for severe anxiety and/or insomnia. It does not appear to have been offered last night. Discussed pneumonia diagnosis/CXR results and addition of antibiotics, time to effect, side effects. Also  "discussed nebulizer treatments and guaifenesin including etiology for use to expect could initially cause more coughing 2/2 expectorant and bronchodilating effects. Patient reporting mild nausea at times - and states had very large loose stool today. Denies emesis or abdominal discomfort. Discussed adding Zofran which patient is open to. Also discussed/clarified upcoming oncology and cardiology appts and briefly discussed recommendations r/t patient' resuming driving once discharge. Recommended against it 2/2 frailty and medical fragility.    Allergies, and PMH/PSH reviewed in EPIC today.  REVIEW OF SYSTEMS:  4 point ROS including Respiratory, CV, GI and , other than that noted in the HPI,  is negative    Objective:   BP (!) 131/96   Pulse 90   Temp 97.4  F (36.3  C)   Resp 19   Ht 1.549 m (5' 1\")   Wt 42.1 kg (92 lb 12.8 oz)   SpO2 95%   BMI 17.53 kg/m    Resp: Effort WNL, LS decreased in left base. Rare weak rattling cough during visit. O2 2LNC  CV: VS as above, no edema noted  Abd- soft, nontender, BS +  Musc- GUTIÉRREZ- generalized weakness  Psych- alert, calm, pleasant        Most Recent 3 CBC's:Recent Labs   Lab Test 05/31/23  0906 05/24/23  0430 05/22/23  0725   WBC 7.3  --  6.2   HGB 10.8* 10.9* 11.3*   *  --  100   * 117* 110*     Most Recent 3 BMP's:Recent Labs   Lab Test 05/31/23  0906 05/25/23  0419    141   POTASSIUM 4.6 4.7   CHLORIDE 99 96*   CO2 30* 38*   BUN 57.1* 73*   CR 2.44* 2.18*   ANIONGAP 14 7   ITA 8.9 9.2   * 97       Assessment/Plan:  Pneumonia of left lower lobe due to infectious organism  - new on CXR 5/30, congestion, weak cough. VSS, afebrile, no leukoctyosis  - continue renally dosed 10 day Levaquin course  - continue sched Duonebs and guaifenesin as well as prn albuterol nebs  - continue supplemental O2 2LNC- wean as able to keep sats > 90 %  - VS daily while on antibiotic  Heart failure with reduced ejection fraction (H)  chronic- EF 25% last inpatient " (reduced from previous baseline). Is currently on lasix 20 mg 3 x weekly, BUN/Cr elevated and po intake marginal of late.   - reduce Lasix to 20 mg 2 x weekly for a while  - monitor weights/follow clinically  - f/w cardiology as scheduled 6/7- clarified today  Chronic obstructive pulmonary disease with acute exacerbation (H)  - chronic- exacerbated inpatient- treated with brief prednisone burst. No wheezing noted today.  - continue nebulizer treatments as above and monitor     Nausea  * ? 2/2 acute illness vs d/t antibiotics  - add Zofran ODT 4 mg daily x 3 days and q 6 hours prn    Loose stools  - suspect 2/2 mx laxatives 2/2 previous constipation.  - reduce Miralax from BID to daily  - reduce Senna S from BID to daily  - continue prn Senna for constipation  - monitor bowel pattern    Mild protein-calorie malnutrition (H)  - multifactorial- now exacerbated 2/2 acute illness and nausea. Of note is also on Renal diet  - dietician following in TCU as well for recommendations to optimize nutrition and hydration  - continue on Nepro supplement TID  - monitor intake/weights    Chronic kidney disease, stage 4 (severe) (H)  - baseline Cr appears approx 2.2- Cr bump since last inpatient. Poor po intake as above  - decrease diuretic as above  - avoid nephrotoxins  - order recheck BMP 6/5 to ensure stability    Thrombocytopenia (H)  - chronic, no s/s bleeding noted. Stable 124 last on 5/31  - ongoing monitoring for any s/s bleeding  - order recheck CBC 6/5 to ensure stability    Insomnia  - acute  - change facility EMR to include offering prn Lorazepam nightly for insomnia.   - Monitor sleep adequacy    Carcinoma of breast metastatic to bone, unspecified laterality (H)  - newly identified mets to liver and bone. H/o breast cancer.  - f/w oncology as scheduled 6/6- clarified with patient and family today    Physical deconditioning  - 2/2 above  - PT/OT to optimize function, safety and independence  - Supportive cares and  treatments  - ongoing discharge planning, SW follow and care conferences per unit protocol          Orders:  Written on unit and discussed with patient/son, nursing    Electronically signed by: JOHNNY Torres CNP

## 2023-06-01 NOTE — LETTER
6/1/2023        RE: Mayela Espino  [address] [facility] [address]        [facility] GERIATRICS    Chief Complaint   Patient presents with     RECHECK     HPI:  Mayela Espino is a 89 year old  (12/18/1932), who is being seen today for an episodic care visit at: [facility] ([facility]) [[id number]].     Patient seen today in [facility] for recheck of multiple acute medical issues:    1. Pneumonia of left lower lobe due to infectious organism    2. Heart failure with reduced ejection fraction (H)    3. Chronic obstructive pulmonary disease with acute exacerbation (H)    4. Nausea    5. Loose stools    6. Mild protein-calorie malnutrition (H)    7. Chronic kidney disease, stage 4 (severe) (H)    8. Thrombocytopenia (H)    9. Carcinoma of breast metastatic to bone, unspecified laterality (H)    10. Physical deconditioning      Nursing reports no new concerns.     Patient with new cough and congestion noted on 5/30 visit. CXR ordered at that time and revealed new left low lobe opacity and small pleural effusion. Levaquin was prescribed. Patient had also been started on scheduled duonebs and guaifenesin and continued on supplemental O2. Had just been hospitalized for acute respiratory failure with hypoxia 2/2 COPD and CHF exacerbations. Treated with burst of prednisone and diuresis. Continued on low dose lasix 3 x weekly. Recent labs reviewed and do reveal an elevated BUN/Cr as compared to recent baseline.     VS and weights reviewed in facility EMR today.    Patient found in room. Call placed to son as well so he could be present during visit and ask questions.  Is alert, calm, NAD. Reports fatigue and insomnia last night stating she hardly slept at all. Reports awake 2/2 cough and wheezing. Denies worsening SOB. Discussed prn lorazepam and reviewed MAR with patient/family. Patient historically uses prn for severe anxiety and/or insomnia. It does not appear to have been offered last night. Discussed  "pneumonia diagnosis/CXR results and addition of antibiotics, time to effect, side effects. Also discussed nebulizer treatments and guaifenesin including etiology for use to expect could initially cause more coughing 2/2 expectorant and bronchodilating effects. Patient reporting mild nausea at times - and states had very large loose stool today. Denies emesis or abdominal discomfort. Discussed adding Zofran which patient is open to. Also discussed/clarified upcoming oncology and cardiology appts and briefly discussed recommendations r/t patient' resuming driving once discharge. Recommended against it 2/2 frailty and medical fragility.    Allergies, and PMH/PSH reviewed in EPIC today.  REVIEW OF SYSTEMS:  4 point ROS including Respiratory, CV, GI and , other than that noted in the HPI,  is negative    Objective:   BP (!) 131/96   Pulse 90   Temp 97.4  F (36.3  C)   Resp 19   Ht 1.549 m (5' 1\")   Wt 42.1 kg (92 lb 12.8 oz)   SpO2 95%   BMI 17.53 kg/m    Resp: Effort WNL, LS decreased in left base. Rare weak rattling cough during visit. O2 2LNC  CV: VS as above, no edema noted  Abd- soft, nontender, BS +  Musc- GUTIÉRREZ- generalized weakness  Psych- alert, calm, pleasant        Most Recent 3 CBC's:Recent Labs   Lab Test 05/31/23  0906 05/24/23  0430 05/22/23  0725   WBC 7.3  --  6.2   HGB 10.8* 10.9* 11.3*   *  --  100   * 117* 110*     Most Recent 3 BMP's:Recent Labs   Lab Test 05/31/23  0906 05/25/23  0419    141   POTASSIUM 4.6 4.7   CHLORIDE 99 96*   CO2 30* 38*   BUN 57.1* 73*   CR 2.44* 2.18*   ANIONGAP 14 7   ITA 8.9 9.2   * 97       Assessment/Plan:  Pneumonia of left lower lobe due to infectious organism  - new on CXR 5/30, congestion, weak cough. VSS, afebrile, no leukoctyosis  - continue renally dosed 10 day Levaquin course  - continue sched Duonebs and guaifenesin as well as prn albuterol nebs  - continue supplemental O2 2LNC- wean as able to keep sats > 90 %  - VS daily while " on antibiotic  Heart failure with reduced ejection fraction (H)  chronic- EF 25% last inpatient (reduced from previous baseline). Is currently on lasix 20 mg 3 x weekly, BUN/Cr elevated and po intake marginal of late.   - reduce Lasix to 20 mg 2 x weekly for a while  - monitor weights/follow clinically  - f/w cardiology as scheduled 6/7- clarified today  Chronic obstructive pulmonary disease with acute exacerbation (H)  - chronic- exacerbated inpatient- treated with brief prednisone burst. No wheezing noted today.  - continue nebulizer treatments as above and monitor     Nausea  * ? 2/2 acute illness vs d/t antibiotics  - add Zofran ODT 4 mg daily x 3 days and q 6 hours prn    Loose stools  - suspect 2/2 mx laxatives 2/2 previous constipation.  - reduce Miralax from BID to daily  - reduce Senna S from BID to daily  - continue prn Senna for constipation  - monitor bowel pattern    Mild protein-calorie malnutrition (H)  - multifactorial- now exacerbated 2/2 acute illness and nausea. Of note is also on Renal diet  - dietician following in TCU as well for recommendations to optimize nutrition and hydration  - continue on Nepro supplement TID  - monitor intake/weights    Chronic kidney disease, stage 4 (severe) (H)  - baseline Cr appears approx 2.2- Cr bump since last inpatient. Poor po intake as above  - decrease diuretic as above  - avoid nephrotoxins  - order recheck BMP 6/5 to ensure stability    Thrombocytopenia (H)  - chronic, no s/s bleeding noted. Stable 124 last on 5/31  - ongoing monitoring for any s/s bleeding  - order recheck CBC 6/5 to ensure stability    Insomnia  - acute  - change facility EMR to include offering prn Lorazepam nightly for insomnia.   - Monitor sleep adequacy    Carcinoma of breast metastatic to bone, unspecified laterality (H)  - newly identified mets to liver and bone. H/o breast cancer.  - f/w oncology as scheduled 6/6- clarified with patient and family today    Physical  deconditioning  - 2/2 above  - PT/OT to optimize function, safety and independence  - Supportive cares and treatments  - ongoing discharge planning, SW follow and care conferences per unit protocol          Orders:  Written on unit and discussed with patient/son, nursing    Electronically signed by: JOHNNY Torres CNP           Sincerely,        JOHNNY Torres CNP

## 2023-06-05 NOTE — TELEPHONE ENCOUNTER
Imaging Received  June 6, 2023 8:20 AM    Action: Breast Images from [facility] received and email sent to  Imaging team to resolve breast images to PACS.     Action June 5, 2023 1:44 PM    Action Taken Called [facility] and spoke to Tiffani, she confirms breast images 11/01/17: US Breast & Mammo will be pushed to PACS     RECORDS STATUS - BREAST    RECORDS REQUESTED FROM: [facility]   DATE REQUESTED:    NOTES DETAILS STATUS   OFFICE NOTE from referring provider [facility]    OFFICE NOTE from surgeon Epic 05/19/22: Dr. Dulce Duran   DISCHARGE SUMMARY from hospital [facility] 06/14/21: [facility]   OPERATIVE REPORT Epic 06/14/21: Bilateral mastectomies   MEDICATION LIST [facility]    LABS     PATHOLOGY REPORTS  (Tissue diagnosis, Stage, ER/NE percentage positive and intensity of staining, HER2 IHC, FISH, and all biopsies from breast and any distant metastasis)                 Report in EPIC 06/14/21: [id number]   IMAGING (NEED IMAGES & REPORT)     MAMMO PACS [facility]:  11/01/17   ULTRASOUND PACS [facility]:  11/01/17: US breast   BRAIN MRI PACS 03/04/22: MR Head

## 2023-06-06 PROBLEM — I12.9 HYPERTENSIVE CHRONIC KIDNEY DISEASE W STG 1-4/UNSP CHR KDNY: Status: RESOLVED | Noted: 2019-03-04 | Resolved: 2023-01-01

## 2023-06-06 PROBLEM — D69.6 THROMBOCYTOPENIA (H): Status: ACTIVE | Noted: 2023-01-01

## 2023-06-06 PROBLEM — N18.4 CKD (CHRONIC KIDNEY DISEASE) STAGE 4, GFR 15-29 ML/MIN (H): Status: ACTIVE | Noted: 2023-01-01

## 2023-06-06 PROBLEM — I50.20 HEART FAILURE WITH REDUCED EJECTION FRACTION (H): Status: RESOLVED | Noted: 2022-02-14 | Resolved: 2023-01-01

## 2023-06-06 NOTE — LETTER
"    6/6/2023         RE: Mayela Espino  [address] [facility] [address]        Dear Colleague,    Thank you for referring your patient, Mayela Espino, to the [facility] [place]. Please see a copy of my visit note below.    Oncology Rooming Note    June 6, 2023 11:05 AM   Mayela Espino is a 89 year old female who presents for:    Chief Complaint   Patient presents with     Oncology Clinic Visit     New consult oncology     Initial Vitals: /77   Pulse 93   Resp 16   Ht 1.549 m (5' 1\")   Wt 43.1 kg (95 lb)   SpO2 93%   BMI 17.95 kg/m   Estimated body mass index is 17.95 kg/m  as calculated from the following:    Height as of this encounter: 1.549 m (5' 1\").    Weight as of this encounter: 43.1 kg (95 lb). Body surface area is 1.36 meters squared.  Worst Pain (10) Comment: during muscle spasms   No LMP recorded.  Allergies reviewed: Yes  Medications reviewed: Yes    Medications: Medication refills not needed today.  Pharmacy name entered into Blog[facility]:    [id number] - [place], [place] - [address] AT [address] [employer]  [place], [place] - [address] [place] [address]    Clinical concerns:  New oncology    Sabrina Costa              [facility] Hematology and Oncology Consult Note    Patient: Mayela Espino  MRN: [id number]  Date of Service: Jun 6, 2023       Reason for Visit    Chief Complaint   Patient presents with     Oncology Clinic Visit     New consult oncology         Assessment/Plan    ECOG Performance 2 - Ambulatory and independent in all ADLs; cannot work; up > 50% of the time    #.  Multiple liver lesions and bone lesions concerning for metastatic cancer of likely breast origin  #.  History of bilateral breast cancer, hormone receptor positive, HER2 negative  #.  Chronic systolic congestive heart failure and cardiomyopathy  #.  CKD- 4  #.  Elevated liver enzymes concerning related to metastatic " liver disease     I reviewed her complex medical history in detail.  I reviewed her most recent MRI and CT scan and labs.  Current findings are concerning for metastatic cancer.  Given her history of breast cancer, it is most likely consistent with metastatic breast cancer.  However, pathology confirmation is warranted.  After thorough discussion of risk, benefits and alternatives, she agreed to proceed with biopsy to confirm pathology and more for prognostication.  I explained to her that given the current information, treatment will be palliative due to advanced stage of the cancer.  We discussed about different scenario of either recurrence of hormone receptor positive, HER2 negative breast cancer all potentially different biology.  I discussed that palliative treatment will involve systemic therapy and radiation.  We will have to carefully consider treatment twice given her several severe comorbidities.     Requested biopsy of the liver lesions for diagnostic as well as molecular testing.   Follow-up with me a week after completion of biopsy.    Encounter Diagnoses:    Problem List Items Addressed This Visit     CKD (chronic kidney disease) stage 4, GFR 15-29 ml/min (H)    Congestive heart failure (H)   Other Visit Diagnoses     Lesion of liver    -  Primary    Relevant Orders    IR Referral          ______________________________________________________________________________    Staging History   Cancer Staging   No matching staging information was found for the patient.      History    Ms. Mayela Espino is a very pleasant 89 year old female presented today accompanied by her son.  Her daughter-in-law Stefani visit by phone.  She was hospitalized about 2 weeks ago with worsening back pain and right flank pain and eventually found to have multiple hepatic lesions, few subcentimeter pulmonary nodules, and numerous thoracic and lumbar vertebral compression fractures.  She is discharged from hospital and  currently in rehab.  She wears supplemental oxygen since discharge from the hospital.      Her past medical history is significant for    She is a former heavy smoker and quit smoking about a year ago.    She has cardiomyopathy and most recent echocardiogram from 2 weeks ago showed severely reduced LVEF of 25-30% with global hypokinesia of the left ventricle.      Prior to hospitalization, she lives by herself.  She does some house chores and she has a helper.  She has a son and he lives in [place] and currently visiting her here.    Upon review of her past oncologic history, she was first diagnosed with bilateral breast cancer in 11/2017.  Right breast showed invasive ductal carcinoma, grade 1, ER/MD positive, HER2 negative and left breast showed invasive ductal carcinoma, grade 2, ER and MD positive, HER2 negative.  She has been followed by Dr. Duran but she refused surgery up until June 2021 when she underwent bilateral mastectomies.  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  However she is taking anastrozole and reportedly she takes it regularly since started in 6/2021.    Review of systems.  Apart from describing in history, the remainder of comprehensive ROS was negative.    Past History    Past Medical History:   Diagnosis Date     Abnormal mini-mental status exam     scored 16 2012, ?dementia     Breast cancer (H)      Chronic kidney disease      Congestive heart failure (H)      Essential tremor      HTN (hypertension)      Tobacco use      Past Surgical History:   Procedure Laterality Date     EYE SURGERY    "    JOINT REPLACEMENT       LUMPECTOMY BREAST Bilateral 1982    Cyst removed     OTHER SURGICAL HISTORY Left     ear prosthesis     OTHER SURGICAL HISTORY      l rola     OTHER SURGICAL HISTORY      left hip ORIF     VA MASTECTOMY, MODIFIED RADICAL Bilateral 6/14/2021    Procedure: Bilateral mastectomies;  Surgeon: Dulce Duran MD;  [facility];  Service: General     Family History   Problem Relation Age of Onset     Hypertension Mother      Cerebrovascular Disease Father 70.00     No Known Problems Son      Social History     Socioeconomic History     Marital status:    Tobacco Use     Smoking status: Every Day     Packs/day: 0.25     Types: Cigarettes     Smokeless tobacco: Never   Substance and Sexual Activity     Alcohol use: No     Drug use: Never   Social History Narrative    Has one son who lives in [place]       Allergies    Allergies   Allergen Reactions     Latex Unknown     Added based on information entered during case entry, please review and add reactions, type, and severity as needed     Latex Rash       Physical Exam    /77   Pulse 93   Resp 16   Ht 1.549 m (5' 1\")   Wt 43.1 kg (95 lb)   SpO2 93%   BMI 17.95 kg/m      General: alert, awake, not in acute distress, frail  HEENT: Head: Normal, normocephalic, atraumatic.  Eye: Normal external eye, conjunctiva, lids cornea, MOLINA.  Nose: Normal external nose, mucus membranes and septum.  She was oxygen by nasal cannula.  Pharynx: Normal buccal mucosa. Normal pharynx.  Neck / Thyroid: Supple, no masses, nodes, nodules or enlargement.  Lymphatics: No abnormally enlarged lymph nodes.  Chest: Normal chest wall and respirations. Clear to auscultation.  Heart: S1 S2 RRR, no murmur.   Abdomen: abdomen is soft without significant tenderness, masses, organomegaly or guarding  Extremities: normal strength, tone, and muscle mass  Skin: normal. no rash or abnormalities  CNS: non focal.    Lab Results    Recent Results (from the " past 168 hour(s))   Comprehensive metabolic panel   Result Value Ref Range    Sodium 143 136 - 145 mmol/L    Potassium 4.6 3.4 - 5.3 mmol/L    Chloride 99 98 - 107 mmol/L    Carbon Dioxide (CO2) 30 (H) 22 - 29 mmol/L    Anion Gap 14 7 - 15 mmol/L    Urea Nitrogen 57.1 (H) 8.0 - 23.0 mg/dL    Creatinine 2.44 (H) 0.51 - 0.95 mg/dL    Calcium 8.9 8.2 - 9.6 mg/dL    Glucose 182 (H) 70 - 99 mg/dL    Alkaline Phosphatase 173 (H) 35 - 104 U/L    AST 48 (H) 10 - 35 U/L    ALT 41 (H) 10 - 35 U/L    Protein Total 6.8 6.4 - 8.3 g/dL    Albumin 3.6 3.5 - 5.2 g/dL    Bilirubin Total 0.6 <=1.2 mg/dL    GFR Estimate 18 (L) >60 mL/min/1.73m2   CBC with platelets and differential   Result Value Ref Range    WBC Count 7.3 4.0 - 11.0 10e3/uL    RBC Count 3.49 (L) 3.80 - 5.20 10e6/uL    Hemoglobin 10.8 (L) 11.7 - 15.7 g/dL    Hematocrit 37.2 35.0 - 47.0 %     (H) 78 - 100 fL    MCH 30.9 26.5 - 33.0 pg    MCHC 29.0 (L) 31.5 - 36.5 g/dL    RDW 16.2 (H) 10.0 - 15.0 %    Platelet Count 124 (L) 150 - 450 10e3/uL    % Neutrophils 86 %    % Lymphocytes 7 %    % Monocytes 6 %    % Eosinophils 1 %    % Basophils 0 %    % Immature Granulocytes 0 %    NRBCs per 100 WBC 0 <1 /100    Absolute Neutrophils 6.3 1.6 - 8.3 10e3/uL    Absolute Lymphocytes 0.5 (L) 0.8 - 5.3 10e3/uL    Absolute Monocytes 0.4 0.0 - 1.3 10e3/uL    Absolute Eosinophils 0.0 0.0 - 0.7 10e3/uL    Absolute Basophils 0.0 0.0 - 0.2 10e3/uL    Absolute Immature Granulocytes 0.0 <=0.4 10e3/uL    Absolute NRBCs 0.0 10e3/uL   Basic metabolic panel   Result Value Ref Range    Sodium 142 136 - 145 mmol/L    Potassium 4.9 3.4 - 5.3 mmol/L    Chloride 105 98 - 107 mmol/L    Carbon Dioxide (CO2) 27 22 - 29 mmol/L    Anion Gap 10 7 - 15 mmol/L    Urea Nitrogen 46.2 (H) 8.0 - 23.0 mg/dL    Creatinine 2.25 (H) 0.51 - 0.95 mg/dL    Calcium 9.1 8.2 - 9.6 mg/dL    Glucose 108 (H) 70 - 99 mg/dL    GFR Estimate 20 (L) >60 mL/min/1.73m2   CBC with platelets   Result Value Ref Range    WBC  Count 5.0 4.0 - 11.0 10e3/uL    RBC Count 3.51 (L) 3.80 - 5.20 10e6/uL    Hemoglobin 10.9 (L) 11.7 - 15.7 g/dL    Hematocrit 37.9 35.0 - 47.0 %     (H) 78 - 100 fL    MCH 31.1 26.5 - 33.0 pg    MCHC 28.8 (L) 31.5 - 36.5 g/dL    RDW 16.2 (H) 10.0 - 15.0 %    Platelet Count 117 (L) 150 - 450 10e3/uL       Imaging Results    MR Thoracic Spine w/o Contrast    Result Date: 5/23/2023  EXAM: MR THORACIC SPINE W/O CONTRAST LOCATION: [facility] DATE/TIME: 5/23/2023 5:36 PM CDT INDICATION: pain fractures acuity COMPARISON: 05/18/2023 CT. 03/30/2018 CT. TECHNIQUE: Routine Thoracic Spine MRI without IV contrast. FINDINGS: 12 rib-bearing thoracic vertebrae. Extensive osseous metastatic disease. T8 compression fracture with 60% central height loss, worsened from 2018, previously 40% height loss, with diffuse T2 STIR hyperintensity throughout the T8 vertebral body. Marrow edema at the T9 posterior spinous process with associated T2 hypointense fracture line. Mild T5 superior plate compression deformity with approximately 27% height loss is unchanged from 2018. Mild T6 superior endplate compression deformity with approximately 13% height loss is new from 2018 but without associated marrow edema. T7 compression deformity with approximately 37% central height loss similar to the 2022 CT. Moderate T9, T10, T11, T12, L1, and L2 compression deformities which are all new or worsened compared to 2018 but without associated marrow edema. Multilevel thoracic spondylosis. No high-grade spinal canal or neural foraminal stenosis. No abnormal cord signal. No acute extraspinal abnormality. Bilateral pleural effusions. Multiple hepatic metastases..     IMPRESSION: 1.  Extensive osseous metastatic disease. 2.  Probable subacute or acute worsening of the T8 compression deformity, with 60% height loss, previously 40% in 2018. 3.  Probable acute nondisplaced fracture of the T9 posterior spinous process. 4.  Multiple  old appearing pathologic compression deformities throughout the thoracic and lumbar spine. 5.  No high-grade spinal canal or neural foraminal stenosis.    Echocardiogram Complete    Result Date: 2023  [id number]^YESSENIA^[place] [address]  Name: RIANNA RUIZ MRN: [id number] : 1932 Study Date: 2023 09:08 AM Age: 89 yrs Gender: Female Patient Location: [facility] Reason For Study: CHF Ordering Physician: JOSE CASAS Performed By: [facility]  BSA: 1.4 m2 Height: 61 in Weight: 98 lb HR: 93 BP: 103/64 mmHg ______________________________________________________________________________ Procedure Complete Portable Echo Adult. Definity (NDC #97940-294) given intravenously. 2.0 ml; lot # [id number]. Adequate quality color and spectral Doppler were performed and interpreted. Adequate quality two-dimensional was performed and interpreted. ______________________________________________________________________________ Interpretation Summary  1. The left ventricle is mildly dilated. Left ventricular function is decreased. The ejection fraction is 25-30% (severely reduced). There is severe global hypokinesia of the left ventricle. 2. Normal right ventricle size and systolic function. 3. The left atrium is moderately dilated. 4. There is mild to moderate (1-2+) mitral regurgitation. 5. There is mild (1+) aortic regurgitation. 6. Moderate left pleural effusion ______________________________________________________________________________ Left Ventricle The left ventricle is mildly dilated. Left ventricular function is decreased. The ejection fraction is 25-30% (severely reduced). There is normal left ventricular wall thickness. Left ventricular diastolic function is abnormal. There is severe global hypokinesia of the left ventricle.  Right Ventricle Normal right ventricle size and systolic function.  Atria The left atrium is moderately dilated.  Right atrial size is normal. There is no color Doppler evidence of an atrial shunt.  Mitral Valve There is mild to moderate mitral annular calcification. The mitral valve leaflets are mildly thickened. There is mild to moderate (1-2+) mitral regurgitation. There is no mitral valve stenosis.  Tricuspid Valve Tricuspid valve leaflets appear normal. There is no evidence of tricuspid stenosis or clinically significant tricuspid regurgitation. Right ventricular systolic pressure could not be approximated due to inadequate tricuspid regurgitation. No tricuspid regurgitation. No significant tricuspid stenosis.  Aortic Valve Moderate aortic valve calcification is present. There is mild (1+) aortic regurgitation. No aortic stenosis is present.  Pulmonic Valve The pulmonic valve is not well seen, but is grossly normal. This degree of valvular regurgitation is within normal limits. There is trace pulmonic valvular regurgitation.  Vessels The aorta root is normal. Normal size ascending aorta. IVC diameter <2.1 cm collapsing >50% with sniff suggests a normal RA pressure of 3 mmHg.  Pericardium There is no pericardial effusion. Moderate left pleural effusion.  ______________________________________________________________________________ MMode/2D Measurements & Calculations RVDd: 2.5 cm IVSd: 0.73 cm LVIDd: 5.2 cm LVIDs: 4.8 cm LVPWd: 0.79 cm FS: 7.3 %  LV mass(C)d: 134.3 grams LV mass(C)dI: 96.2 grams/m2 Ao root diam: 3.2 cm asc Aorta Diam: 3.6 cm LVOT diam: 2.0 cm LVOT area: 3.1 cm2 LA Volume (BP): 50.7 ml LA Volume Index (BP): 36.2 ml/m2  LA Volume Indexed (AL/bp): 38.7 ml/m2 RWT: 0.31 TAPSE: 2.9 cm  Doppler Measurements & Calculations MV E max román: 133.0 cm/sec MV max P.3 mmHg MV mean P.3 mmHg MV V2 VTI: 11.4 cm MVA(VTI): 5.1 cm2 MV dec slope: 1119 cm/sec2 MV dec time: 0.12 sec Ao V2 max: 141.6 cm/sec Ao max P.0 mmHg Ao V2 mean: 104.1 cm/sec Ao mean P.9 mmHg Ao V2 VTI: 21.3 cm KENDELL(I,D): 2.7 cm2 KENDELL(V,D): 2.7  cm2 AI P1/2t: 603.9 msec LV V1 max P.1 mmHg LV V1 max: 123.1 cm/sec LV V1 VTI: 18.5 cm SV(LVOT): 58.1 ml SI(LVOT): 41.7 ml/m2 PA V2 max: 105.1 cm/sec PA max P.4 mmHg PA acc time: 0.06 sec  AV Brent Ratio (DI): 0.87 KENDELL Index (cm2/m2): 2.0 Medial E/e': 19.4 RV S Brent: 12.4 cm/sec  ______________________________________________________________________________ Report approved by: Sierra Iverson 2023 10:39 AM       US Renal Complete Non-Vascular    Result Date: 2023  EXAM: US RENAL COMPLETE NON-VASCULAR LOCATION: [facility] DATE/TIME: 2023 5:17 PM CDT INDICATION: Right flank pain. Chronic kidney disease. COMPARISON: CT abdomen pelvis 2023. TECHNIQUE: Routine Bilateral Renal and Bladder Ultrasound. FINDINGS: RIGHT KIDNEY: Completely atrophied and not well visualized. LEFT KIDNEY: 4.7 x 2.8 x 2.8 cm. Atrophic. Normal cortical echogenicity. No hydronephrosis. Nonobstructing 2 mm calculus at the lower pole, as seen on today's CT. No visualized renal mass. BLADDER: Small right posterolateral bladder diverticulum measuring up to 2.2 cm. Otherwise, normal.     IMPRESSION: 1.  Right kidney is completely atrophied and not well visualized. 2.  Atrophic left kidney, without hydronephrosis. 3.  Nonobstructing 2 mm left renal calculus. 4.  Small right posterolateral bladder diverticulum.    US Lower Extremity Venous Duplex Bilateral    Result Date: 2023  EXAM: US LOWER EXTREMITY VENOUS DUPLEX BILATERAL LOCATION: [facility] DATE/TIME: 2023 2:17 PM CDT INDICATION: Lower extremity swelling COMPARISON: None. TECHNIQUE: Venous Duplex ultrasound of bilateral lower extremities with and without compression, augmentation and duplex. Color flow and spectral Doppler with waveform analysis performed. FINDINGS: Exam includes the common femoral, femoral, popliteal veins as well as segmentally visualized deep calf veins and greater saphenous vein.  RIGHT: No deep vein thrombosis. No superficial thrombophlebitis. No popliteal cyst. There is a oblong subcutaneous fluid collection in the distal medial right thigh measuring 4.2 x 2.2 x 6.0 cm. No associated blood flow on color Doppler LEFT: No deep vein thrombosis. No superficial thrombophlebitis. No popliteal cyst.     IMPRESSION: 1.  No deep venous thrombosis in the bilateral lower extremities. 2.  [place] fluid collection subcutaneous tissues distal medial right thigh consistent with a liquefied hematoma or seroma.    CT Chest Abdomen Pelvis w/o Contrast    Result Date: 5/18/2023  EXAM: CT CHEST ABDOMEN PELVIS W/O CONTRAST LOCATION: [facility] DATE/TIME: 5/18/2023 12:14 PM CDT INDICATION: Shortness of breath, chest pain, chronic kidney disease COMPARISON: Portable chest radiography 06/01/2022; MRI lumbar spine 03/04/2022; thoracic spine CT 03/30/2018 TECHNIQUE: CT scan of the chest, abdomen, and pelvis was performed without IV contrast. Multiplanar reformats were obtained. Dose reduction techniques were used. CONTRAST: None. FINDINGS: LUNGS AND PLEURA: Small to moderate left pleural effusion is present layering posteriorly. Trace right pleural effusion. Focal band of atelectasis posterior left lower lobe related to pleural fluid. A band of atelectasis is present in the lingula. Smaller territories of atelectasis are present in the paradiaphragmatic right lower lobe and along the inferior major fissures. There are 3 adjacent subpleural nodules in the right middle lobe along the minor fissure largest of which is 5 mm (series 4, image 93). There is a subpleural nodule in the lateral left lower lobe which measures 4 mm (series 3, image 77) and 3 mm nodule in the anterior left lower lobe along the major fissure (series 3, image 63). Anterior bowing of the membranous central airways consistent with imaging at partial expiratory phase of the respiratory cycle. Motion artifacts limit assessment  of the subsegmental airways particularly in the lower lungs. MEDIASTINUM/AXILLAE: Heterogeneity and enlargement of the isthmus and right lobe of the thyroid gland which extend into the right paratracheal mediastinum slightly displacing the trachea towards the left. There are mildly enlarged subaortic and lower paratracheal lymph nodes. Mild generalized enlargement of the heart chambers. No pericardial effusion. Tortuous proximal great vessels. Mild to moderate arch and descending aorta atheromatous calcifications. The distal descending thoracic aorta has fusiform ectasia measuring up to 4.1 cm in diameter. The esophagus is decompressed. CORONARY ARTERY CALCIFICATION: Mild. Assessment of the upper abdominal viscera is influenced by motion-related blurring. HEPATOBILIARY: Several low-attenuation lesions are randomly distributed in the liver with larger lesion in the right lobe measuring 15 mm (series 3, image 136). Gallbladder is not distended. No calcified gallstones. No bile duct enlargement. PANCREAS: Pancreas is fatty replaced. No definite pancreas parenchymal lesions. SPLEEN: Normal. ADRENAL GLANDS: Lobular low-attenuation left adrenal gland suggesting hyperplasia. Right adrenal gland is also low-attenuation with a lesser degree of tissue thickening. KIDNEYS/BLADDER: The left kidney is atrophied. There are calcifications at the cortical medullary junction of the upper and lower pole but no hydronephrosis. Compensatory hypertrophy of the right kidney. No definite right nephroureterolithiasis. Urinary bladder is normal. BOWEL: No dilated bowel or bowel wall thickening. In sigmoid diverticulosis. LYMPH NODES: No lymphadenopathy in the abdomen or pelvis. VASCULATURE: Advanced aortoiliac atheromatous calcifications with minimal lobulated contours of the infrarenal aorta but no aneurysm. PELVIC ORGANS: No pelvic masses. MUSCULOSKELETAL: There is a left hip joint replacement. No periprosthetic fracture. Generalized  demineralization of the bones. No pelvis or hip insufficiency fractures. There are numerous superior and inferior endplate deformities of the thoracolumbar spine with greatest degree of compression at T8, T9, and T11 where there is a round 50% loss of vertebral body height. Lesser compression deformities of T5-T7, T10 and all lumbar vertebra. The lumbar compression deformities are unchanged from 03/04/2022.  No aggressive or destructive bone lesions.     IMPRESSION: 1.  Small to moderate left and trace right pleural effusions and related atelectasis. 2.  Suboptimal assessment of the lung parenchyma due to motion-related blurring. Multiple foci of peripheral atelectasis in the bases. Few pulmonary nodules are present largest of which is 5 mm. Per 2017 Fleischner Society guidelines, follow-up CT of the  chest in 12 months is considered optional. 3.  Enlarged, heterogeneous right lobe of the thyroid consistent with mediastinal border. 4.  Multiple randomly low-attenuation liver lesions are present the largest of which measures 1.5 cm. These are indeterminate. These could be further characterized with ultrasound or liver MRI. 5.  Sigmoid diverticulosis but no diverticulitis. 6.  Atrophied left kidney with nonobstructing calculi are present. 7.  Numerous thoracic and lumbar vertebral compression deformities. The lumbar compression deformities are unchanged from 03/04/2022. Multiple thoracic compression deformities have worsened since 2018.    60 minutes spent on the date of the encounter doing chart review, history and exam, documentation, communication of the treatment plan with the care team and further activities as noted above.    Signed by: Mario Bardales MD         Again, thank you for allowing me to participate in the care of your patient.        Sincerely,        Mario Bardales MD

## 2023-06-06 NOTE — PROGRESS NOTES
[facility] Consult Note    Patient: Mayela Espino  MRN: [id number]  Date of Service: Jun 6, 2023       Reason for Visit    Chief Complaint   Patient presents with     Oncology Clinic Visit     New consult oncology         Assessment/Plan    ECOG Performance 2 - Ambulatory and independent in all ADLs; cannot work; up > 50% of the time    #.  Multiple liver lesions and bone lesions concerning for metastatic cancer of likely breast origin  #.  History of bilateral breast cancer, hormone receptor positive, HER2 negative  #.  Chronic systolic congestive heart failure and cardiomyopathy  #.  CKD- 4  #.  Elevated liver enzymes concerning related to metastatic liver disease     I reviewed her complex medical history in detail.  I reviewed her most recent MRI and CT scan and labs.  Current findings are concerning for metastatic cancer.  Given her history of breast cancer, it is most likely consistent with metastatic breast cancer.  However, pathology confirmation is warranted.  After thorough discussion of risk, benefits and alternatives, she agreed to proceed with biopsy to confirm pathology and more for prognostication.  I explained to her that given the current information, treatment will be palliative due to advanced stage of the cancer.  We discussed about different scenario of either recurrence of hormone receptor positive, HER2 negative breast cancer all potentially different biology.  I discussed that palliative treatment will involve systemic therapy and radiation.  We will have to carefully consider treatment twice given her several severe comorbidities.     Requested biopsy of the liver lesions for diagnostic as well as molecular testing.   Follow-up with me a week after completion of biopsy.    Encounter Diagnoses:    Problem List Items Addressed This Visit     CKD (chronic kidney disease) stage 4, GFR 15-29 ml/min (H)    Congestive heart failure (H)   Other Visit Diagnoses      Lesion of liver    -  Primary    Relevant Orders    IR Referral          ______________________________________________________________________________    Staging History   Cancer Staging   No matching staging information was found for the patient.      History    Ms. Mayela Espino is a very pleasant 89 year old female presented today accompanied by her son.  Her daughter-in-law Stefani visit by phone.  She was hospitalized about 2 weeks ago with worsening back pain and right flank pain and eventually found to have multiple hepatic lesions, few subcentimeter pulmonary nodules, and numerous thoracic and lumbar vertebral compression fractures.  She is discharged from hospital and currently in rehab.  She wears supplemental oxygen since discharge from the hospital.      Her past medical history is significant for    She is a former heavy smoker and quit smoking about a year ago.    She has cardiomyopathy and most recent echocardiogram from 2 weeks ago showed severely reduced LVEF of 25-30% with global hypokinesia of the left ventricle.      Prior to hospitalization, she lives by herself.  She does some house chores and she has a helper.  She has a son and he lives in [place] and currently visiting her here.    Upon review of her past oncologic history, she was first diagnosed with bilateral breast cancer in 11/2017.  Right breast showed invasive ductal carcinoma, grade 1, ER/AL positive, HER2 negative and left breast showed invasive ductal carcinoma, grade 2, ER and AL positive, HER2 negative.  She has been followed by Dr. Duran but she refused surgery up until June 2021 when she underwent bilateral mastectomies.  Final pathology showed left breast invasive ductal carcinoma, grade 2, 53 mm, presence of lymphovascular invasion, tumor invasion into the nipple dermis, ER strongly positive, AL moderately positive, HER2 indeterminate by IHC, negative by FISH (pT3 N0) and right breast invasive ductal carcinoma with tubular  "features, grade 1, 15 mm, ER strongly positive, LA negative, HER2 negative (1+) (pT1c Nx)   She has not been seen by a medical oncologist.  However she is taking anastrozole and reportedly she takes it regularly since started in 6/2021.    Review of systems.  Apart from describing in history, the remainder of comprehensive ROS was negative.    Past History    Past Medical History:   Diagnosis Date     Abnormal mini-mental status exam     scored 16 2012, ?dementia     Breast cancer (H)      Chronic kidney disease      Congestive heart failure (H)      Essential tremor      HTN (hypertension)      Tobacco use      Past Surgical History:   Procedure Laterality Date     EYE SURGERY       JOINT REPLACEMENT       LUMPECTOMY BREAST Bilateral 1982    Cyst removed     OTHER SURGICAL HISTORY Left     ear prosthesis     OTHER SURGICAL HISTORY      l rola     OTHER SURGICAL HISTORY      left hip ORIF     LA MASTECTOMY, MODIFIED RADICAL Bilateral 6/14/2021    Procedure: Bilateral mastectomies;  Surgeon: Dulce Duran MD;  Location: [facility] - [place], [place];  Service: General     Family History   Problem Relation Age of Onset     Hypertension Mother      Cerebrovascular Disease Father 70.00     No Known Problems Son      Social History     Socioeconomic History     Marital status:    Tobacco Use     Smoking status: Every Day     Packs/day: 0.25     Types: Cigarettes     Smokeless tobacco: Never   Substance and Sexual Activity     Alcohol use: No     Drug use: Never   Social History Narrative    Has one son who lives in [place]       Allergies    Allergies   Allergen Reactions     Latex Unknown     Added based on information entered during case entry, please review and add reactions, type, and severity as needed     Latex Rash       Physical Exam    /77   Pulse 93   Resp 16   Ht 1.549 m (5' 1\")   Wt 43.1 kg (95 lb)   SpO2 93%   BMI 17.95 kg/m      General: alert, awake, not in acute distress, frail  HEENT: Head: " Normal, normocephalic, atraumatic.  Eye: Normal external eye, conjunctiva, lids cornea, MOLINA.  Nose: Normal external nose, mucus membranes and septum.  She was oxygen by nasal cannula.  Pharynx: Normal buccal mucosa. Normal pharynx.  Neck / Thyroid: Supple, no masses, nodes, nodules or enlargement.  Lymphatics: No abnormally enlarged lymph nodes.  Chest: Normal chest wall and respirations. Clear to auscultation.  Heart: S1 S2 RRR, no murmur.   Abdomen: abdomen is soft without significant tenderness, masses, organomegaly or guarding  Extremities: normal strength, tone, and muscle mass  Skin: normal. no rash or abnormalities  CNS: non focal.    Lab Results    Recent Results (from the past 168 hour(s))   Comprehensive metabolic panel   Result Value Ref Range    Sodium 143 136 - 145 mmol/L    Potassium 4.6 3.4 - 5.3 mmol/L    Chloride 99 98 - 107 mmol/L    Carbon Dioxide (CO2) 30 (H) 22 - 29 mmol/L    Anion Gap 14 7 - 15 mmol/L    Urea Nitrogen 57.1 (H) 8.0 - 23.0 mg/dL    Creatinine 2.44 (H) 0.51 - 0.95 mg/dL    Calcium 8.9 8.2 - 9.6 mg/dL    Glucose 182 (H) 70 - 99 mg/dL    Alkaline Phosphatase 173 (H) 35 - 104 U/L    AST 48 (H) 10 - 35 U/L    ALT 41 (H) 10 - 35 U/L    Protein Total 6.8 6.4 - 8.3 g/dL    Albumin 3.6 3.5 - 5.2 g/dL    Bilirubin Total 0.6 <=1.2 mg/dL    GFR Estimate 18 (L) >60 mL/min/1.73m2   CBC with platelets and differential   Result Value Ref Range    WBC Count 7.3 4.0 - 11.0 10e3/uL    RBC Count 3.49 (L) 3.80 - 5.20 10e6/uL    Hemoglobin 10.8 (L) 11.7 - 15.7 g/dL    Hematocrit 37.2 35.0 - 47.0 %     (H) 78 - 100 fL    MCH 30.9 26.5 - 33.0 pg    MCHC 29.0 (L) 31.5 - 36.5 g/dL    RDW 16.2 (H) 10.0 - 15.0 %    Platelet Count 124 (L) 150 - 450 10e3/uL    % Neutrophils 86 %    % Lymphocytes 7 %    % Monocytes 6 %    % Eosinophils 1 %    % Basophils 0 %    % Immature Granulocytes 0 %    NRBCs per 100 WBC 0 <1 /100    Absolute Neutrophils 6.3 1.6 - 8.3 10e3/uL    Absolute Lymphocytes 0.5 (L) 0.8 -  5.3 10e3/uL    Absolute Monocytes 0.4 0.0 - 1.3 10e3/uL    Absolute Eosinophils 0.0 0.0 - 0.7 10e3/uL    Absolute Basophils 0.0 0.0 - 0.2 10e3/uL    Absolute Immature Granulocytes 0.0 <=0.4 10e3/uL    Absolute NRBCs 0.0 10e3/uL   Basic metabolic panel   Result Value Ref Range    Sodium 142 136 - 145 mmol/L    Potassium 4.9 3.4 - 5.3 mmol/L    Chloride 105 98 - 107 mmol/L    Carbon Dioxide (CO2) 27 22 - 29 mmol/L    Anion Gap 10 7 - 15 mmol/L    Urea Nitrogen 46.2 (H) 8.0 - 23.0 mg/dL    Creatinine 2.25 (H) 0.51 - 0.95 mg/dL    Calcium 9.1 8.2 - 9.6 mg/dL    Glucose 108 (H) 70 - 99 mg/dL    GFR Estimate 20 (L) >60 mL/min/1.73m2   CBC with platelets   Result Value Ref Range    WBC Count 5.0 4.0 - 11.0 10e3/uL    RBC Count 3.51 (L) 3.80 - 5.20 10e6/uL    Hemoglobin 10.9 (L) 11.7 - 15.7 g/dL    Hematocrit 37.9 35.0 - 47.0 %     (H) 78 - 100 fL    MCH 31.1 26.5 - 33.0 pg    MCHC 28.8 (L) 31.5 - 36.5 g/dL    RDW 16.2 (H) 10.0 - 15.0 %    Platelet Count 117 (L) 150 - 450 10e3/uL       Imaging Results    MR Thoracic Spine w/o Contrast    Result Date: 5/23/2023  EXAM: MR THORACIC SPINE W/O CONTRAST LOCATION: [facility] DATE/TIME: 5/23/2023 5:36 PM CDT INDICATION: pain fractures acuity COMPARISON: 05/18/2023 CT. 03/30/2018 CT. TECHNIQUE: Routine Thoracic Spine MRI without IV contrast. FINDINGS: 12 rib-bearing thoracic vertebrae. Extensive osseous metastatic disease. T8 compression fracture with 60% central height loss, worsened from 2018, previously 40% height loss, with diffuse T2 STIR hyperintensity throughout the T8 vertebral body. Marrow edema at the T9 posterior spinous process with associated T2 hypointense fracture line. Mild T5 superior plate compression deformity with approximately 27% height loss is unchanged from 2018. Mild T6 superior endplate compression deformity with approximately 13% height loss is new from 2018 but without associated marrow edema. T7 compression deformity with  approximately 37% central height loss similar to the  CT. Moderate T9, T10, T11, T12, L1, and L2 compression deformities which are all new or worsened compared to 2018 but without associated marrow edema. Multilevel thoracic spondylosis. No high-grade spinal canal or neural foraminal stenosis. No abnormal cord signal. No acute extraspinal abnormality. Bilateral pleural effusions. Multiple hepatic metastases..     IMPRESSION: 1.  Extensive osseous metastatic disease. 2.  Probable subacute or acute worsening of the T8 compression deformity, with 60% height loss, previously 40% in 2018. 3.  Probable acute nondisplaced fracture of the T9 posterior spinous process. 4.  Multiple old appearing pathologic compression deformities throughout the thoracic and lumbar spine. 5.  No high-grade spinal canal or neural foraminal stenosis.    Echocardiogram Complete    Result Date: 2023  [id number]^YESSENIA^[place] [address]  Name: RIANNA RUIZ MRN: [id number] : 1932 Study Date: 2023 09:08 AM Age: 89 yrs Gender: Female Patient Location: [facility] Reason For Study: CHF Ordering Physician: JOSE CASAS Performed By: KANNAN  BSA: 1.4 m2 Height: 61 in Weight: 98 lb HR: 93 BP: 103/64 mmHg ______________________________________________________________________________ Procedure Complete Portable Echo Adult. Definity (NDC #68894-287) given intravenously. 2.0 ml; lot # [id number]. Adequate quality color and spectral Doppler were performed and interpreted. Adequate quality two-dimensional was performed and interpreted. ______________________________________________________________________________ Interpretation Summary  1. The left ventricle is mildly dilated. Left ventricular function is decreased. The ejection fraction is 25-30% (severely reduced). There is severe global hypokinesia of the left ventricle. 2. Normal right ventricle size and systolic  function. 3. The left atrium is moderately dilated. 4. There is mild to moderate (1-2+) mitral regurgitation. 5. There is mild (1+) aortic regurgitation. 6. Moderate left pleural effusion ______________________________________________________________________________ Left Ventricle The left ventricle is mildly dilated. Left ventricular function is decreased. The ejection fraction is 25-30% (severely reduced). There is normal left ventricular wall thickness. Left ventricular diastolic function is abnormal. There is severe global hypokinesia of the left ventricle.  Right Ventricle Normal right ventricle size and systolic function.  Atria The left atrium is moderately dilated. Right atrial size is normal. There is no color Doppler evidence of an atrial shunt.  Mitral Valve There is mild to moderate mitral annular calcification. The mitral valve leaflets are mildly thickened. There is mild to moderate (1-2+) mitral regurgitation. There is no mitral valve stenosis.  Tricuspid Valve Tricuspid valve leaflets appear normal. There is no evidence of tricuspid stenosis or clinically significant tricuspid regurgitation. Right ventricular systolic pressure could not be approximated due to inadequate tricuspid regurgitation. No tricuspid regurgitation. No significant tricuspid stenosis.  Aortic Valve Moderate aortic valve calcification is present. There is mild (1+) aortic regurgitation. No aortic stenosis is present.  Pulmonic Valve The pulmonic valve is not well seen, but is grossly normal. This degree of valvular regurgitation is within normal limits. There is trace pulmonic valvular regurgitation.  Vessels The aorta root is normal. Normal size ascending aorta. IVC diameter <2.1 cm collapsing >50% with sniff suggests a normal RA pressure of 3 mmHg.  Pericardium There is no pericardial effusion. Moderate left pleural effusion.  ______________________________________________________________________________ MMode/2D Measurements &  Calculations RVDd: 2.5 cm IVSd: 0.73 cm LVIDd: 5.2 cm LVIDs: 4.8 cm LVPWd: 0.79 cm FS: 7.3 %  LV mass(C)d: 134.3 grams LV mass(C)dI: 96.2 grams/m2 Ao root diam: 3.2 cm asc Aorta Diam: 3.6 cm LVOT diam: 2.0 cm LVOT area: 3.1 cm2 LA Volume (BP): 50.7 ml LA Volume Index (BP): 36.2 ml/m2  LA Volume Indexed (AL/bp): 38.7 ml/m2 RWT: 0.31 TAPSE: 2.9 cm  Doppler Measurements & Calculations MV E max brent: 133.0 cm/sec MV max P.3 mmHg MV mean P.3 mmHg MV V2 VTI: 11.4 cm MVA(VTI): 5.1 cm2 MV dec slope: 1119 cm/sec2 MV dec time: 0.12 sec Ao V2 max: 141.6 cm/sec Ao max P.0 mmHg Ao V2 mean: 104.1 cm/sec Ao mean P.9 mmHg Ao V2 VTI: 21.3 cm KENDELL(I,D): 2.7 cm2 KENDELL(V,D): 2.7 cm2 AI P1/2t: 603.9 msec LV V1 max P.1 mmHg LV V1 max: 123.1 cm/sec LV V1 VTI: 18.5 cm SV(LVOT): 58.1 ml SI(LVOT): 41.7 ml/m2 PA V2 max: 105.1 cm/sec PA max P.4 mmHg PA acc time: 0.06 sec  AV Brent Ratio (DI): 0.87 KENDELL Index (cm2/m2): 2.0 Medial E/e': 19.4 RV S Brent: 12.4 cm/sec  ______________________________________________________________________________ Report approved by: Sierra Iverson 2023 10:39 AM       US Renal Complete Non-Vascular    Result Date: 2023  EXAM: US RENAL COMPLETE NON-VASCULAR LOCATION: [facility] DATE/TIME: 2023 5:17 PM CDT INDICATION: Right flank pain. Chronic kidney disease. COMPARISON: CT abdomen pelvis 2023. TECHNIQUE: Routine Bilateral Renal and Bladder Ultrasound. FINDINGS: RIGHT KIDNEY: Completely atrophied and not well visualized. LEFT KIDNEY: 4.7 x 2.8 x 2.8 cm. Atrophic. Normal cortical echogenicity. No hydronephrosis. Nonobstructing 2 mm calculus at the lower pole, as seen on today's CT. No visualized renal mass. BLADDER: Small right posterolateral bladder diverticulum measuring up to 2.2 cm. Otherwise, normal.     IMPRESSION: 1.  Right kidney is completely atrophied and not well visualized. 2.  Atrophic left kidney, without hydronephrosis. 3.   Nonobstructing 2 mm left renal calculus. 4.  Small right posterolateral bladder diverticulum.    US Lower Extremity Venous Duplex Bilateral    Result Date: 5/18/2023  EXAM: US LOWER EXTREMITY VENOUS DUPLEX BILATERAL LOCATION: [facility] DATE/TIME: 5/18/2023 2:17 PM CDT INDICATION: Lower extremity swelling COMPARISON: None. TECHNIQUE: Venous Duplex ultrasound of bilateral lower extremities with and without compression, augmentation and duplex. Color flow and spectral Doppler with waveform analysis performed. FINDINGS: Exam includes the common femoral, femoral, popliteal veins as well as segmentally visualized deep calf veins and greater saphenous vein. RIGHT: No deep vein thrombosis. No superficial thrombophlebitis. No popliteal cyst. There is a oblong subcutaneous fluid collection in the distal medial right thigh measuring 4.2 x 2.2 x 6.0 cm. No associated blood flow on color Doppler LEFT: No deep vein thrombosis. No superficial thrombophlebitis. No popliteal cyst.     IMPRESSION: 1.  No deep venous thrombosis in the bilateral lower extremities. 2.  [place] fluid collection subcutaneous tissues distal medial right thigh consistent with a liquefied hematoma or seroma.    CT Chest Abdomen Pelvis w/o Contrast    Result Date: 5/18/2023  EXAM: CT CHEST ABDOMEN PELVIS W/O CONTRAST LOCATION: [facility] DATE/TIME: 5/18/2023 12:14 PM CDT INDICATION: Shortness of breath, chest pain, chronic kidney disease COMPARISON: Portable chest radiography 06/01/2022; MRI lumbar spine 03/04/2022; thoracic spine CT 03/30/2018 TECHNIQUE: CT scan of the chest, abdomen, and pelvis was performed without IV contrast. Multiplanar reformats were obtained. Dose reduction techniques were used. CONTRAST: None. FINDINGS: LUNGS AND PLEURA: Small to moderate left pleural effusion is present layering posteriorly. Trace right pleural effusion. Focal band of atelectasis posterior left lower lobe related  to pleural fluid. A band of atelectasis is present in the lingula. Smaller territories of atelectasis are present in the paradiaphragmatic right lower lobe and along the inferior major fissures. There are 3 adjacent subpleural nodules in the right middle lobe along the minor fissure largest of which is 5 mm (series 4, image 93). There is a subpleural nodule in the lateral left lower lobe which measures 4 mm (series 3, image 77) and 3 mm nodule in the anterior left lower lobe along the major fissure (series 3, image 63). Anterior bowing of the membranous central airways consistent with imaging at partial expiratory phase of the respiratory cycle. Motion artifacts limit assessment of the subsegmental airways particularly in the lower lungs. MEDIASTINUM/AXILLAE: Heterogeneity and enlargement of the isthmus and right lobe of the thyroid gland which extend into the right paratracheal mediastinum slightly displacing the trachea towards the left. There are mildly enlarged subaortic and lower paratracheal lymph nodes. Mild generalized enlargement of the heart chambers. No pericardial effusion. Tortuous proximal great vessels. Mild to moderate arch and descending aorta atheromatous calcifications. The distal descending thoracic aorta has fusiform ectasia measuring up to 4.1 cm in diameter. The esophagus is decompressed. CORONARY ARTERY CALCIFICATION: Mild. Assessment of the upper abdominal viscera is influenced by motion-related blurring. HEPATOBILIARY: Several low-attenuation lesions are randomly distributed in the liver with larger lesion in the right lobe measuring 15 mm (series 3, image 136). Gallbladder is not distended. No calcified gallstones. No bile duct enlargement. PANCREAS: Pancreas is fatty replaced. No definite pancreas parenchymal lesions. SPLEEN: Normal. ADRENAL GLANDS: Lobular low-attenuation left adrenal gland suggesting hyperplasia. Right adrenal gland is also low-attenuation with a lesser degree of tissue  thickening. KIDNEYS/BLADDER: The left kidney is atrophied. There are calcifications at the cortical medullary junction of the upper and lower pole but no hydronephrosis. Compensatory hypertrophy of the right kidney. No definite right nephroureterolithiasis. Urinary bladder is normal. BOWEL: No dilated bowel or bowel wall thickening. In sigmoid diverticulosis. LYMPH NODES: No lymphadenopathy in the abdomen or pelvis. VASCULATURE: Advanced aortoiliac atheromatous calcifications with minimal lobulated contours of the infrarenal aorta but no aneurysm. PELVIC ORGANS: No pelvic masses. MUSCULOSKELETAL: There is a left hip joint replacement. No periprosthetic fracture. Generalized demineralization of the bones. No pelvis or hip insufficiency fractures. There are numerous superior and inferior endplate deformities of the thoracolumbar spine with greatest degree of compression at T8, T9, and T11 where there is a round 50% loss of vertebral body height. Lesser compression deformities of T5-T7, T10 and all lumbar vertebra. The lumbar compression deformities are unchanged from 03/04/2022.  No aggressive or destructive bone lesions.     IMPRESSION: 1.  Small to moderate left and trace right pleural effusions and related atelectasis. 2.  Suboptimal assessment of the lung parenchyma due to motion-related blurring. Multiple foci of peripheral atelectasis in the bases. Few pulmonary nodules are present largest of which is 5 mm. Per 2017 Fleischner Society guidelines, follow-up CT of the  chest in 12 months is considered optional. 3.  Enlarged, heterogeneous right lobe of the thyroid consistent with mediastinal border. 4.  Multiple randomly low-attenuation liver lesions are present the largest of which measures 1.5 cm. These are indeterminate. These could be further characterized with ultrasound or liver MRI. 5.  Sigmoid diverticulosis but no diverticulitis. 6.  Atrophied left kidney with nonobstructing calculi are present. 7.   Numerous thoracic and lumbar vertebral compression deformities. The lumbar compression deformities are unchanged from 03/04/2022. Multiple thoracic compression deformities have worsened since 2018.    60 minutes spent on the date of the encounter doing chart review, history and exam, documentation, communication of the treatment plan with the care team and further activities as noted above.    Signed by: Mario Bardales MD

## 2023-06-06 NOTE — PROGRESS NOTES
"Oncology Rooming Note    June 6, 2023 11:05 AM   Mayela Espino is a 89 year old female who presents for:    Chief Complaint   Patient presents with     Oncology Clinic Visit     New consult oncology     Initial Vitals: /77   Pulse 93   Resp 16   Ht 1.549 m (5' 1\")   Wt 43.1 kg (95 lb)   SpO2 93%   BMI 17.95 kg/m   Estimated body mass index is 17.95 kg/m  as calculated from the following:    Height as of this encounter: 1.549 m (5' 1\").    Weight as of this encounter: 43.1 kg (95 lb). Body surface area is 1.36 meters squared.  Worst Pain (10) Comment: during muscle spasms   No LMP recorded.  Allergies reviewed: Yes  Medications reviewed: Yes    Medications: Medication refills not needed today.  Pharmacy name entered into [facility]:    [id number] - [place], [place] - [address] AT [address] OF [address] [email or web address]  [facility] [place], [place] - [address] [place] [address]    Clinical concerns:  New oncology    Sabrina Costa            "

## 2023-06-07 PROBLEM — F17.210 CIGARETTE NICOTINE DEPENDENCE, UNCOMPLICATED: Status: RESOLVED | Noted: 2019-03-04 | Resolved: 2023-01-01

## 2023-06-07 NOTE — PATIENT INSTRUCTIONS
Mayela Espino,    It was a pleasure to see you today at the [facility].     My recommendations after this visit include:    - No medications changes made today    -Please call if you develop persistent weight gain 2-3 lbs 2 days in a row or 5 lbs in a week with increase shortness of breath, abdominal bloating, lightheaded, dizziness or excessive fatigue or leg swelling    - Please make sure to drink at least 50-60 ounces per day    - Follow up with Gaby Quintana CNP In 2-3 months  in Heart Failure clinic    - Follow up with Dr. Matute in 4 weeks    - Please call Heart Failure Nurse Line at [phone], if you have any questions or concerns

## 2023-06-07 NOTE — PROGRESS NOTES
Assessment/Recommendations   Assessment:    1.  Acute on chronic systolic heart failure with LVEF of 20 to 25%: Previously echo showed LVEF of 30 to 35%.  Patient was hospitalized from May 18 through May 25 with right flank pain with dyspnea.  Serial troponin were found negative.  Echocardiogram showed severely reduced LVEF of 20 to 25%.    Patient was also found in acute hypoxic respiratory failure and COPD exacerbation with compensated respiratory acidosis during hospitalization.  She is on O2 1 and half to 2 L per nasal cannula.  Oxygen saturation initially was 87% on 1 and half liters and improved to 94% on 2 L per nasal cannula.    We discussed and reviewed about heart failure, medication management, and lifestyle management including low sodium diet <2 g/day, daily weight, and staying physically active as tolerated. Patient met with Ayla HF CORE nurse clinician for heart failure education in the hospital.    Heart failure regimen includes  -Not on beta-blocker therapy  -Not on ACE/ ARB/ARNI therapy d/t CKD  -On diuretic therapy with furosemide 20 mg daily    Patient is well compensated with no evidence of fluid retention on exam.  Per son, patient appetite is poor and oral fluid intake is poor.  She reports following low-sodium diet at TCU.    She sleeps with head of the bed elevated.    Blood pressure today is 123/53.    2.  Secondary cardiomyopathy: With unknown etiology. Per Dr. Vasquez's n from February 2022, due to patient's age and frailty invasive ischemic evaluation was deferred at that time.    3.  Chronic kidney disease stage IV: Most recent BMP  level showed sodium 142, potassium 4.9, BUN is 46.2, and creatinine 2.25.  Renal function has worsened compared to a year ago.    5.  History of breast cancer: Recent MRI done in the hospital showed metastatic to liver and spine-suspected recurrent breast cancer.  Patient had a consultation with oncology yesterday. Patient is planning to proceed with  biopsy.  Oncology is considering palliative treatment.    Plan/Recommendation:  -We discussed about initiating low-dose of beta-blocker therapy to improve systolic dysfunction.  Patient declined stating that she fell when she took carvedilol in the past and therefore discontinued on her own.    -ACEI or ARB therapy was not started due to decline in renal function.  Patient is also not interested in trial at this time.    -Patient would like to hold off further cardiac work-up at this time and would like to focus on biopsy and further treatment plan from oncology team which I think is reasonable.    Patient has previously seen Dr. Vasquez in February 2022.  She requested follow-up with Dr. Giovani arriaza.  Follow up with Gaby Quintana CNP in 2-3 months in Heart Failure Clinic     History of Present Illness/Subjective    Ms. Mayela Espino is a 89 year old female with a past medical history of CVA, hypertension, former tobacco use, generalized anxiety disorder, breast cancer, status post hip replacement, chronic kidney disease stage IV, hypertension, and cardiomyopathy with systolic heart failure who is seen at [facility] for post hospitalization heart failure follow-up.    Patient was last seen by Dr. Vasquez in 2/2033 and Gaby Quintana CNP in 4/2022-note reviewed.    Today, Mayela is here accompanied by her son.  She reports that she developed pneumonia while she was at the TCU.  She is currently undergoing treatment with an antibiotic.  She feels some improvement in her breathing.  Her appetite continues to remain poor and son is concerned about poor oral fluid intake.  She denies lightheadedness, shortness of breath, orthopnea, PND, palpitations, chest pain, abdominal fullness/bloating and lower extremity edema.     ECHO from 5/19/23-Reviewed:   Interpretation Summary     1. The left ventricle is mildly dilated. Left ventricular function is  decreased. The ejection  fraction is 25-30% (severely reduced). There is severe  global hypokinesia of the left ventricle.  2. Normal right ventricle size and systolic function.  3. The left atrium is moderately dilated.  4. There is mild to moderate (1-2+) mitral regurgitation.  5. There is mild (1+) aortic regurgitation.  6. Moderate left pleural effusion  ______________________________________________________________________________       Physical Examination Review of Systems   /53 (BP Location: Right arm, Patient Position: Sitting, Cuff Size: Adult Small)   Pulse 98   Wt 42.6 kg (94 lb)   SpO2 94%   BMI 17.76 kg/m    Body mass index is 17.76 kg/m .  Wt Readings from Last 3 Encounters:   06/07/23 42.6 kg (94 lb)   06/06/23 43.1 kg (95 lb)   06/01/23 42.1 kg (92 lb 12.8 oz)     General Appearance:   no distress, normal body habitus   ENT/Mouth: membranes moist, no oral lesions or bleeding gums.      EYES:  no scleral icterus, normal conjunctivae   Neck: no carotid bruits or thyromegaly   Chest/Lungs:   lungs are clear to auscultation, no rales or wheezing, equal chest wall expansion except diminished globally   Cardiovascular:   Heart rate regular. Normal first and second heart sounds with no murmurs, rubs, or gallops; the carotid, radial and posterior tibial pulses are intact, JVP is difficult to assess due to the patient's body habitus, no edema bilaterally    Abdomen:  no organomegaly, masses, bruits, or tenderness; bowel sounds are present   Extremities   no cyanosis or clubbing   Radial pulses and Pedal pulses intact and symmetrical.  CMS intact.   Skin: no xanthelasma, warm.    Neurologic: normal  bilateral, no tremors     Psychiatric: alert and oriented x3, calm                Negative unless noted in HPI     Medical History  Surgical History Family History Social History   Past Medical History:   Diagnosis Date     Abnormal mini-mental status exam     scored 16 2012, ?dementia     Breast cancer (H)      Chronic  kidney disease      Congestive heart failure (H)      Essential tremor      HTN (hypertension)      Tobacco use     Past Surgical History:   Procedure Laterality Date     EYE SURGERY       JOINT REPLACEMENT       LUMPECTOMY BREAST Bilateral 1982    Cyst removed     OTHER SURGICAL HISTORY Left     ear prosthesis     OTHER SURGICAL HISTORY      l rola     OTHER SURGICAL HISTORY      left hip ORIF     ND MASTECTOMY, MODIFIED RADICAL Bilateral 6/14/2021    Procedure: Bilateral mastectomies;  Surgeon: Dulce Duran MD;  [place] [facility] [place]  Service: General    Family History   Problem Relation Age of Onset     Hypertension Mother      Cerebrovascular Disease Father 70.00     No Known Problems Son     Social History     Socioeconomic History     Marital status:      Spouse name: Not on file     Number of children: Not on file     Years of education: Not on file     Highest education level: Not on file   Occupational History     Not on file   Tobacco Use     Smoking status: Every Day     Packs/day: 0.25     Types: Cigarettes     Smokeless tobacco: Never   Vaping Use     Vaping status: Not on file   Substance and Sexual Activity     Alcohol use: No     Drug use: Never     Sexual activity: Not on file   Other Topics Concern     Parent/sibling w/ CABG, MI or angioplasty before 65F 55M? Not Asked   Social History Narrative    Has one son who lives in [place]     Social Determinants of Health     Financial Resource Strain: Not on file   Food Insecurity: Not on file   Transportation Needs: Not on file   Physical Activity: Not on file   Stress: Not on file   Social Connections: Not on file   Intimate Partner Violence: Not on file   Housing Stability: Not on file          Medications  Allergies   Current Outpatient Medications   Medication Sig Dispense Refill     acetaminophen (TYLENOL) 325 MG tablet Take 3 tablets (975 mg) by mouth every 8 hours       albuterol (PROVENTIL) (2.5 MG/3ML) 0.083% neb solution Take  1 vial (2.5 mg) by nebulization every 4 hours as needed for shortness of breath, wheezing or cough 90 mL      artificial saliva (BIOTENE MT) SOLN solution Swish and spit 2 mLs (2 sprays) in mouth 4 times daily as needed for dry mouth       aspirin 81 MG EC tablet Take 81 mg by mouth daily       calcitonin, salmon, (MIACALCIN) 200 UNIT/ACT nasal spray Spray 1 spray into one nostril alternating nostrils daily Alternate nostril each day.       cholecalciferol, vitamin D3, 1,000 unit tablet Take 1,000 Units by mouth daily       diclofenac (VOLTAREN) 1 % topical gel Apply 2 g topically 4 times daily       [START ON 6/8/2023] furosemide (LASIX) 20 MG tablet Take 1 tablet (20 mg) by mouth twice a week       guaiFENesin (ROBITUSSIN) 20 mg/mL liquid Take 15 mLs (300 mg) by mouth 3 times daily And q 6 hours prn       ipratropium - albuterol 0.5 mg/2.5 mg/3 mL (DUONEB) 0.5-2.5 (3) MG/3ML neb solution Take 1 vial (3 mLs) by nebulization 3 times daily 90 mL      levofloxacin (LEVAQUIN) 250 MG tablet Give 500 mg po x 1 now. Then 250 mg po every other day. Over 10 days 6 tablet 0     LORazepam (ATIVAN) 0.5 MG tablet Take 0.5 tablets (0.25 mg) by mouth every 4 hours as needed for anxiety 4 tablet 0     methocarbamol (ROBAXIN) 500 MG tablet Take 0.5 tablets (250 mg) by mouth 4 times daily       multivit-iron-min-folic acid 3,500-18-0.4 unit-mg-mg Chew [MULTIVIT-IRON-MIN-FOLIC ACID 3,500-18-0.4 UNIT-MG-MG CHEW] Chew 1 tablet daily.        ondansetron (ZOFRAN ODT) 4 MG ODT tab Take 4 mg by mouth every 8 hours as needed for nausea       oxyCODONE (ROXICODONE) 5 MG tablet Take 0.5 tablets (2.5 mg) by mouth every 4 hours as needed for moderate to severe pain 16 tablet 0     polyethylene glycol (MIRALAX) 17 GM/Dose powder Take 1 Capful by mouth daily And every day prn       senna-docusate (SENOKOT-S/PERICOLACE) 8.6-50 MG tablet Take 1 tablet by mouth daily      Allergies   Allergen Reactions     Latex Unknown     Added based on  information entered during case entry, please review and add reactions, type, and severity as needed     Latex Rash         Lab Results    Chemistry/lipid CBC Cardiac Enzymes/BNP/TSH/INR   Lab Results   Component Value Date    CHOL 133 03/04/2022    HDL 70 03/04/2022    TRIG 66 03/04/2022    BUN 46.2 (H) 06/05/2023     06/05/2023    CO2 27 06/05/2023    Lab Results   Component Value Date    WBC 5.0 06/05/2023    HGB 10.9 (L) 06/05/2023    HCT 37.9 06/05/2023     (H) 06/05/2023     (L) 06/05/2023    Lab Results   Component Value Date    TROPONINI 0.08 05/18/2023    BNP 4,024 (H) 05/18/2023    TSH 0.07 (L) 07/10/2021    INR 1.03 03/04/2022        45 minutes spent on the date of encounter doing chart review, review of test results, interpretation with above tests, patient visit, documentation, discussion with other provider and discussion with family.        This note has been dictated using voice recognition software. Any grammatical, typographical, or context distortions are unintentional and inherent to the software

## 2023-06-07 NOTE — LETTER
6/7/2023    Johnathan Brenner MD  [address]  [address] [place] [address]    RE: Mayela Espino       Dear Colleague,     I had the pleasure of seeing Mayela Espino in the [facility].          Assessment/Recommendations   Assessment:    1.  Acute on chronic systolic heart failure with LVEF of 20 to 25%: Previously echo showed LVEF of 30 to 35%.  Patient was hospitalized from May 18 through May 25 with right flank pain with dyspnea.  Serial troponin were found negative.  Echocardiogram showed severely reduced LVEF of 20 to 25%.    Patient was also found in acute hypoxic respiratory failure and COPD exacerbation with compensated respiratory acidosis during hospitalization.  She is on O2 1 and half to 2 L per nasal cannula.  Oxygen saturation initially was 87% on 1 and half liters and improved to 94% on 2 L per nasal cannula.    We discussed and reviewed about heart failure, medication management, and lifestyle management including low sodium diet <2 g/day, daily weight, and staying physically active as tolerated. Patient met with Ayla HF CORE nurse clinician for heart failure education in the hospital.    Heart failure regimen includes  -Not on beta-blocker therapy  -Not on ACE/ ARB/ARNI therapy d/t CKD  -On diuretic therapy with furosemide 20 mg daily    Patient is well compensated with no evidence of fluid retention on exam.  Per son, patient appetite is poor and oral fluid intake is poor.  She reports following low-sodium diet at TCU.    She sleeps with head of the bed elevated.    Blood pressure today is 123/53.    2.  Secondary cardiomyopathy: With unknown etiology. Per Dr. Vasquez's n from February 2022, due to patient's age and frailty invasive ischemic evaluation was deferred at that time.    3.  Chronic kidney disease stage IV: Most recent BMP  level showed sodium 142, potassium 4.9, BUN is 46.2, and creatinine 2.25.  Renal function has worsened compared to a year ago.    5.  History of  breast cancer: Recent MRI done in the hospital showed metastatic to liver and spine-suspected recurrent breast cancer.  Patient had a consultation with oncology yesterday. Patient is planning to proceed with biopsy.  Oncology is considering palliative treatment.    Plan/Recommendation:  -We discussed about initiating low-dose of beta-blocker therapy to improve systolic dysfunction.  Patient declined stating that she fell when she took carvedilol in the past and therefore discontinued on her own.    -ACEI or ARB therapy was not started due to decline in renal function.  Patient is also not interested in trial at this time.    -Patient would like to hold off further cardiac work-up at this time and would like to focus on biopsy and further treatment plan from oncology team which I think is reasonable.    Patient has previously seen Dr. Vasquez in February 2022.  She requested follow-up with Dr. Giovani arriaza.  Follow up with Gaby Quintana CNP in 2-3 months in Heart Failure Clinic     History of Present Illness/Subjective    Ms. Mayela Espino is a 89 year old female with a past medical history of CVA, hypertension, former tobacco use, generalized anxiety disorder, breast cancer, status post hip replacement, chronic kidney disease stage IV, hypertension, and cardiomyopathy with systolic heart failure who is seen at [facility] for post hospitalization heart failure follow-up.    Patient was last seen by Dr. Vasquez in 2/2033 and Gaby Quintana CNP in 4/2022-note reviewed.    Today, Mayela is here accompanied by her son.  She reports that she developed pneumonia while she was at the TCU.  She is currently undergoing treatment with an antibiotic.  She feels some improvement in her breathing.  Her appetite continues to remain poor and son is concerned about poor oral fluid intake.  She denies lightheadedness, shortness of breath, orthopnea, PND, palpitations, chest pain,  abdominal fullness/bloating and lower extremity edema.     ECHO from 5/19/23-Reviewed:   Interpretation Summary     1. The left ventricle is mildly dilated. Left ventricular function is  decreased. The ejection fraction is 25-30% (severely reduced). There is severe  global hypokinesia of the left ventricle.  2. Normal right ventricle size and systolic function.  3. The left atrium is moderately dilated.  4. There is mild to moderate (1-2+) mitral regurgitation.  5. There is mild (1+) aortic regurgitation.  6. Moderate left pleural effusion  ______________________________________________________________________________       Physical Examination Review of Systems   /53 (BP Location: Right arm, Patient Position: Sitting, Cuff Size: Adult Small)   Pulse 98   Wt 42.6 kg (94 lb)   SpO2 94%   BMI 17.76 kg/m    Body mass index is 17.76 kg/m .  Wt Readings from Last 3 Encounters:   06/07/23 42.6 kg (94 lb)   06/06/23 43.1 kg (95 lb)   06/01/23 42.1 kg (92 lb 12.8 oz)     General Appearance:   no distress, normal body habitus   ENT/Mouth: membranes moist, no oral lesions or bleeding gums.      EYES:  no scleral icterus, normal conjunctivae   Neck: no carotid bruits or thyromegaly   Chest/Lungs:   lungs are clear to auscultation, no rales or wheezing, equal chest wall expansion except diminished globally   Cardiovascular:   Heart rate regular. Normal first and second heart sounds with no murmurs, rubs, or gallops; the carotid, radial and posterior tibial pulses are intact, JVP is difficult to assess due to the patient's body habitus, no edema bilaterally    Abdomen:  no organomegaly, masses, bruits, or tenderness; bowel sounds are present   Extremities   no cyanosis or clubbing   Radial pulses and Pedal pulses intact and symmetrical.  CMS intact.   Skin: no xanthelasma, warm.    Neurologic: normal  bilateral, no tremors     Psychiatric: alert and oriented x3, calm                Negative unless noted in HPI      Medical History  Surgical History Family History Social History   Past Medical History:   Diagnosis Date    Abnormal mini-mental status exam     scored 16 2012, ?dementia    Breast cancer (H)     Chronic kidney disease     Congestive heart failure (H)     Essential tremor     HTN (hypertension)     Tobacco use     Past Surgical History:   Procedure Laterality Date    EYE SURGERY      JOINT REPLACEMENT      LUMPECTOMY BREAST Bilateral 1982    Cyst removed    OTHER SURGICAL HISTORY Left     ear prosthesis    OTHER SURGICAL HISTORY      l rola    OTHER SURGICAL HISTORY      left hip ORIF    MS MASTECTOMY, MODIFIED RADICAL Bilateral 6/14/2021    Procedure: Bilateral mastectomies;  Surgeon: Dulce Duran MD;  Location: [facility];  Service: General    Family History   Problem Relation Age of Onset    Hypertension Mother     Cerebrovascular Disease Father 70.00    No Known Problems Son     Social History     Socioeconomic History    Marital status:      Spouse name: Not on file    Number of children: Not on file    Years of education: Not on file    Highest education level: Not on file   Occupational History    Not on file   Tobacco Use    Smoking status: Every Day     Packs/day: 0.25     Types: Cigarettes    Smokeless tobacco: Never   Vaping Use    Vaping status: Not on file   Substance and Sexual Activity    Alcohol use: No    Drug use: Never    Sexual activity: Not on file   Other Topics Concern    Parent/sibling w/ CABG, MI or angioplasty before 65F 55M? Not Asked   Social History Narrative    Has one son who lives in [place]     Social Determinants of Health     Financial Resource Strain: Not on file   Food Insecurity: Not on file   Transportation Needs: Not on file   Physical Activity: Not on file   Stress: Not on file   Social Connections: Not on file   Intimate Partner Violence: Not on file   Housing Stability: Not on file          Medications  Allergies   Current Outpatient Medications    Medication Sig Dispense Refill    acetaminophen (TYLENOL) 325 MG tablet Take 3 tablets (975 mg) by mouth every 8 hours      albuterol (PROVENTIL) (2.5 MG/3ML) 0.083% neb solution Take 1 vial (2.5 mg) by nebulization every 4 hours as needed for shortness of breath, wheezing or cough 90 mL     artificial saliva (BIOTENE MT) SOLN solution Swish and spit 2 mLs (2 sprays) in mouth 4 times daily as needed for dry mouth      aspirin 81 MG EC tablet Take 81 mg by mouth daily      calcitonin, salmon, (MIACALCIN) 200 UNIT/ACT nasal spray Spray 1 spray into one nostril alternating nostrils daily Alternate nostril each day.      cholecalciferol, vitamin D3, 1,000 unit tablet Take 1,000 Units by mouth daily      diclofenac (VOLTAREN) 1 % topical gel Apply 2 g topically 4 times daily      [START ON 6/8/2023] furosemide (LASIX) 20 MG tablet Take 1 tablet (20 mg) by mouth twice a week      guaiFENesin (ROBITUSSIN) 20 mg/mL liquid Take 15 mLs (300 mg) by mouth 3 times daily And q 6 hours prn      ipratropium - albuterol 0.5 mg/2.5 mg/3 mL (DUONEB) 0.5-2.5 (3) MG/3ML neb solution Take 1 vial (3 mLs) by nebulization 3 times daily 90 mL     levofloxacin (LEVAQUIN) 250 MG tablet Give 500 mg po x 1 now. Then 250 mg po every other day. Over 10 days 6 tablet 0    LORazepam (ATIVAN) 0.5 MG tablet Take 0.5 tablets (0.25 mg) by mouth every 4 hours as needed for anxiety 4 tablet 0    methocarbamol (ROBAXIN) 500 MG tablet Take 0.5 tablets (250 mg) by mouth 4 times daily      multivit-iron-min-folic acid 3,500-18-0.4 unit-mg-mg Chew [MULTIVIT-IRON-MIN-FOLIC ACID 3,500-18-0.4 UNIT-MG-MG CHEW] Chew 1 tablet daily.       ondansetron (ZOFRAN ODT) 4 MG ODT tab Take 4 mg by mouth every 8 hours as needed for nausea      oxyCODONE (ROXICODONE) 5 MG tablet Take 0.5 tablets (2.5 mg) by mouth every 4 hours as needed for moderate to severe pain 16 tablet 0    polyethylene glycol (MIRALAX) 17 GM/Dose powder Take 1 Capful by mouth daily And every day prn       senna-docusate (SENOKOT-S/PERICOLACE) 8.6-50 MG tablet Take 1 tablet by mouth daily      Allergies   Allergen Reactions    Latex Unknown     Added based on information entered during case entry, please review and add reactions, type, and severity as needed    Latex Rash         Lab Results    Chemistry/lipid CBC Cardiac Enzymes/BNP/TSH/INR   Lab Results   Component Value Date    CHOL 133 03/04/2022    HDL 70 03/04/2022    TRIG 66 03/04/2022    BUN 46.2 (H) 06/05/2023     06/05/2023    CO2 27 06/05/2023    Lab Results   Component Value Date    WBC 5.0 06/05/2023    HGB 10.9 (L) 06/05/2023    HCT 37.9 06/05/2023     (H) 06/05/2023     (L) 06/05/2023    Lab Results   Component Value Date    TROPONINI 0.08 05/18/2023    BNP 4,024 (H) 05/18/2023    TSH 0.07 (L) 07/10/2021    INR 1.03 03/04/2022        45 minutes spent on the date of encounter doing chart review, review of test results, interpretation with above tests, patient visit, documentation, discussion with other provider and discussion with family.        This note has been dictated using voice recognition software. Any grammatical, typographical, or context distortions are unintentional and inherent to the software          Thank you for allowing me to participate in the care of your patient.      Sincerely,     JOHNNY [facility]  cc:   Lalo Matute MD  [address] [facility] [address]  [place] [id number]

## 2023-06-07 NOTE — PROGRESS NOTES
[facility]: Cancer Care                                                                                          Dr. Bardales reports patient needs assistance with transportation.  Son is currently visiting from [place] and will be returning home soon.  SW referral placed.    Signature:  Nanci Huang RN

## 2023-06-08 NOTE — LETTER
"    6/8/2023        RE: Mayela Espino  [address] [address]        [facility] GERIATRICS    Chief Complaint   Patient presents with     Discharge Summary Nursing Home     HPI:  Mayela Espino is a 89 year old  (12/18/1932), who is being seen today for an episodic care visit at: [facility] ([facility]) [[id number]].     Patient is seen today for recheck of multiple acute and chronic medical issues:    1. Pneumonia of left lower lobe due to infectious organism    2. Heart failure with reduced ejection fraction (H)    3. Chronic obstructive pulmonary disease with acute exacerbation (H)    4. Mild protein-calorie malnutrition (H)    5. Chronic kidney disease, stage 4 (severe) (H)    6. Thrombocytopenia (H)    7. Carcinoma of breast metastatic to bone, unspecified laterality (H)    8. Physical deconditioning    9. Constipation, unspecified constipation type      Son Don present from out of state. Patient did f/w cardiology/heart failure clinic and with oncology. Liver biopsy in IR recommended and referral made per oncology.     Patient reports fatigue, nasal congestion, 'echo in head when talks' and full feeling in ears. Also reports some SOB after son discovered patient's O2 was not on and persistent lower O2 saturation readings when off O2.. Son Don reports patient is not eating very well. Patient denies elpidio nausea but reports is a \"picky eater\". Discussed importance of good nutrition and sleep. Discussed supplementing with food brought in from restaurants, nutritional supplements, ice cream, etc. Patient/son willing to talk with dietician as well. (of note patient did eat all of her soup during visit when lunch arrived). Patient reports is sleeping better than was. Reports noting some constipation. Of note recently backed off of Senna S 2/2 patient reports of loose stools. Discussed upcoming home evaluation and some potential options regarding discharge plan I.e home with private care and " "home care vs A/L. Discussed recovery from pneumonia and current heart failure status as complicating factor in recovery. Also discussed with son  Scheduling biopsy and arranging transportation through the [facility] on TCU- phone number for [facility] shared with son Mina.             Allergies, and PMH/PSH reviewed in EPIC today.  REVIEW OF SYSTEMS:  4 point ROS including Respiratory, CV, GI and , other than that noted in the HPI,  is negative    Objective:   /62   Pulse 94   Temp (!) 96.4  F (35.8  C)   Resp 20   Ht 1.549 m (5' 1\")   Wt 43.5 kg (95 lb 12.8 oz)   SpO2 94%   BMI 18.10 kg/m      Resp: Effort WNL, LS diminished in right base. Diminished approx 1/3 way up posteriorly base up left lung. No real cough noted. O2 sats stable 90s.   CV: S1-2, no S3-4, no murmurs noterd- **  Abd- soft, nontender, BS +  Musc- GUTIÉRREZ  Psych- alert, calm, pleasant        Most Recent 3 CBC's:Recent Labs   Lab Test 06/05/23  0656 05/31/23  0906   WBC 5.0 7.3   HGB 10.9* 10.8*   * 107*   * 124*     Most Recent 3 BMP's:Recent Labs   Lab Test 06/05/23  0656      POTASSIUM 4.9   CHLORIDE 105   CO2 27   BUN 46.2*   CR 2.25*   ANIONGAP 10   ITA 9.1   *       Assessment/Plan:  Pneumonia of left lower lobe due to infectious organism  - noted on CXR 5/30. Congestion/cough resolving but now with some nasal congestion, and \"echo\" when talks. Full feeling in ears.  - add Afrin NS 2 sprays each nare daily x 3 days (discussed with patient/son Mina)  - OK Nasal saline- keep at bedside and utilize as directed prn  - continue on guaifenesin, prn oral biotene for cough and dry mouth  - will stop Levaquin in case culprit in terms of appetite- has completed 7 day course  - continue BID Duonebs and prn albuterol nebs  - continue to wean O2 as able to keep sats > 90%  - order check CBC, BMP 6/12  Heart failure with reduced ejection fraction (H)  - chronic, EF 25% (reduced from previous baseline. Had reduced lasix last week 2/2 " poor po intake. Weights stable 95lb. Some mild SOB today after O2 was found off, but improved over course of visit today. F/w heart clinic this week. Patient declined addition of BB or ACE/ARB  - continue twice weekly lasix for now  - continue to monitor VS, weights  - f/w cardiologist as directed  - BMP ordered again for 6/12 to ensure stability  Chronic obstructive pulmonary disease with acute exacerbation (H)  - chronic, exacerbated inpatient. Treated with brief prednisone burst. No wheezing noted today  - continues on Duonebs and prn albuterol nebs    Mild protein-calorie malnutrition (H)  - Body mass index is 18.1 kg/m .  Lab Results   Component Value Date    ALBUMIN 3.6 05/31/2023    ALBUMIN 3.1 05/19/2023     - poor appetite and intake since PNA diagnosis.   - dietician referral made for recommendations dietary choices and supplements to help optimize nutritional status  - monitor weights    Chronic kidney disease, stage 4 (severe) (H)  - chronic, baseline approx 2.2. slight bump Cr noted earlier in TCU. Improved since reducing lasix slightly as above  -  -avoid nephrotoxins  - recheck periodic BMP to ensure stability      Thrombocytopenia (H)  - chronic, no s/s bleeding noted. Last 117 6/5  - monitor for any s/s bleeding  - order recheck CBC 6/12    Insomnia  - Per patient sleep better, improving  - continue to monitor   - prn lorazepam is availabe    Carcinoma of breast metastatic to bone, unspecified laterality (H)  -H/O- s/p mastectomy newly identified mets to liver and bone. F/w Oncology earlier this week. Liver biopsy recommended and ordered per oncology. Discussed with patient and son Don as above. Son will call IR to scheduled and then call [facility] to arrange transporation    Physical deconditioning  2/2 above  - PT/OT to optimize function, safety and independence  Supportive cares and treatments  - ongoing discharge planning, SW follow and care conferences per unit protocol      Constipation, unspecified  constipation type  - reported today after recent reduction in Senna S  - add back Senna S BID and continue prn  - continue Miralax daily        Orders:  Written on unit and discussed with patient and son Don    Electronically signed by: JOHNNY Torres CNP             Sincerely,        JOHNNY Torres CNP

## 2023-06-08 NOTE — PATIENT INSTRUCTIONS
Patient:  Mayela Espino    : 1932    ORDERS:    Discontinue Levaquin now.      Wean O2 off as able, keep sats > 90%      JOHNNY Torres CNP on 2023 at 5:15 PM

## 2023-06-08 NOTE — PROGRESS NOTES
"[facility] GERIATRICS    Chief Complaint   Patient presents with     Discharge Summary Nursing Home     HPI:  Mayela Espino is a 89 year old  (12/18/1932), who is being seen today for an episodic care visit at: [facility] [[id number]].     Patient is seen today for recheck of multiple acute and chronic medical issues:    1. Pneumonia of left lower lobe due to infectious organism    2. Heart failure with reduced ejection fraction (H)    3. Chronic obstructive pulmonary disease with acute exacerbation (H)    4. Mild protein-calorie malnutrition (H)    5. Chronic kidney disease, stage 4 (severe) (H)    6. Thrombocytopenia (H)    7. Carcinoma of breast metastatic to bone, unspecified laterality (H)    8. Physical deconditioning    9. Constipation, unspecified constipation type      Son Don present from out of state. Patient did f/w cardiology/heart failure clinic and with oncology. Liver biopsy in IR recommended and referral made per oncology.     Patient reports fatigue, nasal congestion, 'echo in head when talks' and full feeling in ears. Also reports some SOB after son discovered patient's O2 was not on and persistent lower O2 saturation readings when off O2.. Son Don reports patient is not eating very well. Patient denies elpidio nausea but reports is a \"picky eater\". Discussed importance of good nutrition and sleep. Discussed supplementing with food brought in from restaurants, nutritional supplements, ice cream, etc. Patient/son willing to talk with dietician as well. (of note patient did eat all of her soup during visit when lunch arrived). Patient reports is sleeping better than was. Reports noting some constipation. Of note recently backed off of Senna S 2/2 patient reports of loose stools. Discussed upcoming home evaluation and some potential options regarding discharge plan I.e home with private care and home care vs A/L. Discussed recovery from pneumonia and current heart failure status as " "complicating factor in recovery. Also discussed with son  Scheduling biopsy and arranging transportation through the [facility] on TCU- phone number for [facility] shared with son Mina.             Allergies, and PMH/PSH reviewed in [facility] today.  REVIEW OF SYSTEMS:  4 point ROS including Respiratory, CV, GI and , other than that noted in the HPI,  is negative    Objective:   /62   Pulse 94   Temp (!) 96.4  F (35.8  C)   Resp 20   Ht 1.549 m (5' 1\")   Wt 43.5 kg (95 lb 12.8 oz)   SpO2 94%   BMI 18.10 kg/m      Resp: Effort WNL, LS diminished in right base. Diminished approx 1/3 way up posteriorly base up left lung. No real cough noted. O2 sats stable 90s.   CV: S1-2, no S3-4, no murmurs noterd- **  Abd- soft, nontender, BS +  Musc- GUTIÉRREZ  Psych- alert, calm, pleasant        Most Recent 3 CBC's:Recent Labs   Lab Test 06/05/23  0656 05/31/23  0906   WBC 5.0 7.3   HGB 10.9* 10.8*   * 107*   * 124*     Most Recent 3 BMP's:Recent Labs   Lab Test 06/05/23  0656      POTASSIUM 4.9   CHLORIDE 105   CO2 27   BUN 46.2*   CR 2.25*   ANIONGAP 10   ITA 9.1   *       Assessment/Plan:  Pneumonia of left lower lobe due to infectious organism  - noted on CXR 5/30. Congestion/cough resolving but now with some nasal congestion, and \"echo\" when talks. Full feeling in ears.  - add Afrin NS 2 sprays each nare daily x 3 days (discussed with patient/son Mina)  - OK Nasal saline- keep at bedside and utilize as directed prn  - continue on guaifenesin, prn oral biotene for cough and dry mouth  - will stop Levaquin in case culprit in terms of appetite- has completed 7 day course  - continue BID Duonebs and prn albuterol nebs  - continue to wean O2 as able to keep sats > 90%  - order check CBC, BMP 6/12  Heart failure with reduced ejection fraction (H)  - chronic, EF 25% (reduced from previous baseline. Had reduced lasix last week 2/2 poor po intake. Weights stable 95lb. Some mild SOB today after O2 was found off, but " improved over course of visit today. F/w heart clinic this week. Patient declined addition of BB or ACE/ARB  - continue twice weekly lasix for now  - continue to monitor VS, weights  - f/w cardiologist as directed  - BMP ordered again for 6/12 to ensure stability  Chronic obstructive pulmonary disease with acute exacerbation (H)  - chronic, exacerbated inpatient. Treated with brief prednisone burst. No wheezing noted today  - continues on Duonebs and prn albuterol nebs    Mild protein-calorie malnutrition (H)  - Body mass index is 18.1 kg/m .  Lab Results   Component Value Date    ALBUMIN 3.6 05/31/2023    ALBUMIN 3.1 05/19/2023     - poor appetite and intake since PNA diagnosis.   - dietician referral made for recommendations dietary choices and supplements to help optimize nutritional status  - monitor weights    Chronic kidney disease, stage 4 (severe) (H)  - chronic, baseline approx 2.2. slight bump Cr noted earlier in TCU. Improved since reducing lasix slightly as above  -  -avoid nephrotoxins  - recheck periodic BMP to ensure stability      Thrombocytopenia (H)  - chronic, no s/s bleeding noted. Last 117 6/5  - monitor for any s/s bleeding  - order recheck CBC 6/12    Insomnia  - Per patient sleep better, improving  - continue to monitor   - prn lorazepam is availabe    Carcinoma of breast metastatic to bone, unspecified laterality (H)  -H/O- s/p mastectomy newly identified mets to liver and bone. F/w Oncology earlier this week. Liver biopsy recommended and ordered per oncology. Discussed with patient and son Don as above. Son will call IR to scheduled and then call [facility] to arrange transporation    Physical deconditioning  2/2 above  - PT/OT to optimize function, safety and independence  Supportive cares and treatments  - ongoing discharge planning, SW follow and care conferences per unit protocol      Constipation, unspecified constipation type  - reported today after recent reduction in Senna S  - add back  Senna S BID and continue prn  - continue Miralax daily        Orders:  Written on unit and discussed with patient and son Don    Electronically signed by: JOHNNY Torres CNP

## 2023-06-09 NOTE — TELEPHONE ENCOUNTER
[facility] Geriatrics Lab Note     Provider: JOHNNY Torres CNP   Facility: [facility] Facility Type:  TCU    Allergies   Allergen Reactions     Latex Unknown     Added based on information entered during case entry, please review and add reactions, type, and severity as needed     Latex Rash       Labs Reviewed by provider: BMP and CBC    Verbal Order/Direction given by Provider:   1) Give Lasix 20 mg PO X 1   2) Recheck BMP 6/12/23      Provider giving Order:  JOHNNY Torres CNP     Verbal Order given to: Delphine Lee RN

## 2023-06-09 NOTE — RESULT ENCOUNTER NOTE
Please have them give a dose of lasix 20 mg 6/10 x 1     Recheck BMP 6/12     JOHNNY Torres CNP on 6/9/2023 at 4:41 PM

## 2023-06-12 NOTE — PROGRESS NOTES
"[facility] GERIATRICS    Chief Complaint   Patient presents with     RECHECK     HPI:  Mayela Espino is a 89 year old  (12/18/1932), who is being seen today for an episodic care visit at: [facility] ([facility]) [[id number]].     Patient seen today for recheck of multiple acute and chronic medical issues:    1. Pneumonia of left lower lobe due to infectious organism    2. Heart failure with reduced ejection fraction (H)    3. Chronic obstructive pulmonary disease with acute exacerbation (H)    4. Nausea    5. Loose stools    6. Mild protein-calorie malnutrition (H)    7. Chronic kidney disease, stage 4 (severe) (H)    8. Hyperkalemia    9. Thrombocytopenia (H)    10. 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 Physical deconditioning      Completed antibiotics/Levaquin. Afebrile. WBC WNL. Working with PT/OT and making follow up functional gains.     Nursing reports no new concerns    VS, weights reviewed in facility EMR today.    Patient found in room sitting up in chair. Alert, calm, NAD. Reports breathing is easier and cough has lessened. Reports has an \"echo\" in her head when she talks. States has had this before. Discussed likely upper airway congestion 2/2 recent pneumonia. Reports is eating \"some\". Did meet with dietician. Reports had some loose stools overnight. Denies n/v or abdominal pain.     Allergies, and PMH/PSH reviewed in [facility] today.  REVIEW OF SYSTEMS:  4 point ROS including Respiratory, CV, GI and , other than that noted in the HPI,  is negative    Objective:   /75   Pulse 97   Temp 97.1  F (36.2  C)   Resp 20   Ht 1.549 m (5' 1\")   Wt 43.1 kg (95 lb)   SpO2 92%   BMI 17.95 kg/m      Resp: Effort WNL, LS decreased bilateral bases, more so left. Otherwise clear. O2 on 2LNC  CV: VS as above, trace edema noted bilateral ankles, slight > right.  Abd- soft, nontender, BS +  Musc- GUTIÉRREZ  Psych- alert, calm, pleasant      Recent labs in EPIC reviewed by me " "today.     Assessment/Plan:  Pneumonia of left lower lobe due to infectious organism  - completed 7 day course of lasix 6/8. Afebrile, WBC WNL. Still requiring O2 2LNC. Staff weaning off as able, keep sats > 90%. Better endurance of late. LS decreased. Completed 3 days of Afrin NS for \"echo\" and full feeling in ears, but this persists.   - continue on guaifenesin and nasal saline  Heart failure with reduced ejection fraction (H)  - EF 25% inpatient. Backed off lasix last week 2/2 poor po intake. Now with some decreased LS Left and LE edema. Weights stable 95 lb. F/w cards last week. No changes to meds- patient declined addition of BB or ACE/ARB  - increase lasix back to MWF (3 x week)  - CXR 2 views- 2/2 CHF and pleural effusion- recheck  - monitor VS, weights. F/w cards as directed.   Chronic obstructive pulmonary disease with acute exacerbation (H)  - chronic, ? Role playing in respiratory status at this point. Did receive burst of prednisone inpatient.  - continue on scheduled Duonebs and albuterol nebs.   - monitor resp status, VS    Nausea  - reports occasional nausea in evening. No emesis or abdominal pain.  - continue prn Zofran 4 mg  Loose stools  - constipated last week so added Senna S back. Will discontinue scheduled Senna S and continue on Miralax daily with prn Senna S.  - monitor bowel pattern.    Mild protein-calorie malnutrition (H)  Body mass index is 17.95 kg/m .  Lab Results   Component Value Date    ALBUMIN 3.6 05/31/2023    ALBUMIN 3.1 05/19/2023     - appetite has been poor. Dietician met with patient to go over food selections patient may tolerate and desire. Continues on Nepro supplements and staff encouraging patient to take.   - monitor intake/weights    Chronic kidney disease, stage 4 (severe) (H)  Chronic, does not want dialysis. Baseline Cr 2.2 and in this range last  Hyperkalemia  - chronic intermittent- 2/2 above. K+ 5.5 6/9- received 1 x extra dose of lasix over w/e and has come back " down to 4.6  - recheck BMP 6/15    Thrombocytopenia (H)  - chronic, > 100- no s/s bleeding. 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 Found to have mets to liver and bone. Did f/w oncology. Recommended liver biopsy. Nursing assisting son to schedule.   - f/w oncology as directed    Physical deconditioning  - 2/2 above. Lives alone. Most family is out of state but visiting intermittently  - continues to work with PT/OT- making some functional gains  - Supportive cares and treatments  - ongoing discharge planning, SW follow and care conferences per unit protocol        Orders:  Written on unit and discussed with patient and nursing    Electronically signed by: JOHNNY Torres CNP

## 2023-06-12 NOTE — LETTER
"    6/12/2023        RE: Mayela Espino  [address] [address]        [facility] GERIATRICS    Chief Complaint   Patient presents with     RECHECK     HPI:  Mayela Espino is a 89 year old  (12/18/1932), who is being seen today for an episodic care visit at: [facility] ([facility] [[id number]].     Patient seen today for recheck of multiple acute and chronic medical issues:    1. Pneumonia of left lower lobe due to infectious organism    2. Heart failure with reduced ejection fraction (H)    3. Chronic obstructive pulmonary disease with acute exacerbation (H)    4. Nausea    5. Loose stools    6. Mild protein-calorie malnutrition (H)    7. Chronic kidney disease, stage 4 (severe) (H)    8. Hyperkalemia    9. Thrombocytopenia (H)    10. 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 Physical deconditioning      Completed antibiotics/Levaquin. Afebrile. WBC WNL. Working with PT/OT and making follow up functional gains.     Nursing reports no new concerns    VS, weights reviewed in facility EMR today.    Patient found in room sitting up in chair. Alert, calm, NAD. Reports breathing is easier and cough has lessened. Reports has an \"echo\" in her head when she talks. States has had this before. Discussed likely upper airway congestion 2/2 recent pneumonia. Reports is eating \"some\". Did meet with dietician. Reports had some loose stools overnight. Denies n/v or abdominal pain.     Allergies, and PMH/PSH reviewed in EPIC today.  REVIEW OF SYSTEMS:  4 point ROS including Respiratory, CV, GI and , other than that noted in the HPI,  is negative    Objective:   /75   Pulse 97   Temp 97.1  F (36.2  C)   Resp 20   Ht 1.549 m (5' 1\")   Wt 43.1 kg (95 lb)   SpO2 92%   BMI 17.95 kg/m      Resp: Effort WNL, LS decreased bilateral bases, more so left. Otherwise clear. O2 on 2LNC  CV: VS as above, trace edema noted bilateral ankles, slight > right.  Abd- soft, " "nontender, BS +  Musc- GUTIÉRREZ  Psych- alert, calm, pleasant      Recent labs in [facility] reviewed by me today.     Assessment/Plan:  Pneumonia of left lower lobe due to infectious organism  - completed 7 day course of lasix 6/8. Afebrile, WBC WNL. Still requiring O2 2LNC. Staff weaning off as able, keep sats > 90%. Better endurance of late. LS decreased. Completed 3 days of Afrin NS for \"echo\" and full feeling in ears, but this persists.   - continue on guaifenesin and nasal saline  Heart failure with reduced ejection fraction (H)  - EF 25% inpatient. Backed off lasix last week 2/2 poor po intake. Now with some decreased LS Left and LE edema. Weights stable 95 lb. F/w cards last week. No changes to meds- patient declined addition of BB or ACE/ARB  - increase lasix back to MWF (3 x week)  - CXR 2 views- 2/2 CHF and pleural effusion- recheck  - monitor VS, weights. F/w cards as directed.   Chronic obstructive pulmonary disease with acute exacerbation (H)  - chronic, ? Role playing in respiratory status at this point. Did receive burst of prednisone inpatient.  - continue on scheduled Duonebs and albuterol nebs.   - monitor resp status, VS    Nausea  - reports occasional nausea in evening. No emesis or abdominal pain.  - continue prn Zofran 4 mg  Loose stools  - constipated last week so added Senna S back. Will discontinue scheduled Senna S and continue on Miralax daily with prn Senna S.  - monitor bowel pattern.    Mild protein-calorie malnutrition (H)  Body mass index is 17.95 kg/m .  Lab Results   Component Value Date    ALBUMIN 3.6 05/31/2023    ALBUMIN 3.1 05/19/2023     - appetite has been poor. Dietician met with patient to go over food selections patient may tolerate and desire. Continues on Nepro supplements and staff encouraging patient to take.   - monitor intake/weights    Chronic kidney disease, stage 4 (severe) (H)  Chronic, does not want dialysis. Baseline Cr 2.2 and in this range last  Hyperkalemia  - chronic " intermittent- 2/2 above. K+ 5.5 6/9- received 1 x extra dose of lasix over w/e and has come back down to 4.6  - recheck BMP 6/15    Thrombocytopenia (H)  - chronic, > 100- no s/s bleeding. 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 Found to have mets to liver and bone. Did f/w oncology. Recommended liver biopsy. Nursing assisting son to schedule.   - f/w oncology as directed    Physical deconditioning  - 2/2 above. Lives alone. Most family is out of state but visiting intermittently  - continues to work with PT/OT- making some functional gains  - Supportive cares and treatments  - ongoing discharge planning, SW follow and care conferences per unit protocol        Orders:  Written on unit and discussed with patient and nursing    Electronically signed by: JOHNNY Torres CNP           Sincerely,        JOHNNY Torres CNP

## 2023-06-13 NOTE — PROGRESS NOTES
"Social Work Progress Note      Data/Intervention:  Patient Name:  Mayela Espino  /Age:  1932 (89 year old)    Reason for Follow-Up:  Mayela is a 89-year-old woman who is followed by Dr. Bardales at [facility]. This clinician is covering for Delia Hoskins, who received referral for transportation support.     Intervention:   This clinician attempted to reach Mayela, unable to reach or leave .     This clinician contacted son Mina, who is retired and resides in [place]. Mina reports his wife, daughter, son, and LUIS have been supportive of Mayela.     Mina reports that Mayela is presently at [facility], and they are planning to assist her in relocating to [facility] next week. Mina reports that he and his family are coming to assist with the move. Mina reports that Mayela plans to have an intake meeting 6/15 or .    Mina denied transportation need at present time, as he will be in town for upcoming oncology appointments, reporting that it is his intention to fly in for all \"critical care\" appointments. Mina reports that he had discussed with Mayela in the past relocating to [place], and is aware that it would be too difficult to do at present time, so he intends to support as best as possible from a distance.     SW oriented to role of oncology social worker and support available if needed.     Plan:  No further SW outreach needed at present time. SW will remain available as needed, family knows to outreach in case of concern or need.     Please call or page if needs or concerns arise.     DIANNA Bains, [facility] HospitalSW  Direct Phone: [phone]  Pager: [phone]  "

## 2023-06-13 NOTE — LETTER
6/13/2023        RE: Mayela Epsino  [address] [place] [address]        M [facility] GERIATRICS  INITIAL VISIT NOTE  June 13, 2023    PRIMARY CARE PROVIDER AND CLINIC:  Johnathan Brenner / [address] [place] [address]    M [facility] Medical Record Number:  [id number]  Place of Service where encounter took place:  [facility] ([facility]) [[id number]]    Chief Complaint   Patient presents with     Hospital F/U       HPI:    Mayela Espino is a 89 year old  (12/18/1932) female seen today at [facility]. Medical history is notable for COPD, HFrEF, CKD and breast cancer (2017). She was hospitalized at [facility] from 5/18/23 to 5/25/23 where she presented with R sided flank pain and shortness of breath. She was admitted with decompensated CHF (EF 20-25%). Developed JOSE RAFAEL on CKD while trying to diurese. Also with multiple thoracic and lumbar compression fractures secondary to metastatic disease.  Palliative care was consulted.   She admitted to this facility for  rehab, medical management and nursing care.      History obtained from: facility chart records, facility staff, patient report and [facility] chart review.      Today, Ms. Espino is seen in her room sitting up in a chair. She is on 2L supplemental O2. She talked a fair amount about her anxiety and how it's a daily morales for her. She will sometimes tell nursing and sometimes call family. She has MSK pain in her chest, back and abdomen from coughing. The cough has resolved. She had a bout of 3 episodes of loose stools a couple days ago that was self limited. No belly pain. Discussed recent medical course. She wants do have the biopsy and we discussed it'll take several days to get the results. She's bothered by what feels like an echo in her head - suggested she could try some fluticasone nasal (she has at home) or even a Breathe Right strip. She is working with therapies.     CODE STATUS: DNR /  DNI    ALLERGIES:  Allergies   Allergen Reactions     Latex Unknown     Added based on information entered during case entry, please review and add reactions, type, and severity as needed     Latex Rash       PAST MEDICAL HISTORY:   Past Medical History:   Diagnosis Date     Abnormal mini-mental status exam     scored 16 2012, ?dementia     Breast cancer (H)      Chronic kidney disease      Cigarette nicotine dependence, uncomplicated 3/4/2019     Congestive heart failure (H)      Essential tremor      HTN (hypertension)      Tobacco abuse 3/16/2016     Tobacco use        PAST SURGICAL HISTORY:   Past Surgical History:   Procedure Laterality Date     EYE SURGERY       JOINT REPLACEMENT       LUMPECTOMY BREAST Bilateral 1982    Cyst removed     OTHER SURGICAL HISTORY Left     ear prosthesis     OTHER SURGICAL HISTORY      l rola     OTHER SURGICAL HISTORY      left hip ORIF     [place] MASTECTOMY, MODIFIED RADICAL Bilateral 6/14/2021    Procedure: Bilateral mastectomies;  Surgeon: Dulce Duran MD;  Location: [facility];  Service: General       FAMILY HISTORY:   Family History   Problem Relation Age of Onset     Hypertension Mother      Cerebrovascular Disease Father 70.00     No Known Problems Son        SOCIAL HISTORY:   Patient's living condition: lives alone    MEDICATIONS:  Post Discharge Medication Reconciliation Status: discharge medications reconciled and changed, per note/orders.  Current Outpatient Medications   Medication Sig Dispense Refill     acetaminophen (TYLENOL) 325 MG tablet Take 3 tablets (975 mg) by mouth every 8 hours       albuterol (PROVENTIL) (2.5 MG/3ML) 0.083% neb solution Take 1 vial (2.5 mg) by nebulization every 4 hours as needed for shortness of breath, wheezing or cough 90 mL      artificial saliva (BIOTENE MT) SOLN solution Swish and spit 2 mLs (2 sprays) in mouth 4 times daily as needed for dry mouth       aspirin 81 MG EC tablet Take 81 mg by mouth daily       calcitonin,  "salmon, (MIACALCIN) 200 UNIT/ACT nasal spray Spray 1 spray into one nostril alternating nostrils daily Alternate nostril each day.       cholecalciferol, vitamin D3, 1,000 unit tablet Take 1,000 Units by mouth daily       diclofenac (VOLTAREN) 1 % topical gel Apply 2 g topically 4 times daily       furosemide (LASIX) 20 MG tablet Take 1 tablet (20 mg) by mouth three times a week       guaiFENesin (ROBITUSSIN) 20 mg/mL liquid Take 300 mg by mouth 3 times daily And q6h PRN       ipratropium - albuterol 0.5 mg/2.5 mg/3 mL (DUONEB) 0.5-2.5 (3) MG/3ML neb solution Take 1 vial (3 mLs) by nebulization 3 times daily 90 mL      LORazepam (ATIVAN) 0.5 MG tablet Take 0.5 tablets (0.25 mg) by mouth every 4 hours as needed for anxiety 4 tablet 0     methocarbamol (ROBAXIN) 500 MG tablet Take 0.5 tablets (250 mg) by mouth 4 times daily       multivit-iron-min-folic acid 3,500-18-0.4 unit-mg-mg Chew [MULTIVIT-IRON-MIN-FOLIC ACID 3,500-18-0.4 UNIT-MG-MG CHEW] Chew 1 tablet daily.        ondansetron (ZOFRAN ODT) 4 MG ODT tab Take 4 mg by mouth every 6 hours as needed for nausea       oxyCODONE (ROXICODONE) 5 MG tablet Take 0.5 tablets (2.5 mg) by mouth every 4 hours as needed for moderate to severe pain 16 tablet 0     polyethylene glycol (MIRALAX) 17 GM/Dose powder Take 1 Capful by mouth daily And BID PRN       senna-docusate (SENOKOT-S/PERICOLACE) 8.6-50 MG tablet Take 1 tablet by mouth 2 times daily And BID PRN         ROS:  6 point ROS neg other than the symptoms noted above in the HPI.    PHYSICAL EXAM:  /73   Pulse 70   Temp 97.4  F (36.3  C)   Resp 17   Ht 1.549 m (5' 1\")   Wt 43.1 kg (95 lb)   SpO2 97%   BMI 17.95 kg/m    Gen: sitting in chair, alert, cooperative and in no acute distress  Card: RRR, S1, S2, no murmurs  Resp: lungs overall diminished throughout but without crackles or wheezes   Ext: mild edema around the ankles   Neuro: CX II-XII grossly in tact; ROM in all four extremities grossly in " tact  Psych: alert and oriented x3; a bit anxious affect     LABORATORY/IMAGING DATA:  Review[facility] [employer] [facility]    ASSESSMENT/PLAN:    Metastatic Presumed Breast Cancer  Initial dx of breast Ca in 2017. Saw oncology on 6/6 with plan for biopsy given new liver and bone lesions on recent imaging  -- she wants to go ahead with the biopsy  -- discussed today, any treatment would be palliative and may slow further metastases but the damage to her bones won't reverse    COPD  Chronic Hypoxic Respiratory Failure  LLL PNA, Resolved  Lungs diminished throughout, no wheezing. On 2L supplemental O2. Completed course of levoflox.   -- DuoNeb TID, albuterol neb PRN  -- guaifenesin TID and q6h PRN for cough  -- encouraged good pulmonary toilet - asked her to start using the incentive spirometer again    HFrEF (EF 20-25%)   SBPs 120s. HR 70s-90s. Weight stable at 95 lbs. Some trace ankle edema that's dependent  -- ASA 81 mg daily, furosemide 20 mg MWF  -- follow BPs, weights, clinical volume status     Multiple Lumbar & Thoracic Compression Fractures  Secondary to metastatic disease.  Greatest at T8, T9 and T11 and chronic compression fx noted throughout thoracic and lumbar spine.   -- APAP 975 mg q8h, diclofenac gel QID, methocarbamol 250 mg QID, oxycodone 2.5 mg q4h PRN   -- calcitonin nasal  -- Vit D    CKD, Stage IV-V  She has a completed atrophic R kidney and L kidney is quite small. Given sarcopenia, the GFR is lower than labs would suggest.     Anxiety  Situational stressors coupled with her pulmonary and cardiac disease.   -- lorazepam 0.25 mg QID PRN  -- supportive cares     Protein Calorie Malnutrition  In setting of above.   -- nutrition following    Slow Transit Constipation  -- Miralax 17g daily and BID PRN andand Senna-S 1 tab BID and BID PRN  -- adjust bowel regimen as needed    Physical Deconditioning  In setting of hospitalization and underlying medical conditions  -- ongoing  PT/OT      Electronically signed by:  Cary Null MD                          Sincerely,        Cary Null MD

## 2023-06-13 NOTE — PROGRESS NOTES
[facility] GERIATRICS  INITIAL VISIT NOTE  June 13, 2023    PRIMARY CARE PROVIDER AND CLINIC:  Johnathan BrennerE / [address] [place] [address]    [facility] Medical Record Number:  [id number]  Place of Service where encounter took place:  [facility] ([facility]) [[id number]]    Chief Complaint   Patient presents with     Hospital F/U       HPI:    Mayela Espino is a 89 year old  (12/18/1932) female seen today at [facility]. Medical history is notable for COPD, HFrEF, CKD and breast cancer (2017). She was hospitalized at [facility] from 5/18/23 to 5/25/23 where she presented with R sided flank pain and shortness of breath. She was admitted with decompensated CHF (EF 20-25%). Developed JOSE RAFAEL on CKD while trying to diurese. Also with multiple thoracic and lumbar compression fractures secondary to metastatic disease.  Palliative care was consulted.   She admitted to this facility for  rehab, medical management and nursing care.      History obtained from: facility chart records, facility staff, patient report and [facility] chart review.      Today, Ms. Espino is seen in her room sitting up in a chair. She is on 2L supplemental O2. She talked a fair amount about her anxiety and how it's a daily morales for her. She will sometimes tell nursing and sometimes call family. She has MSK pain in her chest, back and abdomen from coughing. The cough has resolved. She had a bout of 3 episodes of loose stools a couple days ago that was self limited. No belly pain. Discussed recent medical course. She wants do have the biopsy and we discussed it'll take several days to get the results. She's bothered by what feels like an echo in her head - suggested she could try some fluticasone nasal (she has at home) or even a Breathe Right strip. She is working with therapies.     CODE STATUS: DNR / DNI    ALLERGIES:  Allergies   Allergen Reactions     Latex Unknown     Added based on information entered during  case entry, please review and add reactions, type, and severity as needed     Latex Rash       PAST MEDICAL HISTORY:   Past Medical History:   Diagnosis Date     Abnormal mini-mental status exam     scored 16 2012, ?dementia     Breast cancer (H)      Chronic kidney disease      Cigarette nicotine dependence, uncomplicated 3/4/2019     Congestive heart failure (H)      Essential tremor      HTN (hypertension)      Tobacco abuse 3/16/2016     Tobacco use        PAST SURGICAL HISTORY:   Past Surgical History:   Procedure Laterality Date     EYE SURGERY       JOINT REPLACEMENT       LUMPECTOMY BREAST Bilateral 1982    Cyst removed     OTHER SURGICAL HISTORY Left     ear prosthesis     OTHER SURGICAL HISTORY      l rola     OTHER SURGICAL HISTORY      left hip ORIF     [place] MASTECTOMY, MODIFIED RADICAL Bilateral 6/14/2021    Procedure: Bilateral mastectomies;  Surgeon: Dulce Duran MD;  Location: [facility];  Service: General       FAMILY HISTORY:   Family History   Problem Relation Age of Onset     Hypertension Mother      Cerebrovascular Disease Father 70.00     No Known Problems Son        SOCIAL HISTORY:   Patient's living condition: lives alone    MEDICATIONS:  Post Discharge Medication Reconciliation Status: discharge medications reconciled and changed, per note/orders.  Current Outpatient Medications   Medication Sig Dispense Refill     acetaminophen (TYLENOL) 325 MG tablet Take 3 tablets (975 mg) by mouth every 8 hours       albuterol (PROVENTIL) (2.5 MG/3ML) 0.083% neb solution Take 1 vial (2.5 mg) by nebulization every 4 hours as needed for shortness of breath, wheezing or cough 90 mL      artificial saliva (BIOTENE MT) SOLN solution Swish and spit 2 mLs (2 sprays) in mouth 4 times daily as needed for dry mouth       aspirin 81 MG EC tablet Take 81 mg by mouth daily       calcitonin, salmon, (MIACALCIN) 200 UNIT/ACT nasal spray Spray 1 spray into one nostril alternating nostrils daily Alternate  "nostril each day.       cholecalciferol, vitamin D3, 1,000 unit tablet Take 1,000 Units by mouth daily       diclofenac (VOLTAREN) 1 % topical gel Apply 2 g topically 4 times daily       furosemide (LASIX) 20 MG tablet Take 1 tablet (20 mg) by mouth three times a week       guaiFENesin (ROBITUSSIN) 20 mg/mL liquid Take 300 mg by mouth 3 times daily And q6h PRN       ipratropium - albuterol 0.5 mg/2.5 mg/3 mL (DUONEB) 0.5-2.5 (3) MG/3ML neb solution Take 1 vial (3 mLs) by nebulization 3 times daily 90 mL      LORazepam (ATIVAN) 0.5 MG tablet Take 0.5 tablets (0.25 mg) by mouth every 4 hours as needed for anxiety 4 tablet 0     methocarbamol (ROBAXIN) 500 MG tablet Take 0.5 tablets (250 mg) by mouth 4 times daily       multivit-iron-min-folic acid 3,500-18-0.4 unit-mg-mg Chew [MULTIVIT-IRON-MIN-FOLIC ACID 3,500-18-0.4 UNIT-MG-MG CHEW] Chew 1 tablet daily.        ondansetron (ZOFRAN ODT) 4 MG ODT tab Take 4 mg by mouth every 6 hours as needed for nausea       oxyCODONE (ROXICODONE) 5 MG tablet Take 0.5 tablets (2.5 mg) by mouth every 4 hours as needed for moderate to severe pain 16 tablet 0     polyethylene glycol (MIRALAX) 17 GM/Dose powder Take 1 Capful by mouth daily And BID PRN       senna-docusate (SENOKOT-S/PERICOLACE) 8.6-50 MG tablet Take 1 tablet by mouth 2 times daily And BID PRN         ROS:  6 point ROS neg other than the symptoms noted above in the HPI.    PHYSICAL EXAM:  /73   Pulse 70   Temp 97.4  F (36.3  C)   Resp 17   Ht 1.549 m (5' 1\")   Wt 43.1 kg (95 lb)   SpO2 97%   BMI 17.95 kg/m    Gen: sitting in chair, alert, cooperative and in no acute distress  Card: RRR, S1, S2, no murmurs  Resp: lungs overall diminished throughout but without crackles or wheezes   Ext: mild edema around the ankles   Neuro: CX II-XII grossly in tact; ROM in all four extremities grossly in tact  Psych: alert and oriented x3; a bit anxious affect     LABORATORY/IMAGING DATA:  Reviewed as per Epic and/or " CareEverywhere    ASSESSMENT/PLAN:    Metastatic Presumed Breast Cancer  Initial dx of breast Ca in 2017. Saw oncology on 6/6 with plan for biopsy given new liver and bone lesions on recent imaging  -- she wants to go ahead with the biopsy  -- discussed today, any treatment would be palliative and may slow further metastases but the damage to her bones won't reverse    COPD  Chronic Hypoxic Respiratory Failure  LLL PNA, Resolved  Lungs diminished throughout, no wheezing. On 2L supplemental O2. Completed course of levoflox.   -- DuoNeb TID, albuterol neb PRN  -- guaifenesin TID and q6h PRN for cough  -- encouraged good pulmonary toilet - asked her to start using the incentive spirometer again    HFrEF (EF 20-25%)   SBPs 120s. HR 70s-90s. Weight stable at 95 lbs. Some trace ankle edema that's dependent  -- ASA 81 mg daily, furosemide 20 mg MWF  -- follow BPs, weights, clinical volume status     Multiple Lumbar & Thoracic Compression Fractures  Secondary to metastatic disease.  Greatest at T8, T9 and T11 and chronic compression fx noted throughout thoracic and lumbar spine.   -- APAP 975 mg q8h, diclofenac gel QID, methocarbamol 250 mg QID, oxycodone 2.5 mg q4h PRN   -- calcitonin nasal  -- Vit D    CKD, Stage IV-V  She has a completed atrophic R kidney and L kidney is quite small. Given sarcopenia, the GFR is lower than labs would suggest.     Anxiety  Situational stressors coupled with her pulmonary and cardiac disease.   -- lorazepam 0.25 mg QID PRN  -- supportive cares     Protein Calorie Malnutrition  In setting of above.   -- nutrition following    Slow Transit Constipation  -- Miralax 17g daily and BID PRN andand Senna-S 1 tab BID and BID PRN  -- adjust bowel regimen as needed    Physical Deconditioning  In setting of hospitalization and underlying medical conditions  -- ongoing PT/OT      Electronically signed by:  Cary Null MD

## 2023-06-15 NOTE — LETTER
"    6/15/2023        RE: Mayela Espino  [address] [address]        M [facility] GERIATRICS    Chief Complaint   Patient presents with     Discharge Summary Nursing Home     HPI:  Mayela Espino is a 89 year old  (12/18/1932), who is being seen today for an episodic care visit at: [facility] ([facility]) [[id number]].     Patient seen today for recheck of multiple acute and chronic medical issues:    1. Pneumonia of left lower lobe due to infectious organism    2. Heart failure with reduced ejection fraction (H)    3. Chronic obstructive pulmonary disease with acute exacerbation (H)    4. Nausea    5. Loose stools    6. Mild protein-calorie malnutrition (H)    7. Chronic kidney disease, stage 4 (severe) (H)    8. Hyperkalemia    9. Thrombocytopenia (H)    10. 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 Carcinoma of breast metastatic to liver, unspecified laterality (H)    12. Physical deconditioning    13. Other fatigue      Nursing reports no new concerns    VS, weights, NN reviewed in EMR today    Patient found in room sitting up in a chair. Alert, calm, NAD. Reports fatigue. States breathing seems \"better\". Reports less coughing but noting residual pain to back and sternum which she associates with recent pneumonia and cough. States is taking occasional prn oxycodone and seems to help. Reports trying to eat more. States has occasional nausea. Denies emesis, abdominal pain or diarrhea. Has been continuing to work with rehab therapy. Though LCD coming up. Plans to discharge to A/L     Allergies, and PMH/PSH reviewed in [facility] today.  REVIEW OF SYSTEMS:  4 point ROS including Respiratory, CV, GI and , other than that noted in the HPI,  is negative    Objective:   /59   Pulse 77   Temp (!) 96  F (35.6  C)   Resp 20   Ht 1.549 m (5' 1\")   Wt 43.1 kg (95 lb)   SpO2 98%   BMI 17.95 kg/m    Resp: Effort WNL, LS decreased in bases bilaterally, more so on " "left but improved from last week  CV:VS as above, no edema noted  Abd- soft, nontender, BS +  Musc- GUTIÉRREZ- some generalized weakness  Psych- alert, calm, pleasant      Recent labs in [facility] reviewed by me today.     Assessment/Plan:    Pneumonia of left lower lobe due to infectious organism  - completed 7 day course of Levaquin 6/8. Afebrile, WBC WNL. Still requiring O2 2LNC. Staff weaning off as able, keep sats > 90%. Better endurance of late.  Completed 3 days of Afrin NS for \"echo\" and full feeling in ears, but this persists intermittently  - continue on guaifenesin and nasal saline  Heart failure with reduced ejection fraction (H)  - EF 25% inpatient. Backed off lasix last early on 2/2  poor po intake. Noted last week to have increased edema and SOB/decreased LS- repeat CXR done and revealed cardiomegaly and ? Pulmonary edema. Lasix increased back to 3 x weekly. Resp status improved this week and no edema noted. Weights stable 95 lb. F/w while in TCU. No changes to meds- patient declined addition of BB or ACE/ARB  - continue on lasix  MWF (3 x week)  - monitor VS, weights. F/w cards as directed.   Chronic obstructive pulmonary disease with acute exacerbation (H)  - chronic, ? The role this is playing in respiratory status at this point. Did receive burst of prednisone inpatient. No wheezing noted, minimal cough today  - continue on scheduled Duonebs and albuterol nebs.   - monitor resp status, VS     Nausea  - reports occasional nausea in evening. No emesis or abdominal pain.  - continue prn Zofran 4 mg  Loose stools  - is going back and forth between constipation and looser stools. Sensitive to Senna S which was discontinued again last week. No recent loose stools- will continue on Miralax daily with prn Senna S.  - monitor bowel pattern.     Mild protein-calorie malnutrition (H)  Body mass index is 17.95 kg/m .        Lab Results   Component Value Date     ALBUMIN 3.6 05/31/2023     ALBUMIN 3.1 05/19/2023      - " appetite has been poor. Dietician met with patient to go over food selections patient may tolerate and desire. Continues on Nepro supplements and staff encouraging patient to take. Improving some.   - monitor intake/weights     Chronic kidney disease, stage 4 (severe) (H)  Chronic, does not want dialysis. Baseline Cr 2.2 and in this range last today 2.3  Hyperkalemia  - chronic intermittent- 2/2 above. K+ 5.5 6/9 and 6/15(today) received 1 x extra dose of lasix after first and came down.   - extra dose lasix 20 mg  - recheck K+ ordered for 6/16     Thrombocytopenia (H)  - chronic, > 100- no s/s bleeding. Last 124 6/9  - monitor for s/s bleeding       Carcinoma of breast metastatic to bone, unspecified laterality (H)  H/o breast cancer- s/p mastectomy. Found to have mets to liver and bone. Did f/w oncology. Recommended liver biopsy- scheduled now for 6/21  - f/w oncology as directed    Compression fracture of thoracic vertebra with delayed healing, unspecified thoracic vertebral level, subsequent encounter  - reporting some pain today along with sternal discomfort- r/t recent pneumonia and cough  -  Continue on current pain regimen with scheduled acetaminophen and prn oxycodone. Also on Diclofenac and methocarbamol, but will change methocarbamol to prn given fatigue      Physical deconditioning  - 2/2 above. Lives alone. Most family is out of state but visiting intermittently  -  PT/OT- making some functional gains- LCD approaching  - Supportive cares and treatments  - ongoing discharge planning, SW follow and care conferences per unit protocol- plans to move to A/L     Fatigue  - multifactorial, acute, chronic illness, advanced age, poor nutritional status. Will discontinue scheduled methocarbamol as above.            Orders:  - written on unit and discussed with nursing and patient.    Electronically signed by: JOHNNY Torres CNP             Sincerely,        JOHNNY Torres CNP

## 2023-06-15 NOTE — PROGRESS NOTES
"[facility] GERIATRICS    Chief Complaint   Patient presents with     Discharge Summary Nursing Home     HPI:  Mayela Espino is a 89 year old  (12/18/1932), who is being seen today for an episodic care visit at: [facility] ([facility]) [[id number]].     Patient seen today for recheck of multiple acute and chronic medical issues:    1. Pneumonia of left lower lobe due to infectious organism    2. Heart failure with reduced ejection fraction (H)    3. Chronic obstructive pulmonary disease with acute exacerbation (H)    4. Nausea    5. Loose stools    6. Mild protein-calorie malnutrition (H)    7. Chronic kidney disease, stage 4 (severe) (H)    8. Hyperkalemia    9. Thrombocytopenia (H)    10. 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 Carcinoma of breast metastatic to liver, unspecified laterality (H)    12. Physical deconditioning    13. Other fatigue      Nursing reports no new concerns    VS, weights, NN reviewed in EMR today    Patient found in room sitting up in a chair. Alert, calm, NAD. Reports fatigue. States breathing seems \"better\". Reports less coughing but noting residual pain to back and sternum which she associates with recent pneumonia and cough. States is taking occasional prn oxycodone and seems to help. Reports trying to eat more. States has occasional nausea. Denies emesis, abdominal pain or diarrhea. Has been continuing to work with rehab therapy. Though LCD coming up. Plans to discharge to A/L     Allergies, and PMH/PSH reviewed in EPIC today.  REVIEW OF SYSTEMS:  4 point ROS including Respiratory, CV, GI and , other than that noted in the HPI,  is negative    Objective:   /59   Pulse 77   Temp (!) 96  F (35.6  C)   Resp 20   Ht 1.549 m (5' 1\")   Wt 43.1 kg (95 lb)   SpO2 98%   BMI 17.95 kg/m    Resp: Effort WNL, LS decreased in bases bilaterally, more so on left but improved from last week  CV:VS as above, no edema noted  Abd- soft, nontender, BS " "+  Musc- GUTIÉRREZ- some generalized weakness  Psych- alert, calm, pleasant      Recent labs in EPIC reviewed by me today.     Assessment/Plan:    Pneumonia of left lower lobe due to infectious organism  - completed 7 day course of Levaquin 6/8. Afebrile, WBC WNL. Still requiring O2 2LNC. Staff weaning off as able, keep sats > 90%. Better endurance of late.  Completed 3 days of Afrin NS for \"echo\" and full feeling in ears, but this persists intermittently  - continue on guaifenesin and nasal saline  Heart failure with reduced ejection fraction (H)  - EF 25% inpatient. Backed off lasix last early on 2/2  poor po intake. Noted last week to have increased edema and SOB/decreased LS- repeat CXR done and revealed cardiomegaly and ? Pulmonary edema. Lasix increased back to 3 x weekly. Resp status improved this week and no edema noted. Weights stable 95 lb. F/w while in TCU. No changes to meds- patient declined addition of BB or ACE/ARB  - continue on lasix  MWF (3 x week)  - monitor VS, weights. F/w cards as directed.   Chronic obstructive pulmonary disease with acute exacerbation (H)  - chronic, ? The role this is playing in respiratory status at this point. Did receive burst of prednisone inpatient. No wheezing noted, minimal cough today  - continue on scheduled Duonebs and albuterol nebs.   - monitor resp status, VS     Nausea  - reports occasional nausea in evening. No emesis or abdominal pain.  - continue prn Zofran 4 mg  Loose stools  - is going back and forth between constipation and looser stools. Sensitive to Senna S which was discontinued again last week. No recent loose stools- will continue on Miralax daily with prn Senna S.  - monitor bowel pattern.     Mild protein-calorie malnutrition (H)  Body mass index is 17.95 kg/m .        Lab Results   Component Value Date     ALBUMIN 3.6 05/31/2023     ALBUMIN 3.1 05/19/2023      - appetite has been poor. Dietician met with patient to go over food selections patient may " tolerate and desire. Continues on Nepro supplements and staff encouraging patient to take. Improving some.   - monitor intake/weights     Chronic kidney disease, stage 4 (severe) (H)  Chronic, does not want dialysis. Baseline Cr 2.2 and in this range last today 2.3  Hyperkalemia  - chronic intermittent- 2/2 above. K+ 5.5 6/9 and 6/15(today) received 1 x extra dose of lasix after first and came down.   - extra dose lasix 20 mg  - recheck K+ ordered for 6/16     Thrombocytopenia (H)  - chronic, > 100- no s/s bleeding. Last 124 6/9  - monitor for s/s bleeding       Carcinoma of breast metastatic to bone, unspecified laterality (H)  H/o breast cancer- s/p mastectomy. Found to have mets to liver and bone. Did f/w oncology. Recommended liver biopsy- scheduled now for 6/21  - f/w oncology as directed    Compression fracture of thoracic vertebra with delayed healing, unspecified thoracic vertebral level, subsequent encounter  - reporting some pain today along with sternal discomfort- r/t recent pneumonia and cough  -  Continue on current pain regimen with scheduled acetaminophen and prn oxycodone. Also on Diclofenac and methocarbamol, but will change methocarbamol to prn given fatigue      Physical deconditioning  - 2/2 above. Lives alone. Most family is out of state but visiting intermittently  -  PT/OT- making some functional gains- LCD approaching  - Supportive cares and treatments  - ongoing discharge planning, SW follow and care conferences per unit protocol- plans to move to A/L     Fatigue  - multifactorial, acute, chronic illness, advanced age, poor nutritional status. Will discontinue scheduled methocarbamol as above.            Orders:  - written on unit and discussed with nursing and patient.    Electronically signed by: JOHNNY Torres CNP

## 2023-06-16 NOTE — RESULT ENCOUNTER NOTE
Please relay the following order.    BMP Monday 6/19 for hyperkalemia.    JOHNNY Torres CNP on 6/16/2023 at 9:02 AM

## 2023-06-16 NOTE — TELEPHONE ENCOUNTER
[facility] Triage Nurse Telephone Encounter    Provider: JOHNNY Torres CNP   Facility: [facility] Facility Type:  TCU      Call Back Number: [phone]    Allergies:    Allergies   Allergen Reactions     Latex Unknown     Added based on information entered during case entry, please review and add reactions, type, and severity as needed     Latex Rash        Reason for call: New orders per NP.      Verbal Order/Direction given by Provider: Check BMP on 6/19/23 due to hyperkalemia.      Provider giving Order:  JOHNNY Torres CNP     Verbal Order given to: Kristine Claros RN

## 2023-06-19 NOTE — LETTER
6/19/2023        RE: Mayela Espino  [address] [address]        [facility] GERIATRICS    Chief Complaint   Patient presents with     RECHECK     HPI:  Mayela Espino is a 89 year old  (12/18/1932), who is being seen today for an episodic care visit at: [facility] ([facility]) [[id number]].     Patient was due to discharge tomorrow to A/L but today nursing reports patient is having some trouble swallowing pills and more weak this morning.     VS and weights reviewed in facility EMR and are stable.     1. Dysphagia, unspecified type    2. Generalized muscle weakness    3. Hyperkalemia    4. Pneumonia of left lower lobe due to infectious organism    5. Heart failure with reduced ejection fraction (H)    6. Chronic obstructive pulmonary disease with acute exacerbation (H)    7. Chronic kidney disease, stage 4 (severe) (H)    8. 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 Carcinoma of breast metastatic to bone, unspecified laterality (H)    10. Protein-calorie malnutrition, unspecified severity (H)    11. Nausea      Today patient is found sitting up in bed. Alert, calm, NAD. Appropriate in conversation but quiet and looking down often. Reports is swallowing ok but taking too many pills. Reports mild nausea and admits to poor po intake. Reports increased back pain last few days. Nurse reports did give her prn oxycodone this morning. Patient states nausea does not follow oxycodone administration.   Son Mina present later to discuss patient's clinical status and plan going forward. Extensive discussion had with Mina, patient, SW regarding plan. Clinical status and multiple acute and chronic comorbid conditions discussed. Hospitalization vs hospice vs discharge to A/L considered and discussed. Ultimately plan to discharge to A/L was cancelled 2/2 A/L not having means to appropriately care for and monitor patient. Decision  "made to monitor patient in TCU for now. Son Don really wanting to hold off on deciding on hospice until liver biopsy results reviewed with oncology.     Family inquiring about IVF in TCU. Explained that patient would require hospitalization for any IVF 2/2 severity of heart failure and recent CXR demonstrating ? Pulmonary edema. Patient also with severe CKD stage 4. Is again hyperkalemic today. Has declined dialysis and is on lasix 3 x weekly partially d/t the hyperkalemia. Is also on low K+ diet.         Allergies, and PMH/PSH reviewed in EPIC today.  REVIEW OF SYSTEMS:  4 point ROS including Respiratory, CV, GI and , other than that noted in the HPI,  is negative    Objective:   /73   Pulse 79   Temp 97  F (36.1  C)   Resp 18   Ht 1.27 m (4' 2\")   Wt 43.1 kg (95 lb)   SpO2 95%   BMI 26.72 kg/m      Resp: Effort WNL, LS diminished in bases bilaterally- continues on supplemental O2  CV: VS as above. Trace LE edema   Abd- soft, nontender, BS +  Musc- GUTIÉRREZ- generalized weakness  Psych- alert, calm, pleasant      Recent labs in EPIC reviewed by me today.     Assessment/Plan:  Dysphagia, unspecified type  - seems to be only some of the pills. Took the rest without issue and eating most foods ok.   Hold Vit D, MVI, acetaminophen    Generalized muscle weakness  - multifactorial. Rehab therapy done and patient was to discharge tomorrow. Plan to go to A/L is on hold for now.  - continue supportive cares and services in TCU    Hyperkalemia  - chronic 2/2 advanced renal disease. Is on lasix 20 mg 3 x weekly.  - recheck BMP 6/22    Pneumonia of left lower lobe due to infectious organism  - Early in TCU. Completed 7 day course of Levaquin. Resp status is stable. Cough resolving. Resolved    Heart failure with reduced ejection fraction (H)  EF 25% inpatient. Did not tolerate backing off lasix to twice a week which was trialed while had pneumonia 2/2 poor po intake. Resulted in LE edema and increased SOB with " pulmonary edema on CXR so increased back to 3X weekly and this improved. Patient did seen cards while in TCU and declined addition of BB or ACE/ARB at that time.  - no IVF in TCU 2/2 CHF, advanced renal disease would need close monitoring in hospital for this.  - monitor weights and VS, clinical status    Chronic obstructive pulmonary disease with acute exacerbation (H)  - with exacerbation inpatient. Tx with short burst of prednisone. No noted wheezing. Resp status baseline  - continue on scheduled duonebs and prn albuterol nebx      Chronic kidney disease, stage 4 (severe) (H)  Chronic, with hyperkalemia (see above) has stated she does not want dialysis. 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 S/p liver biopsy last week - ordered by oncology  - family wanting to know results and discuss ? Plan with oncology. Discussed with son hospitalization vs hospice today 2/2 worsening clinical status and challenges, limitations of managing in this setting.  VSS, staff and family encouraging patient to eat and drink. Will monitor for now in TCU.     Protein-calorie malnutrition, unspecified severity (H)  Body mass index is 26.72 kg/m . Weights stable at 95 lb.   - poor po intake overall since admitted to facility. Has met with writer and dietician to discuss dietary choices she may desire. Is on nutritional supplements.   - ongoing encouragement to eat and drink. Nursing assistance prn.  - monitor intakes and weights  Nausea  - patient c/o more prevalent than has been today. Prior prn Zofran dropped off patient list in TCU.  - Zofran 4 mg ODT po/SL now - discussed with nursing, and then daily and q 6 hours prn  - monitor bowel pattern- ensure no constipation/diarrhea    Closed compression fracture of thoracolumbar vertebra with routine subsequent encounter  -  chronic, c/o more pain today than has had recently. Methocarbamol stopped end of last week and does seem to correlate. Will add this back.  - also continues on calcitonin, and diclofenac  - holding Vit D and acetaminophen 2/2 patient doesn't like to take these and doesn't want as many pills.        Orders:  Written on unit and discussed with nursing, SW, patient and her son Don      Electronically signed by: JOHNNY Torres CNP          Sincerely,        JOHNNY Torres CNP

## 2023-06-19 NOTE — PROGRESS NOTES
[facility] GERIATRICS    Chief Complaint   Patient presents with     RECHECK     HPI:  Mayela Espino is a 89 year old  (12/18/1932), who is being seen today for an episodic care visit at: [facility] ([facility]) [[id number]].     Patient was due to discharge tomorrow to A/L but today nursing reports patient is having some trouble swallowing pills and more weak this morning.     VS and weights reviewed in facility EMR and are stable.     1. Dysphagia, unspecified type    2. Generalized muscle weakness    3. Hyperkalemia    4. Pneumonia of left lower lobe due to infectious organism    5. Heart failure with reduced ejection fraction (H)    6. Chronic obstructive pulmonary disease with acute exacerbation (H)    7. Chronic kidney disease, stage 4 (severe) (H)    8. 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 Carcinoma of breast metastatic to bone, unspecified laterality (H)    10. Protein-calorie malnutrition, unspecified severity (H)    11. Nausea      Today patient is found sitting up in bed. Alert, calm, NAD. Appropriate in conversation but quiet and looking down often. Reports is swallowing ok but taking too many pills. Reports mild nausea and admits to poor po intake. Reports increased back pain last few days. Nurse reports did give her prn oxycodone this morning. Patient states nausea does not follow oxycodone administration.   Son Mina present later to discuss patient's clinical status and plan going forward. Extensive discussion had with Mina, patient, SW regarding plan. Clinical status and multiple acute and chronic comorbid conditions discussed. Hospitalization vs hospice vs discharge to A/L considered and discussed. Ultimately plan to discharge to A/L was cancelled 2/2 A/L not having means to appropriately care for and monitor patient. Decision made to monitor patient in TCU for now. Son Mina really wanting to hold off on deciding on  "hospice until liver biopsy results reviewed with oncology.     Family inquiring about IVF in TCU. Explained that patient would require hospitalization for any IVF 2/2 severity of heart failure and recent CXR demonstrating ? Pulmonary edema. Patient also with severe CKD stage 4. Is again hyperkalemic today. Has declined dialysis and is on lasix 3 x weekly partially d/t the hyperkalemia. Is also on low K+ diet.         Allergies, and PMH/PSH reviewed in [facility] today.  REVIEW OF SYSTEMS:  4 point ROS including Respiratory, CV, GI and , other than that noted in the HPI,  is negative    Objective:   /73   Pulse 79   Temp 97  F (36.1  C)   Resp 18   Ht 1.27 m (4' 2\")   Wt 43.1 kg (95 lb)   SpO2 95%   BMI 26.72 kg/m      Resp: Effort WNL, LS diminished in bases bilaterally- continues on supplemental O2  CV: VS as above. Trace LE edema   Abd- soft, nontender, BS +  Musc- GUTIÉRREZ- generalized weakness  Psych- alert, calm, pleasant      Recent labs in [facility] reviewed by me today.     Assessment/Plan:  Dysphagia, unspecified type  - seems to be only some of the pills. Took the rest without issue and eating most foods ok.   Hold Vit D, MVI, acetaminophen    Generalized muscle weakness  - multifactorial. Rehab therapy done and patient was to discharge tomorrow. Plan to go to A/L is on hold for now.  - continue supportive cares and services in TCU    Hyperkalemia  - chronic 2/2 advanced renal disease. Is on lasix 20 mg 3 x weekly.  - recheck BMP 6/22    Pneumonia of left lower lobe due to infectious organism  - Early in TCU. Completed 7 day course of Levaquin. Resp status is stable. Cough resolving. Resolved    Heart failure with reduced ejection fraction (H)  EF 25% inpatient. Did not tolerate backing off lasix to twice a week which was trialed while had pneumonia 2/2 poor po intake. Resulted in LE edema and increased SOB with pulmonary edema on CXR so increased back to 3X weekly and this improved. Patient did seen cards " while in TCU and declined addition of BB or ACE/ARB at that time.  - no IVF in TCU 2/2 CHF, advanced renal disease would need close monitoring in hospital for this.  - monitor weights and VS, clinical status    Chronic obstructive pulmonary disease with acute exacerbation (H)  - with exacerbation inpatient. Tx with short burst of prednisone. No noted wheezing. Resp status baseline  - continue on scheduled duonebs and prn albuterol nebx      Chronic kidney disease, stage 4 (severe) (H)  Chronic, with hyperkalemia (see above) has stated she does not want dialysis. 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 S/p liver biopsy last week - ordered by oncology  - family wanting to know results and discuss ? Plan with oncology. Discussed with son hospitalization vs hospice today 2/2 worsening clinical status and challenges, limitations of managing in this setting.  VSS, staff and family encouraging patient to eat and drink. Will monitor for now in TCU.     Protein-calorie malnutrition, unspecified severity (H)  Body mass index is 26.72 kg/m . Weights stable at 95 lb.   - poor po intake overall since admitted to facility. Has met with writer and dietician to discuss dietary choices she may desire. Is on nutritional supplements.   - ongoing encouragement to eat and drink. Nursing assistance prn.  - monitor intakes and weights  Nausea  - patient c/o more prevalent than has been today. Prior prn Zofran dropped off patient list in TCU.  - Zofran 4 mg ODT po/SL now - discussed with nursing, and then daily and q 6 hours prn  - monitor bowel pattern- ensure no constipation/diarrhea    Closed compression fracture of thoracolumbar vertebra with routine subsequent encounter  - chronic, c/o more pain today than has had recently. Methocarbamol stopped end of last week and  does seem to correlate. Will add this back.  - also continues on calcitonin, and diclofenac  - holding Vit D and acetaminophen 2/2 patient doesn't like to take these and doesn't want as many pills.        Orders:  Written on unit and discussed with nursing, SW, patient and her son Don      Electronically signed by: JOHNNY Torres CNP

## 2023-06-20 NOTE — TELEPHONE ENCOUNTER
[facility] Triage Nurse Telephone Encounter    Provider: JOHNNY Torres CNP   Facility: [facility] Facility Type:  TCU    Caller: Stevo  Call Back Number: [phone]    Allergies:    Allergies   Allergen Reactions     Latex Unknown     Added based on information entered during case entry, please review and add reactions, type, and severity as needed     Latex Rash        Reason for call: Nurse is reporting that patient is c/o heartburn.  She was given Mylanta per MATTHEW with some relief, however family wants patient to have an order for Omeprazole.      Verbal Order/Direction given by Provider: Omeprazole 20mg daily.      Provider giving Order:  JOHNNY Torres CNP     Verbal Order given to: Stevo Claros RN

## 2023-06-20 NOTE — TELEPHONE ENCOUNTER
Orders relayed to facility nurseStevo.      ----- Message from JOHNNY Torres CNP sent at 6/20/2023  8:22 AM CDT -----  Please relay the following orders  (hyperkalemia)    Senna S 1 tab po every other day    BMP 6/22    Low potassium diet (consult dietician for guidance)    JOHNNY Torres CNP on 6/20/2023 at 8:21 AM

## 2023-06-20 NOTE — RESULT ENCOUNTER NOTE
Please relay the following orders  (hyperkalemia)    Senna S 1 tab po every other day    BMP 6/22    Low potassium diet (consult dietician for guidance)    JOHNNY Torres CNP on 6/20/2023 at 8:21 AM   Detail Level: Simple Detail Level: Zone Detail Level: Detailed Detail Level: Generalized

## 2023-06-20 NOTE — TELEPHONE ENCOUNTER
[facility] Triage Nurse Telephone Encounter    Provider: JOHNNY Torres CNP   Facility: [facility] Facility Type:  TCU    Caller: Stevo  Call Back Number: [phone]    Allergies:    Allergies   Allergen Reactions     Latex Unknown     Added based on information entered during case entry, please review and add reactions, type, and severity as needed     Latex Rash        Reason for call: Nurse is calling on behalf of patient's family request for IV fluids due to poor oral intake.      Verbal Order/Direction given by Provider: No IV fluids while on TCU due to poor cardiac function and risk of CHF exacerbation.  If family want the patient to get IV fluids, the patient would need to go to the hospital.      Provider giving Order:  JOHNNY Torres CNP     Verbal Order given to: Stevo Claros RN

## 2023-06-21 NOTE — PROVIDER NOTIFICATION
Pt and son verbazlied discharge education. Called and gave report to GG at [facility]. Pt wheeled out on her oxygen, son drove to care facility.

## 2023-06-21 NOTE — IP AVS SNAPSHOT
[facility]  [address] [address]  Phone: [phone]  Fax: [phone]                              After Visit Summary   6/21/2023    Mayela Espino   MRN: [id number]           After Visit Summary Signature Page    I have received my discharge instructions, and my questions have been answered. I have discussed any challenges I see with this plan with the nurse or doctor.    ..........................................................................................................................................  Patient/Patient Representative Signature      ..........................................................................................................................................  Patient Representative Print Name and Relationship to Patient    ..................................................               ................................................  Date                                   Time    ..........................................................................................................................................  Reviewed by Signature/Title    ...................................................              ..............................................  Date                                               Time    22EPIC Rev 08/18

## 2023-06-21 NOTE — PROCEDURES
[facility]    Procedure: Imaging Procedure Note    Date/Time: 6/21/2023 10:47 AM    Performed by: Josue Ortiz MD  Authorized by: Josue Ortiz MD      UNIVERSAL PROTOCOL   Site Marked: Yes  Prior Images Obtained and Reviewed:  Yes  Required items: Required blood products, implants, devices and special equipment available    Patient identity confirmed:  Verbally with patient  Patient was reevaluated immediately before administering moderate or deep sedation or anesthesia  Confirmation Checklist:  Patient's identity using two indicators, relevant allergies, procedure was appropriate and matched the consent or emergent situation and correct equipment/implants were available  Time out: Immediately prior to the procedure a time out was called    Universal Protocol: the Joint Commission Universal Protocol was followed    Preparation: Patient was prepped and draped in usual sterile fashion      SEDATION  Patient Sedated: Yes    Vital signs: Vital signs monitored during sedation    See dictated procedure note for full details.    PROCEDURE  Describe Procedure: Liver mass biopsy with US  4 cores  20g needle  Patient Tolerance:  Patient tolerated the procedure well with no immediate complications  Length of time physician/provider present for 1:1 monitoring during sedation: 15

## 2023-06-21 NOTE — DISCHARGE INSTRUCTIONS
LIVER BIOPSY    1. You are required to have someone accompany you home.    2. Rest today. Do not drive or operate machinery today. Over-activity may produce nausea and dizziness.    3. You should follow your normal diet. Drink plenty of fluids. No alcoholic beverages for 24 hours. *(Alcohol may interact with the medications you received today)    4. Avoid strenuous activity or heavy lifting for 48 hours.    5. Leave bandage on today, you may remove tomorrow.    6. You may shower tomorrow. Do not soak in a bath tub, hot tub, or swim until the site is completely healed and the skin glue is off. Keep the site clean and dry.    7. Watch your biopsy site for signs of infection, increase pain, redness, swelling, or any drainage and or fever or chills.    8.  If you experience sudden weakness, dizziness, abdominal pain, shortness of breath or a temperature above 100.0 degree F for more than 24 hours, call your Doctor or go to the emergency room.           Discharge Instructions for Liver Biopsy    You had a procedure called liver biopsy. A healthcare provider used a special needle to remove a small piece of tissue from your liver. Then it was examined for signs of damage or disease. A liver biopsy is ordered after other tests have shown that your liver is not working properly. You may also have a liver biopsy when liver disease is suspected, to determine whether there is too much iron in the liver, or to rule out cancer.    Home care  Recommendations include the following:   Because you had anesthesia, you should not drive until the day after your biopsy.   Remove the bandage covering the biopsy site  48 hours after the procedure.  Rest for 6 hours and take it easy when you arrive home.  Don t shower for  24 hours after the biopsy. If you wish, you may wash yourself with a sponge or washcloth. When you are able to shower, don t scrub the site. Gently wash the area and pat it dry.  Don t lift anything heavier than  10  pounds (4.5 kg) for up to 1 week after the procedure, or as advised by your healthcare provider.  Don't do strenuous activities or exercises for up to 1 week after the procedure.  Ask your healthcare provider when you can return to work.  Don't start taking blood thinners without clear instructions from your healthcare provider.    Follow-up care  Make a follow-up appointment as directed by our staff.    When to call your healthcare provider  Call your healthcare provider immediately if you have any of the following:  Bleeding from the biopsy site  Dizziness or lightheadedness  Sudden or increased shortness of breath  Sudden chest pain  Fever of  100.4 F ( 38.0 C) or higher, or as directed by your healthcare provider  Shaking chills  Yellow eyes or skin  Increasing redness, tenderness, or swelling at the biopsy site  Drainage from the biopsy site  Opening of the biopsy site  Vomiting blood  Rectal bleeding or bloody stools  Increasing pain, with or without activity, in the liver or belly area, or pain shooting to the right shoulder    [employer] last reviewed this educational content on 6/1/2019 2000-2021 [employer]. All rights reserved. This information is not intended as a substitute for professional medical care. Always follow your healthcare professional's instructions.

## 2023-06-21 NOTE — IR NOTE
Patient Name: Mayela Espino  Medical Record Number: [id number]  Today's Date: 6/21/2023    Procedure: liver bx  Proceduralist: Dr. ROSIE Ortiz      Sedation medications administered: 0.5 mg midazolam       Report given to: Wendy Granados nurse at [facility] TCU. Reviewed discharge instructions with Wendy and no questions. Pt discharged and son drove home.

## 2023-06-22 NOTE — PROGRESS NOTES
[facility] GERIATRICS    Chief Complaint   Patient presents with     RECHECK     HPI:  Mayela Espino is a 89 year old  (12/18/1932), who is being seen today for an episodic care visit at: [facility] ([facility]) [[id number]]. Today's concern is:     Patient seen today for recheck of multiple acute and chronic medical issues:    1. Generalized muscle weakness    2. Hyperkalemia    3. Chronic kidney disease, stage 4 (severe) (H)    4. Heart failure with reduced ejection fraction (H)    5. Chronic obstructive pulmonary disease with acute exacerbation (H)    6. Protein-calorie malnutrition, unspecified severity (H)    7. Compression fracture of thoracic vertebra with delayed healing, unspecified thoracic vertebral level, subsequent encounter    8. Carcinoma of breast metastatic to liver, unspecified laterality (H)    9. 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 Pathology pending. Oncology f/u is scheduled for next Friday.     Patient reportedly continues to be weak. Staff and family have been encouraging po food and fluids.     Patient in room seated on bed. Son Don here as well. Patient oral intake remains modest. C/o intermittent nausea. Does have daily and prn Zofran available for this. Earlier in week family had inquired about offering IVF in TCU. Writer spoke with triage nurse to relay that patient would need to go to hospital for IVF as they cannot be safely administered in TCU. Discussed hospice again today but patient herself was quite quiet when asked about it and son did relay that they are hoping to wait to make any decision regarding hospice until liver biopsy results come back. Explained again that patient is medically fragile and on the precipice of requiring hospitalization so the decision may need to be made one way or another prior to the oncology appt next Friday.     Allergies, and PMH/PSH reviewed in [facility] today.  REVIEW OF  "SYSTEMS:      Objective:   /83   Pulse 88   Temp 97  F (36.1  C)   Resp 18   Ht 1.549 m (5' 1\")   Wt 43.1 kg (95 lb)   SpO2 95%   BMI 17.95 kg/m      Resp: Effort WNL, LS diminished in bilateral bases. O2 on 2LNC  CV: VS as above, trace bilateral ankle edema  Abd- soft, nontender, BS +  Musc- GUTIÉRREZ- generalized weakness  Psych- alert, calm- quiet today      Recent labs in EPIC reviewed by me today.     Assessment/Plan:  Generalized muscle weakness  - persists, multifactorial  - ongoing supportive cares and services in TCU    Hyperkalemia  - chronic, 2/2 advanced renal disease. K+ 5.6 today. Discussed with MD. Will recheck on 6/26  Chronic kidney disease, stage 4 (severe) (H)  - chronic, advanced. Patient has declined dialysis  - BUN up today, Cr recent baseline. Discussed with MD. Will hold the next couple doses of lasix. Family and staff continue to encourage po fluids. Nursing and family have been told cannot do IVF in TCU but if would like to pursue would need to go to the hospital. This was reiterated to nurse manager today. This was also discussed with MD. Patient and family are declining hospice as hoping to get biopsy results and oncology follow up before deciding.   - order to recheck BMP 6/26    Heart failure with reduced ejection fraction (H)  - advanced, EF 25% and with global hypokinesis on Echo inpatient. Resp status and VS baseline. Weights actually fairly stable 95lb. Does have some LE edema.  - will hold lasix through Monday 2/2 bump in BUN and in order to balance renal and cardiac. Patient did f/w cards and declined BB, or ACE/ARB. BPs have been stable  - monitor VS, weights, resp status    Chronic obstructive pulmonary disease with acute exacerbation (H)  - chronic, no wheezing noted. Some chronic mild SOB since admit (did have PNA early on and was treated with burst of steroids inpatient)   - continues on duonebs, albuterol neb prn    Protein-calorie malnutrition, unspecified severity " (H)  - Frail appearing. Weights stable but generally po intake has been poor since admit. Staff and family encourage patient. Remains on Nepro nutritional supplement. Dietician follows as well.  - continue to encourage as patient tolerates  - continue Zofran for any c/o nausea    Compression fracture of thoracic vertebra with delayed healing, unspecified thoracic vertebral level, subsequent encounter  - chronic, more pain this week then previously  - added Robaxin back 2/2 increased reports of discomfort when off of it.   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 Has f/w oncology. Liver biopsy performed with IV sedation yesterday. Pathology pending. Has f/u with oncology next Friday.  Per oncology note any treatment would be palliative at this point.         Orders:  Written on unit and discussed with nursing    Electronically signed by: JOHNNY Torres CNP

## 2023-06-22 NOTE — LETTER
6/22/2023        RE: Mayela Espino  [address] [address]        [facility] GERIATRICS    Chief Complaint   Patient presents with     RECHECK     HPI:  Mayela Espino is a 89 year old  (12/18/1932), who is being seen today for an episodic care visit at: [facility] ([facility]) [[id number]]. Today's concern is:     Patient seen today for recheck of multiple acute and chronic medical issues:    1. Generalized muscle weakness    2. Hyperkalemia    3. Chronic kidney disease, stage 4 (severe) (H)    4. Heart failure with reduced ejection fraction (H)    5. Chronic obstructive pulmonary disease with acute exacerbation (H)    6. Protein-calorie malnutrition, unspecified severity (H)    7. Compression fracture of thoracic vertebra with delayed healing, unspecified thoracic vertebral level, subsequent encounter    8. Carcinoma of breast metastatic to liver, unspecified laterality (H)    9. 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 Pathology pending. Oncology f/u is scheduled for next Friday.     Patient reportedly continues to be weak. Staff and family have been encouraging po food and fluids.     Patient in room seated on bed. Son Don here as well. Patient oral intake remains modest. C/o intermittent nausea. Does have daily and prn Zofran available for this. Earlier in week family had inquired about offering IVF in TCU. Writer spoke with triage nurse to relay that patient would need to go to hospital for IVF as they cannot be safely administered in TCU. Discussed hospice again today but patient herself was quite quiet when asked about it and son did relay that they are hoping to wait to make any decision regarding hospice until liver biopsy results come back. Explained again that patient is medically fragile and on the precipice of requiring hospitalization so the decision may need to be made one way or another prior to the  "oncology appt next Friday.     Allergies, and PMH/PSH reviewed in [facility] today.  REVIEW OF SYSTEMS:      Objective:   /83   Pulse 88   Temp 97  F (36.1  C)   Resp 18   Ht 1.549 m (5' 1\")   Wt 43.1 kg (95 lb)   SpO2 95%   BMI 17.95 kg/m      Resp: Effort WNL, LS diminished in bilateral bases. O2 on 2LNC  CV: VS as above, trace bilateral ankle edema  Abd- soft, nontender, BS +  Musc- GUTIÉRREZ- generalized weakness  Psych- alert, calm- quiet today      Recent labs in [facility] reviewed by me today.     Assessment/Plan:  Generalized muscle weakness  - persists, multifactorial  - ongoing supportive cares and services in TCU    Hyperkalemia  - chronic, 2/2 advanced renal disease. K+ 5.6 today. Discussed with MD. Will recheck on 6/26  Chronic kidney disease, stage 4 (severe) (H)  - chronic, advanced. Patient has declined dialysis  - BUN up today, Cr recent baseline. Discussed with MD. Will hold the next couple doses of lasix. Family and staff continue to encourage po fluids. Nursing and family have been told cannot do IVF in TCU but if would like to pursue would need to go to the hospital. This was reiterated to nurse manager today. This was also discussed with MD. Patient and family are declining hospice as hoping to get biopsy results and oncology follow up before deciding.   - order to recheck BMP 6/26    Heart failure with reduced ejection fraction (H)  - advanced, EF 25% and with global hypokinesis on Echo inpatient. Resp status and VS baseline. Weights actually fairly stable 95lb. Does have some LE edema.  - will hold lasix through Monday 2/2 bump in BUN and in order to balance renal and cardiac. Patient did f/w cards and declined BB, or ACE/ARB. BPs have been stable  - monitor VS, weights, resp status    Chronic obstructive pulmonary disease with acute exacerbation (H)  - chronic, no wheezing noted. Some chronic mild SOB since admit (did have PNA early on and was treated with burst of steroids inpatient)   - " continues on duonebs, albuterol neb prn    Protein-calorie malnutrition, unspecified severity (H)  - Frail appearing. Weights stable but generally po intake has been poor since admit. Staff and family encourage patient. Remains on Nepro nutritional supplement. Dietician follows as well.  - continue to encourage as patient tolerates  - continue Zofran for any c/o nausea    Compression fracture of thoracic vertebra with delayed healing, unspecified thoracic vertebral level, subsequent encounter  - chronic, more pain this week then previously  - added Robaxin back 2/2 increased reports of discomfort when off of it.   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 Has f/w oncology. Liver biopsy performed with IV sedation yesterday. Pathology pending. Has f/u with oncology next Friday.  Per oncology note any treatment would be palliative at this point.         Orders:  Written on unit and discussed with nursing    Electronically signed by: JOHNNY Torres CNP           Sincerely,        JOHNNY Torres CNP

## 2023-06-26 NOTE — LETTER
"    6/26/2023        RE: Mayela Espino  [address] [address]        [facility] GERIATRICS    Chief Complaint   Patient presents with     RECHECK     HPI:  Mayela Espino is a 89 year old  (12/18/1932), who is being seen today for an episodic care visit at: [facility] ([facility]) [[id number]].     Patient found in room initially lying in bed sleeping. Later did awaken. Is very weak. Reports fatigue and weakness. Is not tracking very well today.    Nursing reports patient spilled her water three times this morning. CNA escorted patient to the bathroom and reported patient was quite weak.     Nurse manager reported patient progressively more weak over the weekend. Son returned to [place] and did want to be contacted if patient took a turn for the worse.     K+ level today is 6.5    Allergies, and PMH/PSH reviewed in [facility] today.  REVIEW OF SYSTEMS:  As above    Objective:   /69   Pulse 84   Temp (!) 95.7  F (35.4  C)   Resp 20   Ht 1.549 m (5' 1\")   Wt 42.2 kg (93 lb)   SpO2 97%   BMI 17.57 kg/m      Resp: Effort WNL, LS diminished in bases, no adventitious sounds noted  CV: VS as above, trace bilateral ankle edema  Abd- soft, nontender, BS +  Musc- GUTIÉRREZ- very weak  Psych- alert, calm, slightly confused vs unable to focus well        Most Recent 3 CBC's:  Recent Labs   Lab Test 06/09/23  0548 06/05/23  0656 05/31/23  0906   WBC 5.5 5.0 7.3   HGB 10.6* 10.9* 10.8*   * 108* 107*   * 117* 124*     Most Recent 3 BMP's:  Recent Labs   Lab Test 06/26/23  0611 06/22/23  0636 06/19/23  1109    140 138   POTASSIUM 6.5* 5.6* 5.4*   CHLORIDE 105 107 105   CO2 23 23 20*   BUN 98.7* 88.2* 78.9*   CR 2.80* 2.33* 2.39*   ANIONGAP 11 10 13   ITA 9.3 9.5 8.9   GLC 84 126* 140*       Assessment/Plan:  Generalized muscle weakness  Multifactorial. PT unable to work with patient today 2/2 clinical status and patient's desire to rest.    Hyperkalemia  - chronic, 2/2 advanced renal " disease. Intermittently elevated. Critical value today of 6.5. Discussed kayexalate with patient's son today.  Chronic kidney disease, stage 4 (severe) (H)  - advanced disease. Is not a dialysis candidate. BUN/Cr elevated above baseline Thursday and worsened today. Lasix was held Friday and order to hold again today pending labs today  Heart failure with reduced ejection fraction (H)  - weights stable over TCU course, has had some intermittent bilateral ankle edema. Respiratory status stable currently.    Chronic obstructive pulmonary disease with acute exacerbation (H)  - chronic, no wheezing. Continues on Duonebs  Protein-calorie malnutrition, unspecified severity (H)  Appetite has been poor over TCU course. Facility staff and family have continued to encourage patient and assist patient to find food she finds desirable. Dietician has been following as well for recommendations to optimize nutritional status. Is on Nepro. Weights have generally remained stable.    Compression fracture of thoracic vertebra with delayed healing, unspecified thoracic vertebral level, subsequent encounter  - Chronic, has been reporting worsening back pain over the last week or so. Difficult to know if this is r/t old compression fractures, physical deconditioning or cancer.   - Is back on QID low dose Robaxin as pain seemed to worsen without this? Continues on prn oxycodone which per nursing does provide some relief. Stopped taking scheduled acetaminophen 2/2 trouble taking so many pills.    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 New finding of metastatic cancer inpatient when went in for worsening back pain. Did have liver biopsy performed last week. F/u with oncology to discuss ? Palliative treatments Friday of this week.     Patient  in her room while writer yet writing orders for hospice and a dose  of Kayexalate after speaking with son. Son Mina again contacted by writer to notify him of his mother's passing. Did relay to Don that hospice has stated they are available for bereavement . Don did decline at this time.    Orders:  Did not relay orders 2/2 patient death while in process of writing the orders.    Electronically signed by: JOHNNY Torres CNP           Sincerely,        JOHNNY Torres CNP

## 2023-06-26 NOTE — PROGRESS NOTES
"[facility] GERIATRICS    Chief Complaint   Patient presents with     RECHECK     HPI:  Mayela Espino is a 89 year old  (12/18/1932), who is being seen today for an episodic care visit at: [facility] ([facility]) [[id number]].     Patient found in room initially lying in bed sleeping. Later did awaken. Is very weak. Reports fatigue and weakness. Is not tracking very well today.    Nursing reports patient spilled her water three times this morning. CNA escorted patient to the bathroom and reported patient was quite weak.     Nurse manager reported patient progressively more weak over the weekend. Son returned to [place] and did want to be contacted if patient took a turn for the worse.     K+ level today is 6.5    Allergies, and PMH/PSH reviewed in [facility] today.  REVIEW OF SYSTEMS:  As above    Objective:   /69   Pulse 84   Temp (!) 95.7  F (35.4  C)   Resp 20   Ht 1.549 m (5' 1\")   Wt 42.2 kg (93 lb)   SpO2 97%   BMI 17.57 kg/m      Resp: Effort WNL, LS diminished in bases, no adventitious sounds noted  CV: VS as above, trace bilateral ankle edema  Abd- soft, nontender, BS +  Musc- GUTIÉRREZ- very weak  Psych- alert, calm, slightly confused vs unable to focus well        Most Recent 3 CBC's:  Recent Labs   Lab Test 06/09/23  0548 06/05/23  0656 05/31/23  0906   WBC 5.5 5.0 7.3   HGB 10.6* 10.9* 10.8*   * 108* 107*   * 117* 124*     Most Recent 3 BMP's:  Recent Labs   Lab Test 06/26/23  0611 06/22/23  0636 06/19/23  1109    140 138   POTASSIUM 6.5* 5.6* 5.4*   CHLORIDE 105 107 105   CO2 23 23 20*   BUN 98.7* 88.2* 78.9*   CR 2.80* 2.33* 2.39*   ANIONGAP 11 10 13   ITA 9.3 9.5 8.9   GLC 84 126* 140*       Assessment/Plan:  Generalized muscle weakness  Multifactorial. PT unable to work with patient today 2/2 clinical status and patient's desire to rest.    Hyperkalemia  - chronic, 2/2 advanced renal disease. Intermittently elevated. Critical value today of 6.5. Discussed kayexalate with " patient's son today.  Chronic kidney disease, stage 4 (severe) (H)  - advanced disease. Is not a dialysis candidate. BUN/Cr elevated above baseline Thursday and worsened today. Lasix was held Friday and order to hold again today pending labs today  Heart failure with reduced ejection fraction (H)  - weights stable over TCU course, has had some intermittent bilateral ankle edema. Respiratory status stable currently.    Chronic obstructive pulmonary disease with acute exacerbation (H)  - chronic, no wheezing. Continues on Duonebs  Protein-calorie malnutrition, unspecified severity (H)  Appetite has been poor over TCU course. Facility staff and family have continued to encourage patient and assist patient to find food she finds desirable. Dietician has been following as well for recommendations to optimize nutritional status. Is on Nepro. Weights have generally remained stable.    Compression fracture of thoracic vertebra with delayed healing, unspecified thoracic vertebral level, subsequent encounter  - Chronic, has been reporting worsening back pain over the last week or so. Difficult to know if this is r/t old compression fractures, physical deconditioning or cancer.   - Is back on QID low dose Robaxin as pain seemed to worsen without this? Continues on prn oxycodone which per nursing does provide some relief. Stopped taking scheduled acetaminophen 2/2 trouble taking so many pills.    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 New finding of metastatic cancer inpatient when went in for worsening back pain. Did have liver biopsy performed last week. F/u with oncology to discuss ? Palliative treatments Friday of this week.     Patient  in her room while writer yet writing orders for hospice and a dose of Kayexalate after speaking with son. Son Don again contacted by writer to notify him of  his mother's passing. Did relay to Don that hospice has stated they are available for bereavement . Don did decline at this time.    Orders:  Did not relay orders 2/2 patient death while in process of writing the orders.    Electronically signed by: JOHNNY Torres CNP

## 2023-06-27 VITALS
BODY MASS INDEX: 21.98 KG/M2 | HEIGHT: 55 IN | SYSTOLIC BLOOD PRESSURE: 111 MMHG | HEART RATE: 79 BPM | OXYGEN SATURATION: 95 % | WEIGHT: 95 LBS | DIASTOLIC BLOOD PRESSURE: 73 MMHG | RESPIRATION RATE: 18 BRPM | TEMPERATURE: 97 F

## 2023-06-30 ENCOUNTER — TELEPHONE (OUTPATIENT)
Dept: ONCOLOGY | Facility: HOSPITAL | Age: 88
End: 2023-06-30
Payer: MEDICARE

## 2023-06-30 NOTE — TELEPHONE ENCOUNTER
Don called and asked if he could speak with Dr. Bardales as his mother passed away this week but had also had a biopsy and would like to speak about those results. I advised that Dr. Bardales is not in the office yet but I would pass along the message. He left his name and contact number. I offered him my condolences. WILFRED Jackson, OCN, CBCN

## 2023-07-03 RX ORDER — FUROSEMIDE 20 MG
20 TABLET ORAL
Start: 2023-07-03

## (undated) RX ORDER — FENTANYL CITRATE 50 UG/ML
INJECTION, SOLUTION INTRAMUSCULAR; INTRAVENOUS
Status: DISPENSED
Start: 2023-01-01